# Patient Record
Sex: MALE | Race: WHITE | Employment: UNEMPLOYED | URBAN - METROPOLITAN AREA
[De-identification: names, ages, dates, MRNs, and addresses within clinical notes are randomized per-mention and may not be internally consistent; named-entity substitution may affect disease eponyms.]

---

## 2017-01-01 ENCOUNTER — HOSPICE ADMISSION (OUTPATIENT)
Dept: HOSPICE | Facility: HOSPICE | Age: 72
End: 2017-01-01
Payer: MEDICARE

## 2017-01-01 ENCOUNTER — APPOINTMENT (OUTPATIENT)
Dept: GENERAL RADIOLOGY | Age: 72
DRG: 329 | End: 2017-01-01
Attending: EMERGENCY MEDICINE
Payer: MEDICARE

## 2017-01-01 ENCOUNTER — ANESTHESIA EVENT (OUTPATIENT)
Dept: SURGERY | Age: 72
DRG: 329 | End: 2017-01-01
Payer: MEDICARE

## 2017-01-01 ENCOUNTER — TELEPHONE (OUTPATIENT)
Dept: NEUROLOGY | Age: 72
End: 2017-01-01

## 2017-01-01 ENCOUNTER — APPOINTMENT (OUTPATIENT)
Dept: GENERAL RADIOLOGY | Age: 72
DRG: 329 | End: 2017-01-01
Attending: INTERNAL MEDICINE
Payer: MEDICARE

## 2017-01-01 ENCOUNTER — APPOINTMENT (OUTPATIENT)
Dept: CT IMAGING | Age: 72
DRG: 329 | End: 2017-01-01
Attending: INTERNAL MEDICINE
Payer: MEDICARE

## 2017-01-01 ENCOUNTER — APPOINTMENT (OUTPATIENT)
Dept: GENERAL RADIOLOGY | Age: 72
DRG: 329 | End: 2017-01-01
Attending: SURGERY
Payer: MEDICARE

## 2017-01-01 ENCOUNTER — HOSPITAL ENCOUNTER (INPATIENT)
Age: 72
LOS: 14 days | Discharge: HOSPICE | DRG: 329 | End: 2017-04-24
Attending: EMERGENCY MEDICINE | Admitting: INTERNAL MEDICINE
Payer: MEDICARE

## 2017-01-01 ENCOUNTER — APPOINTMENT (OUTPATIENT)
Dept: INTERVENTIONAL RADIOLOGY/VASCULAR | Age: 72
DRG: 329 | End: 2017-01-01
Attending: INTERNAL MEDICINE
Payer: MEDICARE

## 2017-01-01 ENCOUNTER — HOME CARE VISIT (OUTPATIENT)
Dept: HOSPICE | Facility: HOSPICE | Age: 72
End: 2017-01-01
Payer: MEDICARE

## 2017-01-01 ENCOUNTER — OFFICE VISIT (OUTPATIENT)
Dept: NEUROLOGY | Age: 72
End: 2017-01-01

## 2017-01-01 ENCOUNTER — APPOINTMENT (OUTPATIENT)
Dept: CT IMAGING | Age: 72
DRG: 329 | End: 2017-01-01
Attending: PSYCHIATRY & NEUROLOGY
Payer: MEDICARE

## 2017-01-01 ENCOUNTER — APPOINTMENT (OUTPATIENT)
Dept: GENERAL RADIOLOGY | Age: 72
DRG: 329 | End: 2017-01-01
Attending: RADIOLOGY
Payer: MEDICARE

## 2017-01-01 ENCOUNTER — ANESTHESIA (OUTPATIENT)
Dept: ENDOSCOPY | Age: 72
DRG: 329 | End: 2017-01-01
Payer: MEDICARE

## 2017-01-01 ENCOUNTER — HOSPITAL ENCOUNTER (INPATIENT)
Age: 72
LOS: 4 days | Discharge: HOME HEALTH | DRG: 175 | End: 2017-04-09
Attending: EMERGENCY MEDICINE | Admitting: INTERNAL MEDICINE
Payer: MEDICARE

## 2017-01-01 ENCOUNTER — ANESTHESIA EVENT (OUTPATIENT)
Dept: ENDOSCOPY | Age: 72
DRG: 329 | End: 2017-01-01
Payer: MEDICARE

## 2017-01-01 ENCOUNTER — APPOINTMENT (OUTPATIENT)
Dept: CT IMAGING | Age: 72
DRG: 175 | End: 2017-01-01
Attending: EMERGENCY MEDICINE
Payer: MEDICARE

## 2017-01-01 ENCOUNTER — APPOINTMENT (OUTPATIENT)
Dept: GENERAL RADIOLOGY | Age: 72
DRG: 329 | End: 2017-01-01
Attending: PHYSICIAN ASSISTANT
Payer: MEDICARE

## 2017-01-01 ENCOUNTER — ANESTHESIA (OUTPATIENT)
Dept: SURGERY | Age: 72
DRG: 329 | End: 2017-01-01
Payer: MEDICARE

## 2017-01-01 ENCOUNTER — HOSPITAL ENCOUNTER (INPATIENT)
Age: 72
LOS: 1 days | DRG: 189 | End: 2017-04-24
Attending: FAMILY MEDICINE | Admitting: FAMILY MEDICINE
Payer: OTHER MISCELLANEOUS

## 2017-01-01 ENCOUNTER — TELEPHONE (OUTPATIENT)
Dept: CARDIOLOGY CLINIC | Age: 72
End: 2017-01-01

## 2017-01-01 ENCOUNTER — APPOINTMENT (OUTPATIENT)
Dept: GENERAL RADIOLOGY | Age: 72
DRG: 175 | End: 2017-01-01
Attending: EMERGENCY MEDICINE
Payer: MEDICARE

## 2017-01-01 ENCOUNTER — PATIENT MESSAGE (OUTPATIENT)
Dept: NEUROLOGY | Age: 72
End: 2017-01-01

## 2017-01-01 VITALS
HEART RATE: 81 BPM | WEIGHT: 315 LBS | BODY MASS INDEX: 44.13 KG/M2 | OXYGEN SATURATION: 96 % | DIASTOLIC BLOOD PRESSURE: 70 MMHG | SYSTOLIC BLOOD PRESSURE: 114 MMHG

## 2017-01-01 VITALS
BODY MASS INDEX: 43.2 KG/M2 | DIASTOLIC BLOOD PRESSURE: 92 MMHG | RESPIRATION RATE: 20 BRPM | SYSTOLIC BLOOD PRESSURE: 145 MMHG | HEIGHT: 71 IN | OXYGEN SATURATION: 94 % | TEMPERATURE: 98.4 F | HEART RATE: 90 BPM | WEIGHT: 308.6 LBS

## 2017-01-01 VITALS
SYSTOLIC BLOOD PRESSURE: 152 MMHG | HEIGHT: 71 IN | OXYGEN SATURATION: 92 % | BODY MASS INDEX: 40.68 KG/M2 | HEART RATE: 104 BPM | DIASTOLIC BLOOD PRESSURE: 74 MMHG | WEIGHT: 290.57 LBS | TEMPERATURE: 99 F | RESPIRATION RATE: 22 BRPM

## 2017-01-01 VITALS — RESPIRATION RATE: 20 BRPM | WEIGHT: 315 LBS | HEIGHT: 71 IN | BODY MASS INDEX: 44.1 KG/M2

## 2017-01-01 VITALS
OXYGEN SATURATION: 97 % | DIASTOLIC BLOOD PRESSURE: 82 MMHG | TEMPERATURE: 98.9 F | HEART RATE: 100 BPM | SYSTOLIC BLOOD PRESSURE: 153 MMHG | RESPIRATION RATE: 22 BRPM

## 2017-01-01 VITALS
DIASTOLIC BLOOD PRESSURE: 66 MMHG | OXYGEN SATURATION: 96 % | BODY MASS INDEX: 42.34 KG/M2 | HEART RATE: 102 BPM | WEIGHT: 303.6 LBS | SYSTOLIC BLOOD PRESSURE: 126 MMHG

## 2017-01-01 DIAGNOSIS — C18.7 MALIGNANT NEOPLASM OF SIGMOID COLON (HCC): ICD-10-CM

## 2017-01-01 DIAGNOSIS — G70.01 MYASTHENIA GRAVIS IN CRISIS (HCC): Primary | ICD-10-CM

## 2017-01-01 DIAGNOSIS — K92.2 GASTROINTESTINAL HEMORRHAGE, UNSPECIFIED GASTROINTESTINAL HEMORRHAGE TYPE: Primary | ICD-10-CM

## 2017-01-01 DIAGNOSIS — G70.01 MYASTHENIA GRAVIS WITH EXACERBATION, ADULT FORM (HCC): Chronic | ICD-10-CM

## 2017-01-01 DIAGNOSIS — K65.1 ABSCESS OF ABDOMINAL CAVITY (HCC): ICD-10-CM

## 2017-01-01 DIAGNOSIS — I26.99 OTHER ACUTE PULMONARY EMBOLISM WITHOUT ACUTE COR PULMONALE (HCC): ICD-10-CM

## 2017-01-01 DIAGNOSIS — R06.02 SOB (SHORTNESS OF BREATH): ICD-10-CM

## 2017-01-01 DIAGNOSIS — I82.419 ACUTE DEEP VEIN THROMBOSIS (DVT) OF FEMORAL VEIN, UNSPECIFIED LATERALITY (HCC): ICD-10-CM

## 2017-01-01 DIAGNOSIS — I48.0 PAF (PAROXYSMAL ATRIAL FIBRILLATION) (HCC): Chronic | ICD-10-CM

## 2017-01-01 DIAGNOSIS — J96.00 ACUTE RESPIRATORY FAILURE, UNSPECIFIED WHETHER WITH HYPOXIA OR HYPERCAPNIA (HCC): ICD-10-CM

## 2017-01-01 DIAGNOSIS — G70.01 MYASTHENIA GRAVIS IN CRISIS (HCC): ICD-10-CM

## 2017-01-01 DIAGNOSIS — R00.0 TACHYCARDIA: ICD-10-CM

## 2017-01-01 DIAGNOSIS — G70.01 MYASTHENIA GRAVIS WITH EXACERBATION, ADULT FORM (HCC): Primary | ICD-10-CM

## 2017-01-01 DIAGNOSIS — G70.01 MYASTHENIA GRAVIS WITH EXACERBATION (HCC): Primary | ICD-10-CM

## 2017-01-01 DIAGNOSIS — R91.8 LUNG NODULES: ICD-10-CM

## 2017-01-01 DIAGNOSIS — D62 ACUTE BLOOD LOSS ANEMIA: ICD-10-CM

## 2017-01-01 DIAGNOSIS — E66.01 MORBID OBESITY WITH BMI OF 40.0-44.9, ADULT (HCC): Chronic | ICD-10-CM

## 2017-01-01 DIAGNOSIS — I26.99 PULMONARY INFARCT (HCC): ICD-10-CM

## 2017-01-01 DIAGNOSIS — I26.99 OTHER ACUTE PULMONARY EMBOLISM WITHOUT ACUTE COR PULMONALE (HCC): Primary | ICD-10-CM

## 2017-01-01 LAB
ABO + RH BLD: NORMAL
ABO + RH BLD: NORMAL
ALBUMIN SERPL BCP-MCNC: 1.8 G/DL (ref 3.5–5)
ALBUMIN SERPL BCP-MCNC: 2.1 G/DL (ref 3.5–5)
ALBUMIN SERPL BCP-MCNC: 2.2 G/DL (ref 3.5–5)
ALBUMIN SERPL BCP-MCNC: 2.3 G/DL (ref 3.5–5)
ALBUMIN SERPL BCP-MCNC: 2.5 G/DL (ref 3.5–5)
ALBUMIN SERPL BCP-MCNC: 2.7 G/DL (ref 3.5–5)
ALBUMIN SERPL BCP-MCNC: 3.1 G/DL (ref 3.5–5)
ALBUMIN SERPL-MCNC: 3.5 G/DL (ref 3.6–4.8)
ALBUMIN/GLOB SERPL: 0.7 {RATIO} (ref 1.1–2.2)
ALBUMIN/GLOB SERPL: 0.8 {RATIO} (ref 1.1–2.2)
ALBUMIN/GLOB SERPL: 0.9 {RATIO} (ref 1.1–2.2)
ALBUMIN/GLOB SERPL: 1 {RATIO} (ref 1.1–2.2)
ALBUMIN/GLOB SERPL: 1 {RATIO} (ref 1.1–2.2)
ALP SERPL-CCNC: 40 U/L (ref 45–117)
ALP SERPL-CCNC: 44 U/L (ref 45–117)
ALP SERPL-CCNC: 47 U/L (ref 45–117)
ALP SERPL-CCNC: 48 U/L (ref 45–117)
ALP SERPL-CCNC: 48 U/L (ref 45–117)
ALP SERPL-CCNC: 50 U/L (ref 45–117)
ALP SERPL-CCNC: 54 U/L (ref 45–117)
ALP SERPL-CCNC: 60 IU/L (ref 39–117)
ALP SERPL-CCNC: 68 U/L (ref 45–117)
ALP SERPL-CCNC: 77 U/L (ref 45–117)
ALT SERPL-CCNC: 106 U/L (ref 12–78)
ALT SERPL-CCNC: 16 U/L (ref 12–78)
ALT SERPL-CCNC: 28 IU/L (ref 0–44)
ALT SERPL-CCNC: 29 U/L (ref 12–78)
ALT SERPL-CCNC: 31 U/L (ref 12–78)
ALT SERPL-CCNC: 32 U/L (ref 12–78)
ALT SERPL-CCNC: 32 U/L (ref 12–78)
ALT SERPL-CCNC: 33 U/L (ref 12–78)
ALT SERPL-CCNC: 41 U/L (ref 12–78)
ALT SERPL-CCNC: 73 U/L (ref 12–78)
ANION GAP BLD CALC-SCNC: 10 MMOL/L (ref 5–15)
ANION GAP BLD CALC-SCNC: 11 MMOL/L (ref 5–15)
ANION GAP BLD CALC-SCNC: 11 MMOL/L (ref 5–15)
ANION GAP BLD CALC-SCNC: 13 MMOL/L (ref 5–15)
ANION GAP BLD CALC-SCNC: 5 MMOL/L (ref 5–15)
ANION GAP BLD CALC-SCNC: 5 MMOL/L (ref 5–15)
ANION GAP BLD CALC-SCNC: 6 MMOL/L (ref 5–15)
ANION GAP BLD CALC-SCNC: 7 MMOL/L (ref 5–15)
ANION GAP BLD CALC-SCNC: 8 MMOL/L (ref 5–15)
ANION GAP BLD CALC-SCNC: 9 MMOL/L (ref 5–15)
APPEARANCE UR: CLEAR
APPEARANCE UR: CLEAR
APTT PPP: 24.5 SEC (ref 22.1–32.5)
ARTERIAL PATENCY WRIST A: YES
AST SERPL W P-5'-P-CCNC: 16 U/L (ref 15–37)
AST SERPL W P-5'-P-CCNC: 16 U/L (ref 15–37)
AST SERPL W P-5'-P-CCNC: 18 U/L (ref 15–37)
AST SERPL W P-5'-P-CCNC: 19 U/L (ref 15–37)
AST SERPL W P-5'-P-CCNC: 21 U/L (ref 15–37)
AST SERPL W P-5'-P-CCNC: 22 U/L (ref 15–37)
AST SERPL W P-5'-P-CCNC: 27 U/L (ref 15–37)
AST SERPL W P-5'-P-CCNC: 29 U/L (ref 15–37)
AST SERPL W P-5'-P-CCNC: 44 U/L (ref 15–37)
AST SERPL-CCNC: 22 IU/L (ref 0–40)
AT III PPP CHRO-ACNC: 144 % (ref 75–135)
ATRIAL RATE: 127 BPM
ATRIAL RATE: 166 BPM
ATRIAL RATE: 67 BPM
BACTERIA SPEC CULT: NORMAL
BACTERIA SPEC CULT: NORMAL
BACTERIA URNS QL MICRO: NEGATIVE /HPF
BACTERIA URNS QL MICRO: NEGATIVE /HPF
BASE EXCESS BLDA CALC-SCNC: 4.7 MMOL/L
BASE EXCESS BLDA CALC-SCNC: 6.6 MMOL/L
BASE EXCESS BLDA CALC-SCNC: 6.7 MMOL/L
BASOPHILS # BLD AUTO: 0 K/UL (ref 0–0.1)
BASOPHILS # BLD AUTO: 0 X10E3/UL (ref 0–0.2)
BASOPHILS # BLD AUTO: 0 X10E3/UL (ref 0–0.2)
BASOPHILS # BLD AUTO: 0.1 K/UL (ref 0–0.1)
BASOPHILS # BLD: 0 % (ref 0–1)
BASOPHILS # BLD: 1 % (ref 0–1)
BASOPHILS NFR BLD AUTO: 0 %
BASOPHILS NFR BLD AUTO: 0 %
BDY SITE: ABNORMAL
BILIRUB DIRECT SERPL-MCNC: 0.1 MG/DL (ref 0–0.2)
BILIRUB DIRECT SERPL-MCNC: 0.12 MG/DL (ref 0–0.4)
BILIRUB SERPL-MCNC: 0.3 MG/DL (ref 0–1.2)
BILIRUB SERPL-MCNC: 0.4 MG/DL (ref 0.2–1)
BILIRUB SERPL-MCNC: 0.4 MG/DL (ref 0.2–1)
BILIRUB SERPL-MCNC: 0.5 MG/DL (ref 0.2–1)
BILIRUB SERPL-MCNC: 0.6 MG/DL (ref 0.2–1)
BILIRUB SERPL-MCNC: 0.6 MG/DL (ref 0.2–1)
BILIRUB UR QL: NEGATIVE
BILIRUB UR QL: NEGATIVE
BLD PROD TYP BPU: NORMAL
BLOOD GROUP ANTIBODIES SERPL: NORMAL
BLOOD GROUP ANTIBODIES SERPL: NORMAL
BNP SERPL-MCNC: 1201 PG/ML (ref 0–125)
BNP SERPL-MCNC: 759 PG/ML (ref 0–125)
BNP SERPL-MCNC: 794 PG/ML (ref 0–125)
BPU ID: NORMAL
BUN SERPL-MCNC: 10 MG/DL (ref 6–20)
BUN SERPL-MCNC: 12 MG/DL (ref 6–20)
BUN SERPL-MCNC: 14 MG/DL (ref 6–20)
BUN SERPL-MCNC: 16 MG/DL (ref 6–20)
BUN SERPL-MCNC: 18 MG/DL (ref 6–20)
BUN SERPL-MCNC: 19 MG/DL (ref 6–20)
BUN SERPL-MCNC: 21 MG/DL (ref 6–20)
BUN SERPL-MCNC: 25 MG/DL (ref 6–20)
BUN SERPL-MCNC: 5 MG/DL (ref 6–20)
BUN SERPL-MCNC: 6 MG/DL (ref 6–20)
BUN SERPL-MCNC: 6 MG/DL (ref 6–20)
BUN SERPL-MCNC: 7 MG/DL (ref 6–20)
BUN SERPL-MCNC: 8 MG/DL (ref 6–20)
BUN SERPL-MCNC: 9 MG/DL (ref 6–20)
BUN/CREAT SERPL: 10 (ref 12–20)
BUN/CREAT SERPL: 11 (ref 12–20)
BUN/CREAT SERPL: 11 (ref 12–20)
BUN/CREAT SERPL: 12 (ref 12–20)
BUN/CREAT SERPL: 13 (ref 12–20)
BUN/CREAT SERPL: 13 (ref 12–20)
BUN/CREAT SERPL: 15 (ref 12–20)
BUN/CREAT SERPL: 16 (ref 12–20)
BUN/CREAT SERPL: 19 (ref 12–20)
BUN/CREAT SERPL: 20 (ref 12–20)
BUN/CREAT SERPL: 7 (ref 12–20)
BUN/CREAT SERPL: 8 (ref 12–20)
BUN/CREAT SERPL: 8 (ref 12–20)
CALCIUM SERPL-MCNC: 7.4 MG/DL (ref 8.5–10.1)
CALCIUM SERPL-MCNC: 7.5 MG/DL (ref 8.5–10.1)
CALCIUM SERPL-MCNC: 7.6 MG/DL (ref 8.5–10.1)
CALCIUM SERPL-MCNC: 7.7 MG/DL (ref 8.5–10.1)
CALCIUM SERPL-MCNC: 7.9 MG/DL (ref 8.5–10.1)
CALCIUM SERPL-MCNC: 8 MG/DL (ref 8.5–10.1)
CALCIUM SERPL-MCNC: 8.1 MG/DL (ref 8.5–10.1)
CALCIUM SERPL-MCNC: 8.1 MG/DL (ref 8.5–10.1)
CALCIUM SERPL-MCNC: 8.2 MG/DL (ref 8.5–10.1)
CALCIUM SERPL-MCNC: 8.3 MG/DL (ref 8.5–10.1)
CALCIUM SERPL-MCNC: 8.3 MG/DL (ref 8.5–10.1)
CALCIUM SERPL-MCNC: 8.4 MG/DL (ref 8.5–10.1)
CALCIUM SERPL-MCNC: 8.4 MG/DL (ref 8.5–10.1)
CALCIUM SERPL-MCNC: 8.5 MG/DL (ref 8.5–10.1)
CALCIUM SERPL-MCNC: 8.6 MG/DL (ref 8.5–10.1)
CALCIUM SERPL-MCNC: 8.7 MG/DL (ref 8.5–10.1)
CALCIUM SERPL-MCNC: 8.8 MG/DL (ref 8.5–10.1)
CALCIUM SERPL-MCNC: 9.3 MG/DL (ref 8.5–10.1)
CALCULATED R AXIS, ECG10: 21 DEGREES
CALCULATED R AXIS, ECG10: 35 DEGREES
CALCULATED R AXIS, ECG10: 45 DEGREES
CALCULATED T AXIS, ECG11: -116 DEGREES
CALCULATED T AXIS, ECG11: -163 DEGREES
CALCULATED T AXIS, ECG11: 39 DEGREES
CARDIOLIPIN IGA SER IA-ACNC: <9 APL U/ML (ref 0–11)
CARDIOLIPIN IGG SER IA-ACNC: <9 GPL U/ML (ref 0–14)
CARDIOLIPIN IGM SER IA-ACNC: <9 MPL U/ML (ref 0–12)
CHLORIDE SERPL-SCNC: 100 MMOL/L (ref 97–108)
CHLORIDE SERPL-SCNC: 101 MMOL/L (ref 97–108)
CHLORIDE SERPL-SCNC: 102 MMOL/L (ref 97–108)
CHLORIDE SERPL-SCNC: 103 MMOL/L (ref 97–108)
CHLORIDE SERPL-SCNC: 104 MMOL/L (ref 97–108)
CHLORIDE SERPL-SCNC: 105 MMOL/L (ref 97–108)
CHLORIDE SERPL-SCNC: 106 MMOL/L (ref 97–108)
CHLORIDE SERPL-SCNC: 109 MMOL/L (ref 97–108)
CHLORIDE SERPL-SCNC: 111 MMOL/L (ref 97–108)
CHOLEST SERPL-MCNC: 151 MG/DL
CK MB CFR SERPL CALC: ABNORMAL % (ref 0–2.5)
CK MB SERPL-MCNC: <1 NG/ML (ref 5–25)
CK SERPL-CCNC: 34 U/L (ref 39–308)
CO2 SERPL-SCNC: 22 MMOL/L (ref 21–32)
CO2 SERPL-SCNC: 24 MMOL/L (ref 21–32)
CO2 SERPL-SCNC: 24 MMOL/L (ref 21–32)
CO2 SERPL-SCNC: 25 MMOL/L (ref 21–32)
CO2 SERPL-SCNC: 25 MMOL/L (ref 21–32)
CO2 SERPL-SCNC: 28 MMOL/L (ref 21–32)
CO2 SERPL-SCNC: 29 MMOL/L (ref 21–32)
CO2 SERPL-SCNC: 29 MMOL/L (ref 21–32)
CO2 SERPL-SCNC: 30 MMOL/L (ref 21–32)
CO2 SERPL-SCNC: 31 MMOL/L (ref 21–32)
CO2 SERPL-SCNC: 32 MMOL/L (ref 21–32)
CO2 SERPL-SCNC: 33 MMOL/L (ref 21–32)
CO2 SERPL-SCNC: 33 MMOL/L (ref 21–32)
CO2 SERPL-SCNC: 35 MMOL/L (ref 21–32)
COLOR UR: ABNORMAL
COLOR UR: ABNORMAL
COMMENT, 511217: NORMAL
CREAT SERPL-MCNC: 0.61 MG/DL (ref 0.7–1.3)
CREAT SERPL-MCNC: 0.62 MG/DL (ref 0.7–1.3)
CREAT SERPL-MCNC: 0.66 MG/DL (ref 0.7–1.3)
CREAT SERPL-MCNC: 0.69 MG/DL (ref 0.7–1.3)
CREAT SERPL-MCNC: 0.7 MG/DL (ref 0.7–1.3)
CREAT SERPL-MCNC: 0.71 MG/DL (ref 0.7–1.3)
CREAT SERPL-MCNC: 0.71 MG/DL (ref 0.7–1.3)
CREAT SERPL-MCNC: 0.72 MG/DL (ref 0.7–1.3)
CREAT SERPL-MCNC: 0.74 MG/DL (ref 0.7–1.3)
CREAT SERPL-MCNC: 0.75 MG/DL (ref 0.7–1.3)
CREAT SERPL-MCNC: 0.77 MG/DL (ref 0.7–1.3)
CREAT SERPL-MCNC: 0.79 MG/DL (ref 0.7–1.3)
CREAT SERPL-MCNC: 0.81 MG/DL (ref 0.7–1.3)
CREAT SERPL-MCNC: 0.85 MG/DL (ref 0.7–1.3)
CREAT SERPL-MCNC: 0.89 MG/DL (ref 0.7–1.3)
CREAT SERPL-MCNC: 0.9 MG/DL (ref 0.7–1.3)
CREAT SERPL-MCNC: 0.97 MG/DL (ref 0.7–1.3)
CREAT SERPL-MCNC: 1.09 MG/DL (ref 0.7–1.3)
CREAT SERPL-MCNC: 1.1 MG/DL (ref 0.7–1.3)
CREAT SERPL-MCNC: 1.24 MG/DL (ref 0.7–1.3)
CREAT SERPL-MCNC: 1.3 MG/DL (ref 0.7–1.3)
CREATININE, RANDOM U, 070016: 1.45 G/L (ref 0.3–3)
CROSSMATCH RESULT,%XM: NORMAL
DAILY QC (YES/NO)?: YES
DATE LAST DOSE: ABNORMAL
DIAGNOSIS, 93000: NORMAL
DIFFERENTIAL METHOD BLD: ABNORMAL
DRVVT MIX, 117894: 41.8 SEC (ref 0–44)
EOSINOPHIL # BLD AUTO: 0 X10E3/UL (ref 0–0.4)
EOSINOPHIL # BLD AUTO: 0 X10E3/UL (ref 0–0.4)
EOSINOPHIL # BLD: 0 K/UL (ref 0–0.4)
EOSINOPHIL # BLD: 0.1 K/UL (ref 0–0.4)
EOSINOPHIL # BLD: 0.3 K/UL (ref 0–0.4)
EOSINOPHIL NFR BLD AUTO: 0 %
EOSINOPHIL NFR BLD AUTO: 0 %
EOSINOPHIL NFR BLD: 0 % (ref 0–7)
EOSINOPHIL NFR BLD: 1 % (ref 0–7)
EOSINOPHIL NFR BLD: 3 % (ref 0–7)
EPITH CASTS URNS QL MICRO: ABNORMAL /LPF
EPITH CASTS URNS QL MICRO: ABNORMAL /LPF
ERYTHROCYTE [DISTWIDTH] IN BLOOD BY AUTOMATED COUNT: 14.9 % (ref 11.5–14.5)
ERYTHROCYTE [DISTWIDTH] IN BLOOD BY AUTOMATED COUNT: 15 % (ref 11.5–14.5)
ERYTHROCYTE [DISTWIDTH] IN BLOOD BY AUTOMATED COUNT: 15.1 % (ref 12.3–15.4)
ERYTHROCYTE [DISTWIDTH] IN BLOOD BY AUTOMATED COUNT: 15.2 % (ref 11.5–14.5)
ERYTHROCYTE [DISTWIDTH] IN BLOOD BY AUTOMATED COUNT: 15.3 % (ref 11.5–14.5)
ERYTHROCYTE [DISTWIDTH] IN BLOOD BY AUTOMATED COUNT: 15.3 % (ref 11.5–14.5)
ERYTHROCYTE [DISTWIDTH] IN BLOOD BY AUTOMATED COUNT: 15.5 % (ref 12.3–15.4)
ERYTHROCYTE [DISTWIDTH] IN BLOOD BY AUTOMATED COUNT: 15.6 % (ref 11.5–14.5)
ERYTHROCYTE [DISTWIDTH] IN BLOOD BY AUTOMATED COUNT: 15.6 % (ref 11.5–14.5)
ERYTHROCYTE [DISTWIDTH] IN BLOOD BY AUTOMATED COUNT: 16.3 % (ref 11.5–14.5)
ERYTHROCYTE [DISTWIDTH] IN BLOOD BY AUTOMATED COUNT: 17.2 % (ref 11.5–14.5)
ERYTHROCYTE [DISTWIDTH] IN BLOOD BY AUTOMATED COUNT: 17.4 % (ref 11.5–14.5)
ERYTHROCYTE [DISTWIDTH] IN BLOOD BY AUTOMATED COUNT: 17.6 % (ref 11.5–14.5)
ERYTHROCYTE [DISTWIDTH] IN BLOOD BY AUTOMATED COUNT: 17.8 % (ref 11.5–14.5)
ERYTHROCYTE [DISTWIDTH] IN BLOOD BY AUTOMATED COUNT: 17.9 % (ref 11.5–14.5)
ERYTHROCYTE [DISTWIDTH] IN BLOOD BY AUTOMATED COUNT: 17.9 % (ref 11.5–14.5)
ERYTHROCYTE [DISTWIDTH] IN BLOOD BY AUTOMATED COUNT: 18 % (ref 11.5–14.5)
ERYTHROCYTE [DISTWIDTH] IN BLOOD BY AUTOMATED COUNT: 18.3 % (ref 11.5–14.5)
ERYTHROCYTE [DISTWIDTH] IN BLOOD BY AUTOMATED COUNT: 18.4 % (ref 11.5–14.5)
ERYTHROCYTE [DISTWIDTH] IN BLOOD BY AUTOMATED COUNT: 18.4 % (ref 11.5–14.5)
EST. AVERAGE GLUCOSE BLD GHB EST-MCNC: 160 MG/DL
F5 GENE MUT ANL BLD/T: NORMAL
FERRITIN SERPL-MCNC: 235 NG/ML (ref 26–388)
GAS FLOW.O2 O2 DELIVERY SYS: 4 L/MIN
GAS FLOW.O2 O2 DELIVERY SYS: 6 L/MIN
GAS FLOW.O2 O2 DELIVERY SYS: 6 L/MIN
GLOBULIN SER CALC-MCNC: 2.4 G/DL (ref 2–4)
GLOBULIN SER CALC-MCNC: 2.4 G/DL (ref 2–4)
GLOBULIN SER CALC-MCNC: 2.5 G/DL (ref 2–4)
GLOBULIN SER CALC-MCNC: 2.6 G/DL (ref 2–4)
GLOBULIN SER CALC-MCNC: 2.7 G/DL (ref 2–4)
GLOBULIN SER CALC-MCNC: 2.8 G/DL (ref 2–4)
GLOBULIN SER CALC-MCNC: 3.7 G/DL (ref 2–4)
GLUCOSE BLD STRIP.AUTO-MCNC: 101 MG/DL (ref 65–100)
GLUCOSE BLD STRIP.AUTO-MCNC: 106 MG/DL (ref 65–100)
GLUCOSE BLD STRIP.AUTO-MCNC: 106 MG/DL (ref 65–100)
GLUCOSE BLD STRIP.AUTO-MCNC: 107 MG/DL (ref 65–100)
GLUCOSE BLD STRIP.AUTO-MCNC: 107 MG/DL (ref 65–100)
GLUCOSE BLD STRIP.AUTO-MCNC: 108 MG/DL (ref 65–100)
GLUCOSE BLD STRIP.AUTO-MCNC: 115 MG/DL (ref 65–100)
GLUCOSE BLD STRIP.AUTO-MCNC: 123 MG/DL (ref 65–100)
GLUCOSE BLD STRIP.AUTO-MCNC: 123 MG/DL (ref 65–100)
GLUCOSE BLD STRIP.AUTO-MCNC: 124 MG/DL (ref 65–100)
GLUCOSE BLD STRIP.AUTO-MCNC: 126 MG/DL (ref 65–100)
GLUCOSE BLD STRIP.AUTO-MCNC: 128 MG/DL (ref 65–100)
GLUCOSE BLD STRIP.AUTO-MCNC: 129 MG/DL (ref 65–100)
GLUCOSE BLD STRIP.AUTO-MCNC: 131 MG/DL (ref 65–100)
GLUCOSE BLD STRIP.AUTO-MCNC: 132 MG/DL (ref 65–100)
GLUCOSE BLD STRIP.AUTO-MCNC: 134 MG/DL (ref 65–100)
GLUCOSE BLD STRIP.AUTO-MCNC: 134 MG/DL (ref 65–100)
GLUCOSE BLD STRIP.AUTO-MCNC: 135 MG/DL (ref 65–100)
GLUCOSE BLD STRIP.AUTO-MCNC: 136 MG/DL (ref 65–100)
GLUCOSE BLD STRIP.AUTO-MCNC: 140 MG/DL (ref 65–100)
GLUCOSE BLD STRIP.AUTO-MCNC: 140 MG/DL (ref 65–100)
GLUCOSE BLD STRIP.AUTO-MCNC: 141 MG/DL (ref 65–100)
GLUCOSE BLD STRIP.AUTO-MCNC: 141 MG/DL (ref 65–100)
GLUCOSE BLD STRIP.AUTO-MCNC: 142 MG/DL (ref 65–100)
GLUCOSE BLD STRIP.AUTO-MCNC: 143 MG/DL (ref 65–100)
GLUCOSE BLD STRIP.AUTO-MCNC: 143 MG/DL (ref 65–100)
GLUCOSE BLD STRIP.AUTO-MCNC: 145 MG/DL (ref 65–100)
GLUCOSE BLD STRIP.AUTO-MCNC: 148 MG/DL (ref 65–100)
GLUCOSE BLD STRIP.AUTO-MCNC: 149 MG/DL (ref 65–100)
GLUCOSE BLD STRIP.AUTO-MCNC: 151 MG/DL (ref 65–100)
GLUCOSE BLD STRIP.AUTO-MCNC: 152 MG/DL (ref 65–100)
GLUCOSE BLD STRIP.AUTO-MCNC: 154 MG/DL (ref 65–100)
GLUCOSE BLD STRIP.AUTO-MCNC: 154 MG/DL (ref 65–100)
GLUCOSE BLD STRIP.AUTO-MCNC: 155 MG/DL (ref 65–100)
GLUCOSE BLD STRIP.AUTO-MCNC: 159 MG/DL (ref 65–100)
GLUCOSE BLD STRIP.AUTO-MCNC: 161 MG/DL (ref 65–100)
GLUCOSE BLD STRIP.AUTO-MCNC: 162 MG/DL (ref 65–100)
GLUCOSE BLD STRIP.AUTO-MCNC: 163 MG/DL (ref 65–100)
GLUCOSE BLD STRIP.AUTO-MCNC: 163 MG/DL (ref 65–100)
GLUCOSE BLD STRIP.AUTO-MCNC: 164 MG/DL (ref 65–100)
GLUCOSE BLD STRIP.AUTO-MCNC: 165 MG/DL (ref 65–100)
GLUCOSE BLD STRIP.AUTO-MCNC: 167 MG/DL (ref 65–100)
GLUCOSE BLD STRIP.AUTO-MCNC: 168 MG/DL (ref 65–100)
GLUCOSE BLD STRIP.AUTO-MCNC: 169 MG/DL (ref 65–100)
GLUCOSE BLD STRIP.AUTO-MCNC: 169 MG/DL (ref 65–100)
GLUCOSE BLD STRIP.AUTO-MCNC: 170 MG/DL (ref 65–100)
GLUCOSE BLD STRIP.AUTO-MCNC: 170 MG/DL (ref 65–100)
GLUCOSE BLD STRIP.AUTO-MCNC: 172 MG/DL (ref 65–100)
GLUCOSE BLD STRIP.AUTO-MCNC: 174 MG/DL (ref 65–100)
GLUCOSE BLD STRIP.AUTO-MCNC: 176 MG/DL (ref 65–100)
GLUCOSE BLD STRIP.AUTO-MCNC: 177 MG/DL (ref 65–100)
GLUCOSE BLD STRIP.AUTO-MCNC: 179 MG/DL (ref 65–100)
GLUCOSE BLD STRIP.AUTO-MCNC: 179 MG/DL (ref 65–100)
GLUCOSE BLD STRIP.AUTO-MCNC: 181 MG/DL (ref 65–100)
GLUCOSE BLD STRIP.AUTO-MCNC: 184 MG/DL (ref 65–100)
GLUCOSE BLD STRIP.AUTO-MCNC: 194 MG/DL (ref 65–100)
GLUCOSE BLD STRIP.AUTO-MCNC: 195 MG/DL (ref 65–100)
GLUCOSE BLD STRIP.AUTO-MCNC: 196 MG/DL (ref 65–100)
GLUCOSE BLD STRIP.AUTO-MCNC: 198 MG/DL (ref 65–100)
GLUCOSE BLD STRIP.AUTO-MCNC: 202 MG/DL (ref 65–100)
GLUCOSE BLD STRIP.AUTO-MCNC: 204 MG/DL (ref 65–100)
GLUCOSE BLD STRIP.AUTO-MCNC: 212 MG/DL (ref 65–100)
GLUCOSE BLD STRIP.AUTO-MCNC: 213 MG/DL (ref 65–100)
GLUCOSE BLD STRIP.AUTO-MCNC: 74 MG/DL (ref 65–100)
GLUCOSE BLD STRIP.AUTO-MCNC: 91 MG/DL (ref 65–100)
GLUCOSE BLD STRIP.AUTO-MCNC: 92 MG/DL (ref 65–100)
GLUCOSE BLD STRIP.AUTO-MCNC: 93 MG/DL (ref 65–100)
GLUCOSE BLD STRIP.AUTO-MCNC: 96 MG/DL (ref 65–100)
GLUCOSE SERPL-MCNC: 101 MG/DL (ref 65–100)
GLUCOSE SERPL-MCNC: 101 MG/DL (ref 65–100)
GLUCOSE SERPL-MCNC: 104 MG/DL (ref 65–100)
GLUCOSE SERPL-MCNC: 104 MG/DL (ref 65–100)
GLUCOSE SERPL-MCNC: 107 MG/DL (ref 65–100)
GLUCOSE SERPL-MCNC: 110 MG/DL (ref 65–100)
GLUCOSE SERPL-MCNC: 111 MG/DL (ref 65–100)
GLUCOSE SERPL-MCNC: 120 MG/DL (ref 65–100)
GLUCOSE SERPL-MCNC: 123 MG/DL (ref 65–100)
GLUCOSE SERPL-MCNC: 128 MG/DL (ref 65–100)
GLUCOSE SERPL-MCNC: 129 MG/DL (ref 65–100)
GLUCOSE SERPL-MCNC: 131 MG/DL (ref 65–100)
GLUCOSE SERPL-MCNC: 137 MG/DL (ref 65–100)
GLUCOSE SERPL-MCNC: 137 MG/DL (ref 65–100)
GLUCOSE SERPL-MCNC: 144 MG/DL (ref 65–100)
GLUCOSE SERPL-MCNC: 147 MG/DL (ref 65–100)
GLUCOSE SERPL-MCNC: 158 MG/DL (ref 65–100)
GLUCOSE SERPL-MCNC: 91 MG/DL (ref 65–100)
GLUCOSE SERPL-MCNC: 93 MG/DL (ref 65–100)
GLUCOSE SERPL-MCNC: 93 MG/DL (ref 65–100)
GLUCOSE UR STRIP.AUTO-MCNC: NEGATIVE MG/DL
GLUCOSE UR STRIP.AUTO-MCNC: NEGATIVE MG/DL
HBA1C MFR BLD: 7.2 % (ref 4.2–6.3)
HCO3 BLDA-SCNC: 29 MMOL/L (ref 22–26)
HCO3 BLDA-SCNC: 31 MMOL/L (ref 22–26)
HCO3 BLDA-SCNC: 31 MMOL/L (ref 22–26)
HCT VFR BLD AUTO: 22.4 % (ref 36.6–50.3)
HCT VFR BLD AUTO: 24.6 % (ref 36.6–50.3)
HCT VFR BLD AUTO: 24.9 % (ref 36.6–50.3)
HCT VFR BLD AUTO: 25.2 % (ref 36.6–50.3)
HCT VFR BLD AUTO: 25.4 % (ref 36.6–50.3)
HCT VFR BLD AUTO: 25.8 % (ref 36.6–50.3)
HCT VFR BLD AUTO: 26.1 % (ref 36.6–50.3)
HCT VFR BLD AUTO: 26.5 % (ref 36.6–50.3)
HCT VFR BLD AUTO: 26.7 % (ref 36.6–50.3)
HCT VFR BLD AUTO: 27.1 % (ref 36.6–50.3)
HCT VFR BLD AUTO: 27.6 % (ref 36.6–50.3)
HCT VFR BLD AUTO: 28 % (ref 36.6–50.3)
HCT VFR BLD AUTO: 28.7 % (ref 36.6–50.3)
HCT VFR BLD AUTO: 29.6 % (ref 36.6–50.3)
HCT VFR BLD AUTO: 34.6 % (ref 36.6–50.3)
HCT VFR BLD AUTO: 34.6 % (ref 36.6–50.3)
HCT VFR BLD AUTO: 35.3 % (ref 36.6–50.3)
HCT VFR BLD AUTO: 36.1 % (ref 36.6–50.3)
HCT VFR BLD AUTO: 37.9 % (ref 36.6–50.3)
HCT VFR BLD AUTO: 42.8 % (ref 36.6–50.3)
HCT VFR BLD AUTO: 43.3 % (ref 36.6–50.3)
HCT VFR BLD AUTO: 44.3 % (ref 36.6–50.3)
HCT VFR BLD AUTO: 44.6 % (ref 36.6–50.3)
HCT VFR BLD AUTO: 45.3 % (ref 36.6–50.3)
HCT VFR BLD AUTO: 45.9 % (ref 37.5–51)
HCT VFR BLD AUTO: 46.7 % (ref 37.5–51)
HCT VFR BLD AUTO: 51.3 % (ref 36.6–50.3)
HCYS UR-MCNC: 11.1 UMOL/L (ref 0.4–13)
HDLC SERPL-MCNC: 45 MG/DL
HDLC SERPL: 3.4 {RATIO} (ref 0–5)
HGB BLD-MCNC: 10.5 G/DL (ref 12.1–17)
HGB BLD-MCNC: 11.3 G/DL (ref 12.1–17)
HGB BLD-MCNC: 11.4 G/DL (ref 12.1–17)
HGB BLD-MCNC: 12.2 G/DL (ref 12.1–17)
HGB BLD-MCNC: 12.4 G/DL (ref 12.1–17)
HGB BLD-MCNC: 12.6 G/DL (ref 12.1–17)
HGB BLD-MCNC: 13.3 G/DL (ref 12.1–17)
HGB BLD-MCNC: 13.9 G/DL (ref 12.1–17)
HGB BLD-MCNC: 14.1 G/DL (ref 12.1–17)
HGB BLD-MCNC: 14.4 G/DL (ref 12.1–17)
HGB BLD-MCNC: 14.4 G/DL (ref 12.1–17)
HGB BLD-MCNC: 15 G/DL (ref 12.6–17.7)
HGB BLD-MCNC: 15.6 G/DL (ref 12.6–17.7)
HGB BLD-MCNC: 15.9 G/DL (ref 12.1–17)
HGB BLD-MCNC: 7.2 G/DL (ref 12.1–17)
HGB BLD-MCNC: 7.7 G/DL (ref 12.1–17)
HGB BLD-MCNC: 7.8 G/DL (ref 12.1–17)
HGB BLD-MCNC: 7.8 G/DL (ref 12.1–17)
HGB BLD-MCNC: 8 G/DL (ref 12.1–17)
HGB BLD-MCNC: 8 G/DL (ref 12.1–17)
HGB BLD-MCNC: 8.3 G/DL (ref 12.1–17)
HGB BLD-MCNC: 8.4 G/DL (ref 12.1–17)
HGB BLD-MCNC: 8.5 G/DL (ref 12.1–17)
HGB BLD-MCNC: 8.7 G/DL (ref 12.1–17)
HGB BLD-MCNC: 8.9 G/DL (ref 12.1–17)
HGB BLD-MCNC: 9 G/DL (ref 12.1–17)
HGB BLD-MCNC: 9.1 G/DL (ref 12.1–17)
HGB BLD-MCNC: 9.5 G/DL (ref 12.1–17)
HGB BLD-MCNC: 9.7 G/DL (ref 12.1–17)
HGB BLD-MCNC: 9.8 G/DL (ref 12.1–17)
HGB UR QL STRIP: ABNORMAL
HGB UR QL STRIP: NEGATIVE
HOMOCYSTEINE-NORM, 716443: 0.9 UMOL/MMOL CR (ref 0.1–0.8)
HYALINE CASTS URNS QL MICRO: ABNORMAL /LPF (ref 0–5)
HYALINE CASTS URNS QL MICRO: ABNORMAL /LPF (ref 0–5)
IMM GRANULOCYTES # BLD: 0.1 X10E3/UL (ref 0–0.1)
IMM GRANULOCYTES # BLD: 0.3 X10E3/UL (ref 0–0.1)
IMM GRANULOCYTES NFR BLD: 1 %
IMM GRANULOCYTES NFR BLD: 2 %
INR PPP: 1 (ref 0.9–1.1)
INR PPP: 1.1 (ref 0.9–1.1)
IRON SATN MFR SERPL: 17 % (ref 20–50)
IRON SERPL-MCNC: 34 UG/DL (ref 35–150)
KETONES UR QL STRIP.AUTO: 40 MG/DL
KETONES UR QL STRIP.AUTO: NEGATIVE MG/DL
LA PPP-IMP: ABNORMAL
LACTATE SERPL-SCNC: 1.1 MMOL/L (ref 0.4–2)
LACTATE SERPL-SCNC: 2.4 MMOL/L (ref 0.4–2)
LACTATE SERPL-SCNC: 2.4 MMOL/L (ref 0.4–2)
LDLC SERPL CALC-MCNC: 85 MG/DL (ref 0–100)
LEUKOCYTE ESTERASE UR QL STRIP.AUTO: NEGATIVE
LEUKOCYTE ESTERASE UR QL STRIP.AUTO: NEGATIVE
LIPASE SERPL-CCNC: 121 U/L (ref 73–393)
LIPID PROFILE,FLP: NORMAL
LYMPHOCYTES # BLD AUTO: 0.3 X10E3/UL (ref 0.7–3.1)
LYMPHOCYTES # BLD AUTO: 0.4 X10E3/UL (ref 0.7–3.1)
LYMPHOCYTES # BLD AUTO: 12 % (ref 12–49)
LYMPHOCYTES # BLD AUTO: 3 % (ref 12–49)
LYMPHOCYTES # BLD AUTO: 4 % (ref 12–49)
LYMPHOCYTES # BLD AUTO: 5 % (ref 12–49)
LYMPHOCYTES # BLD AUTO: 5 % (ref 12–49)
LYMPHOCYTES # BLD AUTO: 6 % (ref 12–49)
LYMPHOCYTES # BLD AUTO: 7 % (ref 12–49)
LYMPHOCYTES # BLD AUTO: 7 % (ref 12–49)
LYMPHOCYTES # BLD AUTO: 9 % (ref 12–49)
LYMPHOCYTES # BLD AUTO: 9 % (ref 12–49)
LYMPHOCYTES # BLD: 0.3 K/UL (ref 0.8–3.5)
LYMPHOCYTES # BLD: 0.4 K/UL (ref 0.8–3.5)
LYMPHOCYTES # BLD: 0.6 K/UL (ref 0.8–3.5)
LYMPHOCYTES # BLD: 0.8 K/UL (ref 0.8–3.5)
LYMPHOCYTES # BLD: 0.9 K/UL (ref 0.8–3.5)
LYMPHOCYTES # BLD: 1 K/UL (ref 0.8–3.5)
LYMPHOCYTES # BLD: 1.1 K/UL (ref 0.8–3.5)
LYMPHOCYTES # BLD: 1.2 K/UL (ref 0.8–3.5)
LYMPHOCYTES # BLD: 1.4 K/UL (ref 0.8–3.5)
LYMPHOCYTES NFR BLD AUTO: 3 %
LYMPHOCYTES NFR BLD AUTO: 3 %
MAGNESIUM SERPL-MCNC: 1.7 MG/DL (ref 1.6–2.4)
MAGNESIUM SERPL-MCNC: 1.7 MG/DL (ref 1.6–2.4)
MAGNESIUM SERPL-MCNC: 1.8 MG/DL (ref 1.6–2.4)
MAGNESIUM SERPL-MCNC: 1.9 MG/DL (ref 1.6–2.4)
MAGNESIUM SERPL-MCNC: 2 MG/DL (ref 1.6–2.4)
MAGNESIUM SERPL-MCNC: 2.1 MG/DL (ref 1.6–2.4)
MAGNESIUM SERPL-MCNC: 2.1 MG/DL (ref 1.6–2.4)
MAGNESIUM SERPL-MCNC: 2.2 MG/DL (ref 1.6–2.4)
MCH RBC QN AUTO: 30.1 PG (ref 26–34)
MCH RBC QN AUTO: 30.2 PG (ref 26–34)
MCH RBC QN AUTO: 30.4 PG (ref 26–34)
MCH RBC QN AUTO: 30.5 PG (ref 26–34)
MCH RBC QN AUTO: 30.6 PG (ref 26–34)
MCH RBC QN AUTO: 30.6 PG (ref 26–34)
MCH RBC QN AUTO: 30.8 PG (ref 26–34)
MCH RBC QN AUTO: 30.8 PG (ref 26–34)
MCH RBC QN AUTO: 30.9 PG (ref 26–34)
MCH RBC QN AUTO: 30.9 PG (ref 26–34)
MCH RBC QN AUTO: 31 PG (ref 26–34)
MCH RBC QN AUTO: 31.1 PG (ref 26–34)
MCH RBC QN AUTO: 31.1 PG (ref 26–34)
MCH RBC QN AUTO: 31.2 PG (ref 26–34)
MCH RBC QN AUTO: 31.3 PG (ref 26–34)
MCH RBC QN AUTO: 31.6 PG (ref 26–34)
MCH RBC QN AUTO: 31.8 PG (ref 26.6–33)
MCH RBC QN AUTO: 32 PG (ref 26–34)
MCH RBC QN AUTO: 32 PG (ref 26–34)
MCH RBC QN AUTO: 32.1 PG (ref 26–34)
MCH RBC QN AUTO: 32.1 PG (ref 26–34)
MCH RBC QN AUTO: 32.5 PG (ref 26.6–33)
MCHC RBC AUTO-ENTMCNC: 30.4 G/DL (ref 30–36.5)
MCHC RBC AUTO-ENTMCNC: 30.7 G/DL (ref 30–36.5)
MCHC RBC AUTO-ENTMCNC: 31 G/DL (ref 30–36.5)
MCHC RBC AUTO-ENTMCNC: 31 G/DL (ref 30–36.5)
MCHC RBC AUTO-ENTMCNC: 31.1 G/DL (ref 30–36.5)
MCHC RBC AUTO-ENTMCNC: 31.3 G/DL (ref 30–36.5)
MCHC RBC AUTO-ENTMCNC: 31.4 G/DL (ref 30–36.5)
MCHC RBC AUTO-ENTMCNC: 31.8 G/DL (ref 30–36.5)
MCHC RBC AUTO-ENTMCNC: 32.1 G/DL (ref 30–36.5)
MCHC RBC AUTO-ENTMCNC: 32.3 G/DL (ref 30–36.5)
MCHC RBC AUTO-ENTMCNC: 32.5 G/DL (ref 30–36.5)
MCHC RBC AUTO-ENTMCNC: 32.6 G/DL (ref 30–36.5)
MCHC RBC AUTO-ENTMCNC: 32.7 G/DL (ref 30–36.5)
MCHC RBC AUTO-ENTMCNC: 32.7 G/DL (ref 31.5–35.7)
MCHC RBC AUTO-ENTMCNC: 32.8 G/DL (ref 30–36.5)
MCHC RBC AUTO-ENTMCNC: 32.9 G/DL (ref 30–36.5)
MCHC RBC AUTO-ENTMCNC: 32.9 G/DL (ref 30–36.5)
MCHC RBC AUTO-ENTMCNC: 33.1 G/DL (ref 30–36.5)
MCHC RBC AUTO-ENTMCNC: 33.2 G/DL (ref 30–36.5)
MCHC RBC AUTO-ENTMCNC: 33.3 G/DL (ref 30–36.5)
MCHC RBC AUTO-ENTMCNC: 33.4 G/DL (ref 31.5–35.7)
MCHC RBC AUTO-ENTMCNC: 33.7 G/DL (ref 30–36.5)
MCV RBC AUTO: 100 FL (ref 80–99)
MCV RBC AUTO: 100.9 FL (ref 80–99)
MCV RBC AUTO: 101 FL (ref 80–99)
MCV RBC AUTO: 91.4 FL (ref 80–99)
MCV RBC AUTO: 92.3 FL (ref 80–99)
MCV RBC AUTO: 93.7 FL (ref 80–99)
MCV RBC AUTO: 93.8 FL (ref 80–99)
MCV RBC AUTO: 94 FL (ref 80–99)
MCV RBC AUTO: 95 FL (ref 79–97)
MCV RBC AUTO: 95.3 FL (ref 80–99)
MCV RBC AUTO: 95.5 FL (ref 80–99)
MCV RBC AUTO: 95.7 FL (ref 80–99)
MCV RBC AUTO: 96.3 FL (ref 80–99)
MCV RBC AUTO: 96.4 FL (ref 80–99)
MCV RBC AUTO: 97 FL (ref 80–99)
MCV RBC AUTO: 97.1 FL (ref 80–99)
MCV RBC AUTO: 97.6 FL (ref 80–99)
MCV RBC AUTO: 98.4 FL (ref 80–99)
MCV RBC AUTO: 98.7 FL (ref 80–99)
MCV RBC AUTO: 99 FL (ref 79–97)
MCV RBC AUTO: 99.1 FL (ref 80–99)
MCV RBC AUTO: 99.6 FL (ref 80–99)
METAMYELOCYTES NFR BLD MANUAL: 1 %
METAMYELOCYTES NFR BLD MANUAL: 2 %
MONOCYTES # BLD AUTO: 0.2 X10E3/UL (ref 0.1–0.9)
MONOCYTES # BLD AUTO: 0.2 X10E3/UL (ref 0.1–0.9)
MONOCYTES # BLD: 0.2 K/UL (ref 0–1)
MONOCYTES # BLD: 0.3 K/UL (ref 0–1)
MONOCYTES # BLD: 0.4 K/UL (ref 0–1)
MONOCYTES # BLD: 0.5 K/UL (ref 0–1)
MONOCYTES # BLD: 0.6 K/UL (ref 0–1)
MONOCYTES # BLD: 0.7 K/UL (ref 0–1)
MONOCYTES # BLD: 0.8 K/UL (ref 0–1)
MONOCYTES # BLD: 0.8 K/UL (ref 0–1)
MONOCYTES # BLD: 0.9 K/UL (ref 0–1)
MONOCYTES # BLD: 1 K/UL (ref 0–1)
MONOCYTES # BLD: 1.2 K/UL (ref 0–1)
MONOCYTES # BLD: 1.9 K/UL (ref 0–1)
MONOCYTES NFR BLD AUTO: 1 %
MONOCYTES NFR BLD AUTO: 10 % (ref 5–13)
MONOCYTES NFR BLD AUTO: 2 %
MONOCYTES NFR BLD AUTO: 2 % (ref 5–13)
MONOCYTES NFR BLD AUTO: 2 % (ref 5–13)
MONOCYTES NFR BLD AUTO: 4 % (ref 5–13)
MONOCYTES NFR BLD AUTO: 5 % (ref 5–13)
MONOCYTES NFR BLD AUTO: 5 % (ref 5–13)
MONOCYTES NFR BLD AUTO: 6 % (ref 5–13)
MONOCYTES NFR BLD AUTO: 7 % (ref 5–13)
MONOCYTES NFR BLD AUTO: 9 % (ref 5–13)
MYELOCYTES NFR BLD MANUAL: 1 %
MYELOCYTES NFR BLD MANUAL: 1 %
MYELOCYTES NFR BLD MANUAL: 5 %
MYELOCYTES NFR BLD MANUAL: 6 %
NEUTROPHILS # BLD AUTO: 12.3 X10E3/UL (ref 1.4–7)
NEUTROPHILS # BLD AUTO: 13 X10E3/UL (ref 1.4–7)
NEUTROPHILS NFR BLD AUTO: 93 %
NEUTROPHILS NFR BLD AUTO: 95 %
NEUTS BAND NFR BLD MANUAL: 1 % (ref 0–6)
NEUTS BAND NFR BLD MANUAL: 1 % (ref 0–6)
NEUTS BAND NFR BLD MANUAL: 2 % (ref 0–6)
NEUTS BAND NFR BLD MANUAL: 5 % (ref 0–6)
NEUTS SEG # BLD: 11.3 K/UL (ref 1.8–8)
NEUTS SEG # BLD: 11.5 K/UL (ref 1.8–8)
NEUTS SEG # BLD: 12.4 K/UL (ref 1.8–8)
NEUTS SEG # BLD: 15.3 K/UL (ref 1.8–8)
NEUTS SEG # BLD: 15.5 K/UL (ref 1.8–8)
NEUTS SEG # BLD: 17.4 K/UL (ref 1.8–8)
NEUTS SEG # BLD: 24.1 K/UL (ref 1.8–8)
NEUTS SEG # BLD: 7.2 K/UL (ref 1.8–8)
NEUTS SEG # BLD: 7.5 K/UL (ref 1.8–8)
NEUTS SEG # BLD: 7.7 K/UL (ref 1.8–8)
NEUTS SEG # BLD: 9 K/UL (ref 1.8–8)
NEUTS SEG # BLD: 9.1 K/UL (ref 1.8–8)
NEUTS SEG # BLD: 9.5 K/UL (ref 1.8–8)
NEUTS SEG # BLD: 9.6 K/UL (ref 1.8–8)
NEUTS SEG # BLD: 9.7 K/UL (ref 1.8–8)
NEUTS SEG # BLD: 9.7 K/UL (ref 1.8–8)
NEUTS SEG NFR BLD AUTO: 80 % (ref 32–75)
NEUTS SEG NFR BLD AUTO: 82 % (ref 32–75)
NEUTS SEG NFR BLD AUTO: 82 % (ref 32–75)
NEUTS SEG NFR BLD AUTO: 83 % (ref 32–75)
NEUTS SEG NFR BLD AUTO: 84 % (ref 32–75)
NEUTS SEG NFR BLD AUTO: 85 % (ref 32–75)
NEUTS SEG NFR BLD AUTO: 86 % (ref 32–75)
NEUTS SEG NFR BLD AUTO: 86 % (ref 32–75)
NEUTS SEG NFR BLD AUTO: 88 % (ref 32–75)
NEUTS SEG NFR BLD AUTO: 89 % (ref 32–75)
NEUTS SEG NFR BLD AUTO: 90 % (ref 32–75)
NEUTS SEG NFR BLD AUTO: 90 % (ref 32–75)
NEUTS SEG NFR BLD AUTO: 91 % (ref 32–75)
NITRITE UR QL STRIP.AUTO: NEGATIVE
NITRITE UR QL STRIP.AUTO: NEGATIVE
NRBC BLD-RTO: 1 PER 100 WBC
PCO2 BLDA: 42 MMHG (ref 35–45)
PCO2 BLDA: 44 MMHG (ref 35–45)
PCO2 BLDA: 44 MMHG (ref 35–45)
PH BLDA: 7.45 [PH] (ref 7.35–7.45)
PH BLDA: 7.47 [PH] (ref 7.35–7.45)
PH BLDA: 7.48 [PH] (ref 7.35–7.45)
PH UR STRIP: 5 [PH] (ref 5–8)
PH UR STRIP: 7.5 [PH] (ref 5–8)
PHOSPHATE SERPL-MCNC: 2.2 MG/DL (ref 2.6–4.7)
PHOSPHATE SERPL-MCNC: 2.3 MG/DL (ref 2.6–4.7)
PHOSPHATE SERPL-MCNC: 2.6 MG/DL (ref 2.6–4.7)
PHOSPHATE SERPL-MCNC: 2.9 MG/DL (ref 2.6–4.7)
PHOSPHATE SERPL-MCNC: 2.9 MG/DL (ref 2.6–4.7)
PHOSPHATE SERPL-MCNC: 3 MG/DL (ref 2.6–4.7)
PHOSPHATE SERPL-MCNC: 3.1 MG/DL (ref 2.6–4.7)
PHOSPHATE SERPL-MCNC: 3.3 MG/DL (ref 2.6–4.7)
PHOSPHATE SERPL-MCNC: 3.4 MG/DL (ref 2.6–4.7)
PHOSPHATE SERPL-MCNC: 3.8 MG/DL (ref 2.6–4.7)
PHOSPHATE SERPL-MCNC: 5 MG/DL (ref 2.6–4.7)
PLATELET # BLD AUTO: 161 K/UL (ref 150–400)
PLATELET # BLD AUTO: 173 K/UL (ref 150–400)
PLATELET # BLD AUTO: 200 K/UL (ref 150–400)
PLATELET # BLD AUTO: 201 K/UL (ref 150–400)
PLATELET # BLD AUTO: 207 K/UL (ref 150–400)
PLATELET # BLD AUTO: 210 K/UL (ref 150–400)
PLATELET # BLD AUTO: 215 K/UL (ref 150–400)
PLATELET # BLD AUTO: 218 K/UL (ref 150–400)
PLATELET # BLD AUTO: 218 K/UL (ref 150–400)
PLATELET # BLD AUTO: 228 K/UL (ref 150–400)
PLATELET # BLD AUTO: 229 K/UL (ref 150–400)
PLATELET # BLD AUTO: 245 K/UL (ref 150–400)
PLATELET # BLD AUTO: 248 K/UL (ref 150–400)
PLATELET # BLD AUTO: 251 K/UL (ref 150–400)
PLATELET # BLD AUTO: 285 K/UL (ref 150–400)
PLATELET # BLD AUTO: 306 K/UL (ref 150–400)
PLATELET # BLD AUTO: 310 X10E3/UL (ref 150–379)
PLATELET # BLD AUTO: 323 K/UL (ref 150–400)
PLATELET # BLD AUTO: 323 K/UL (ref 150–400)
PLATELET # BLD AUTO: 327 K/UL (ref 150–400)
PLATELET # BLD AUTO: 421 K/UL (ref 150–400)
PLATELET # BLD AUTO: 439 X10E3/UL (ref 150–379)
PO2 BLDA: 71 MMHG (ref 80–100)
PO2 BLDA: 82 MMHG (ref 80–100)
PO2 BLDA: 94 MMHG (ref 80–100)
POTASSIUM SERPL-SCNC: 2.6 MMOL/L (ref 3.5–5.1)
POTASSIUM SERPL-SCNC: 2.8 MMOL/L (ref 3.5–5.1)
POTASSIUM SERPL-SCNC: 3 MMOL/L (ref 3.5–5.1)
POTASSIUM SERPL-SCNC: 3.2 MMOL/L (ref 3.5–5.1)
POTASSIUM SERPL-SCNC: 3.3 MMOL/L (ref 3.5–5.1)
POTASSIUM SERPL-SCNC: 3.5 MMOL/L (ref 3.5–5.1)
POTASSIUM SERPL-SCNC: 3.6 MMOL/L (ref 3.5–5.1)
POTASSIUM SERPL-SCNC: 3.8 MMOL/L (ref 3.5–5.1)
POTASSIUM SERPL-SCNC: 3.9 MMOL/L (ref 3.5–5.1)
POTASSIUM SERPL-SCNC: 4 MMOL/L (ref 3.5–5.1)
POTASSIUM SERPL-SCNC: 4 MMOL/L (ref 3.5–5.1)
POTASSIUM SERPL-SCNC: 4.4 MMOL/L (ref 3.5–5.1)
POTASSIUM SERPL-SCNC: 4.5 MMOL/L (ref 3.5–5.1)
POTASSIUM SERPL-SCNC: 4.6 MMOL/L (ref 3.5–5.1)
POTASSIUM SERPL-SCNC: 4.9 MMOL/L (ref 3.5–5.1)
PROT C PPP-ACNC: 190 % (ref 73–180)
PROT S ACT/NOR PPP: 147 % (ref 57–157)
PROT S PPP-ACNC: 117 % (ref 60–150)
PROT SERPL-MCNC: 4.4 G/DL (ref 6.4–8.2)
PROT SERPL-MCNC: 4.6 G/DL (ref 6.4–8.2)
PROT SERPL-MCNC: 4.7 G/DL (ref 6.4–8.2)
PROT SERPL-MCNC: 4.7 G/DL (ref 6.4–8.2)
PROT SERPL-MCNC: 4.9 G/DL (ref 6.4–8.2)
PROT SERPL-MCNC: 4.9 G/DL (ref 6.4–8.2)
PROT SERPL-MCNC: 5.3 G/DL (ref 6.4–8.2)
PROT SERPL-MCNC: 5.5 G/DL (ref 6.4–8.2)
PROT SERPL-MCNC: 5.9 G/DL (ref 6–8.5)
PROT SERPL-MCNC: 6.8 G/DL (ref 6.4–8.2)
PROT UR STRIP-MCNC: NEGATIVE MG/DL
PROT UR STRIP-MCNC: NEGATIVE MG/DL
PROTHROMBIN TIME: 10.5 SEC (ref 9–11.1)
PROTHROMBIN TIME: 11.2 SEC (ref 9–11.1)
PS IGA SER-ACNC: 1 APS IGA (ref 0–20)
PS IGG SER-ACNC: 1 GPS IGG (ref 0–11)
PS IGM SER-ACNC: 7 MPS IGM (ref 0–25)
Q-T INTERVAL, ECG07: 278 MS
Q-T INTERVAL, ECG07: 308 MS
Q-T INTERVAL, ECG07: 362 MS
QRS DURATION, ECG06: 56 MS
QRS DURATION, ECG06: 72 MS
QRS DURATION, ECG06: 80 MS
QTC CALCULATION (BEZET), ECG08: 368 MS
QTC CALCULATION (BEZET), ECG08: 445 MS
QTC CALCULATION (BEZET), ECG08: 468 MS
RBC # BLD AUTO: 2.34 M/UL (ref 4.1–5.7)
RBC # BLD AUTO: 2.55 M/UL (ref 4.1–5.7)
RBC # BLD AUTO: 2.69 M/UL (ref 4.1–5.7)
RBC # BLD AUTO: 2.71 M/UL (ref 4.1–5.7)
RBC # BLD AUTO: 2.75 M/UL (ref 4.1–5.7)
RBC # BLD AUTO: 2.79 M/UL (ref 4.1–5.7)
RBC # BLD AUTO: 2.81 M/UL (ref 4.1–5.7)
RBC # BLD AUTO: 2.82 M/UL (ref 4.1–5.7)
RBC # BLD AUTO: 2.92 M/UL (ref 4.1–5.7)
RBC # BLD AUTO: 2.96 M/UL (ref 4.1–5.7)
RBC # BLD AUTO: 3.1 M/UL (ref 4.1–5.7)
RBC # BLD AUTO: 3.63 M/UL (ref 4.1–5.7)
RBC # BLD AUTO: 3.75 M/UL (ref 4.1–5.7)
RBC # BLD AUTO: 3.93 M/UL (ref 4.1–5.7)
RBC # BLD AUTO: 4.28 M/UL (ref 4.1–5.7)
RBC # BLD AUTO: 4.4 M/UL (ref 4.1–5.7)
RBC # BLD AUTO: 4.49 M/UL (ref 4.1–5.7)
RBC # BLD AUTO: 4.49 M/UL (ref 4.1–5.7)
RBC # BLD AUTO: 4.5 M/UL (ref 4.1–5.7)
RBC # BLD AUTO: 4.62 X10E6/UL (ref 4.14–5.8)
RBC # BLD AUTO: 4.9 X10E6/UL (ref 4.14–5.8)
RBC # BLD AUTO: 5.08 M/UL (ref 4.1–5.7)
RBC #/AREA URNS HPF: ABNORMAL /HPF (ref 0–5)
RBC #/AREA URNS HPF: ABNORMAL /HPF (ref 0–5)
RBC MORPH BLD: ABNORMAL
REPORTED DOSE,DOSE: ABNORMAL UNITS
REPORTED DOSE/TIME,TMG: ABNORMAL
SAO2 % BLD: 95 % (ref 92–97)
SAO2 % BLD: 96 % (ref 92–97)
SAO2 % BLD: 98 % (ref 92–97)
SAO2% DEVICE SAO2% SENSOR NAME: ABNORMAL
SCREEN APTT: 36.4 SEC (ref 0–43.6)
SCREEN DRVVT: 49.1 SEC (ref 0–44)
SERVICE CMNT-IMP: ABNORMAL
SERVICE CMNT-IMP: NORMAL
SODIUM SERPL-SCNC: 138 MMOL/L (ref 136–145)
SODIUM SERPL-SCNC: 139 MMOL/L (ref 136–145)
SODIUM SERPL-SCNC: 139 MMOL/L (ref 136–145)
SODIUM SERPL-SCNC: 140 MMOL/L (ref 136–145)
SODIUM SERPL-SCNC: 140 MMOL/L (ref 136–145)
SODIUM SERPL-SCNC: 141 MMOL/L (ref 136–145)
SODIUM SERPL-SCNC: 141 MMOL/L (ref 136–145)
SODIUM SERPL-SCNC: 142 MMOL/L (ref 136–145)
SODIUM SERPL-SCNC: 143 MMOL/L (ref 136–145)
SODIUM SERPL-SCNC: 145 MMOL/L (ref 136–145)
SODIUM SERPL-SCNC: 146 MMOL/L (ref 136–145)
SP GR UR REFRACTOMETRY: 1.01 (ref 1–1.03)
SP GR UR REFRACTOMETRY: 1.02 (ref 1–1.03)
SPECIMEN EXP DATE BLD: NORMAL
SPECIMEN EXP DATE BLD: NORMAL
SPECIMEN SITE: ABNORMAL
STATUS OF UNIT,%ST: NORMAL
T4 FREE SERPL-MCNC: 1.1 NG/DL (ref 0.8–1.5)
THERAPEUTIC RANGE,PTTT: NORMAL SECS (ref 58–77)
TIBC SERPL-MCNC: 202 UG/DL (ref 250–450)
TRIGL SERPL-MCNC: 105 MG/DL (ref ?–150)
TROPONIN I SERPL-MCNC: 0.06 NG/ML
TROPONIN I SERPL-MCNC: <0.04 NG/ML
TSH SERPL DL<=0.05 MIU/L-ACNC: 0.32 UIU/ML (ref 0.36–3.74)
UA: UC IF INDICATED,UAUC: ABNORMAL
UA: UC IF INDICATED,UAUC: ABNORMAL
UNIT DIVISION, %UDIV: 0
UROBILINOGEN UR QL STRIP.AUTO: 0.2 EU/DL (ref 0.2–1)
UROBILINOGEN UR QL STRIP.AUTO: 0.2 EU/DL (ref 0.2–1)
VANCOMYCIN TROUGH SERPL-MCNC: 17.1 UG/ML (ref 5–10)
VENTRICULAR RATE, ECG03: 171 BPM
VENTRICULAR RATE, ECG03: 86 BPM
VENTRICULAR RATE, ECG03: 91 BPM
VLDLC SERPL CALC-MCNC: 21 MG/DL
WBC # BLD AUTO: 10 K/UL (ref 4.1–11.1)
WBC # BLD AUTO: 10.5 K/UL (ref 4.1–11.1)
WBC # BLD AUTO: 10.8 K/UL (ref 4.1–11.1)
WBC # BLD AUTO: 10.8 K/UL (ref 4.1–11.1)
WBC # BLD AUTO: 10.9 K/UL (ref 4.1–11.1)
WBC # BLD AUTO: 11.1 K/UL (ref 4.1–11.1)
WBC # BLD AUTO: 11.4 K/UL (ref 4.1–11.1)
WBC # BLD AUTO: 11.4 K/UL (ref 4.1–11.1)
WBC # BLD AUTO: 11.6 K/UL (ref 4.1–11.1)
WBC # BLD AUTO: 12.4 K/UL (ref 4.1–11.1)
WBC # BLD AUTO: 12.6 K/UL (ref 4.1–11.1)
WBC # BLD AUTO: 13.2 X10E3/UL (ref 3.4–10.8)
WBC # BLD AUTO: 13.8 K/UL (ref 4.1–11.1)
WBC # BLD AUTO: 13.8 X10E3/UL (ref 3.4–10.8)
WBC # BLD AUTO: 17.2 K/UL (ref 4.1–11.1)
WBC # BLD AUTO: 17.4 K/UL (ref 4.1–11.1)
WBC # BLD AUTO: 19.2 K/UL (ref 4.1–11.1)
WBC # BLD AUTO: 27.7 K/UL (ref 4.1–11.1)
WBC # BLD AUTO: 8.4 K/UL (ref 4.1–11.1)
WBC # BLD AUTO: 9.1 K/UL (ref 4.1–11.1)
WBC # BLD AUTO: 9.1 K/UL (ref 4.1–11.1)
WBC # BLD AUTO: 9.3 K/UL (ref 4.1–11.1)
WBC URNS QL MICRO: ABNORMAL /HPF (ref 0–4)
WBC URNS QL MICRO: ABNORMAL /HPF (ref 0–4)

## 2017-01-01 PROCEDURE — 74011636637 HC RX REV CODE- 636/637: Performed by: INTERNAL MEDICINE

## 2017-01-01 PROCEDURE — 71010 XR CHEST PORT: CPT

## 2017-01-01 PROCEDURE — 36415 COLL VENOUS BLD VENIPUNCTURE: CPT | Performed by: SURGERY

## 2017-01-01 PROCEDURE — 74011250637 HC RX REV CODE- 250/637: Performed by: INTERNAL MEDICINE

## 2017-01-01 PROCEDURE — 65660000000 HC RM CCU STEPDOWN

## 2017-01-01 PROCEDURE — C9113 INJ PANTOPRAZOLE SODIUM, VIA: HCPCS | Performed by: INTERNAL MEDICINE

## 2017-01-01 PROCEDURE — 74011250636 HC RX REV CODE- 250/636: Performed by: FAMILY MEDICINE

## 2017-01-01 PROCEDURE — C1769 GUIDE WIRE: HCPCS

## 2017-01-01 PROCEDURE — 99152 MOD SED SAME PHYS/QHP 5/>YRS: CPT | Performed by: INTERNAL MEDICINE

## 2017-01-01 PROCEDURE — 77030009965 HC RELD STPLR ENDOS COVD -D: Performed by: SURGERY

## 2017-01-01 PROCEDURE — 85025 COMPLETE CBC W/AUTO DIFF WBC: CPT | Performed by: EMERGENCY MEDICINE

## 2017-01-01 PROCEDURE — 74011250636 HC RX REV CODE- 250/636: Performed by: INTERNAL MEDICINE

## 2017-01-01 PROCEDURE — 83735 ASSAY OF MAGNESIUM: CPT | Performed by: INTERNAL MEDICINE

## 2017-01-01 PROCEDURE — 86900 BLOOD TYPING SEROLOGIC ABO: CPT | Performed by: EMERGENCY MEDICINE

## 2017-01-01 PROCEDURE — 74011250637 HC RX REV CODE- 250/637: Performed by: PHYSICIAN ASSISTANT

## 2017-01-01 PROCEDURE — 82962 GLUCOSE BLOOD TEST: CPT

## 2017-01-01 PROCEDURE — 77030013079 HC BLNKT BAIR HGGR 3M -A: Performed by: SURGERY

## 2017-01-01 PROCEDURE — 74011000250 HC RX REV CODE- 250: Performed by: INTERNAL MEDICINE

## 2017-01-01 PROCEDURE — 80053 COMPREHEN METABOLIC PANEL: CPT | Performed by: INTERNAL MEDICINE

## 2017-01-01 PROCEDURE — 74011000250 HC RX REV CODE- 250

## 2017-01-01 PROCEDURE — 85018 HEMOGLOBIN: CPT | Performed by: ANESTHESIOLOGY

## 2017-01-01 PROCEDURE — 80048 BASIC METABOLIC PNL TOTAL CA: CPT | Performed by: PHYSICIAN ASSISTANT

## 2017-01-01 PROCEDURE — 74011250636 HC RX REV CODE- 250/636: Performed by: NURSE PRACTITIONER

## 2017-01-01 PROCEDURE — 83880 ASSAY OF NATRIURETIC PEPTIDE: CPT | Performed by: INTERNAL MEDICINE

## 2017-01-01 PROCEDURE — 97116 GAIT TRAINING THERAPY: CPT

## 2017-01-01 PROCEDURE — 80048 BASIC METABOLIC PNL TOTAL CA: CPT | Performed by: INTERNAL MEDICINE

## 2017-01-01 PROCEDURE — 74011000258 HC RX REV CODE- 258: Performed by: EMERGENCY MEDICINE

## 2017-01-01 PROCEDURE — 77030003028 HC SUT VCRL J&J -A: Performed by: SURGERY

## 2017-01-01 PROCEDURE — 74011250636 HC RX REV CODE- 250/636: Performed by: SURGERY

## 2017-01-01 PROCEDURE — 77010033678 HC OXYGEN DAILY

## 2017-01-01 PROCEDURE — 74011000250 HC RX REV CODE- 250: Performed by: EMERGENCY MEDICINE

## 2017-01-01 PROCEDURE — P9016 RBC LEUKOCYTES REDUCED: HCPCS | Performed by: EMERGENCY MEDICINE

## 2017-01-01 PROCEDURE — 85025 COMPLETE CBC W/AUTO DIFF WBC: CPT | Performed by: INTERNAL MEDICINE

## 2017-01-01 PROCEDURE — 84484 ASSAY OF TROPONIN QUANT: CPT | Performed by: EMERGENCY MEDICINE

## 2017-01-01 PROCEDURE — L3710 EO ELAS W/METAL JNTS PRE OTS: HCPCS

## 2017-01-01 PROCEDURE — C1758 CATHETER, URETERAL: HCPCS

## 2017-01-01 PROCEDURE — 02HV33Z INSERTION OF INFUSION DEVICE INTO SUPERIOR VENA CAVA, PERCUTANEOUS APPROACH: ICD-10-PCS | Performed by: RADIOLOGY

## 2017-01-01 PROCEDURE — 96360 HYDRATION IV INFUSION INIT: CPT

## 2017-01-01 PROCEDURE — B4151ZZ FLUOROSCOPY OF INFERIOR MESENTERIC ARTERY USING LOW OSMOLAR CONTRAST: ICD-10-PCS | Performed by: RADIOLOGY

## 2017-01-01 PROCEDURE — 85610 PROTHROMBIN TIME: CPT | Performed by: EMERGENCY MEDICINE

## 2017-01-01 PROCEDURE — 04LB3DZ OCCLUSION OF INFERIOR MESENTERIC ARTERY WITH INTRALUMINAL DEVICE, PERCUTANEOUS APPROACH: ICD-10-PCS | Performed by: RADIOLOGY

## 2017-01-01 PROCEDURE — 74011250637 HC RX REV CODE- 250/637: Performed by: SPECIALIST

## 2017-01-01 PROCEDURE — 83880 ASSAY OF NATRIURETIC PEPTIDE: CPT | Performed by: EMERGENCY MEDICINE

## 2017-01-01 PROCEDURE — 65610000006 HC RM INTENSIVE CARE

## 2017-01-01 PROCEDURE — 77030013131 HC IV BLD ST ICUM -A

## 2017-01-01 PROCEDURE — 36415 COLL VENOUS BLD VENIPUNCTURE: CPT | Performed by: INTERNAL MEDICINE

## 2017-01-01 PROCEDURE — 74011000258 HC RX REV CODE- 258: Performed by: INTERNAL MEDICINE

## 2017-01-01 PROCEDURE — 77030010541: Performed by: SURGERY

## 2017-01-01 PROCEDURE — 77030034849: Performed by: SURGERY

## 2017-01-01 PROCEDURE — 85018 HEMOGLOBIN: CPT | Performed by: INTERNAL MEDICINE

## 2017-01-01 PROCEDURE — 87106 FUNGI IDENTIFICATION YEAST: CPT | Performed by: INTERNAL MEDICINE

## 2017-01-01 PROCEDURE — 81241 F5 GENE: CPT | Performed by: NURSE PRACTITIONER

## 2017-01-01 PROCEDURE — 93005 ELECTROCARDIOGRAM TRACING: CPT

## 2017-01-01 PROCEDURE — 74000 XR ABD PORT  1 V: CPT

## 2017-01-01 PROCEDURE — 74011000250 HC RX REV CODE- 250: Performed by: NURSE PRACTITIONER

## 2017-01-01 PROCEDURE — 71275 CT ANGIOGRAPHY CHEST: CPT

## 2017-01-01 PROCEDURE — 84484 ASSAY OF TROPONIN QUANT: CPT | Performed by: INTERNAL MEDICINE

## 2017-01-01 PROCEDURE — 80076 HEPATIC FUNCTION PANEL: CPT | Performed by: INTERNAL MEDICINE

## 2017-01-01 PROCEDURE — 77030032490 HC SLV COMPR SCD KNE COVD -B

## 2017-01-01 PROCEDURE — 65270000029 HC RM PRIVATE

## 2017-01-01 PROCEDURE — 77030034848

## 2017-01-01 PROCEDURE — 97165 OT EVAL LOW COMPLEX 30 MIN: CPT

## 2017-01-01 PROCEDURE — 77030031139 HC SUT VCRL2 J&J -A: Performed by: SURGERY

## 2017-01-01 PROCEDURE — 0656 HSPC GENERAL INPATIENT

## 2017-01-01 PROCEDURE — 85018 HEMOGLOBIN: CPT | Performed by: SURGERY

## 2017-01-01 PROCEDURE — 77030008463 HC STPLR SKN PROX J&J -B: Performed by: SURGERY

## 2017-01-01 PROCEDURE — 80053 COMPREHEN METABOLIC PANEL: CPT | Performed by: EMERGENCY MEDICINE

## 2017-01-01 PROCEDURE — 77030018809 HC RETRCTR ALXSO DISP AMR -B: Performed by: SURGERY

## 2017-01-01 PROCEDURE — 82803 BLOOD GASES ANY COMBINATION: CPT | Performed by: INTERNAL MEDICINE

## 2017-01-01 PROCEDURE — 70450 CT HEAD/BRAIN W/O DYE: CPT

## 2017-01-01 PROCEDURE — 83605 ASSAY OF LACTIC ACID: CPT | Performed by: INTERNAL MEDICINE

## 2017-01-01 PROCEDURE — 74011250636 HC RX REV CODE- 250/636

## 2017-01-01 PROCEDURE — 86900 BLOOD TYPING SEROLOGIC ABO: CPT | Performed by: INTERNAL MEDICINE

## 2017-01-01 PROCEDURE — 74011000250 HC RX REV CODE- 250: Performed by: PHYSICIAN ASSISTANT

## 2017-01-01 PROCEDURE — 74011250637 HC RX REV CODE- 250/637: Performed by: NURSE PRACTITIONER

## 2017-01-01 PROCEDURE — 77030010541

## 2017-01-01 PROCEDURE — 85613 RUSSELL VIPER VENOM DILUTED: CPT | Performed by: NURSE PRACTITIONER

## 2017-01-01 PROCEDURE — C1887 CATHETER, GUIDING: HCPCS

## 2017-01-01 PROCEDURE — 87040 BLOOD CULTURE FOR BACTERIA: CPT | Performed by: INTERNAL MEDICINE

## 2017-01-01 PROCEDURE — 77030033269 HC SLV COMPR SCD KNE2 CARD -B

## 2017-01-01 PROCEDURE — 5A2204Z RESTORATION OF CARDIAC RHYTHM, SINGLE: ICD-10-PCS | Performed by: INTERNAL MEDICINE

## 2017-01-01 PROCEDURE — 77030018798 HC PMP KT ENTRL FED COVD -A

## 2017-01-01 PROCEDURE — 77030008771 HC TU NG SALEM SUMP -A

## 2017-01-01 PROCEDURE — 77030012856

## 2017-01-01 PROCEDURE — 93970 EXTREMITY STUDY: CPT

## 2017-01-01 PROCEDURE — 99151 MOD SED SAME PHYS/QHP <5 YRS: CPT

## 2017-01-01 PROCEDURE — 74011000258 HC RX REV CODE- 258: Performed by: NURSE PRACTITIONER

## 2017-01-01 PROCEDURE — 97530 THERAPEUTIC ACTIVITIES: CPT

## 2017-01-01 PROCEDURE — 74011250637 HC RX REV CODE- 250/637: Performed by: PSYCHIATRY & NEUROLOGY

## 2017-01-01 PROCEDURE — 77030019563 HC DEV ATTCH FEED HOLL -A

## 2017-01-01 PROCEDURE — 74011250636 HC RX REV CODE- 250/636: Performed by: PHYSICIAN ASSISTANT

## 2017-01-01 PROCEDURE — 77030020391 HC TU TRACH GLDRT VERT -A: Performed by: ANESTHESIOLOGY

## 2017-01-01 PROCEDURE — 77030002933 HC SUT MCRYL J&J -A: Performed by: SURGERY

## 2017-01-01 PROCEDURE — 0DJD8ZZ INSPECTION OF LOWER INTESTINAL TRACT, VIA NATURAL OR ARTIFICIAL OPENING ENDOSCOPIC: ICD-10-PCS | Performed by: SPECIALIST

## 2017-01-01 PROCEDURE — 84100 ASSAY OF PHOSPHORUS: CPT | Performed by: INTERNAL MEDICINE

## 2017-01-01 PROCEDURE — 77030028837 HC SYR ANGI PWR INJ COEU -A

## 2017-01-01 PROCEDURE — 84484 ASSAY OF TROPONIN QUANT: CPT | Performed by: NURSE PRACTITIONER

## 2017-01-01 PROCEDURE — 85303 CLOT INHIBIT PROT C ACTIVITY: CPT | Performed by: NURSE PRACTITIONER

## 2017-01-01 PROCEDURE — 97161 PT EVAL LOW COMPLEX 20 MIN: CPT

## 2017-01-01 PROCEDURE — 94762 N-INVAS EAR/PLS OXIMTRY CONT: CPT

## 2017-01-01 PROCEDURE — 77030004530 HC CATH ANGI DX IMGR BSC -A

## 2017-01-01 PROCEDURE — 86147 CARDIOLIPIN ANTIBODY EA IG: CPT | Performed by: NURSE PRACTITIONER

## 2017-01-01 PROCEDURE — 74011250636 HC RX REV CODE- 250/636: Performed by: EMERGENCY MEDICINE

## 2017-01-01 PROCEDURE — 77030013079 HC BLNKT BAIR HGGR 3M -A: Performed by: ANESTHESIOLOGY

## 2017-01-01 PROCEDURE — 85300 ANTITHROMBIN III ACTIVITY: CPT | Performed by: NURSE PRACTITIONER

## 2017-01-01 PROCEDURE — P9059 PLASMA, FRZ BETWEEN 8-24HOUR: HCPCS | Performed by: INTERNAL MEDICINE

## 2017-01-01 PROCEDURE — 85027 COMPLETE CBC AUTOMATED: CPT | Performed by: INTERNAL MEDICINE

## 2017-01-01 PROCEDURE — 97110 THERAPEUTIC EXERCISES: CPT

## 2017-01-01 PROCEDURE — 36430 TRANSFUSION BLD/BLD COMPNT: CPT

## 2017-01-01 PROCEDURE — 77030008771 HC TU NG SALEM SUMP -A: Performed by: ANESTHESIOLOGY

## 2017-01-01 PROCEDURE — 97162 PT EVAL MOD COMPLEX 30 MIN: CPT

## 2017-01-01 PROCEDURE — 77030011641 HC PASTE OST ADH BMS -A

## 2017-01-01 PROCEDURE — 3336500001 HSPC ELECTION

## 2017-01-01 PROCEDURE — 81001 URINALYSIS AUTO W/SCOPE: CPT | Performed by: INTERNAL MEDICINE

## 2017-01-01 PROCEDURE — 74011250636 HC RX REV CODE- 250/636: Performed by: SPECIALIST

## 2017-01-01 PROCEDURE — 82728 ASSAY OF FERRITIN: CPT | Performed by: INTERNAL MEDICINE

## 2017-01-01 PROCEDURE — 93312 ECHO TRANSESOPHAGEAL: CPT

## 2017-01-01 PROCEDURE — 77030011264 HC ELECTRD BLD EXT COVD -A: Performed by: SURGERY

## 2017-01-01 PROCEDURE — C1894 INTRO/SHEATH, NON-LASER: HCPCS

## 2017-01-01 PROCEDURE — 77030029065 HC DRSG HEMO QCLOT ZMED -B

## 2017-01-01 PROCEDURE — 77030015696

## 2017-01-01 PROCEDURE — 83090 ASSAY OF HOMOCYSTEINE: CPT | Performed by: NURSE PRACTITIONER

## 2017-01-01 PROCEDURE — 76210000017 HC OR PH I REC 1.5 TO 2 HR: Performed by: SURGERY

## 2017-01-01 PROCEDURE — 36415 COLL VENOUS BLD VENIPUNCTURE: CPT | Performed by: PHYSICIAN ASSISTANT

## 2017-01-01 PROCEDURE — 96361 HYDRATE IV INFUSION ADD-ON: CPT

## 2017-01-01 PROCEDURE — 84443 ASSAY THYROID STIM HORMONE: CPT | Performed by: EMERGENCY MEDICINE

## 2017-01-01 PROCEDURE — 74011636637 HC RX REV CODE- 636/637: Performed by: PSYCHIATRY & NEUROLOGY

## 2017-01-01 PROCEDURE — 93306 TTE W/DOPPLER COMPLETE: CPT

## 2017-01-01 PROCEDURE — 77030013032 HC MSK BPAP/CPAP FISP -B

## 2017-01-01 PROCEDURE — 85730 THROMBOPLASTIN TIME PARTIAL: CPT | Performed by: EMERGENCY MEDICINE

## 2017-01-01 PROCEDURE — C1880 VENA CAVA FILTER: HCPCS

## 2017-01-01 PROCEDURE — 36600 WITHDRAWAL OF ARTERIAL BLOOD: CPT | Performed by: INTERNAL MEDICINE

## 2017-01-01 PROCEDURE — 77030018729 HC ELECTRD DEFIB PAD CARD -B

## 2017-01-01 PROCEDURE — 76937 US GUIDE VASCULAR ACCESS: CPT

## 2017-01-01 PROCEDURE — 83540 ASSAY OF IRON: CPT | Performed by: INTERNAL MEDICINE

## 2017-01-01 PROCEDURE — 85025 COMPLETE CBC W/AUTO DIFF WBC: CPT | Performed by: SURGERY

## 2017-01-01 PROCEDURE — 77030021668 HC NEB PREFIL KT VYRM -A

## 2017-01-01 PROCEDURE — C1751 CATH, INF, PER/CENT/MIDLINE: HCPCS

## 2017-01-01 PROCEDURE — 77030002966 HC SUT PDS J&J -A: Performed by: SURGERY

## 2017-01-01 PROCEDURE — 96376 TX/PRO/DX INJ SAME DRUG ADON: CPT

## 2017-01-01 PROCEDURE — 0DTN0ZZ RESECTION OF SIGMOID COLON, OPEN APPROACH: ICD-10-PCS | Performed by: SURGERY

## 2017-01-01 PROCEDURE — 99285 EMERGENCY DEPT VISIT HI MDM: CPT

## 2017-01-01 PROCEDURE — 77030011640 HC PAD GRND REM COVD -A: Performed by: SURGERY

## 2017-01-01 PROCEDURE — 74011000250 HC RX REV CODE- 250: Performed by: RADIOLOGY

## 2017-01-01 PROCEDURE — 94660 CPAP INITIATION&MGMT: CPT

## 2017-01-01 PROCEDURE — 77030004822 HC CATH COIL PUSH BSC -C

## 2017-01-01 PROCEDURE — 76450000000

## 2017-01-01 PROCEDURE — P9016 RBC LEUKOCYTES REDUCED: HCPCS | Performed by: INTERNAL MEDICINE

## 2017-01-01 PROCEDURE — 74011250636 HC RX REV CODE- 250/636: Performed by: RADIOLOGY

## 2017-01-01 PROCEDURE — 74174 CTA ABD&PLVS W/CONTRAST: CPT

## 2017-01-01 PROCEDURE — 83690 ASSAY OF LIPASE: CPT | Performed by: EMERGENCY MEDICINE

## 2017-01-01 PROCEDURE — 06H03DZ INSERTION OF INTRALUMINAL DEVICE INTO INFERIOR VENA CAVA, PERCUTANEOUS APPROACH: ICD-10-PCS | Performed by: RADIOLOGY

## 2017-01-01 PROCEDURE — 85305 CLOT INHIBIT PROT S TOTAL: CPT | Performed by: NURSE PRACTITIONER

## 2017-01-01 PROCEDURE — 74011636320 HC RX REV CODE- 636/320: Performed by: RADIOLOGY

## 2017-01-01 PROCEDURE — 77030010522

## 2017-01-01 PROCEDURE — 80202 ASSAY OF VANCOMYCIN: CPT | Performed by: INTERNAL MEDICINE

## 2017-01-01 PROCEDURE — 80053 COMPREHEN METABOLIC PANEL: CPT | Performed by: SURGERY

## 2017-01-01 PROCEDURE — 82565 ASSAY OF CREATININE: CPT | Performed by: SURGERY

## 2017-01-01 PROCEDURE — 85025 COMPLETE CBC W/AUTO DIFF WBC: CPT | Performed by: PHYSICIAN ASSISTANT

## 2017-01-01 PROCEDURE — 76010000153 HC OR TIME 1.5 TO 2 HR: Performed by: SURGERY

## 2017-01-01 PROCEDURE — 77030012406 HC DRN WND PENRS BARD -A: Performed by: SURGERY

## 2017-01-01 PROCEDURE — 82550 ASSAY OF CK (CPK): CPT | Performed by: EMERGENCY MEDICINE

## 2017-01-01 PROCEDURE — 76060000031 HC ANESTHESIA FIRST 0.5 HR: Performed by: SPECIALIST

## 2017-01-01 PROCEDURE — 83036 HEMOGLOBIN GLYCOSYLATED A1C: CPT | Performed by: INTERNAL MEDICINE

## 2017-01-01 PROCEDURE — 77030018836 HC SOL IRR NACL ICUM -A: Performed by: SURGERY

## 2017-01-01 PROCEDURE — 84100 ASSAY OF PHOSPHORUS: CPT | Performed by: SURGERY

## 2017-01-01 PROCEDURE — 77030002996 HC SUT SLK J&J -A: Performed by: SURGERY

## 2017-01-01 PROCEDURE — 77030002986 HC SUT PROL J&J -A: Performed by: SURGERY

## 2017-01-01 PROCEDURE — 36415 COLL VENOUS BLD VENIPUNCTURE: CPT | Performed by: EMERGENCY MEDICINE

## 2017-01-01 PROCEDURE — 86920 COMPATIBILITY TEST SPIN: CPT | Performed by: EMERGENCY MEDICINE

## 2017-01-01 PROCEDURE — 96366 THER/PROPH/DIAG IV INF ADDON: CPT

## 2017-01-01 PROCEDURE — 77030029131 HC ADMN ST IV BLD N DEHP ICUM -B

## 2017-01-01 PROCEDURE — 81001 URINALYSIS AUTO W/SCOPE: CPT | Performed by: SURGERY

## 2017-01-01 PROCEDURE — 77030019698 HC SYR ANGI MDLON MRTM -A

## 2017-01-01 PROCEDURE — 0D1N0Z4 BYPASS SIGMOID COLON TO CUTANEOUS, OPEN APPROACH: ICD-10-PCS | Performed by: SURGERY

## 2017-01-01 PROCEDURE — 77030007172 HC COIL EMB HLAL COOK -B

## 2017-01-01 PROCEDURE — 88309 TISSUE EXAM BY PATHOLOGIST: CPT | Performed by: SURGERY

## 2017-01-01 PROCEDURE — C9290 INJ, BUPIVACAINE LIPOSOME: HCPCS | Performed by: SURGERY

## 2017-01-01 PROCEDURE — 77030020061 HC IV BLD WRMR ADMIN SET 3M -B: Performed by: ANESTHESIOLOGY

## 2017-01-01 PROCEDURE — 30233N1 TRANSFUSION OF NONAUTOLOGOUS RED BLOOD CELLS INTO PERIPHERAL VEIN, PERCUTANEOUS APPROACH: ICD-10-PCS | Performed by: EMERGENCY MEDICINE

## 2017-01-01 PROCEDURE — C1760 CLOSURE DEV, VASC: HCPCS

## 2017-01-01 PROCEDURE — G0299 HHS/HOSPICE OF RN EA 15 MIN: HCPCS

## 2017-01-01 PROCEDURE — 77030019908 HC STETH ESOPH SIMS -A: Performed by: ANESTHESIOLOGY

## 2017-01-01 PROCEDURE — 83605 ASSAY OF LACTIC ACID: CPT | Performed by: SURGERY

## 2017-01-01 PROCEDURE — 83735 ASSAY OF MAGNESIUM: CPT | Performed by: SURGERY

## 2017-01-01 PROCEDURE — 74011000258 HC RX REV CODE- 258

## 2017-01-01 PROCEDURE — 76040000019: Performed by: SPECIALIST

## 2017-01-01 PROCEDURE — 97535 SELF CARE MNGMENT TRAINING: CPT

## 2017-01-01 PROCEDURE — 77030010545

## 2017-01-01 PROCEDURE — 77030011229 HC DIL VESL COON COOK -A

## 2017-01-01 PROCEDURE — 74011000250 HC RX REV CODE- 250: Performed by: SURGERY

## 2017-01-01 PROCEDURE — 76060000034 HC ANESTHESIA 1.5 TO 2 HR: Performed by: SURGERY

## 2017-01-01 PROCEDURE — 92960 CARDIOVERSION ELECTRIC EXT: CPT

## 2017-01-01 PROCEDURE — 77030034850

## 2017-01-01 PROCEDURE — 85018 HEMOGLOBIN: CPT | Performed by: PHYSICIAN ASSISTANT

## 2017-01-01 PROCEDURE — 84439 ASSAY OF FREE THYROXINE: CPT | Performed by: EMERGENCY MEDICINE

## 2017-01-01 PROCEDURE — 30233K1 TRANSFUSION OF NONAUTOLOGOUS FROZEN PLASMA INTO PERIPHERAL VEIN, PERCUTANEOUS APPROACH: ICD-10-PCS | Performed by: INTERNAL MEDICINE

## 2017-01-01 PROCEDURE — 86148 ANTI-PHOSPHOLIPID ANTIBODY: CPT | Performed by: NURSE PRACTITIONER

## 2017-01-01 PROCEDURE — 86920 COMPATIBILITY TEST SPIN: CPT | Performed by: INTERNAL MEDICINE

## 2017-01-01 PROCEDURE — 96365 THER/PROPH/DIAG IV INF INIT: CPT

## 2017-01-01 PROCEDURE — 80061 LIPID PANEL: CPT | Performed by: SURGERY

## 2017-01-01 RX ORDER — CARVEDILOL 6.25 MG/1
6.25 TABLET ORAL 2 TIMES DAILY WITH MEALS
Status: DISCONTINUED | OUTPATIENT
Start: 2017-01-01 | End: 2017-01-01

## 2017-01-01 RX ORDER — HYDROMORPHONE HYDROCHLORIDE 1 MG/ML
0.5 INJECTION, SOLUTION INTRAMUSCULAR; INTRAVENOUS; SUBCUTANEOUS
Status: DISCONTINUED | OUTPATIENT
Start: 2017-01-01 | End: 2017-01-01 | Stop reason: HOSPADM

## 2017-01-01 RX ORDER — INSULIN LISPRO 100 [IU]/ML
INJECTION, SOLUTION INTRAVENOUS; SUBCUTANEOUS
Status: DISCONTINUED | OUTPATIENT
Start: 2017-01-01 | End: 2017-01-01 | Stop reason: HOSPADM

## 2017-01-01 RX ORDER — ADHESIVE BANDAGE
30 BANDAGE TOPICAL DAILY
Status: DISCONTINUED | OUTPATIENT
Start: 2017-01-01 | End: 2017-01-01 | Stop reason: HOSPADM

## 2017-01-01 RX ORDER — ENOXAPARIN SODIUM 100 MG/ML
100 INJECTION SUBCUTANEOUS
Status: COMPLETED | OUTPATIENT
Start: 2017-01-01 | End: 2017-01-01

## 2017-01-01 RX ORDER — NALOXONE HYDROCHLORIDE 0.4 MG/ML
0.4 INJECTION, SOLUTION INTRAMUSCULAR; INTRAVENOUS; SUBCUTANEOUS
Status: DISCONTINUED | OUTPATIENT
Start: 2017-01-01 | End: 2017-01-01 | Stop reason: HOSPADM

## 2017-01-01 RX ORDER — DIGOXIN 0.25 MG/ML
250 INJECTION INTRAMUSCULAR; INTRAVENOUS ONCE
Status: COMPLETED | OUTPATIENT
Start: 2017-01-01 | End: 2017-01-01

## 2017-01-01 RX ORDER — ENOXAPARIN SODIUM 100 MG/ML
100 INJECTION SUBCUTANEOUS EVERY 12 HOURS
Status: DISCONTINUED | OUTPATIENT
Start: 2017-01-01 | End: 2017-01-01

## 2017-01-01 RX ORDER — METFORMIN HYDROCHLORIDE 500 MG/1
500 TABLET ORAL
Status: DISCONTINUED | OUTPATIENT
Start: 2017-01-01 | End: 2017-01-01

## 2017-01-01 RX ORDER — MAGNESIUM SULFATE HEPTAHYDRATE 40 MG/ML
2 INJECTION, SOLUTION INTRAVENOUS ONCE
Status: COMPLETED | OUTPATIENT
Start: 2017-01-01 | End: 2017-01-01

## 2017-01-01 RX ORDER — SODIUM,POTASSIUM PHOSPHATES 280-250MG
2 POWDER IN PACKET (EA) ORAL EVERY 4 HOURS
Status: COMPLETED | OUTPATIENT
Start: 2017-01-01 | End: 2017-01-01

## 2017-01-01 RX ORDER — PYRIDOSTIGMINE BROMIDE 60 MG/1
60 TABLET ORAL 2 TIMES DAILY
Status: DISCONTINUED | OUTPATIENT
Start: 2017-01-01 | End: 2017-01-01

## 2017-01-01 RX ORDER — SODIUM CHLORIDE 9 MG/ML
INJECTION, SOLUTION INTRAVENOUS
Status: DISCONTINUED | OUTPATIENT
Start: 2017-01-01 | End: 2017-01-01 | Stop reason: HOSPADM

## 2017-01-01 RX ORDER — LIDOCAINE HYDROCHLORIDE 10 MG/ML
10-30 INJECTION INFILTRATION; PERINEURAL
Status: DISCONTINUED | OUTPATIENT
Start: 2017-01-01 | End: 2017-01-01 | Stop reason: HOSPADM

## 2017-01-01 RX ORDER — MIDAZOLAM HYDROCHLORIDE 1 MG/ML
INJECTION, SOLUTION INTRAMUSCULAR; INTRAVENOUS
Status: DISPENSED
Start: 2017-01-01 | End: 2017-01-01

## 2017-01-01 RX ORDER — CARVEDILOL 12.5 MG/1
12.5 TABLET ORAL 2 TIMES DAILY WITH MEALS
Status: DISCONTINUED | OUTPATIENT
Start: 2017-01-01 | End: 2017-01-01 | Stop reason: HOSPADM

## 2017-01-01 RX ORDER — MAGNESIUM SULFATE 100 %
4 CRYSTALS MISCELLANEOUS AS NEEDED
Status: DISCONTINUED | OUTPATIENT
Start: 2017-01-01 | End: 2017-01-01

## 2017-01-01 RX ORDER — OXYCODONE AND ACETAMINOPHEN 5; 325 MG/1; MG/1
1 TABLET ORAL
Status: CANCELLED | OUTPATIENT
Start: 2017-01-01

## 2017-01-01 RX ORDER — MIDAZOLAM HYDROCHLORIDE 1 MG/ML
.25-5 INJECTION, SOLUTION INTRAMUSCULAR; INTRAVENOUS
Status: DISCONTINUED | OUTPATIENT
Start: 2017-01-01 | End: 2017-01-01 | Stop reason: HOSPADM

## 2017-01-01 RX ORDER — MYCOPHENOLATE MOFETIL 200 MG/ML
1000 POWDER, FOR SUSPENSION ORAL
Status: DISCONTINUED | OUTPATIENT
Start: 2017-01-01 | End: 2017-01-01

## 2017-01-01 RX ORDER — FENTANYL CITRATE 50 UG/ML
25-200 INJECTION, SOLUTION INTRAMUSCULAR; INTRAVENOUS
Status: DISCONTINUED | OUTPATIENT
Start: 2017-01-01 | End: 2017-01-01 | Stop reason: HOSPADM

## 2017-01-01 RX ORDER — NALOXONE HYDROCHLORIDE 0.4 MG/ML
0.4 INJECTION, SOLUTION INTRAMUSCULAR; INTRAVENOUS; SUBCUTANEOUS ONCE
Status: COMPLETED | OUTPATIENT
Start: 2017-01-01 | End: 2017-01-01

## 2017-01-01 RX ORDER — SODIUM CHLORIDE 450 MG/100ML
75 INJECTION, SOLUTION INTRAVENOUS CONTINUOUS
Status: DISCONTINUED | OUTPATIENT
Start: 2017-01-01 | End: 2017-01-01

## 2017-01-01 RX ORDER — PYRIDOSTIGMINE BROMIDE 60 MG/1
60 TABLET ORAL 2 TIMES DAILY
COMMUNITY

## 2017-01-01 RX ORDER — SODIUM CHLORIDE 0.9 % (FLUSH) 0.9 %
5-10 SYRINGE (ML) INJECTION AS NEEDED
Status: DISCONTINUED | OUTPATIENT
Start: 2017-01-01 | End: 2017-01-01 | Stop reason: HOSPADM

## 2017-01-01 RX ORDER — ASCORBIC ACID 500 MG
1000 TABLET ORAL DAILY
Status: DISCONTINUED | OUTPATIENT
Start: 2017-01-01 | End: 2017-01-01

## 2017-01-01 RX ORDER — DILTIAZEM HYDROCHLORIDE 30 MG/1
30 TABLET, FILM COATED ORAL
Status: DISCONTINUED | OUTPATIENT
Start: 2017-01-01 | End: 2017-01-01

## 2017-01-01 RX ORDER — DILTIAZEM HYDROCHLORIDE 5 MG/ML
INJECTION INTRAVENOUS
Status: COMPLETED
Start: 2017-01-01 | End: 2017-01-01

## 2017-01-01 RX ORDER — AZATHIOPRINE 50 MG/1
50 TABLET ORAL 2 TIMES DAILY
Qty: 60 TAB | Refills: 5 | Status: SHIPPED | OUTPATIENT
Start: 2017-01-01 | End: 2017-01-01 | Stop reason: SDUPTHER

## 2017-01-01 RX ORDER — SODIUM CHLORIDE 9 MG/ML
250 INJECTION, SOLUTION INTRAVENOUS AS NEEDED
Status: DISCONTINUED | OUTPATIENT
Start: 2017-01-01 | End: 2017-01-01

## 2017-01-01 RX ORDER — MORPHINE SULFATE 2 MG/ML
2 INJECTION, SOLUTION INTRAMUSCULAR; INTRAVENOUS
Status: DISCONTINUED | OUTPATIENT
Start: 2017-01-01 | End: 2017-01-01 | Stop reason: HOSPADM

## 2017-01-01 RX ORDER — FENTANYL CITRATE 50 UG/ML
100 INJECTION, SOLUTION INTRAMUSCULAR; INTRAVENOUS ONCE
Status: DISCONTINUED | OUTPATIENT
Start: 2017-01-01 | End: 2017-01-01 | Stop reason: HOSPADM

## 2017-01-01 RX ORDER — ACETAMINOPHEN 500 MG
1000 TABLET ORAL
COMMUNITY

## 2017-01-01 RX ORDER — PROPOFOL 10 MG/ML
INJECTION, EMULSION INTRAVENOUS AS NEEDED
Status: DISCONTINUED | OUTPATIENT
Start: 2017-01-01 | End: 2017-01-01 | Stop reason: HOSPADM

## 2017-01-01 RX ORDER — DILTIAZEM HYDROCHLORIDE 240 MG/1
240 CAPSULE, COATED, EXTENDED RELEASE ORAL DAILY
Status: DISCONTINUED | OUTPATIENT
Start: 2017-01-01 | End: 2017-01-01

## 2017-01-01 RX ORDER — CHOLECALCIFEROL TAB 125 MCG (5000 UNIT) 125 MCG
5000 TAB ORAL DAILY
COMMUNITY

## 2017-01-01 RX ORDER — FENTANYL CITRATE 50 UG/ML
INJECTION, SOLUTION INTRAMUSCULAR; INTRAVENOUS AS NEEDED
Status: DISCONTINUED | OUTPATIENT
Start: 2017-01-01 | End: 2017-01-01 | Stop reason: HOSPADM

## 2017-01-01 RX ORDER — DEXTROSE 50 % IN WATER (D50W) INTRAVENOUS SYRINGE
12.5-25 AS NEEDED
Status: DISCONTINUED | OUTPATIENT
Start: 2017-01-01 | End: 2017-01-01 | Stop reason: HOSPADM

## 2017-01-01 RX ORDER — DILTIAZEM HYDROCHLORIDE 5 MG/ML
20 INJECTION INTRAVENOUS
Status: COMPLETED | OUTPATIENT
Start: 2017-01-01 | End: 2017-01-01

## 2017-01-01 RX ORDER — ACETAMINOPHEN 650 MG/1
650 SUPPOSITORY RECTAL
Status: DISCONTINUED | OUTPATIENT
Start: 2017-01-01 | End: 2017-01-01 | Stop reason: HOSPADM

## 2017-01-01 RX ORDER — CLONIDINE 0.2 MG/24H
1 PATCH, EXTENDED RELEASE TRANSDERMAL
Status: DISCONTINUED | OUTPATIENT
Start: 2017-01-01 | End: 2017-01-01 | Stop reason: HOSPADM

## 2017-01-01 RX ORDER — MIDAZOLAM HYDROCHLORIDE 1 MG/ML
INJECTION, SOLUTION INTRAMUSCULAR; INTRAVENOUS AS NEEDED
Status: DISCONTINUED | OUTPATIENT
Start: 2017-01-01 | End: 2017-01-01 | Stop reason: HOSPADM

## 2017-01-01 RX ORDER — FACIAL-BODY WIPES
10 EACH TOPICAL DAILY PRN
Status: DISCONTINUED | OUTPATIENT
Start: 2017-01-01 | End: 2017-01-01 | Stop reason: HOSPADM

## 2017-01-01 RX ORDER — MIDAZOLAM HYDROCHLORIDE 1 MG/ML
.5-1 INJECTION, SOLUTION INTRAMUSCULAR; INTRAVENOUS
Status: DISCONTINUED | OUTPATIENT
Start: 2017-01-01 | End: 2017-01-01 | Stop reason: HOSPADM

## 2017-01-01 RX ORDER — ZOLPIDEM TARTRATE 5 MG/1
5 TABLET ORAL
Status: DISCONTINUED | OUTPATIENT
Start: 2017-01-01 | End: 2017-01-01 | Stop reason: HOSPADM

## 2017-01-01 RX ORDER — GLYCOPYRROLATE 0.2 MG/ML
0.2 INJECTION INTRAMUSCULAR; INTRAVENOUS
Status: DISCONTINUED | OUTPATIENT
Start: 2017-01-01 | End: 2017-01-01 | Stop reason: HOSPADM

## 2017-01-01 RX ORDER — MYCOPHENOLATE MOFETIL 500 MG/1
1000 TABLET ORAL 2 TIMES DAILY
Qty: 120 TAB | Refills: 5 | Status: SHIPPED | OUTPATIENT
Start: 2017-01-01 | End: 2017-01-01

## 2017-01-01 RX ORDER — SODIUM CHLORIDE 0.9 % (FLUSH) 0.9 %
5-10 SYRINGE (ML) INJECTION AS NEEDED
Status: DISCONTINUED | OUTPATIENT
Start: 2017-01-01 | End: 2017-01-01

## 2017-01-01 RX ORDER — HYDROMORPHONE HCL IN 0.9% NACL 15 MG/30ML
PATIENT CONTROLLED ANALGESIA VIAL INTRAVENOUS CONTINUOUS
Status: DISCONTINUED | OUTPATIENT
Start: 2017-01-01 | End: 2017-01-01 | Stop reason: HOSPADM

## 2017-01-01 RX ORDER — ALBUTEROL SULFATE 0.83 MG/ML
2.5 SOLUTION RESPIRATORY (INHALATION)
Status: DISCONTINUED | OUTPATIENT
Start: 2017-01-01 | End: 2017-01-01 | Stop reason: HOSPADM

## 2017-01-01 RX ORDER — ONDANSETRON 2 MG/ML
INJECTION INTRAMUSCULAR; INTRAVENOUS AS NEEDED
Status: DISCONTINUED | OUTPATIENT
Start: 2017-01-01 | End: 2017-01-01 | Stop reason: HOSPADM

## 2017-01-01 RX ORDER — PROTAMINE SULFATE 10 MG/ML
50 INJECTION, SOLUTION INTRAVENOUS
Status: COMPLETED | OUTPATIENT
Start: 2017-01-01 | End: 2017-01-01

## 2017-01-01 RX ORDER — ENOXAPARIN SODIUM 100 MG/ML
40 INJECTION SUBCUTANEOUS EVERY 12 HOURS
Status: DISCONTINUED | OUTPATIENT
Start: 2017-01-01 | End: 2017-01-01

## 2017-01-01 RX ORDER — DEXTROMETHORPHAN/PSEUDOEPHED 2.5-7.5/.8
1.2 DROPS ORAL
Status: DISCONTINUED | OUTPATIENT
Start: 2017-01-01 | End: 2017-01-01 | Stop reason: HOSPADM

## 2017-01-01 RX ORDER — CARVEDILOL 12.5 MG/1
12.5 TABLET ORAL 2 TIMES DAILY WITH MEALS
Status: DISCONTINUED | OUTPATIENT
Start: 2017-01-01 | End: 2017-01-01

## 2017-01-01 RX ORDER — MULTIVIT WITH MINERALS/HERBS
3 TABLET ORAL DAILY
COMMUNITY

## 2017-01-01 RX ORDER — HYDROMORPHONE HYDROCHLORIDE 1 MG/ML
0.5 INJECTION, SOLUTION INTRAMUSCULAR; INTRAVENOUS; SUBCUTANEOUS
Status: DISCONTINUED | OUTPATIENT
Start: 2017-01-01 | End: 2017-01-01

## 2017-01-01 RX ORDER — DIGOXIN 125 MCG
0.25 TABLET ORAL DAILY
Status: COMPLETED | OUTPATIENT
Start: 2017-01-01 | End: 2017-01-01

## 2017-01-01 RX ORDER — POTASSIUM CHLORIDE 1.5 G/1.77G
40 POWDER, FOR SOLUTION ORAL
Status: COMPLETED | OUTPATIENT
Start: 2017-01-01 | End: 2017-01-01

## 2017-01-01 RX ORDER — FLUMAZENIL 0.1 MG/ML
0.2 INJECTION INTRAVENOUS
Status: DISCONTINUED | OUTPATIENT
Start: 2017-01-01 | End: 2017-01-01 | Stop reason: HOSPADM

## 2017-01-01 RX ORDER — HEPARIN SODIUM 10000 [USP'U]/100ML
10-36 INJECTION, SOLUTION INTRAVENOUS
Status: DISCONTINUED | OUTPATIENT
Start: 2017-01-01 | End: 2017-01-01

## 2017-01-01 RX ORDER — LORAZEPAM 2 MG/ML
2 INJECTION INTRAMUSCULAR
Status: DISCONTINUED | OUTPATIENT
Start: 2017-01-01 | End: 2017-01-01 | Stop reason: HOSPADM

## 2017-01-01 RX ORDER — MORPHINE SULFATE 5 MG/ML
INJECTION, SOLUTION INTRAVENOUS CONTINUOUS
Status: DISCONTINUED | OUTPATIENT
Start: 2017-01-01 | End: 2017-01-01

## 2017-01-01 RX ORDER — ALPRAZOLAM 0.25 MG/1
0.25 TABLET ORAL
Status: DISCONTINUED | OUTPATIENT
Start: 2017-01-01 | End: 2017-01-01

## 2017-01-01 RX ORDER — LORAZEPAM 2 MG/ML
1 INJECTION INTRAMUSCULAR
Status: DISCONTINUED | OUTPATIENT
Start: 2017-01-01 | End: 2017-01-01

## 2017-01-01 RX ORDER — LORAZEPAM 2 MG/ML
1 INJECTION INTRAMUSCULAR
Status: DISCONTINUED | OUTPATIENT
Start: 2017-01-01 | End: 2017-01-01 | Stop reason: HOSPADM

## 2017-01-01 RX ORDER — SODIUM CHLORIDE 0.9 % (FLUSH) 0.9 %
10 SYRINGE (ML) INJECTION EVERY 8 HOURS
Status: DISCONTINUED | OUTPATIENT
Start: 2017-01-01 | End: 2017-01-01

## 2017-01-01 RX ORDER — MIDAZOLAM HYDROCHLORIDE 5 MG/ML
4 INJECTION INTRAMUSCULAR; INTRAVENOUS ONCE
Status: DISPENSED | OUTPATIENT
Start: 2017-01-01 | End: 2017-01-01

## 2017-01-01 RX ORDER — LORAZEPAM 2 MG/ML
1 INJECTION INTRAMUSCULAR EVERY 4 HOURS
Status: DISCONTINUED | OUTPATIENT
Start: 2017-01-01 | End: 2017-01-01

## 2017-01-01 RX ORDER — CARVEDILOL 12.5 MG/1
12.5 TABLET ORAL 2 TIMES DAILY WITH MEALS
Qty: 60 TAB | Refills: 0 | Status: SHIPPED | OUTPATIENT
Start: 2017-01-01

## 2017-01-01 RX ORDER — ENOXAPARIN SODIUM 100 MG/ML
40 INJECTION SUBCUTANEOUS
Status: COMPLETED | OUTPATIENT
Start: 2017-01-01 | End: 2017-01-01

## 2017-01-01 RX ORDER — HEPARIN SODIUM 200 [USP'U]/100ML
500 INJECTION, SOLUTION INTRAVENOUS ONCE
Status: DISCONTINUED | OUTPATIENT
Start: 2017-01-01 | End: 2017-01-01 | Stop reason: HOSPADM

## 2017-01-01 RX ORDER — ENOXAPARIN SODIUM 100 MG/ML
1 INJECTION SUBCUTANEOUS EVERY 12 HOURS
Status: DISCONTINUED | OUTPATIENT
Start: 2017-01-01 | End: 2017-01-01 | Stop reason: SDUPTHER

## 2017-01-01 RX ORDER — MAGNESIUM CITRATE
296 SOLUTION, ORAL ORAL
Status: COMPLETED | OUTPATIENT
Start: 2017-01-01 | End: 2017-01-01

## 2017-01-01 RX ORDER — ASCORBIC ACID 500 MG
TABLET ORAL DAILY
COMMUNITY

## 2017-01-01 RX ORDER — OXYCODONE AND ACETAMINOPHEN 5; 325 MG/1; MG/1
1 TABLET ORAL
Status: DISCONTINUED | OUTPATIENT
Start: 2017-01-01 | End: 2017-01-01

## 2017-01-01 RX ORDER — CARVEDILOL 6.25 MG/1
12.5 TABLET ORAL 2 TIMES DAILY WITH MEALS
Status: DISCONTINUED | OUTPATIENT
Start: 2017-01-01 | End: 2017-01-01

## 2017-01-01 RX ORDER — DILTIAZEM HYDROCHLORIDE 5 MG/ML
15 INJECTION INTRAVENOUS ONCE
Status: COMPLETED | OUTPATIENT
Start: 2017-01-01 | End: 2017-01-01

## 2017-01-01 RX ORDER — METFORMIN HYDROCHLORIDE 500 MG/1
500 TABLET ORAL
Qty: 30 TAB | Refills: 0 | Status: SHIPPED | OUTPATIENT
Start: 2017-01-01

## 2017-01-01 RX ORDER — SODIUM CHLORIDE 9 MG/ML
100 INJECTION, SOLUTION INTRAVENOUS CONTINUOUS
Status: DISCONTINUED | OUTPATIENT
Start: 2017-01-01 | End: 2017-01-01

## 2017-01-01 RX ORDER — SODIUM CHLORIDE 9 MG/ML
50 INJECTION, SOLUTION INTRAVENOUS CONTINUOUS
Status: DISPENSED | OUTPATIENT
Start: 2017-01-01 | End: 2017-01-01

## 2017-01-01 RX ORDER — KETOROLAC TROMETHAMINE 30 MG/ML
30 INJECTION, SOLUTION INTRAMUSCULAR; INTRAVENOUS
Status: DISCONTINUED | OUTPATIENT
Start: 2017-01-01 | End: 2017-01-01 | Stop reason: HOSPADM

## 2017-01-01 RX ORDER — EPINEPHRINE 0.1 MG/ML
1 INJECTION INTRACARDIAC; INTRAVENOUS
Status: DISCONTINUED | OUTPATIENT
Start: 2017-01-01 | End: 2017-01-01 | Stop reason: HOSPADM

## 2017-01-01 RX ORDER — DILTIAZEM HYDROCHLORIDE 300 MG/1
300 CAPSULE, COATED, EXTENDED RELEASE ORAL DAILY
Status: DISCONTINUED | OUTPATIENT
Start: 2017-01-01 | End: 2017-01-01

## 2017-01-01 RX ORDER — ALPRAZOLAM 0.25 MG/1
0.25 TABLET ORAL
Status: DISCONTINUED | OUTPATIENT
Start: 2017-01-01 | End: 2017-01-01 | Stop reason: HOSPADM

## 2017-01-01 RX ORDER — LABETALOL HYDROCHLORIDE 5 MG/ML
10 INJECTION, SOLUTION INTRAVENOUS
Status: DISCONTINUED | OUTPATIENT
Start: 2017-01-01 | End: 2017-01-01

## 2017-01-01 RX ORDER — ONDANSETRON 2 MG/ML
4 INJECTION INTRAMUSCULAR; INTRAVENOUS
Status: DISCONTINUED | OUTPATIENT
Start: 2017-01-01 | End: 2017-01-01

## 2017-01-01 RX ORDER — FLUMAZENIL 0.1 MG/ML
0.5 INJECTION INTRAVENOUS ONCE
Status: ACTIVE | OUTPATIENT
Start: 2017-01-01 | End: 2017-01-01

## 2017-01-01 RX ORDER — CARVEDILOL 12.5 MG/1
25 TABLET ORAL 2 TIMES DAILY WITH MEALS
Status: DISCONTINUED | OUTPATIENT
Start: 2017-01-01 | End: 2017-01-01

## 2017-01-01 RX ORDER — ACETAMINOPHEN 650 MG/1
650 SUPPOSITORY RECTAL
Status: DISCONTINUED | OUTPATIENT
Start: 2017-01-01 | End: 2017-01-01

## 2017-01-01 RX ORDER — GUAIFENESIN 100 MG/5ML
324 LIQUID (ML) ORAL
Status: COMPLETED | OUTPATIENT
Start: 2017-01-01 | End: 2017-01-01

## 2017-01-01 RX ORDER — ONDANSETRON 4 MG/1
4 TABLET, ORALLY DISINTEGRATING ORAL
Status: DISCONTINUED | OUTPATIENT
Start: 2017-01-01 | End: 2017-01-01 | Stop reason: HOSPADM

## 2017-01-01 RX ORDER — SODIUM CHLORIDE 0.9 % (FLUSH) 0.9 %
5-10 SYRINGE (ML) INJECTION EVERY 8 HOURS
Status: DISCONTINUED | OUTPATIENT
Start: 2017-01-01 | End: 2017-01-01

## 2017-01-01 RX ORDER — AZATHIOPRINE 50 MG/1
TABLET ORAL
Qty: 90 TAB | Refills: 5 | Status: SHIPPED | OUTPATIENT
Start: 2017-01-01 | End: 2017-01-01 | Stop reason: ALTCHOICE

## 2017-01-01 RX ORDER — GUAIFENESIN 100 MG/5ML
81 LIQUID (ML) ORAL DAILY
Status: DISCONTINUED | OUTPATIENT
Start: 2017-01-01 | End: 2017-01-01

## 2017-01-01 RX ORDER — DILTIAZEM HYDROCHLORIDE 60 MG/1
120 CAPSULE, EXTENDED RELEASE ORAL DAILY
Status: DISCONTINUED | OUTPATIENT
Start: 2017-01-01 | End: 2017-01-01

## 2017-01-01 RX ORDER — SODIUM CHLORIDE 0.9 % (FLUSH) 0.9 %
5-10 SYRINGE (ML) INJECTION EVERY 8 HOURS
Status: DISCONTINUED | OUTPATIENT
Start: 2017-01-01 | End: 2017-01-01 | Stop reason: HOSPADM

## 2017-01-01 RX ORDER — SODIUM,POTASSIUM PHOSPHATES 280-250MG
2 POWDER IN PACKET (EA) ORAL ONCE
Status: COMPLETED | OUTPATIENT
Start: 2017-01-01 | End: 2017-01-01

## 2017-01-01 RX ORDER — TOPIRAMATE 25 MG/1
25 TABLET ORAL
Qty: 30 TAB | Refills: 5 | Status: SHIPPED | OUTPATIENT
Start: 2017-01-01 | End: 2017-01-01

## 2017-01-01 RX ORDER — LIDOCAINE HYDROCHLORIDE 10 MG/ML
INJECTION INFILTRATION; PERINEURAL
Status: DISPENSED
Start: 2017-01-01 | End: 2017-01-01

## 2017-01-01 RX ORDER — HYDROMORPHONE HCL IN 0.9% NACL 15 MG/30ML
PATIENT CONTROLLED ANALGESIA VIAL INTRAVENOUS
Status: DISCONTINUED | OUTPATIENT
Start: 2017-01-01 | End: 2017-01-01

## 2017-01-01 RX ORDER — ENOXAPARIN SODIUM 100 MG/ML
1 INJECTION SUBCUTANEOUS EVERY 12 HOURS
Status: DISCONTINUED | OUTPATIENT
Start: 2017-01-01 | End: 2017-01-01 | Stop reason: CLARIF

## 2017-01-01 RX ORDER — CLONIDINE 0.2 MG/24H
1 PATCH, EXTENDED RELEASE TRANSDERMAL
Status: DISCONTINUED | OUTPATIENT
Start: 2017-01-01 | End: 2017-01-01

## 2017-01-01 RX ORDER — FENTANYL CITRATE 50 UG/ML
100 INJECTION, SOLUTION INTRAMUSCULAR; INTRAVENOUS ONCE
Status: ACTIVE | OUTPATIENT
Start: 2017-01-01 | End: 2017-01-01

## 2017-01-01 RX ORDER — DIGOXIN 250 MCG
0.25 TABLET ORAL DAILY
Status: COMPLETED | OUTPATIENT
Start: 2017-01-01 | End: 2017-01-01

## 2017-01-01 RX ORDER — PROCHLORPERAZINE EDISYLATE 5 MG/ML
5 INJECTION INTRAMUSCULAR; INTRAVENOUS
Status: DISCONTINUED | OUTPATIENT
Start: 2017-01-01 | End: 2017-01-01 | Stop reason: HOSPADM

## 2017-01-01 RX ORDER — POTASSIUM CHLORIDE 750 MG/1
10 TABLET, FILM COATED, EXTENDED RELEASE ORAL
Status: COMPLETED | OUTPATIENT
Start: 2017-01-01 | End: 2017-01-01

## 2017-01-01 RX ORDER — DOCUSATE SODIUM 100 MG/1
100 CAPSULE, LIQUID FILLED ORAL DAILY
Status: DISCONTINUED | OUTPATIENT
Start: 2017-01-01 | End: 2017-01-01 | Stop reason: HOSPADM

## 2017-01-01 RX ORDER — DILTIAZEM HYDROCHLORIDE 300 MG/1
300 CAPSULE, COATED, EXTENDED RELEASE ORAL DAILY
Qty: 30 CAP | Refills: 0 | Status: SHIPPED | OUTPATIENT
Start: 2017-01-01

## 2017-01-01 RX ORDER — INSULIN LISPRO 100 [IU]/ML
INJECTION, SOLUTION INTRAVENOUS; SUBCUTANEOUS EVERY 6 HOURS
Status: DISCONTINUED | OUTPATIENT
Start: 2017-01-01 | End: 2017-01-01

## 2017-01-01 RX ORDER — ACETAMINOPHEN 325 MG/1
650 TABLET ORAL
Status: DISCONTINUED | OUTPATIENT
Start: 2017-01-01 | End: 2017-01-01 | Stop reason: HOSPADM

## 2017-01-01 RX ORDER — ACETAMINOPHEN 325 MG/1
650 TABLET ORAL
Status: DISCONTINUED | OUTPATIENT
Start: 2017-01-01 | End: 2017-01-01

## 2017-01-01 RX ORDER — TRAMADOL HYDROCHLORIDE 50 MG/1
50 TABLET ORAL
Status: DISCONTINUED | OUTPATIENT
Start: 2017-01-01 | End: 2017-01-01 | Stop reason: HOSPADM

## 2017-01-01 RX ORDER — PYRIDOSTIGMINE BROMIDE 60 MG/1
TABLET ORAL
Qty: 270 TAB | Refills: 5 | Status: SHIPPED | OUTPATIENT
Start: 2017-01-01 | End: 2017-01-01 | Stop reason: CLARIF

## 2017-01-01 RX ORDER — IBUPROFEN 200 MG
400-600 TABLET ORAL
COMMUNITY
End: 2017-01-01

## 2017-01-01 RX ORDER — TRAMADOL HYDROCHLORIDE 50 MG/1
50 TABLET ORAL
COMMUNITY

## 2017-01-01 RX ORDER — LEVOFLOXACIN 5 MG/ML
750 INJECTION, SOLUTION INTRAVENOUS
Status: DISCONTINUED | OUTPATIENT
Start: 2017-01-01 | End: 2017-01-01

## 2017-01-01 RX ORDER — POTASSIUM CHLORIDE 750 MG/1
20 TABLET, FILM COATED, EXTENDED RELEASE ORAL
Status: CANCELLED | OUTPATIENT
Start: 2017-01-01 | End: 2017-01-01

## 2017-01-01 RX ORDER — MORPHINE SULFATE 2 MG/ML
2 INJECTION, SOLUTION INTRAMUSCULAR; INTRAVENOUS
Status: DISCONTINUED | OUTPATIENT
Start: 2017-01-01 | End: 2017-01-01

## 2017-01-01 RX ORDER — SODIUM CHLORIDE 900 MG/100ML
INJECTION INTRAVENOUS
Status: COMPLETED
Start: 2017-01-01 | End: 2017-01-01

## 2017-01-01 RX ORDER — LIDOCAINE 50 MG/G
OINTMENT TOPICAL AS NEEDED
Status: DISCONTINUED | OUTPATIENT
Start: 2017-01-01 | End: 2017-01-01 | Stop reason: HOSPADM

## 2017-01-01 RX ORDER — DILTIAZEM HYDROCHLORIDE 180 MG/1
360 CAPSULE, COATED, EXTENDED RELEASE ORAL DAILY
Status: DISCONTINUED | OUTPATIENT
Start: 2017-01-01 | End: 2017-01-01

## 2017-01-01 RX ORDER — SODIUM CHLORIDE 0.9 % (FLUSH) 0.9 %
10 SYRINGE (ML) INJECTION AS NEEDED
Status: DISCONTINUED | OUTPATIENT
Start: 2017-01-01 | End: 2017-01-01

## 2017-01-01 RX ORDER — PYRIDOSTIGMINE BROMIDE 60 MG/1
60 TABLET ORAL 2 TIMES DAILY
Status: DISCONTINUED | OUTPATIENT
Start: 2017-01-01 | End: 2017-01-01 | Stop reason: HOSPADM

## 2017-01-01 RX ORDER — SUCCINYLCHOLINE CHLORIDE 20 MG/ML
INJECTION INTRAMUSCULAR; INTRAVENOUS AS NEEDED
Status: DISCONTINUED | OUTPATIENT
Start: 2017-01-01 | End: 2017-01-01 | Stop reason: HOSPADM

## 2017-01-01 RX ORDER — MELATONIN
5000 DAILY
Status: DISCONTINUED | OUTPATIENT
Start: 2017-01-01 | End: 2017-01-01

## 2017-01-01 RX ORDER — FENTANYL CITRATE 50 UG/ML
INJECTION, SOLUTION INTRAMUSCULAR; INTRAVENOUS
Status: DISPENSED
Start: 2017-01-01 | End: 2017-01-01

## 2017-01-01 RX ORDER — FUROSEMIDE 10 MG/ML
20 INJECTION INTRAMUSCULAR; INTRAVENOUS ONCE
Status: COMPLETED | OUTPATIENT
Start: 2017-01-01 | End: 2017-01-01

## 2017-01-01 RX ORDER — TRAMADOL HYDROCHLORIDE 50 MG/1
50 TABLET ORAL
Status: DISCONTINUED | OUTPATIENT
Start: 2017-01-01 | End: 2017-01-01

## 2017-01-01 RX ORDER — SCOLOPAMINE TRANSDERMAL SYSTEM 1 MG/1
1.5 PATCH, EXTENDED RELEASE TRANSDERMAL
Status: DISCONTINUED | OUTPATIENT
Start: 2017-01-01 | End: 2017-01-01 | Stop reason: HOSPADM

## 2017-01-01 RX ORDER — SODIUM CHLORIDE, SODIUM LACTATE, POTASSIUM CHLORIDE, CALCIUM CHLORIDE 600; 310; 30; 20 MG/100ML; MG/100ML; MG/100ML; MG/100ML
INJECTION, SOLUTION INTRAVENOUS
Status: DISCONTINUED | OUTPATIENT
Start: 2017-01-01 | End: 2017-01-01 | Stop reason: HOSPADM

## 2017-01-01 RX ORDER — MYCOPHENOLATE MOFETIL 500 MG/1
500 TABLET ORAL 2 TIMES DAILY
Qty: 120 TAB | Refills: 5 | Status: SHIPPED | OUTPATIENT
Start: 2017-01-01 | End: 2017-01-01 | Stop reason: SDUPTHER

## 2017-01-01 RX ORDER — LIDOCAINE HYDROCHLORIDE 20 MG/ML
15 SOLUTION OROPHARYNGEAL AS NEEDED
Status: DISCONTINUED | OUTPATIENT
Start: 2017-01-01 | End: 2017-01-01 | Stop reason: HOSPADM

## 2017-01-01 RX ORDER — BUMETANIDE 0.25 MG/ML
1 INJECTION INTRAMUSCULAR; INTRAVENOUS ONCE
Status: DISCONTINUED | OUTPATIENT
Start: 2017-01-01 | End: 2017-01-01 | Stop reason: HOSPADM

## 2017-01-01 RX ORDER — OXYCODONE AND ACETAMINOPHEN 5; 325 MG/1; MG/1
1-2 TABLET ORAL
Status: DISCONTINUED | OUTPATIENT
Start: 2017-01-01 | End: 2017-01-01

## 2017-01-01 RX ORDER — SODIUM CHLORIDE 9 MG/ML
75 INJECTION, SOLUTION INTRAVENOUS CONTINUOUS
Status: DISCONTINUED | OUTPATIENT
Start: 2017-01-01 | End: 2017-01-01

## 2017-01-01 RX ORDER — POTASSIUM CHLORIDE 7.45 MG/ML
10 INJECTION INTRAVENOUS
Status: COMPLETED | OUTPATIENT
Start: 2017-01-01 | End: 2017-01-01

## 2017-01-01 RX ORDER — INSULIN LISPRO 100 [IU]/ML
INJECTION, SOLUTION INTRAVENOUS; SUBCUTANEOUS
Status: DISCONTINUED | OUTPATIENT
Start: 2017-01-01 | End: 2017-01-01

## 2017-01-01 RX ORDER — ATROPINE SULFATE 0.1 MG/ML
0.5 INJECTION INTRAVENOUS
Status: DISCONTINUED | OUTPATIENT
Start: 2017-01-01 | End: 2017-01-01 | Stop reason: HOSPADM

## 2017-01-01 RX ORDER — ROCURONIUM BROMIDE 10 MG/ML
INJECTION, SOLUTION INTRAVENOUS AS NEEDED
Status: DISCONTINUED | OUTPATIENT
Start: 2017-01-01 | End: 2017-01-01 | Stop reason: HOSPADM

## 2017-01-01 RX ORDER — POTASSIUM CHLORIDE 7.45 MG/ML
10 INJECTION INTRAVENOUS
Status: DISCONTINUED | OUTPATIENT
Start: 2017-01-01 | End: 2017-01-01

## 2017-01-01 RX ORDER — LIDOCAINE HYDROCHLORIDE 20 MG/ML
JELLY TOPICAL ONCE
Status: DISCONTINUED | OUTPATIENT
Start: 2017-01-01 | End: 2017-01-01 | Stop reason: HOSPADM

## 2017-01-01 RX ORDER — POTASSIUM CHLORIDE 750 MG/1
40 TABLET, FILM COATED, EXTENDED RELEASE ORAL
Status: COMPLETED | OUTPATIENT
Start: 2017-01-01 | End: 2017-01-01

## 2017-01-01 RX ORDER — DIPHENHYDRAMINE HYDROCHLORIDE 50 MG/ML
25 INJECTION, SOLUTION INTRAMUSCULAR; INTRAVENOUS
Status: DISCONTINUED | OUTPATIENT
Start: 2017-01-01 | End: 2017-01-01

## 2017-01-01 RX ORDER — ENOXAPARIN SODIUM 100 MG/ML
1 INJECTION SUBCUTANEOUS
Status: DISCONTINUED | OUTPATIENT
Start: 2017-01-01 | End: 2017-01-01

## 2017-01-01 RX ORDER — PRAVASTATIN SODIUM 20 MG/1
40 TABLET ORAL
Status: DISCONTINUED | OUTPATIENT
Start: 2017-01-01 | End: 2017-01-01

## 2017-01-01 RX ORDER — ACETAMINOPHEN 325 MG/1
650 TABLET ORAL ONCE
Status: COMPLETED | OUTPATIENT
Start: 2017-01-01 | End: 2017-01-01

## 2017-01-01 RX ORDER — PREDNISONE 20 MG/1
40 TABLET ORAL
Status: DISCONTINUED | OUTPATIENT
Start: 2017-01-01 | End: 2017-01-01

## 2017-01-01 RX ORDER — AMOXICILLIN 500 MG/1
500 CAPSULE ORAL 2 TIMES DAILY
COMMUNITY
Start: 2017-01-01 | End: 2017-01-01

## 2017-01-01 RX ORDER — MAGNESIUM SULFATE 100 %
4 CRYSTALS MISCELLANEOUS AS NEEDED
Status: DISCONTINUED | OUTPATIENT
Start: 2017-01-01 | End: 2017-01-01 | Stop reason: HOSPADM

## 2017-01-01 RX ORDER — PYRIDOSTIGMINE BROMIDE 60 MG/1
90 TABLET ORAL 3 TIMES DAILY
Status: DISCONTINUED | OUTPATIENT
Start: 2017-01-01 | End: 2017-01-01

## 2017-01-01 RX ORDER — DEXTROSE 50 % IN WATER (D50W) INTRAVENOUS SYRINGE
12.5-25 AS NEEDED
Status: DISCONTINUED | OUTPATIENT
Start: 2017-01-01 | End: 2017-01-01

## 2017-01-01 RX ORDER — NALOXONE HYDROCHLORIDE 0.4 MG/ML
0.4 INJECTION, SOLUTION INTRAMUSCULAR; INTRAVENOUS; SUBCUTANEOUS AS NEEDED
Status: COMPLETED | OUTPATIENT
Start: 2017-01-01 | End: 2017-01-01

## 2017-01-01 RX ADMIN — INSULIN LISPRO 2 UNITS: 100 INJECTION, SOLUTION INTRAVENOUS; SUBCUTANEOUS at 12:59

## 2017-01-01 RX ADMIN — PREDNISONE 50 MG: 20 TABLET ORAL at 09:12

## 2017-01-01 RX ADMIN — CARVEDILOL 6.25 MG: 6.25 TABLET, FILM COATED ORAL at 08:15

## 2017-01-01 RX ADMIN — DIGOXIN 0.25 MG: 0.25 TABLET ORAL at 09:11

## 2017-01-01 RX ADMIN — NALOXONE HYDROCHLORIDE 0.4 MG: 0.4 INJECTION, SOLUTION INTRAMUSCULAR; INTRAVENOUS; SUBCUTANEOUS at 14:14

## 2017-01-01 RX ADMIN — CARVEDILOL 25 MG: 12.5 TABLET, FILM COATED ORAL at 09:11

## 2017-01-01 RX ADMIN — ALPRAZOLAM 0.25 MG: 0.25 TABLET ORAL at 05:41

## 2017-01-01 RX ADMIN — ONDANSETRON 4 MG: 2 INJECTION INTRAMUSCULAR; INTRAVENOUS at 08:38

## 2017-01-01 RX ADMIN — PREDNISONE 50 MG: 20 TABLET ORAL at 09:45

## 2017-01-01 RX ADMIN — Medication 10 ML: at 05:47

## 2017-01-01 RX ADMIN — SODIUM CHLORIDE 1000 ML: 900 INJECTION, SOLUTION INTRAVENOUS at 08:40

## 2017-01-01 RX ADMIN — ASPIRIN 81 MG CHEWABLE TABLET 81 MG: 81 TABLET CHEWABLE at 08:50

## 2017-01-01 RX ADMIN — PYRIDOSTIGMINE BROMIDE 60 MG: 60 TABLET ORAL at 16:56

## 2017-01-01 RX ADMIN — SODIUM CHLORIDE: 9 INJECTION, SOLUTION INTRAVENOUS at 07:15

## 2017-01-01 RX ADMIN — ENOXAPARIN SODIUM 100 MG: 40 INJECTION SUBCUTANEOUS at 12:04

## 2017-01-01 RX ADMIN — PIPERACILLIN SODIUM,TAZOBACTAM SODIUM 3.38 G: 3; .375 INJECTION, POWDER, FOR SOLUTION INTRAVENOUS at 02:59

## 2017-01-01 RX ADMIN — PIPERACILLIN SODIUM,TAZOBACTAM SODIUM 3.38 G: 3; .375 INJECTION, POWDER, FOR SOLUTION INTRAVENOUS at 18:04

## 2017-01-01 RX ADMIN — PIPERACILLIN SODIUM,TAZOBACTAM SODIUM 3.38 G: 3; .375 INJECTION, POWDER, FOR SOLUTION INTRAVENOUS at 03:53

## 2017-01-01 RX ADMIN — Medication 10 ML: at 14:00

## 2017-01-01 RX ADMIN — Medication 2 MG: at 11:06

## 2017-01-01 RX ADMIN — SODIUM CHLORIDE 75 ML/HR: 900 INJECTION, SOLUTION INTRAVENOUS at 09:05

## 2017-01-01 RX ADMIN — NITROGLYCERIN 0.5 INCH: 20 OINTMENT TOPICAL at 12:00

## 2017-01-01 RX ADMIN — MIDAZOLAM HYDROCHLORIDE 1 MG: 1 INJECTION, SOLUTION INTRAMUSCULAR; INTRAVENOUS at 01:27

## 2017-01-01 RX ADMIN — Medication: at 22:09

## 2017-01-01 RX ADMIN — MAGESIUM CITRATE 296 ML: 1.75 LIQUID ORAL at 11:53

## 2017-01-01 RX ADMIN — MAGNESIUM SULFATE HEPTAHYDRATE 2 G: 40 INJECTION, SOLUTION INTRAVENOUS at 14:32

## 2017-01-01 RX ADMIN — SODIUM CHLORIDE 40 MG: 9 INJECTION INTRAMUSCULAR; INTRAVENOUS; SUBCUTANEOUS at 22:13

## 2017-01-01 RX ADMIN — INSULIN LISPRO 3 UNITS: 100 INJECTION, SOLUTION INTRAVENOUS; SUBCUTANEOUS at 16:44

## 2017-01-01 RX ADMIN — Medication 10 ML: at 13:54

## 2017-01-01 RX ADMIN — VANCOMYCIN HYDROCHLORIDE 2000 MG: 10 INJECTION, POWDER, LYOPHILIZED, FOR SOLUTION INTRAVENOUS at 03:18

## 2017-01-01 RX ADMIN — SODIUM CHLORIDE 40 MG: 9 INJECTION INTRAMUSCULAR; INTRAVENOUS; SUBCUTANEOUS at 09:13

## 2017-01-01 RX ADMIN — Medication 10 ML: at 06:53

## 2017-01-01 RX ADMIN — VANCOMYCIN HYDROCHLORIDE 2000 MG: 10 INJECTION, POWDER, LYOPHILIZED, FOR SOLUTION INTRAVENOUS at 14:45

## 2017-01-01 RX ADMIN — OXYCODONE HYDROCHLORIDE AND ACETAMINOPHEN 1000 MG: 500 TABLET ORAL at 09:12

## 2017-01-01 RX ADMIN — Medication 10 ML: at 05:16

## 2017-01-01 RX ADMIN — HYDROMORPHONE HYDROCHLORIDE 0.5 MG: 1 INJECTION, SOLUTION INTRAMUSCULAR; INTRAVENOUS; SUBCUTANEOUS at 20:28

## 2017-01-01 RX ADMIN — Medication 10 ML: at 16:31

## 2017-01-01 RX ADMIN — PIPERACILLIN SODIUM,TAZOBACTAM SODIUM 3.38 G: 3; .375 INJECTION, POWDER, FOR SOLUTION INTRAVENOUS at 11:07

## 2017-01-01 RX ADMIN — PIPERACILLIN SODIUM,TAZOBACTAM SODIUM 3.38 G: 3; .375 INJECTION, POWDER, FOR SOLUTION INTRAVENOUS at 12:02

## 2017-01-01 RX ADMIN — SODIUM CHLORIDE 40 MG: 9 INJECTION INTRAMUSCULAR; INTRAVENOUS; SUBCUTANEOUS at 08:52

## 2017-01-01 RX ADMIN — DILTIAZEM HYDROCHLORIDE 30 MG: 30 TABLET, FILM COATED ORAL at 08:36

## 2017-01-01 RX ADMIN — SODIUM CHLORIDE: 9 INJECTION, SOLUTION INTRAVENOUS at 10:30

## 2017-01-01 RX ADMIN — ALPRAZOLAM 0.25 MG: 0.25 TABLET ORAL at 00:01

## 2017-01-01 RX ADMIN — PROPOFOL 10 MG: 10 INJECTION, EMULSION INTRAVENOUS at 07:23

## 2017-01-01 RX ADMIN — OXYCODONE HYDROCHLORIDE AND ACETAMINOPHEN 2 TABLET: 5; 325 TABLET ORAL at 17:18

## 2017-01-01 RX ADMIN — PREDNISONE 50 MG: 20 TABLET ORAL at 08:14

## 2017-01-01 RX ADMIN — SODIUM CHLORIDE 100 ML/HR: 900 INJECTION, SOLUTION INTRAVENOUS at 00:21

## 2017-01-01 RX ADMIN — POTASSIUM CHLORIDE 10 MEQ: 750 TABLET, FILM COATED, EXTENDED RELEASE ORAL at 12:59

## 2017-01-01 RX ADMIN — CARVEDILOL 25 MG: 12.5 TABLET, FILM COATED ORAL at 16:20

## 2017-01-01 RX ADMIN — INSULIN LISPRO 2 UNITS: 100 INJECTION, SOLUTION INTRAVENOUS; SUBCUTANEOUS at 17:57

## 2017-01-01 RX ADMIN — DILTIAZEM HYDROCHLORIDE 5 MG/HR: 5 INJECTION INTRAVENOUS at 13:38

## 2017-01-01 RX ADMIN — PYRIDOSTIGMINE BROMIDE 60 MG: 60 TABLET ORAL at 09:28

## 2017-01-01 RX ADMIN — MIDAZOLAM HYDROCHLORIDE 1 MG: 1 INJECTION, SOLUTION INTRAMUSCULAR; INTRAVENOUS at 11:26

## 2017-01-01 RX ADMIN — POTASSIUM & SODIUM PHOSPHATES POWDER PACK 280-160-250 MG 2 PACKET: 280-160-250 PACK at 09:04

## 2017-01-01 RX ADMIN — SODIUM CHLORIDE 40 MG: 9 INJECTION INTRAMUSCULAR; INTRAVENOUS; SUBCUTANEOUS at 09:00

## 2017-01-01 RX ADMIN — PREDNISONE 50 MG: 20 TABLET ORAL at 08:21

## 2017-01-01 RX ADMIN — DILTIAZEM HYDROCHLORIDE 15 MG/HR: 5 INJECTION INTRAVENOUS at 01:48

## 2017-01-01 RX ADMIN — SODIUM CHLORIDE 125 ML/HR: 900 INJECTION, SOLUTION INTRAVENOUS at 07:42

## 2017-01-01 RX ADMIN — PYRIDOSTIGMINE BROMIDE 60 MG: 60 TABLET ORAL at 08:15

## 2017-01-01 RX ADMIN — ONDANSETRON 4 MG: 2 INJECTION INTRAMUSCULAR; INTRAVENOUS at 12:00

## 2017-01-01 RX ADMIN — Medication 10 ML: at 09:17

## 2017-01-01 RX ADMIN — DILTIAZEM HYDROCHLORIDE 10 MG/HR: 5 INJECTION INTRAVENOUS at 19:59

## 2017-01-01 RX ADMIN — CARVEDILOL 6.25 MG: 6.25 TABLET, FILM COATED ORAL at 11:07

## 2017-01-01 RX ADMIN — FENTANYL CITRATE 25 MCG: 50 INJECTION, SOLUTION INTRAMUSCULAR; INTRAVENOUS at 01:27

## 2017-01-01 RX ADMIN — PIPERACILLIN SODIUM,TAZOBACTAM SODIUM 3.38 G: 3; .375 INJECTION, POWDER, FOR SOLUTION INTRAVENOUS at 20:02

## 2017-01-01 RX ADMIN — ALPRAZOLAM 0.25 MG: 0.25 TABLET ORAL at 00:09

## 2017-01-01 RX ADMIN — INSULIN LISPRO 2 UNITS: 100 INJECTION, SOLUTION INTRAVENOUS; SUBCUTANEOUS at 23:54

## 2017-01-01 RX ADMIN — SODIUM CHLORIDE 40 MG: 9 INJECTION INTRAMUSCULAR; INTRAVENOUS; SUBCUTANEOUS at 20:23

## 2017-01-01 RX ADMIN — OXYCODONE HYDROCHLORIDE AND ACETAMINOPHEN 1000 MG: 500 TABLET ORAL at 08:50

## 2017-01-01 RX ADMIN — SODIUM CHLORIDE 100 ML: 900 INJECTION, SOLUTION INTRAVENOUS at 20:45

## 2017-01-01 RX ADMIN — PYRIDOSTIGMINE BROMIDE 90 MG: 60 TABLET ORAL at 08:14

## 2017-01-01 RX ADMIN — CARVEDILOL 25 MG: 12.5 TABLET, FILM COATED ORAL at 19:55

## 2017-01-01 RX ADMIN — Medication 10 ML: at 22:22

## 2017-01-01 RX ADMIN — ENOXAPARIN SODIUM 100 MG: 100 INJECTION SUBCUTANEOUS at 13:18

## 2017-01-01 RX ADMIN — POTASSIUM CHLORIDE 10 MEQ: 10 INJECTION, SOLUTION INTRAVENOUS at 09:55

## 2017-01-01 RX ADMIN — ENOXAPARIN SODIUM 40 MG: 40 INJECTION SUBCUTANEOUS at 00:25

## 2017-01-01 RX ADMIN — Medication 10 ML: at 14:35

## 2017-01-01 RX ADMIN — ENOXAPARIN SODIUM 40 MG: 40 INJECTION SUBCUTANEOUS at 12:04

## 2017-01-01 RX ADMIN — SODIUM CHLORIDE, SODIUM LACTATE, POTASSIUM CHLORIDE, CALCIUM CHLORIDE: 600; 310; 30; 20 INJECTION, SOLUTION INTRAVENOUS at 10:30

## 2017-01-01 RX ADMIN — CARVEDILOL 12.5 MG: 12.5 TABLET, FILM COATED ORAL at 11:01

## 2017-01-01 RX ADMIN — PIPERACILLIN SODIUM,TAZOBACTAM SODIUM 3.38 G: 3; .375 INJECTION, POWDER, FOR SOLUTION INTRAVENOUS at 03:38

## 2017-01-01 RX ADMIN — INSULIN LISPRO 2 UNITS: 100 INJECTION, SOLUTION INTRAVENOUS; SUBCUTANEOUS at 11:56

## 2017-01-01 RX ADMIN — ROCURONIUM BROMIDE 5 MG: 10 INJECTION, SOLUTION INTRAVENOUS at 10:40

## 2017-01-01 RX ADMIN — DILTIAZEM HYDROCHLORIDE 15 MG/HR: 5 INJECTION INTRAVENOUS at 23:24

## 2017-01-01 RX ADMIN — ENOXAPARIN SODIUM 100 MG: 40 INJECTION SUBCUTANEOUS at 12:00

## 2017-01-01 RX ADMIN — Medication 10 ML: at 21:32

## 2017-01-01 RX ADMIN — Medication 10 ML: at 13:17

## 2017-01-01 RX ADMIN — CARVEDILOL 12.5 MG: 12.5 TABLET, FILM COATED ORAL at 17:04

## 2017-01-01 RX ADMIN — IOPAMIDOL 80 ML: 755 INJECTION, SOLUTION INTRAVENOUS at 18:22

## 2017-01-01 RX ADMIN — HYDROMORPHONE HYDROCHLORIDE 0.5 MG: 1 INJECTION, SOLUTION INTRAMUSCULAR; INTRAVENOUS; SUBCUTANEOUS at 22:36

## 2017-01-01 RX ADMIN — ASPIRIN 81 MG CHEWABLE TABLET 324 MG: 81 TABLET CHEWABLE at 12:00

## 2017-01-01 RX ADMIN — NITROGLYCERIN 0.5 INCH: 20 OINTMENT TOPICAL at 08:47

## 2017-01-01 RX ADMIN — TRAMADOL HYDROCHLORIDE 50 MG: 50 TABLET, FILM COATED ORAL at 19:57

## 2017-01-01 RX ADMIN — LABETALOL HYDROCHLORIDE 10 MG: 5 INJECTION, SOLUTION INTRAVENOUS at 04:35

## 2017-01-01 RX ADMIN — Medication 10 ML: at 05:33

## 2017-01-01 RX ADMIN — ENOXAPARIN SODIUM 40 MG: 40 INJECTION SUBCUTANEOUS at 08:21

## 2017-01-01 RX ADMIN — VANCOMYCIN HYDROCHLORIDE 2000 MG: 10 INJECTION, POWDER, LYOPHILIZED, FOR SOLUTION INTRAVENOUS at 02:48

## 2017-01-01 RX ADMIN — MIDAZOLAM HYDROCHLORIDE 1 MG: 1 INJECTION, SOLUTION INTRAMUSCULAR; INTRAVENOUS at 10:50

## 2017-01-01 RX ADMIN — SODIUM CHLORIDE 40 MG: 9 INJECTION INTRAMUSCULAR; INTRAVENOUS; SUBCUTANEOUS at 20:40

## 2017-01-01 RX ADMIN — SUCCINYLCHOLINE CHLORIDE 100 MG: 20 INJECTION INTRAMUSCULAR; INTRAVENOUS at 10:40

## 2017-01-01 RX ADMIN — MIDAZOLAM HYDROCHLORIDE 1 MG: 1 INJECTION, SOLUTION INTRAMUSCULAR; INTRAVENOUS at 10:45

## 2017-01-01 RX ADMIN — APIXABAN 10 MG: 5 TABLET, FILM COATED ORAL at 08:38

## 2017-01-01 RX ADMIN — LIDOCAINE: 50 OINTMENT TOPICAL at 10:27

## 2017-01-01 RX ADMIN — Medication 10 ML: at 21:57

## 2017-01-01 RX ADMIN — INSULIN LISPRO 2 UNITS: 100 INJECTION, SOLUTION INTRAVENOUS; SUBCUTANEOUS at 16:57

## 2017-01-01 RX ADMIN — PIPERACILLIN SODIUM,TAZOBACTAM SODIUM 3.38 G: 3; .375 INJECTION, POWDER, FOR SOLUTION INTRAVENOUS at 11:44

## 2017-01-01 RX ADMIN — SODIUM CHLORIDE, SODIUM LACTATE, POTASSIUM CHLORIDE, CALCIUM CHLORIDE: 600; 310; 30; 20 INJECTION, SOLUTION INTRAVENOUS at 11:30

## 2017-01-01 RX ADMIN — TRAMADOL HYDROCHLORIDE 50 MG: 50 TABLET, FILM COATED ORAL at 15:00

## 2017-01-01 RX ADMIN — LORAZEPAM 2 MG: 2 INJECTION, SOLUTION INTRAMUSCULAR; INTRAVENOUS at 15:33

## 2017-01-01 RX ADMIN — INSULIN LISPRO 2 UNITS: 100 INJECTION, SOLUTION INTRAVENOUS; SUBCUTANEOUS at 12:43

## 2017-01-01 RX ADMIN — PIPERACILLIN SODIUM,TAZOBACTAM SODIUM 3.38 G: 3; .375 INJECTION, POWDER, FOR SOLUTION INTRAVENOUS at 20:41

## 2017-01-01 RX ADMIN — VITAMIN D, TAB 1000IU (100/BT) 5000 UNITS: 25 TAB at 11:51

## 2017-01-01 RX ADMIN — PIPERACILLIN SODIUM,TAZOBACTAM SODIUM 3.38 G: 3; .375 INJECTION, POWDER, FOR SOLUTION INTRAVENOUS at 18:52

## 2017-01-01 RX ADMIN — SODIUM CHLORIDE 125 ML/HR: 900 INJECTION, SOLUTION INTRAVENOUS at 18:09

## 2017-01-01 RX ADMIN — PREDNISONE 50 MG: 20 TABLET ORAL at 08:57

## 2017-01-01 RX ADMIN — CARVEDILOL 25 MG: 12.5 TABLET, FILM COATED ORAL at 16:43

## 2017-01-01 RX ADMIN — Medication 10 ML: at 13:18

## 2017-01-01 RX ADMIN — Medication 10 ML: at 19:58

## 2017-01-01 RX ADMIN — VANCOMYCIN HYDROCHLORIDE 2000 MG: 10 INJECTION, POWDER, LYOPHILIZED, FOR SOLUTION INTRAVENOUS at 14:04

## 2017-01-01 RX ADMIN — DILTIAZEM HYDROCHLORIDE 15 MG/HR: 5 INJECTION INTRAVENOUS at 08:17

## 2017-01-01 RX ADMIN — DILTIAZEM HYDROCHLORIDE 360 MG: 180 CAPSULE, COATED, EXTENDED RELEASE ORAL at 08:14

## 2017-01-01 RX ADMIN — SODIUM CHLORIDE 75 ML/HR: 450 INJECTION, SOLUTION INTRAVENOUS at 08:39

## 2017-01-01 RX ADMIN — Medication 10 ML: at 13:47

## 2017-01-01 RX ADMIN — Medication: at 12:05

## 2017-01-01 RX ADMIN — INSULIN LISPRO 2 UNITS: 100 INJECTION, SOLUTION INTRAVENOUS; SUBCUTANEOUS at 12:03

## 2017-01-01 RX ADMIN — SODIUM CHLORIDE 125 ML/HR: 900 INJECTION, SOLUTION INTRAVENOUS at 22:00

## 2017-01-01 RX ADMIN — PIPERACILLIN SODIUM,TAZOBACTAM SODIUM 3.38 G: 3; .375 INJECTION, POWDER, FOR SOLUTION INTRAVENOUS at 03:05

## 2017-01-01 RX ADMIN — OXYCODONE HYDROCHLORIDE AND ACETAMINOPHEN 1000 MG: 500 TABLET ORAL at 11:52

## 2017-01-01 RX ADMIN — PYRIDOSTIGMINE BROMIDE 90 MG: 60 TABLET ORAL at 20:40

## 2017-01-01 RX ADMIN — DILTIAZEM HYDROCHLORIDE 300 MG: 180 CAPSULE, COATED, EXTENDED RELEASE ORAL at 08:01

## 2017-01-01 RX ADMIN — LABETALOL HYDROCHLORIDE 10 MG: 5 INJECTION, SOLUTION INTRAVENOUS at 02:31

## 2017-01-01 RX ADMIN — MIDAZOLAM HYDROCHLORIDE 1 MG: 1 INJECTION, SOLUTION INTRAMUSCULAR; INTRAVENOUS at 11:01

## 2017-01-01 RX ADMIN — SODIUM CHLORIDE 40 MG: 9 INJECTION INTRAMUSCULAR; INTRAVENOUS; SUBCUTANEOUS at 21:50

## 2017-01-01 RX ADMIN — PROTAMINE SULFATE 50 MG: 10 INJECTION, SOLUTION INTRAVENOUS at 23:02

## 2017-01-01 RX ADMIN — OXYCODONE HYDROCHLORIDE AND ACETAMINOPHEN 1000 MG: 500 TABLET ORAL at 08:13

## 2017-01-01 RX ADMIN — LABETALOL HYDROCHLORIDE 10 MG: 5 INJECTION, SOLUTION INTRAVENOUS at 02:50

## 2017-01-01 RX ADMIN — NITROGLYCERIN 0.5 INCH: 20 OINTMENT TOPICAL at 02:21

## 2017-01-01 RX ADMIN — PYRIDOSTIGMINE BROMIDE 60 MG: 60 TABLET ORAL at 17:44

## 2017-01-01 RX ADMIN — SODIUM CHLORIDE 40 MG: 9 INJECTION INTRAMUSCULAR; INTRAVENOUS; SUBCUTANEOUS at 08:42

## 2017-01-01 RX ADMIN — APIXABAN 10 MG: 5 TABLET, FILM COATED ORAL at 17:04

## 2017-01-01 RX ADMIN — POTASSIUM CHLORIDE 40 MEQ: 750 TABLET, FILM COATED, EXTENDED RELEASE ORAL at 08:59

## 2017-01-01 RX ADMIN — LABETALOL HYDROCHLORIDE 10 MG: 5 INJECTION, SOLUTION INTRAVENOUS at 11:45

## 2017-01-01 RX ADMIN — OXYCODONE HYDROCHLORIDE AND ACETAMINOPHEN 1000 MG: 500 TABLET ORAL at 09:16

## 2017-01-01 RX ADMIN — SODIUM CHLORIDE 40 MG: 9 INJECTION INTRAMUSCULAR; INTRAVENOUS; SUBCUTANEOUS at 08:37

## 2017-01-01 RX ADMIN — Medication 10 ML: at 05:26

## 2017-01-01 RX ADMIN — DILTIAZEM HYDROCHLORIDE 15 MG/HR: 5 INJECTION INTRAVENOUS at 10:21

## 2017-01-01 RX ADMIN — DIGOXIN 0.25 MG: 0.12 TABLET ORAL at 08:22

## 2017-01-01 RX ADMIN — CARVEDILOL 12.5 MG: 6.25 TABLET, FILM COATED ORAL at 18:03

## 2017-01-01 RX ADMIN — LIDOCAINE HYDROCHLORIDE 20 ML: 10 INJECTION, SOLUTION INFILTRATION; PERINEURAL at 01:28

## 2017-01-01 RX ADMIN — SODIUM CHLORIDE 40 MG: 9 INJECTION INTRAMUSCULAR; INTRAVENOUS; SUBCUTANEOUS at 22:15

## 2017-01-01 RX ADMIN — SODIUM CHLORIDE: 9 INJECTION, SOLUTION INTRAVENOUS at 11:45

## 2017-01-01 RX ADMIN — PYRIDOSTIGMINE BROMIDE 60 MG: 60 TABLET ORAL at 08:40

## 2017-01-01 RX ADMIN — POTASSIUM CHLORIDE 10 MEQ: 10 INJECTION, SOLUTION INTRAVENOUS at 06:26

## 2017-01-01 RX ADMIN — DILTIAZEM HYDROCHLORIDE 20 MG: 5 INJECTION INTRAVENOUS at 16:03

## 2017-01-01 RX ADMIN — Medication 10 ML: at 22:15

## 2017-01-01 RX ADMIN — PYRIDOSTIGMINE BROMIDE 60 MG: 60 TABLET ORAL at 18:03

## 2017-01-01 RX ADMIN — Medication 10 ML: at 13:57

## 2017-01-01 RX ADMIN — VITAMIN D, TAB 1000IU (100/BT) 5000 UNITS: 25 TAB at 08:22

## 2017-01-01 RX ADMIN — VITAMIN D, TAB 1000IU (100/BT) 5000 UNITS: 25 TAB at 09:16

## 2017-01-01 RX ADMIN — ENOXAPARIN SODIUM 100 MG: 100 INJECTION SUBCUTANEOUS at 12:04

## 2017-01-01 RX ADMIN — PYRIDOSTIGMINE BROMIDE 90 MG: 60 TABLET ORAL at 08:50

## 2017-01-01 RX ADMIN — SODIUM CHLORIDE 40 MG: 9 INJECTION INTRAMUSCULAR; INTRAVENOUS; SUBCUTANEOUS at 09:17

## 2017-01-01 RX ADMIN — PIPERACILLIN SODIUM,TAZOBACTAM SODIUM 3.38 G: 3; .375 INJECTION, POWDER, FOR SOLUTION INTRAVENOUS at 11:42

## 2017-01-01 RX ADMIN — FENTANYL CITRATE 50 MCG: 50 INJECTION, SOLUTION INTRAMUSCULAR; INTRAVENOUS at 11:45

## 2017-01-01 RX ADMIN — DILTIAZEM HYDROCHLORIDE 30 MG: 30 TABLET, FILM COATED ORAL at 06:51

## 2017-01-01 RX ADMIN — PYRIDOSTIGMINE BROMIDE 60 MG: 60 TABLET ORAL at 17:18

## 2017-01-01 RX ADMIN — DIGOXIN 0.25 MG: 0.12 TABLET ORAL at 08:56

## 2017-01-01 RX ADMIN — DILTIAZEM HYDROCHLORIDE 360 MG: 180 CAPSULE, COATED, EXTENDED RELEASE ORAL at 09:15

## 2017-01-01 RX ADMIN — Medication 10 ML: at 22:36

## 2017-01-01 RX ADMIN — VITAMIN D, TAB 1000IU (100/BT) 5000 UNITS: 25 TAB at 09:11

## 2017-01-01 RX ADMIN — SODIUM CHLORIDE 40 MG: 9 INJECTION INTRAMUSCULAR; INTRAVENOUS; SUBCUTANEOUS at 23:06

## 2017-01-01 RX ADMIN — SODIUM CHLORIDE 40 MG: 9 INJECTION INTRAMUSCULAR; INTRAVENOUS; SUBCUTANEOUS at 08:21

## 2017-01-01 RX ADMIN — PIPERACILLIN SODIUM,TAZOBACTAM SODIUM 3.38 G: 3; .375 INJECTION, POWDER, FOR SOLUTION INTRAVENOUS at 19:34

## 2017-01-01 RX ADMIN — Medication 10 ML: at 17:58

## 2017-01-01 RX ADMIN — DIPHENHYDRAMINE HYDROCHLORIDE 25 MG: 50 INJECTION, SOLUTION INTRAMUSCULAR; INTRAVENOUS at 01:43

## 2017-01-01 RX ADMIN — INSULIN LISPRO 2 UNITS: 100 INJECTION, SOLUTION INTRAVENOUS; SUBCUTANEOUS at 16:30

## 2017-01-01 RX ADMIN — PIPERACILLIN SODIUM,TAZOBACTAM SODIUM 3.38 G: 3; .375 INJECTION, POWDER, FOR SOLUTION INTRAVENOUS at 21:33

## 2017-01-01 RX ADMIN — BENZOCAINE: 200 SPRAY DENTAL; ORAL; PERIODONTAL at 10:34

## 2017-01-01 RX ADMIN — POTASSIUM CHLORIDE 10 MEQ: 10 INJECTION, SOLUTION INTRAVENOUS at 13:02

## 2017-01-01 RX ADMIN — PIPERACILLIN SODIUM,TAZOBACTAM SODIUM 3.38 G: 3; .375 INJECTION, POWDER, FOR SOLUTION INTRAVENOUS at 16:00

## 2017-01-01 RX ADMIN — ACETAMINOPHEN 650 MG: 325 TABLET ORAL at 16:43

## 2017-01-01 RX ADMIN — ACETAMINOPHEN 650 MG: 325 TABLET ORAL at 19:56

## 2017-01-01 RX ADMIN — DILTIAZEM HYDROCHLORIDE 300 MG: 180 CAPSULE, COATED, EXTENDED RELEASE ORAL at 09:30

## 2017-01-01 RX ADMIN — DILTIAZEM HYDROCHLORIDE 30 MG: 30 TABLET, FILM COATED ORAL at 16:27

## 2017-01-01 RX ADMIN — POTASSIUM CHLORIDE 40 MEQ: 1.5 POWDER, FOR SOLUTION ORAL at 11:07

## 2017-01-01 RX ADMIN — VANCOMYCIN HYDROCHLORIDE 2000 MG: 10 INJECTION, POWDER, LYOPHILIZED, FOR SOLUTION INTRAVENOUS at 01:00

## 2017-01-01 RX ADMIN — Medication 10 ML: at 05:10

## 2017-01-01 RX ADMIN — SODIUM CHLORIDE 40 MG: 9 INJECTION INTRAMUSCULAR; INTRAVENOUS; SUBCUTANEOUS at 19:36

## 2017-01-01 RX ADMIN — VANCOMYCIN HYDROCHLORIDE 2000 MG: 10 INJECTION, POWDER, LYOPHILIZED, FOR SOLUTION INTRAVENOUS at 16:45

## 2017-01-01 RX ADMIN — NALOXONE HYDROCHLORIDE 0.4 MG: 0.4 INJECTION, SOLUTION INTRAMUSCULAR; INTRAVENOUS; SUBCUTANEOUS at 14:12

## 2017-01-01 RX ADMIN — ALPRAZOLAM 0.25 MG: 0.25 TABLET ORAL at 20:40

## 2017-01-01 RX ADMIN — FENTANYL CITRATE 50 MCG: 50 INJECTION, SOLUTION INTRAMUSCULAR; INTRAVENOUS at 10:45

## 2017-01-01 RX ADMIN — CARVEDILOL 25 MG: 12.5 TABLET, FILM COATED ORAL at 17:18

## 2017-01-01 RX ADMIN — ACETAMINOPHEN 650 MG: 325 TABLET ORAL at 09:11

## 2017-01-01 RX ADMIN — ALPRAZOLAM 0.25 MG: 0.25 TABLET ORAL at 12:41

## 2017-01-01 RX ADMIN — ACETAMINOPHEN 650 MG: 325 TABLET ORAL at 13:10

## 2017-01-01 RX ADMIN — ENOXAPARIN SODIUM 100 MG: 100 INJECTION SUBCUTANEOUS at 01:02

## 2017-01-01 RX ADMIN — VANCOMYCIN HYDROCHLORIDE 2000 MG: 10 INJECTION, POWDER, LYOPHILIZED, FOR SOLUTION INTRAVENOUS at 14:20

## 2017-01-01 RX ADMIN — SODIUM CHLORIDE 40 MG: 9 INJECTION INTRAMUSCULAR; INTRAVENOUS; SUBCUTANEOUS at 23:44

## 2017-01-01 RX ADMIN — OXYCODONE HYDROCHLORIDE AND ACETAMINOPHEN 1000 MG: 500 TABLET ORAL at 08:59

## 2017-01-01 RX ADMIN — INSULIN LISPRO 2 UNITS: 100 INJECTION, SOLUTION INTRAVENOUS; SUBCUTANEOUS at 13:14

## 2017-01-01 RX ADMIN — CARVEDILOL 25 MG: 12.5 TABLET, FILM COATED ORAL at 08:56

## 2017-01-01 RX ADMIN — OXYCODONE HYDROCHLORIDE AND ACETAMINOPHEN 1000 MG: 500 TABLET ORAL at 08:15

## 2017-01-01 RX ADMIN — LABETALOL HYDROCHLORIDE 10 MG: 5 INJECTION, SOLUTION INTRAVENOUS at 20:28

## 2017-01-01 RX ADMIN — DILTIAZEM HYDROCHLORIDE 30 MG: 30 TABLET, FILM COATED ORAL at 13:09

## 2017-01-01 RX ADMIN — DIPHENHYDRAMINE HYDROCHLORIDE 25 MG: 50 INJECTION, SOLUTION INTRAMUSCULAR; INTRAVENOUS at 17:23

## 2017-01-01 RX ADMIN — MIDAZOLAM HYDROCHLORIDE 2 MG: 1 INJECTION, SOLUTION INTRAMUSCULAR; INTRAVENOUS at 10:47

## 2017-01-01 RX ADMIN — PYRIDOSTIGMINE BROMIDE 60 MG: 60 TABLET ORAL at 08:36

## 2017-01-01 RX ADMIN — Medication 10 ML: at 21:13

## 2017-01-01 RX ADMIN — PIPERACILLIN SODIUM,TAZOBACTAM SODIUM 3.38 G: 3; .375 INJECTION, POWDER, FOR SOLUTION INTRAVENOUS at 10:37

## 2017-01-01 RX ADMIN — Medication 10 ML: at 14:34

## 2017-01-01 RX ADMIN — DILTIAZEM HYDROCHLORIDE 120 MG: 60 CAPSULE, EXTENDED RELEASE ORAL at 09:52

## 2017-01-01 RX ADMIN — Medication 10 ML: at 05:20

## 2017-01-01 RX ADMIN — DILTIAZEM HYDROCHLORIDE 2.5 MG/HR: 5 INJECTION INTRAVENOUS at 16:37

## 2017-01-01 RX ADMIN — ACETAMINOPHEN 650 MG: 325 TABLET ORAL at 21:12

## 2017-01-01 RX ADMIN — SODIUM CHLORIDE 40 MG: 9 INJECTION INTRAMUSCULAR; INTRAVENOUS; SUBCUTANEOUS at 21:56

## 2017-01-01 RX ADMIN — TRAMADOL HYDROCHLORIDE 50 MG: 50 TABLET, FILM COATED ORAL at 03:02

## 2017-01-01 RX ADMIN — PYRIDOSTIGMINE BROMIDE 90 MG: 60 TABLET ORAL at 09:13

## 2017-01-01 RX ADMIN — Medication 10 ML: at 18:08

## 2017-01-01 RX ADMIN — DILTIAZEM HYDROCHLORIDE 300 MG: 180 CAPSULE, COATED, EXTENDED RELEASE ORAL at 08:38

## 2017-01-01 RX ADMIN — PREDNISONE 50 MG: 20 TABLET ORAL at 08:50

## 2017-01-01 RX ADMIN — NITROGLYCERIN 0.5 INCH: 20 OINTMENT TOPICAL at 19:00

## 2017-01-01 RX ADMIN — PYRIDOSTIGMINE BROMIDE 60 MG: 60 TABLET ORAL at 09:11

## 2017-01-01 RX ADMIN — CARVEDILOL 6.25 MG: 6.25 TABLET, FILM COATED ORAL at 16:37

## 2017-01-01 RX ADMIN — DILTIAZEM HYDROCHLORIDE 30 MG: 30 TABLET, FILM COATED ORAL at 21:32

## 2017-01-01 RX ADMIN — INSULIN LISPRO 2 UNITS: 100 INJECTION, SOLUTION INTRAVENOUS; SUBCUTANEOUS at 12:25

## 2017-01-01 RX ADMIN — Medication: at 15:42

## 2017-01-01 RX ADMIN — POTASSIUM CHLORIDE 10 MEQ: 10 INJECTION, SOLUTION INTRAVENOUS at 07:08

## 2017-01-01 RX ADMIN — SODIUM CHLORIDE 1000 ML: 900 INJECTION, SOLUTION INTRAVENOUS at 15:54

## 2017-01-01 RX ADMIN — PYRIDOSTIGMINE BROMIDE 60 MG: 60 TABLET ORAL at 17:21

## 2017-01-01 RX ADMIN — PIPERACILLIN SODIUM,TAZOBACTAM SODIUM 3.38 G: 3; .375 INJECTION, POWDER, FOR SOLUTION INTRAVENOUS at 03:16

## 2017-01-01 RX ADMIN — PIPERACILLIN SODIUM,TAZOBACTAM SODIUM 3.38 G: 3; .375 INJECTION, POWDER, FOR SOLUTION INTRAVENOUS at 18:32

## 2017-01-01 RX ADMIN — FENTANYL CITRATE 25 MCG: 50 INJECTION, SOLUTION INTRAMUSCULAR; INTRAVENOUS at 11:01

## 2017-01-01 RX ADMIN — SODIUM CHLORIDE 75 ML/HR: 450 INJECTION, SOLUTION INTRAVENOUS at 22:47

## 2017-01-01 RX ADMIN — FENTANYL CITRATE 50 MCG: 50 INJECTION, SOLUTION INTRAMUSCULAR; INTRAVENOUS at 12:00

## 2017-01-01 RX ADMIN — POTASSIUM & SODIUM PHOSPHATES POWDER PACK 280-160-250 MG 2 PACKET: 280-160-250 PACK at 09:46

## 2017-01-01 RX ADMIN — PYRIDOSTIGMINE BROMIDE 90 MG: 60 TABLET ORAL at 22:14

## 2017-01-01 RX ADMIN — SODIUM CHLORIDE 40 MG: 9 INJECTION INTRAMUSCULAR; INTRAVENOUS; SUBCUTANEOUS at 21:40

## 2017-01-01 RX ADMIN — VITAMIN D, TAB 1000IU (100/BT) 5000 UNITS: 25 TAB at 08:58

## 2017-01-01 RX ADMIN — IOPAMIDOL 95 ML: 755 INJECTION, SOLUTION INTRAVENOUS at 22:49

## 2017-01-01 RX ADMIN — DIGOXIN 0.25 MG: 0.25 TABLET ORAL at 09:13

## 2017-01-01 RX ADMIN — DILTIAZEM HYDROCHLORIDE 30 MG: 30 TABLET, FILM COATED ORAL at 17:43

## 2017-01-01 RX ADMIN — POTASSIUM CHLORIDE 10 MEQ: 10 INJECTION, SOLUTION INTRAVENOUS at 08:55

## 2017-01-01 RX ADMIN — MIDAZOLAM HYDROCHLORIDE 1 MG: 1 INJECTION, SOLUTION INTRAMUSCULAR; INTRAVENOUS at 11:04

## 2017-01-01 RX ADMIN — CARVEDILOL 12.5 MG: 12.5 TABLET, FILM COATED ORAL at 08:39

## 2017-01-01 RX ADMIN — DILTIAZEM HYDROCHLORIDE 360 MG: 180 CAPSULE, COATED, EXTENDED RELEASE ORAL at 09:45

## 2017-01-01 RX ADMIN — APIXABAN 10 MG: 5 TABLET, FILM COATED ORAL at 08:01

## 2017-01-01 RX ADMIN — PIPERACILLIN SODIUM,TAZOBACTAM SODIUM 3.38 G: 3; .375 INJECTION, POWDER, FOR SOLUTION INTRAVENOUS at 03:17

## 2017-01-01 RX ADMIN — PRAVASTATIN SODIUM 40 MG: 20 TABLET ORAL at 22:14

## 2017-01-01 RX ADMIN — DILTIAZEM HYDROCHLORIDE 30 MG: 30 TABLET, FILM COATED ORAL at 21:54

## 2017-01-01 RX ADMIN — SODIUM CHLORIDE 40 MG: 9 INJECTION INTRAMUSCULAR; INTRAVENOUS; SUBCUTANEOUS at 08:18

## 2017-01-01 RX ADMIN — OXYCODONE HYDROCHLORIDE AND ACETAMINOPHEN 1000 MG: 500 TABLET ORAL at 09:13

## 2017-01-01 RX ADMIN — PYRIDOSTIGMINE BROMIDE 60 MG: 60 TABLET ORAL at 08:23

## 2017-01-01 RX ADMIN — INSULIN LISPRO 2 UNITS: 100 INJECTION, SOLUTION INTRAVENOUS; SUBCUTANEOUS at 18:03

## 2017-01-01 RX ADMIN — INSULIN LISPRO 2 UNITS: 100 INJECTION, SOLUTION INTRAVENOUS; SUBCUTANEOUS at 18:49

## 2017-01-01 RX ADMIN — DILTIAZEM HYDROCHLORIDE 30 MG: 30 TABLET, FILM COATED ORAL at 17:11

## 2017-01-01 RX ADMIN — HYDROMORPHONE HYDROCHLORIDE 0.5 MG: 1 INJECTION, SOLUTION INTRAMUSCULAR; INTRAVENOUS; SUBCUTANEOUS at 11:12

## 2017-01-01 RX ADMIN — PIPERACILLIN SODIUM,TAZOBACTAM SODIUM 3.38 G: 3; .375 INJECTION, POWDER, FOR SOLUTION INTRAVENOUS at 10:01

## 2017-01-01 RX ADMIN — PIPERACILLIN SODIUM,TAZOBACTAM SODIUM 3.38 G: 3; .375 INJECTION, POWDER, FOR SOLUTION INTRAVENOUS at 04:50

## 2017-01-01 RX ADMIN — FENTANYL CITRATE 100 MCG: 50 INJECTION, SOLUTION INTRAMUSCULAR; INTRAVENOUS at 11:31

## 2017-01-01 RX ADMIN — DIPHENHYDRAMINE HYDROCHLORIDE 25 MG: 50 INJECTION, SOLUTION INTRAMUSCULAR; INTRAVENOUS at 22:45

## 2017-01-01 RX ADMIN — INSULIN LISPRO 2 UNITS: 100 INJECTION, SOLUTION INTRAVENOUS; SUBCUTANEOUS at 17:08

## 2017-01-01 RX ADMIN — PREDNISONE 50 MG: 20 TABLET ORAL at 08:22

## 2017-01-01 RX ADMIN — POLYETHYLENE GLYCOL-3350 AND ELECTROLYTES 2000 ML: 236; 6.74; 5.86; 2.97; 22.74 POWDER, FOR SOLUTION ORAL at 20:47

## 2017-01-01 RX ADMIN — FOLIC ACID: 5 INJECTION, SOLUTION INTRAMUSCULAR; INTRAVENOUS; SUBCUTANEOUS at 14:00

## 2017-01-01 RX ADMIN — FUROSEMIDE 20 MG: 10 INJECTION, SOLUTION INTRAMUSCULAR; INTRAVENOUS at 14:26

## 2017-01-01 RX ADMIN — PYRIDOSTIGMINE BROMIDE 90 MG: 60 TABLET ORAL at 09:12

## 2017-01-01 RX ADMIN — DILTIAZEM HYDROCHLORIDE 30 MG: 30 TABLET, FILM COATED ORAL at 12:25

## 2017-01-01 RX ADMIN — PYRIDOSTIGMINE BROMIDE 60 MG: 60 TABLET ORAL at 18:24

## 2017-01-01 RX ADMIN — TRAMADOL HYDROCHLORIDE 50 MG: 50 TABLET, FILM COATED ORAL at 00:01

## 2017-01-01 RX ADMIN — TRAMADOL HYDROCHLORIDE 50 MG: 50 TABLET, FILM COATED ORAL at 20:40

## 2017-01-01 RX ADMIN — IOPAMIDOL 90 ML: 755 INJECTION, SOLUTION INTRAVENOUS at 02:10

## 2017-01-01 RX ADMIN — FENTANYL CITRATE 50 MCG: 50 INJECTION, SOLUTION INTRAMUSCULAR; INTRAVENOUS at 10:04

## 2017-01-01 RX ADMIN — ENOXAPARIN SODIUM 40 MG: 40 INJECTION SUBCUTANEOUS at 19:44

## 2017-01-01 RX ADMIN — ENOXAPARIN SODIUM 100 MG: 100 INJECTION SUBCUTANEOUS at 08:21

## 2017-01-01 RX ADMIN — PYRIDOSTIGMINE BROMIDE 60 MG: 60 TABLET ORAL at 08:02

## 2017-01-01 RX ADMIN — Medication 10 ML: at 17:32

## 2017-01-01 RX ADMIN — DILTIAZEM HYDROCHLORIDE 10 MG/HR: 5 INJECTION INTRAVENOUS at 20:07

## 2017-01-01 RX ADMIN — SODIUM CHLORIDE 40 MG: 9 INJECTION INTRAMUSCULAR; INTRAVENOUS; SUBCUTANEOUS at 08:15

## 2017-01-01 RX ADMIN — CARVEDILOL 25 MG: 12.5 TABLET, FILM COATED ORAL at 16:29

## 2017-01-01 RX ADMIN — NITROGLYCERIN 1 INCH: 20 OINTMENT TOPICAL at 12:41

## 2017-01-01 RX ADMIN — CARVEDILOL 6.25 MG: 6.25 TABLET, FILM COATED ORAL at 09:28

## 2017-01-01 RX ADMIN — PIPERACILLIN SODIUM,TAZOBACTAM SODIUM 3.38 G: 3; .375 INJECTION, POWDER, FOR SOLUTION INTRAVENOUS at 18:13

## 2017-01-01 RX ADMIN — VANCOMYCIN HYDROCHLORIDE 2000 MG: 10 INJECTION, POWDER, LYOPHILIZED, FOR SOLUTION INTRAVENOUS at 02:37

## 2017-01-01 RX ADMIN — PREDNISONE 50 MG: 20 TABLET ORAL at 08:35

## 2017-01-01 RX ADMIN — VITAMIN D, TAB 1000IU (100/BT) 5000 UNITS: 25 TAB at 09:28

## 2017-01-01 RX ADMIN — INSULIN LISPRO 2 UNITS: 100 INJECTION, SOLUTION INTRAVENOUS; SUBCUTANEOUS at 17:10

## 2017-01-01 RX ADMIN — CARVEDILOL 6.25 MG: 6.25 TABLET, FILM COATED ORAL at 16:27

## 2017-01-01 RX ADMIN — PYRIDOSTIGMINE BROMIDE 60 MG: 60 TABLET ORAL at 17:11

## 2017-01-01 RX ADMIN — Medication 10 ML: at 20:51

## 2017-01-01 RX ADMIN — PREDNISONE 50 MG: 20 TABLET ORAL at 07:59

## 2017-01-01 RX ADMIN — LIDOCAINE HYDROCHLORIDE 15 ML: 20 SOLUTION ORAL; TOPICAL at 10:35

## 2017-01-01 RX ADMIN — PYRIDOSTIGMINE BROMIDE 60 MG: 60 TABLET ORAL at 18:00

## 2017-01-01 RX ADMIN — DIGOXIN 250 MCG: 0.25 INJECTION INTRAMUSCULAR; INTRAVENOUS at 22:40

## 2017-01-01 RX ADMIN — Medication 10 ML: at 09:00

## 2017-01-01 RX ADMIN — SODIUM CHLORIDE 40 MG: 9 INJECTION INTRAMUSCULAR; INTRAVENOUS; SUBCUTANEOUS at 09:30

## 2017-01-01 RX ADMIN — INSULIN LISPRO 2 UNITS: 100 INJECTION, SOLUTION INTRAVENOUS; SUBCUTANEOUS at 16:43

## 2017-01-01 RX ADMIN — APIXABAN 10 MG: 5 TABLET, FILM COATED ORAL at 20:42

## 2017-01-01 RX ADMIN — CARVEDILOL 12.5 MG: 6.25 TABLET, FILM COATED ORAL at 17:11

## 2017-01-01 RX ADMIN — ENOXAPARIN SODIUM 100 MG: 100 INJECTION SUBCUTANEOUS at 19:43

## 2017-01-01 RX ADMIN — PIPERACILLIN SODIUM,TAZOBACTAM SODIUM 3.38 G: 3; .375 INJECTION, POWDER, FOR SOLUTION INTRAVENOUS at 05:32

## 2017-01-01 RX ADMIN — INSULIN LISPRO 2 UNITS: 100 INJECTION, SOLUTION INTRAVENOUS; SUBCUTANEOUS at 16:37

## 2017-01-01 RX ADMIN — MAGNESIUM SULFATE HEPTAHYDRATE 2 G: 40 INJECTION, SOLUTION INTRAVENOUS at 09:58

## 2017-01-01 RX ADMIN — PIPERACILLIN SODIUM,TAZOBACTAM SODIUM 3.38 G: 3; .375 INJECTION, POWDER, FOR SOLUTION INTRAVENOUS at 12:11

## 2017-01-01 RX ADMIN — PIPERACILLIN SODIUM,TAZOBACTAM SODIUM 3.38 G: 3; .375 INJECTION, POWDER, FOR SOLUTION INTRAVENOUS at 11:29

## 2017-01-01 RX ADMIN — ACETAMINOPHEN 650 MG: 325 TABLET ORAL at 09:41

## 2017-01-01 RX ADMIN — POTASSIUM CHLORIDE 10 MEQ: 10 INJECTION, SOLUTION INTRAVENOUS at 11:42

## 2017-01-01 RX ADMIN — PREDNISONE 50 MG: 20 TABLET ORAL at 09:15

## 2017-01-01 RX ADMIN — ENOXAPARIN SODIUM 40 MG: 40 INJECTION SUBCUTANEOUS at 13:15

## 2017-01-01 RX ADMIN — ENOXAPARIN SODIUM 40 MG: 40 INJECTION SUBCUTANEOUS at 12:00

## 2017-01-01 RX ADMIN — DILTIAZEM HYDROCHLORIDE 20 MG/HR: 5 INJECTION INTRAVENOUS at 19:30

## 2017-01-01 RX ADMIN — DILTIAZEM HYDROCHLORIDE 300 MG: 180 CAPSULE, COATED, EXTENDED RELEASE ORAL at 08:50

## 2017-01-01 RX ADMIN — SODIUM CHLORIDE 1000 ML: 900 INJECTION, SOLUTION INTRAVENOUS at 13:45

## 2017-01-01 RX ADMIN — TRAMADOL HYDROCHLORIDE 50 MG: 50 TABLET, FILM COATED ORAL at 11:50

## 2017-01-01 RX ADMIN — PROPOFOL 40 MG: 10 INJECTION, EMULSION INTRAVENOUS at 07:21

## 2017-01-01 RX ADMIN — PRAVASTATIN SODIUM 40 MG: 20 TABLET ORAL at 21:24

## 2017-01-01 RX ADMIN — Medication 10 ML: at 22:09

## 2017-01-01 RX ADMIN — PRAVASTATIN SODIUM 40 MG: 20 TABLET ORAL at 20:40

## 2017-01-01 RX ADMIN — ENOXAPARIN SODIUM 100 MG: 100 INJECTION SUBCUTANEOUS at 20:06

## 2017-01-01 RX ADMIN — PIPERACILLIN SODIUM,TAZOBACTAM SODIUM 3.38 G: 3; .375 INJECTION, POWDER, FOR SOLUTION INTRAVENOUS at 21:38

## 2017-01-01 RX ADMIN — PYRIDOSTIGMINE BROMIDE 60 MG: 60 TABLET ORAL at 17:42

## 2017-01-01 RX ADMIN — DILTIAZEM HYDROCHLORIDE 5 MG/HR: 5 INJECTION INTRAVENOUS at 12:15

## 2017-01-01 RX ADMIN — PYRIDOSTIGMINE BROMIDE 90 MG: 60 TABLET ORAL at 09:45

## 2017-01-01 RX ADMIN — Medication 10 ML: at 23:07

## 2017-01-01 RX ADMIN — PYRIDOSTIGMINE BROMIDE 60 MG: 60 TABLET ORAL at 18:04

## 2017-01-01 RX ADMIN — PYRIDOSTIGMINE BROMIDE 60 MG: 60 TABLET ORAL at 08:21

## 2017-01-01 RX ADMIN — VANCOMYCIN HYDROCHLORIDE 2500 MG: 10 INJECTION, POWDER, LYOPHILIZED, FOR SOLUTION INTRAVENOUS at 00:48

## 2017-01-01 RX ADMIN — PIPERACILLIN SODIUM,TAZOBACTAM SODIUM 3.38 G: 3; .375 INJECTION, POWDER, FOR SOLUTION INTRAVENOUS at 11:08

## 2017-01-01 RX ADMIN — SODIUM CHLORIDE 125 ML/HR: 900 INJECTION, SOLUTION INTRAVENOUS at 12:47

## 2017-01-01 RX ADMIN — ENOXAPARIN SODIUM 100 MG: 100 INJECTION SUBCUTANEOUS at 00:24

## 2017-01-01 RX ADMIN — POLYETHYLENE GLYCOL 3350, SODIUM SULFATE ANHYDROUS, SODIUM BICARBONATE, SODIUM CHLORIDE, POTASSIUM CHLORIDE 2000 ML: 236; 22.74; 6.74; 5.86; 2.97 POWDER, FOR SOLUTION ORAL at 13:47

## 2017-01-01 RX ADMIN — Medication 10 ML: at 23:50

## 2017-01-01 RX ADMIN — SODIUM CHLORIDE 40 MG: 9 INJECTION INTRAMUSCULAR; INTRAVENOUS; SUBCUTANEOUS at 16:31

## 2017-01-01 RX ADMIN — Medication 2 MG: at 08:50

## 2017-01-01 RX ADMIN — DIGOXIN 250 MCG: 0.25 INJECTION INTRAMUSCULAR; INTRAVENOUS at 03:21

## 2017-01-01 RX ADMIN — LABETALOL HYDROCHLORIDE 10 MG: 5 INJECTION, SOLUTION INTRAVENOUS at 11:54

## 2017-01-01 RX ADMIN — FENTANYL CITRATE 50 MCG: 50 INJECTION, SOLUTION INTRAMUSCULAR; INTRAVENOUS at 10:48

## 2017-01-01 RX ADMIN — PYRIDOSTIGMINE BROMIDE 90 MG: 60 TABLET ORAL at 21:56

## 2017-01-01 RX ADMIN — DOCUSATE SODIUM 100 MG: 100 CAPSULE ORAL at 13:30

## 2017-01-01 RX ADMIN — PYRIDOSTIGMINE BROMIDE 60 MG: 60 TABLET ORAL at 17:06

## 2017-01-01 RX ADMIN — PYRIDOSTIGMINE BROMIDE 90 MG: 60 TABLET ORAL at 20:23

## 2017-01-01 RX ADMIN — LORAZEPAM 1 MG: 2 INJECTION, SOLUTION INTRAMUSCULAR; INTRAVENOUS at 11:06

## 2017-01-01 RX ADMIN — CEFTRIAXONE SODIUM 1 G: 1 INJECTION, POWDER, FOR SOLUTION INTRAMUSCULAR; INTRAVENOUS at 20:46

## 2017-01-01 RX ADMIN — PYRIDOSTIGMINE BROMIDE 90 MG: 60 TABLET ORAL at 14:31

## 2017-01-01 RX ADMIN — LIDOCAINE HYDROCHLORIDE 10 ML: 10 INJECTION, SOLUTION INFILTRATION; PERINEURAL at 10:07

## 2017-01-01 RX ADMIN — IOPAMIDOL 100 ML: 755 INJECTION, SOLUTION INTRAVENOUS at 10:42

## 2017-01-01 RX ADMIN — OXYCODONE HYDROCHLORIDE AND ACETAMINOPHEN 1000 MG: 500 TABLET ORAL at 09:28

## 2017-01-01 RX ADMIN — ENOXAPARIN SODIUM 100 MG: 100 INJECTION SUBCUTANEOUS at 08:26

## 2017-01-01 RX ADMIN — VITAMIN D, TAB 1000IU (100/BT) 5000 UNITS: 25 TAB at 09:12

## 2017-01-01 RX ADMIN — DILTIAZEM HYDROCHLORIDE 10 MG/HR: 5 INJECTION INTRAVENOUS at 05:32

## 2017-01-01 RX ADMIN — DIGOXIN 0.25 MG: 0.25 TABLET ORAL at 11:50

## 2017-01-01 RX ADMIN — SODIUM CHLORIDE 40 MG: 9 INJECTION INTRAMUSCULAR; INTRAVENOUS; SUBCUTANEOUS at 09:46

## 2017-01-01 RX ADMIN — INSULIN LISPRO 2 UNITS: 100 INJECTION, SOLUTION INTRAVENOUS; SUBCUTANEOUS at 17:17

## 2017-01-01 RX ADMIN — SODIUM CHLORIDE 40 MG: 9 INJECTION INTRAMUSCULAR; INTRAVENOUS; SUBCUTANEOUS at 21:24

## 2017-01-01 RX ADMIN — Medication 10 ML: at 23:44

## 2017-01-01 RX ADMIN — OXYCODONE HYDROCHLORIDE AND ACETAMINOPHEN 1000 MG: 500 TABLET ORAL at 08:22

## 2017-01-01 RX ADMIN — AZITHROMYCIN MONOHYDRATE 500 MG: 500 INJECTION, POWDER, LYOPHILIZED, FOR SOLUTION INTRAVENOUS at 20:46

## 2017-01-01 RX ADMIN — PIPERACILLIN SODIUM,TAZOBACTAM SODIUM 3.38 G: 3; .375 INJECTION, POWDER, FOR SOLUTION INTRAVENOUS at 05:10

## 2017-01-01 RX ADMIN — PYRIDOSTIGMINE BROMIDE 90 MG: 60 TABLET ORAL at 16:21

## 2017-01-01 RX ADMIN — ENOXAPARIN SODIUM 40 MG: 40 INJECTION SUBCUTANEOUS at 20:05

## 2017-01-01 RX ADMIN — MIDAZOLAM HYDROCHLORIDE 1 MG: 1 INJECTION, SOLUTION INTRAMUSCULAR; INTRAVENOUS at 11:15

## 2017-01-01 RX ADMIN — PYRIDOSTIGMINE BROMIDE 90 MG: 60 TABLET ORAL at 13:58

## 2017-01-01 RX ADMIN — ALPRAZOLAM 0.25 MG: 0.25 TABLET ORAL at 21:12

## 2017-01-01 RX ADMIN — TRAMADOL HYDROCHLORIDE 50 MG: 50 TABLET, FILM COATED ORAL at 21:24

## 2017-01-01 RX ADMIN — LORAZEPAM 1 MG: 2 INJECTION, SOLUTION INTRAMUSCULAR; INTRAVENOUS at 13:52

## 2017-01-01 RX ADMIN — VANCOMYCIN HYDROCHLORIDE 2000 MG: 10 INJECTION, POWDER, LYOPHILIZED, FOR SOLUTION INTRAVENOUS at 15:18

## 2017-01-01 RX ADMIN — ENOXAPARIN SODIUM 100 MG: 40 INJECTION SUBCUTANEOUS at 05:09

## 2017-01-01 RX ADMIN — Medication 10 ML: at 02:59

## 2017-01-01 RX ADMIN — FENTANYL CITRATE 50 MCG: 50 INJECTION, SOLUTION INTRAMUSCULAR; INTRAVENOUS at 10:19

## 2017-01-01 RX ADMIN — LORAZEPAM 1 MG: 2 INJECTION, SOLUTION INTRAMUSCULAR; INTRAVENOUS at 08:56

## 2017-01-01 RX ADMIN — HYDROMORPHONE HYDROCHLORIDE 0.5 MG: 1 INJECTION, SOLUTION INTRAMUSCULAR; INTRAVENOUS; SUBCUTANEOUS at 12:41

## 2017-01-01 RX ADMIN — PYRIDOSTIGMINE BROMIDE 60 MG: 60 TABLET ORAL at 08:39

## 2017-01-01 RX ADMIN — VITAMIN D, TAB 1000IU (100/BT) 5000 UNITS: 25 TAB at 08:49

## 2017-01-01 RX ADMIN — INSULIN LISPRO 3 UNITS: 100 INJECTION, SOLUTION INTRAVENOUS; SUBCUTANEOUS at 17:20

## 2017-01-01 RX ADMIN — ENOXAPARIN SODIUM 40 MG: 40 INJECTION SUBCUTANEOUS at 02:21

## 2017-01-01 RX ADMIN — TRAMADOL HYDROCHLORIDE 50 MG: 50 TABLET, FILM COATED ORAL at 08:00

## 2017-01-01 RX ADMIN — Medication 10 ML: at 06:54

## 2017-01-01 RX ADMIN — DILTIAZEM HYDROCHLORIDE 15 MG: 5 INJECTION INTRAVENOUS at 13:24

## 2017-01-01 RX ADMIN — PREDNISONE 50 MG: 20 TABLET ORAL at 08:38

## 2017-01-01 RX ADMIN — DILTIAZEM HYDROCHLORIDE 360 MG: 180 CAPSULE, COATED, EXTENDED RELEASE ORAL at 09:11

## 2017-01-01 RX ADMIN — CARVEDILOL 25 MG: 12.5 TABLET, FILM COATED ORAL at 17:57

## 2017-01-01 RX ADMIN — DILTIAZEM HYDROCHLORIDE 30 MG: 30 TABLET, FILM COATED ORAL at 11:29

## 2017-01-01 RX ADMIN — Medication: at 15:58

## 2017-01-01 RX ADMIN — IOPAMIDOL 30 ML: 755 INJECTION, SOLUTION INTRAVENOUS at 10:19

## 2017-01-01 RX ADMIN — CARVEDILOL 25 MG: 12.5 TABLET, FILM COATED ORAL at 09:43

## 2017-01-01 RX ADMIN — DOCUSATE SODIUM 100 MG: 100 CAPSULE ORAL at 08:39

## 2017-01-01 RX ADMIN — DILTIAZEM HYDROCHLORIDE 30 MG: 30 TABLET, FILM COATED ORAL at 08:22

## 2017-01-01 RX ADMIN — Medication 10 ML: at 22:00

## 2017-01-01 RX ADMIN — TRAMADOL HYDROCHLORIDE 50 MG: 50 TABLET, FILM COATED ORAL at 18:07

## 2017-01-01 RX ADMIN — INSULIN LISPRO 2 UNITS: 100 INJECTION, SOLUTION INTRAVENOUS; SUBCUTANEOUS at 17:05

## 2017-01-01 RX ADMIN — CARVEDILOL 25 MG: 12.5 TABLET, FILM COATED ORAL at 17:21

## 2017-01-01 RX ADMIN — SODIUM CHLORIDE: 9 INJECTION, SOLUTION INTRAVENOUS at 10:45

## 2017-01-01 RX ADMIN — SODIUM CHLORIDE 50 ML/HR: 900 INJECTION, SOLUTION INTRAVENOUS at 07:04

## 2017-01-01 RX ADMIN — INSULIN LISPRO 2 UNITS: 100 INJECTION, SOLUTION INTRAVENOUS; SUBCUTANEOUS at 21:50

## 2017-01-01 RX ADMIN — VANCOMYCIN HYDROCHLORIDE 2000 MG: 10 INJECTION, POWDER, LYOPHILIZED, FOR SOLUTION INTRAVENOUS at 02:51

## 2017-01-01 RX ADMIN — INSULIN LISPRO 2 UNITS: 100 INJECTION, SOLUTION INTRAVENOUS; SUBCUTANEOUS at 11:41

## 2017-01-01 RX ADMIN — PIPERACILLIN SODIUM,TAZOBACTAM SODIUM 3.38 G: 3; .375 INJECTION, POWDER, FOR SOLUTION INTRAVENOUS at 11:30

## 2017-01-01 RX ADMIN — PYRIDOSTIGMINE BROMIDE 60 MG: 60 TABLET ORAL at 08:57

## 2017-01-01 RX ADMIN — DIGOXIN 0.25 MG: 0.12 TABLET ORAL at 17:11

## 2017-01-01 RX ADMIN — PREDNISONE 50 MG: 20 TABLET ORAL at 08:39

## 2017-01-01 RX ADMIN — PIPERACILLIN SODIUM,TAZOBACTAM SODIUM 3.38 G: 3; .375 INJECTION, POWDER, FOR SOLUTION INTRAVENOUS at 19:57

## 2017-01-01 RX ADMIN — DILTIAZEM HYDROCHLORIDE 240 MG: 240 CAPSULE, EXTENDED RELEASE ORAL at 12:59

## 2017-01-01 RX ADMIN — PIPERACILLIN SODIUM,TAZOBACTAM SODIUM 3.38 G: 3; .375 INJECTION, POWDER, FOR SOLUTION INTRAVENOUS at 04:42

## 2017-01-01 RX ADMIN — VITAMIN D, TAB 1000IU (100/BT) 5000 UNITS: 25 TAB at 08:14

## 2017-01-01 RX ADMIN — SODIUM CHLORIDE 40 MG: 9 INJECTION INTRAMUSCULAR; INTRAVENOUS; SUBCUTANEOUS at 21:35

## 2017-01-01 RX ADMIN — PROPOFOL 150 MG: 10 INJECTION, EMULSION INTRAVENOUS at 10:40

## 2017-01-01 RX ADMIN — MAGNESIUM HYDROXIDE 30 ML: 400 SUSPENSION ORAL at 13:30

## 2017-01-01 RX ADMIN — SODIUM CHLORIDE 40 MG: 9 INJECTION INTRAMUSCULAR; INTRAVENOUS; SUBCUTANEOUS at 21:54

## 2017-01-01 RX ADMIN — ENOXAPARIN SODIUM 40 MG: 40 INJECTION SUBCUTANEOUS at 05:10

## 2017-01-01 RX ADMIN — ENOXAPARIN SODIUM 40 MG: 40 INJECTION SUBCUTANEOUS at 08:30

## 2017-01-01 RX ADMIN — INSULIN LISPRO 2 UNITS: 100 INJECTION, SOLUTION INTRAVENOUS; SUBCUTANEOUS at 11:30

## 2017-01-01 RX ADMIN — VITAMIN D, TAB 1000IU (100/BT) 5000 UNITS: 25 TAB at 08:41

## 2017-01-01 RX ADMIN — CARVEDILOL 25 MG: 12.5 TABLET, FILM COATED ORAL at 08:14

## 2017-01-01 RX ADMIN — SODIUM CHLORIDE 125 ML/HR: 900 INJECTION, SOLUTION INTRAVENOUS at 02:00

## 2017-01-01 RX ADMIN — ENOXAPARIN SODIUM 100 MG: 40 INJECTION SUBCUTANEOUS at 02:20

## 2017-01-01 RX ADMIN — INSULIN LISPRO 2 UNITS: 100 INJECTION, SOLUTION INTRAVENOUS; SUBCUTANEOUS at 16:39

## 2017-01-01 RX ADMIN — DILTIAZEM HYDROCHLORIDE 240 MG: 240 CAPSULE, EXTENDED RELEASE ORAL at 08:57

## 2017-01-01 RX ADMIN — HYDROMORPHONE HYDROCHLORIDE 0.5 MG: 1 INJECTION, SOLUTION INTRAMUSCULAR; INTRAVENOUS; SUBCUTANEOUS at 05:41

## 2017-01-01 RX ADMIN — Medication 10 ML: at 21:50

## 2017-01-01 RX ADMIN — Medication 5 ML: at 22:46

## 2017-01-01 RX ADMIN — PRAVASTATIN SODIUM 40 MG: 20 TABLET ORAL at 22:08

## 2017-01-01 RX ADMIN — SODIUM CHLORIDE 40 MG: 9 INJECTION INTRAMUSCULAR; INTRAVENOUS; SUBCUTANEOUS at 21:31

## 2017-01-01 RX ADMIN — LABETALOL HYDROCHLORIDE 10 MG: 5 INJECTION, SOLUTION INTRAVENOUS at 16:32

## 2017-01-01 RX ADMIN — ALPRAZOLAM 0.25 MG: 0.25 TABLET ORAL at 21:24

## 2017-01-01 RX ADMIN — OXYCODONE HYDROCHLORIDE AND ACETAMINOPHEN 1000 MG: 500 TABLET ORAL at 08:39

## 2017-01-01 RX ADMIN — PYRIDOSTIGMINE BROMIDE 90 MG: 60 TABLET ORAL at 21:24

## 2017-01-01 RX ADMIN — PREDNISONE 50 MG: 20 TABLET ORAL at 09:28

## 2017-01-01 RX ADMIN — PIPERACILLIN SODIUM,TAZOBACTAM SODIUM 3.38 G: 3; .375 INJECTION, POWDER, FOR SOLUTION INTRAVENOUS at 03:03

## 2017-01-01 RX ADMIN — MIDAZOLAM HYDROCHLORIDE 2 MG: 1 INJECTION, SOLUTION INTRAMUSCULAR; INTRAVENOUS at 10:30

## 2017-01-01 RX ADMIN — Medication 10 ML: at 13:33

## 2017-01-01 RX ADMIN — MAGNESIUM HYDROXIDE 30 ML: 400 SUSPENSION ORAL at 09:46

## 2017-01-01 RX ADMIN — CARVEDILOL 25 MG: 12.5 TABLET, FILM COATED ORAL at 08:50

## 2017-01-01 RX ADMIN — ACETAMINOPHEN 650 MG: 325 TABLET ORAL at 09:45

## 2017-01-01 RX ADMIN — PREDNISONE 50 MG: 20 TABLET ORAL at 08:13

## 2017-01-01 RX ADMIN — PIPERACILLIN SODIUM,TAZOBACTAM SODIUM 3.38 G: 3; .375 INJECTION, POWDER, FOR SOLUTION INTRAVENOUS at 04:49

## 2017-01-01 RX ADMIN — SODIUM CHLORIDE 40 MG: 9 INJECTION INTRAMUSCULAR; INTRAVENOUS; SUBCUTANEOUS at 09:05

## 2017-01-01 RX ADMIN — ENOXAPARIN SODIUM 40 MG: 40 INJECTION SUBCUTANEOUS at 01:01

## 2017-01-01 RX ADMIN — CARVEDILOL 25 MG: 12.5 TABLET, FILM COATED ORAL at 16:56

## 2017-01-01 RX ADMIN — PREDNISONE 50 MG: 20 TABLET ORAL at 09:14

## 2017-01-01 RX ADMIN — POTASSIUM CHLORIDE 10 MEQ: 10 INJECTION, SOLUTION INTRAVENOUS at 08:32

## 2017-01-01 RX ADMIN — PIPERACILLIN SODIUM,TAZOBACTAM SODIUM 3.38 G: 3; .375 INJECTION, POWDER, FOR SOLUTION INTRAVENOUS at 02:02

## 2017-01-01 RX ADMIN — INSULIN LISPRO 2 UNITS: 100 INJECTION, SOLUTION INTRAVENOUS; SUBCUTANEOUS at 08:56

## 2017-01-01 RX ADMIN — PIPERACILLIN SODIUM,TAZOBACTAM SODIUM 3.38 G: 3; .375 INJECTION, POWDER, FOR SOLUTION INTRAVENOUS at 22:00

## 2017-01-01 RX ADMIN — ALPRAZOLAM 0.25 MG: 0.25 TABLET ORAL at 16:15

## 2017-01-01 RX ADMIN — DILTIAZEM HYDROCHLORIDE 20 MG/HR: 5 INJECTION INTRAVENOUS at 04:47

## 2017-01-01 RX ADMIN — POTASSIUM & SODIUM PHOSPHATES POWDER PACK 280-160-250 MG 2 PACKET: 280-160-250 PACK at 11:29

## 2017-01-01 RX ADMIN — PYRIDOSTIGMINE BROMIDE 60 MG: 60 TABLET ORAL at 17:04

## 2017-01-01 RX ADMIN — POTASSIUM CHLORIDE 40 MEQ: 1.5 POWDER, FOR SOLUTION ORAL at 09:11

## 2017-01-01 RX ADMIN — PIPERACILLIN SODIUM,TAZOBACTAM SODIUM 3.38 G: 3; .375 INJECTION, POWDER, FOR SOLUTION INTRAVENOUS at 18:24

## 2017-01-04 NOTE — TELEPHONE ENCOUNTER
Order placed for Topamax 25 mg, # 30 #5 refills, PO, per Verbal Order from Dr. Thor Gray on 1/4/2017 due to Headache.

## 2017-01-04 NOTE — TELEPHONE ENCOUNTER
Order placed for Imuran 50 mg , # 60 #5 refills, PO, per Verbal Order from Dr. Darylene Green on 1/4/2017 due to Myasthenia Gravis.

## 2017-01-23 NOTE — PROGRESS NOTES
Neurology Progress Note    Patient ID:  Anju Pearson  [id number]  73 y.o.  1945      Subjective:   History:  Mr. Alexander Nevarez is a 74 yo RH M history of has a past medical history of Anxiety; Arthritis; Asthma; and HTN (hypertension) , glucose intolerant, who last 9/2/16 was transferred to [facility] for diplopia, L facial weakness R ptosis and general weakness and respiratory distress requiring intubation, seen by Dr Elian Palomares and Dr Dayami Craig in consultation and found to have (+) Myasthenia Gravis Abs. CT head negative for stroke. Unable to do MRI brain due to size. CT chest negative for thymoma. Patient eventually was placed on IVIG, Prednisone 60 mg, Mestinon 60 mg TID and Cellcept 500 mg BID with benefit.      Patient was doing fine with Cellcept, but his insurance will not approve it despite several appeal with note of worsening. Patient was started on Imuran 50 mg BID 3 weeks ago. Currently feels slightly generally weak. (+) jaw pain and swelling (+) numbness of R hand. Still on Prednisone 50 mg QD        Objective:   ROS:  Per HPI-  Otherwise 12 point ROS was negative    Meds:  Current Outpatient Prescriptions on File Prior to Visit   Medication Sig Dispense Refill    topiramate (TOPAMAX) 25 mg tablet Take 1 Tab by mouth nightly. 30 Tab 5    predniSONE (DELTASONE) 10 mg tablet Take 5 Tabs by mouth daily (with breakfast). 450 Tab 3    potassium chloride SA (MICRO-K) 10 mEq capsule Take 10 mEq by mouth two (2) times a day.  ALPRAZolam (XANAX) 0.25 mg tablet Take  by mouth.  pyridostigmine (MESTINON) 60 mg tablet Take 1 Tab by mouth three (3) times daily. 90 Tab 5    albuterol-ipratropium (DUO-NEB) 2.5 mg-0.5 mg/3 ml nebu 3 mL by Nebulization route every six (6) hours as needed. 30 Nebule 0    aspirin delayed-release (ASPIR-LOW) 81 mg tablet Take 1 Tab by mouth daily. 30 Tab 0    cloNIDine (CATAPRES) 0.2 mg/24 hr patch 1 Patch by TransDERmal route every seven (7) days.  4 Patch 0    doxepin (SINEQUAN) 25 mg capsule Take 1 Cap by mouth nightly. 90 Cap 3    loratadine (CLARITIN) 10 mg tablet Take 10 mg by mouth.  pantoprazole (PROTONIX) 20 mg tablet Take 20 mg by mouth daily. No current facility-administered medications on file prior to visit. Imaging:    CT Results (recent): MRI Results (recent):  No results found for this or any previous visit. IR Results (recent):  No results found for this or any previous visit. VAS/US Results (recent):  No results found for this or any previous visit. Reviewed records in [employer] tab today    Lab Review     Orders Only on 12/19/2016   Component Date Value Ref Range Status    WBC 01/12/2017 11.5* 3.4 - 10.8 x10E3/uL Final    RBC 01/12/2017 4.38  4.14 - 5.80 x10E6/uL Final    HGB 01/12/2017 14.1  12.6 - 17.7 g/dL Final    HCT 01/12/2017 43.6  37.5 - 51.0 % Final    MCV 01/12/2017 100* 79 - 97 fL Final    MCH 01/12/2017 32.2  26.6 - 33.0 pg Final    MCHC 01/12/2017 32.3  31.5 - 35.7 g/dL Final    RDW 01/12/2017 14.6  12.3 - 15.4 % Final    PLATELET 84/05/9966 009  150 - 379 x10E3/uL Final    NEUTROPHILS 01/12/2017 88  % Final    Lymphocytes 01/12/2017 5  % Final    MONOCYTES 01/12/2017 5  % Final    EOSINOPHILS 01/12/2017 1  % Final    BASOPHILS 01/12/2017 0  % Final    ABS. NEUTROPHILS 01/12/2017 10.2* 1.4 - 7.0 x10E3/uL Final    Abs Lymphocytes 01/12/2017 0.5* 0.7 - 3.1 x10E3/uL Final    ABS. MONOCYTES 01/12/2017 0.6  0.1 - 0.9 x10E3/uL Final    ABS. EOSINOPHILS 01/12/2017 0.1  0.0 - 0.4 x10E3/uL Final    ABS. BASOPHILS 01/12/2017 0.0  0.0 - 0.2 x10E3/uL Final    IMMATURE GRANULOCYTES 01/12/2017 1  % Final    ABS. IMM. GRANS. 01/12/2017 0.2* 0.0 - 0.1 x10E3/uL Final    Comment: (An elevated percentage of Immature Granulocytes has not been found  to be clinically significant as a sole clinical predictor of disease. Does NOT include bands or blast cells.   Pregnancy associated  physiological leukocytosis may also show increased immature  granulocytes without clinical significance.)      Protein, total 01/12/2017 5.4* 6.0 - 8.5 g/dL Final    Albumin 01/12/2017 3.4* 3.6 - 4.8 g/dL Final    Bilirubin, total 01/12/2017 0.2  0.0 - 1.2 mg/dL Final    Bilirubin, direct 01/12/2017 0.08  0.00 - 0.40 mg/dL Final    Alk. phosphatase 01/12/2017 75  39 - 117 IU/L Final    AST 01/12/2017 21  0 - 40 IU/L Final    ALT 01/12/2017 21  0 - 44 IU/L Final   Office Visit on 11/21/2016   Component Date Value Ref Range Status    WBC 11/22/2016 12.2* 3.4 - 10.8 x10E3/uL Final    RBC 11/22/2016 4.85  4.14 - 5.80 x10E6/uL Final    HGB 11/22/2016 15.2  12.6 - 17.7 g/dL Final    HCT 11/22/2016 46.4  37.5 - 51.0 % Final    MCV 11/22/2016 96  79 - 97 fL Final    MCH 11/22/2016 31.3  26.6 - 33.0 pg Final    MCHC 11/22/2016 32.8  31.5 - 35.7 g/dL Final    RDW 11/22/2016 15.1  12.3 - 15.4 % Final    PLATELET 36/22/5765 464  150 - 379 x10E3/uL Final    NEUTROPHILS 11/22/2016 93  % Final    Toxic granulation of polys.  Lymphocytes 11/22/2016 3  % Final    MONOCYTES 11/22/2016 1  % Final    EOSINOPHILS 11/22/2016 0  % Final    BASOPHILS 11/22/2016 0  % Final    IMMATURE CELLS 11/22/2016 Note   Final    ABS. NEUTROPHILS 11/22/2016 11.5* 1.4 - 7.0 x10E3/uL Final    Abs Lymphocytes 11/22/2016 0.4* 0.7 - 3.1 x10E3/uL Final    ABS. MONOCYTES 11/22/2016 0.1  0.1 - 0.9 x10E3/uL Final    ABS. EOSINOPHILS 11/22/2016 0.0  0.0 - 0.4 x10E3/uL Final    ABS. BASOPHILS 11/22/2016 0.0  0.0 - 0.2 x10E3/uL Final    Hematology comments: 11/22/2016 Note:   Final    Manual differential was performed.  BANDS 11/22/2016 1  Not Estab. % Final    Metamyelocytes, [id number] 11/22/2016 2* 0 - 0 % Final         Exam:  Visit Vitals    Resp 20    Ht 5' 11\" (1.803 m)    Wt 143.3 kg (316 lb)    BMI 44.07 kg/m2     Gen: Awake, alert, follows commands  Appropriate appearance, normal speech. Oriented to all spheres.   No visual field defect on confrontation exam.  Full eyes movement, with no nystagmus, no diplopia, no ptosis. Normal gag and swallow. All remaining cranial nerves were normal  Motor function: 5/5 in all extremities  Sensory: intact to LT, PP and JPS  Good FTN and HTS   Gait: Normal    Assessment:     1. Myasthenia gravis in crisis [facility])    Paresthesia , possible R CTS        Plan:   1. Increase Imuran 50 mg 2 tabs in AM and 1 tab QHS. .  2. We will check CBC and LFTS a few days before FU  3. Continue prednisone to 50 mg (10 mg 5 pills per day)  4. Continue GI prophylaxis, Ca and Vit D  5. If patient continues to get worse, will consider IVIG  6. R Wrist splint at night      Follow-up Disposition:  Return in about 1 month (around 2/23/2017).           Avery Duarte MD  Diplomate, American Board of Psychiatry and Neurology  Diplomate, Neuromuscular Medicine  Diplomate, American Board of Electrodiagnostic Medicine

## 2017-01-23 NOTE — PROGRESS NOTES
Reviewed record in preparation for visit and have necessary documentation  Pt did not bring medication to office visit for review  Medication list reviewed and reconciled with patient  Information was given to pt on Advanced Directives, Living Will  opportunity was given for questions

## 2017-01-23 NOTE — PATIENT INSTRUCTIONS
Learning About Living [place]  What is a living will? A living will is a legal form you use to write down the kind of care you want at the end of your life. It is used by the health professionals who will treat you if you aren't able to decide for yourself. If you put your wishes in writing, your loved ones and others will know what kind of care you want. They won't need to guess. This can ease your mind and be helpful to others. A living will is not the same as an estate or property will. An estate will explains what you want to happen with your money and property after you die. Is a living will a legal document? A living will is a legal document. Each state has its own laws about [place]. If you move to another state, make sure that your living will is legal in the state where you now live. Or you might use a universal form that has been approved by many states. This kind of form can sometimes be completed and stored online. Your electronic copy will then be available wherever you have a connection to the Internet. In most cases, doctors will respect your wishes even if you have a form from a different state. · You don't need an  to complete a living will. But legal advice can be helpful if your state's laws are unclear, your health history is complicated, or your family can't agree on what should be in your living will. · You can change your living will at any time. Some people find that their wishes about end-of-life care change as their health changes. · In addition to making a living will, think about completing a medical power of  form. This form lets you name the person you want to make end-of-life treatment decisions for you (your \"health care agent\") if you're not able to. Many hospitals and nursing homes will give you the forms you need to complete a living will and a medical power of .   · Your living will is used only if you can't make or communicate decisions for yourself anymore. If you become able to make decisions again, you can accept or refuse any treatment, no matter what you wrote in your living will. · Your state may offer an online registry. This is a place where you can store your living will online so the doctors and nurses who need to treat you can find it right away. What should you think about when creating a living will? Talk about your end-of-life wishes with your family members and your doctor. Let them know what you want. That way the people making decisions for you won't be surprised by your choices. Think about these questions as you make your living will:  · Do you know enough about life support methods that might be used? If not, talk to your doctor so you know what might be done if you can't breathe on your own, your heart stops, or you're unable to swallow. · What things would you still want to be able to do after you receive life-support methods? Would you want to be able to walk? To speak? To eat on your own? To live without the help of machines? · If you have a choice, where do you want to be cared for? In your home? At a hospital or nursing home? · Do you want certain Shinto practices performed if you become very ill? · If you have a choice at the end of your life, where would you prefer to die? At home? In a hospital or nursing home? Somewhere else? · Would you prefer to be buried or cremated? · Do you want your organs to be donated after you die? What should you do with your living will? · Make sure that your family members and your health care agent have copies of your living will. · Give your doctor a copy of your living will to keep in your medical record. If you have more than one doctor, make sure that each one has a copy. · You may want to put a copy of your living will where it can be easily found. Where can you learn more? Go to http://palmer-kendall.info/.   Enter N625 in the search box to learn more about \"Learning About Living [place]. \"  Current as of: February 24, 2016  Content Version: 11.1  © 2409-8524 [employer]. Care instructions adapted under license by [employer] (which disclaims liability or warranty for this information). If you have questions about a medical condition or this instruction, always ask your healthcare professional. [address] any warranty or liability for your use of this information. Advance Directives: Care Instructions  Your Care Instructions  An advance directive is a legal way to state your wishes at the end of your life. It tells your family and your doctor what to do if you can no longer say what you want. There are two main types of advance directives. You can change them any time that your wishes change. · A living will tells your family and your doctor your wishes about life support and other treatment. · A medical power of  lets you name a person to make treatment decisions for you when you can't speak for yourself. This person is called a health care agent. If you do not have an advance directive, decisions about your medical care may be made by a doctor or a  who doesn't know you. It may help to think of an advance directive as a gift to the people who care for you. If you have one, they won't have to make tough decisions by themselves. Follow-up care is a key part of your treatment and safety. Be sure to make and go to all appointments, and call your doctor if you are having problems. It's also a good idea to know your test results and keep a list of the medicines you take. How can you care for yourself at home? · Discuss your wishes with your loved ones and your doctor. This way, there are no surprises. · Many states have a unique form. Or you might use a universal form that has been approved by many states. This kind of form can sometimes be completed and stored online.  Your electronic copy will then be available wherever you have a connection to the Internet. In most cases, doctors will respect your wishes even if you have a form from a different state. · You don't need a  to do an advance directive. But you may want to get legal advice. · Think about these questions when you prepare an advance directive:  ¨ Who do you want to make decisions about your medical care if you are not able to? Many people choose a family member, close friend, or doctor. ¨ Do you know enough about life support methods that might be used? If not, talk to your doctor so you understand. ¨ What are you most afraid of that might happen? You might be afraid of having pain, losing your independence, or being kept alive by machines. ¨ Where would you prefer to die? Choices include your home, a hospital, or a nursing home. ¨ Would you like to have information about hospice care to support you and your family? ¨ Do you want to donate organs when you die? ¨ Do you want certain Evangelical practices performed before you die? If so, put your wishes in the advance directive. · Read your advance directive every year, and make changes as needed. When should you call for help? Be sure to contact your doctor if you have any questions. Where can you learn more? Go to http://palmer-kendall.info/. Enter R264 in the search box to learn more about \"Advance Directives: Care Instructions. \"  Current as of: February 24, 2016  Content Version: 11.1  © 9976-1083 [employer]. Care instructions adapted under license by [employer] (which disclaims liability or warranty for this information). If you have questions about a medical condition or this instruction, always ask your healthcare professional. [address] any warranty or liability for your use of this information.

## 2017-01-23 NOTE — MR AVS SNAPSHOT
Visit Information Date & Time Provider Department Dept. Phone Encounter #  
 1/23/2017  1:00 PM Luis Dobbs MD Neurology [facility] [phone] [id number] Follow-up Instructions Return in about 1 month (around 2/23/2017). Upcoming Health Maintenance Date Due DTaP/Tdap/Td series (1 - Tdap) 6/16/1968 FOBT Q 1 YEAR AGE 50-75 6/16/1997 ZOSTER VACCINE AGE 60> 6/16/2007 GLAUCOMA SCREENING Q2Y 6/16/2012 Pneumococcal 65+ Low/Medium Risk (1 of 2 - PCV13) 6/16/2012 MEDICARE YEARLY EXAM 6/16/2012 INFLUENZA AGE 9 TO ADULT 8/1/2016 Allergies as of 1/23/2017  Review Complete On: 1/23/2017 By: Acacia Rondon LPN Severity Noted Reaction Type Reactions Lactose Medium 09/14/2016   Intolerance Itching Fructose  09/14/2016   Intolerance Other (comments) States added sugar to foods causes sore throat/ear ache. Inderal [Propranolol]  09/03/2016    Unknown (comments) Lisinopril  09/07/2016    Angioedema Gluten Low 09/14/2016   Intolerance Nausea Only Current Immunizations  Never Reviewed No immunizations on file. Not reviewed this visit You Were Diagnosed With   
  
 Codes Comments Myasthenia gravis in crisis [facility])    -  Primary ICD-10-CM: G70.01 
ICD-9-CM: 358.01 Vitals Resp Height(growth percentile) Weight(growth percentile) BMI Smoking Status 20 5' 11\" (1.803 m) 316 lb (143.3 kg) 44.07 kg/m2 Never Smoker BMI and BSA Data Body Mass Index Body Surface Area 44.07 kg/m 2 2.68 m 2 Preferred Pharmacy Pharmacy Name Phone [facility] [address] [place] [address] [phone] Your Updated Medication List  
  
   
This list is accurate as of: 1/23/17  1:17 PM.  Always use your most recent med list.  
  
  
  
  
 albuterol-ipratropium 2.5 mg-0.5 mg/3 ml Nebu Commonly known as:  DUO-NEB  
3 mL by Nebulization route every six (6) hours as needed. aspirin delayed-release 81 mg tablet Commonly known as:  ASPIR-LOW Take 1 Tab by mouth daily. azaTHIOprine 50 mg tablet Commonly known as:  [employer] Take 2 tabs in AM and 1 tab at night CLARITIN 10 mg tablet Generic drug:  loratadine Take 10 mg by mouth.  
  
 cloNIDine 0.2 mg/24 hr patch Commonly known as:  CATAPRES  
1 Patch by TransDERmal route every seven (7) days. doxepin 25 mg capsule Commonly known as:  SINEquan Take 1 Cap by mouth nightly. potassium chloride SA 10 mEq capsule Commonly known as:  Ector [place] Take 10 mEq by mouth two (2) times a day. predniSONE 10 mg tablet Commonly known as:  Justin Razor Take 5 Tabs by mouth daily (with breakfast). PROTONIX 20 mg tablet Generic drug:  pantoprazole Take 20 mg by mouth daily. pyridostigmine 60 mg tablet Commonly known as:  MESTINON Take 1 Tab by mouth three (3) times daily. topiramate 25 mg tablet Commonly known as:  TOPAMAX Take 1 Tab by mouth nightly. XANAX 0.25 mg tablet Generic drug:  ALPRAZolam  
Take  by mouth. Prescriptions Sent to Pharmacy Refills  
 azaTHIOprine (IMURAN) 50 mg tablet 5 Sig: Take 2 tabs in AM and 1 tab at night Class: Normal  
 Pharmacy: [address] #: [phone] We Performed the Following CBC WITH AUTOMATED DIFF [53670 CPT(R)] HEPATIC FUNCTION PANEL [54566 CPT(R)] Follow-up Instructions Return in about 1 month (around 2/23/2017). Patient Instructions Yovani Lomas 1723 What is a living will? A living will is a legal form you use to write down the kind of care you want at the end of your life. It is used by the health professionals who will treat you if you aren't able to decide for yourself.  
If you put your wishes in writing, your loved ones and others will know what kind of care you want. They won't need to guess. This can ease your mind and be helpful to others. A living will is not the same as an estate or property will. An estate will explains what you want to happen with your money and property after you die. Is a living will a legal document? A living will is a legal document. Each state has its own laws about living lane. If you move to another state, make sure that your living will is legal in the state where you now live. Or you might use a universal form that has been approved by many states. This kind of form can sometimes be completed and stored online. Your electronic copy will then be available wherever you have a connection to the Internet. In most cases, doctors will respect your wishes even if you have a form from a different state. · You don't need an  to complete a living will. But legal advice can be helpful if your state's laws are unclear, your health history is complicated, or your family can't agree on what should be in your living will. · You can change your living will at any time. Some people find that their wishes about end-of-life care change as their health changes. · In addition to making a living will, think about completing a medical power of  form. This form lets you name the person you want to make end-of-life treatment decisions for you (your \"health care agent\") if you're not able to. Many hospitals and nursing homes will give you the forms you need to complete a living will and a medical power of . · Your living will is used only if you can't make or communicate decisions for yourself anymore. If you become able to make decisions again, you can accept or refuse any treatment, no matter what you wrote in your living will. · Your state may offer an online registry. This is a place where you can store your living will online so the doctors and nurses who need to treat you can find it right away. What should you think about when creating a living will? Talk about your end-of-life wishes with your family members and your doctor. Let them know what you want. That way the people making decisions for you won't be surprised by your choices. Think about these questions as you make your living will: · Do you know enough about life support methods that might be used? If not, talk to your doctor so you know what might be done if you can't breathe on your own, your heart stops, or you're unable to swallow. · What things would you still want to be able to do after you receive life-support methods? Would you want to be able to walk? To speak? To eat on your own? To live without the help of machines? · If you have a choice, where do you want to be cared for? In your home? At a hospital or nursing home? · Do you want certain Episcopalian practices performed if you become very ill? · If you have a choice at the end of your life, where would you prefer to die? At home? In a hospital or nursing home? Somewhere else? · Would you prefer to be buried or cremated? · Do you want your organs to be donated after you die? What should you do with your living will? · Make sure that your family members and your health care agent have copies of your living will. · Give your doctor a copy of your living will to keep in your medical record. If you have more than one doctor, make sure that each one has a copy. · You may want to put a copy of your living will where it can be easily found. Where can you learn more? Go to http://palmer-kendall.info/. Enter H155 in the search box to learn more about \"Learning About Living Perronelida. \" Current as of: February 24, 2016 Content Version: 11.1 © 7547-6292 [employer]. Care instructions adapted under license by eshtery (which disclaims liability or warranty for this information).  If you have questions about a medical condition or this instruction, always ask your healthcare professional. [address] any warranty or liability for your use of this information. Advance Directives: Care Instructions Your Care Instructions An advance directive is a legal way to state your wishes at the end of your life. It tells your family and your doctor what to do if you can no longer say what you want. There are two main types of advance directives. You can change them any time that your wishes change. · A living will tells your family and your doctor your wishes about life support and other treatment. · A medical power of  lets you name a person to make treatment decisions for you when you can't speak for yourself. This person is called a health care agent. If you do not have an advance directive, decisions about your medical care may be made by a doctor or a  who doesn't know you. It may help to think of an advance directive as a gift to the people who care for you. If you have one, they won't have to make tough decisions by themselves. Follow-up care is a key part of your treatment and safety. Be sure to make and go to all appointments, and call your doctor if you are having problems. It's also a good idea to know your test results and keep a list of the medicines you take. How can you care for yourself at home? · Discuss your wishes with your loved ones and your doctor. This way, there are no surprises. · Many states have a unique form. Or you might use a universal form that has been approved by many states. This kind of form can sometimes be completed and stored online. Your electronic copy will then be available wherever you have a connection to the Internet. In most cases, doctors will respect your wishes even if you have a form from a different state. · You don't need a  to do an advance directive. But you may want to get legal advice. · Think about these questions when you prepare an advance directive: ¨ Who do you want to make decisions about your medical care if you are not able to? Many people choose a family member, close friend, or doctor. ¨ Do you know enough about life support methods that might be used? If not, talk to your doctor so you understand. ¨ What are you most afraid of that might happen? You might be afraid of having pain, losing your independence, or being kept alive by machines. ¨ Where would you prefer to die? Choices include your home, a hospital, or a nursing home. ¨ Would you like to have information about hospice care to support you and your family? ¨ Do you want to donate organs when you die? ¨ Do you want certain Mormonism practices performed before you die? If so, put your wishes in the advance directive. · Read your advance directive every year, and make changes as needed. When should you call for help? Be sure to contact your doctor if you have any questions. Where can you learn more? Go to http://palmer-kendall.info/. Enter R264 in the search box to learn more about \"Advance Directives: Care Instructions. \" Current as of: February 24, 2016 Content Version: 11.1 © 6032-6319 Centro. Care instructions adapted under license by Bambisa (which disclaims liability or warranty for this information). If you have questions about a medical condition or this instruction, always ask your healthcare professional. [place] [address] any warranty or liability for your use of this information. Introducing [facility]! Dear Jorge Tucker: Thank you for requesting a Banksnob account. Our records indicate that you already have an active Banksnob account. You can access your account anytime at [email or web address] Did you know that you can access your hospital and ER discharge instructions at any time in Airside Mobile? You can also review all of your test results from your hospital stay or ER visit. Additional Information If you have questions, please visit the Frequently Asked Questions section of the Airside Mobile website at [email or web address] Remember, Airside Mobile is NOT to be used for urgent needs. For medical emergencies, dial 911. Now available from your iPhone and Android! Please provide this summary of care documentation to your next provider. Your primary care clinician is listed as [facility] and [facility]. If you have any questions after today's visit, please call [phone].

## 2017-02-08 NOTE — TELEPHONE ENCOUNTER
From: Vivian Palencia. To: Evon Smiley MD  Sent: 2/4/2017 10:14 AM EST  Subject: Prescription Question    I think I have great news. It looks like goodrx has a [facility] coupon for Mycophenolate 500 mg tablets Generic for Cellcept. They have it for $35.00 120 tablets. I just pray that is correct because they have coupons for all kinds of prices at other drug stores. And without the coupon it is over $700.00. I would really appreciate it if Dr. Leonila Morgan would send a prescription to Ferny Miller at [facility] [place] [phone]. I do not know why I did not think about this before. Thank you very much.    Sincerely,  Skyler Wright

## 2017-02-24 NOTE — PROGRESS NOTES
Neurology Progress Note    Patient ID:  Cali Maher  [id number]  73 y.o.  1945      Subjective:   History:  Mr. Analy Richardson is a 74 yo RH M history of has a past medical history of Anxiety; Arthritis; Asthma; and HTN (hypertension) , glucose intolerant, who last 9/2/16 was transferred to [facility] for diplopia, L facial weakness R ptosis and general weakness and respiratory distress requiring intubation, seen by Dr Celena Solomon and Dr Jeremie Gordon in consultation and found to have (+) Myasthenia Gravis Abs. CT chest negative for thymoma. Patient eventually was placed on IVIG, Prednisone 60 mg, Mestinon 60 mg TID and Cellcept 500 mg BID with benefit.      Patient was not responding as well with Imuran. Luckily, patient able to find a pharmacy c/[facility] which sells cheaper Cellcept. Patient placed back on Cellcept 500 mg BID. Patient had a recent gum infection with note of weakness but now that he is on Amoxicillin, he is felling much better. Objective:   ROS:  Per HPI-  Otherwise 12 point ROS was negative    Meds:  Current Outpatient Prescriptions on File Prior to Visit   Medication Sig Dispense Refill    pyridostigmine (MESTINON) 60 mg tablet TAKE 1 TABLET BY MOUTH THREE TIMES DAILY 270 Tab 5    predniSONE (DELTASONE) 10 mg tablet Take 5 Tabs by mouth daily (with breakfast). 450 Tab 3    loratadine (CLARITIN) 10 mg tablet Take 10 mg by mouth.  potassium chloride SA (MICRO-K) 10 mEq capsule Take 10 mEq by mouth two (2) times a day.  ALPRAZolam (XANAX) 0.25 mg tablet Take  by mouth.  aspirin delayed-release (ASPIR-LOW) 81 mg tablet Take 1 Tab by mouth daily. 30 Tab 0    cloNIDine (CATAPRES) 0.2 mg/24 hr patch 1 Patch by TransDERmal route every seven (7) days. 4 Patch 0    topiramate (TOPAMAX) 25 mg tablet Take 1 Tab by mouth nightly. 30 Tab 5    doxepin (SINEQUAN) 25 mg capsule Take 1 Cap by mouth nightly. 90 Cap 3    pantoprazole (PROTONIX) 20 mg tablet Take 20 mg by mouth daily.       albuterol-ipratropium (DUO-NEB) 2.5 mg-0.5 mg/3 ml nebu 3 mL by Nebulization route every six (6) hours as needed. 30 Nebule 0     No current facility-administered medications on file prior to visit. Imaging:    CT Results (recent): MRI Results (recent):  No results found for this or any previous visit. IR Results (recent):  No results found for this or any previous visit. VAS/US Results (recent):  No results found for this or any previous visit. Reviewed records in [facility] and media tab today    Lab Review     Office Visit on 01/23/2017   Component Date Value Ref Range Status    WBC 02/22/2017 13.8* 3.4 - 10.8 x10E3/uL Final    RBC 02/22/2017 4.62  4.14 - 5.80 x10E6/uL Final    HGB 02/22/2017 15.0  12.6 - 17.7 g/dL Final    HCT 02/22/2017 45.9  37.5 - 51.0 % Final    MCV 02/22/2017 99* 79 - 97 fL Final    MCH 02/22/2017 32.5  26.6 - 33.0 pg Final    MCHC 02/22/2017 32.7  31.5 - 35.7 g/dL Final    RDW 02/22/2017 15.5* 12.3 - 15.4 % Final    PLATELET 48/55/4858 432* 150 - 379 x10E3/uL Final    NEUTROPHILS 02/22/2017 95  % Final    Lymphocytes 02/22/2017 3  % Final    MONOCYTES 02/22/2017 1  % Final    EOSINOPHILS 02/22/2017 0  % Final    BASOPHILS 02/22/2017 0  % Final    ABS. NEUTROPHILS 02/22/2017 13.0* 1.4 - 7.0 x10E3/uL Final    Abs Lymphocytes 02/22/2017 0.4* 0.7 - 3.1 x10E3/uL Final    ABS. MONOCYTES 02/22/2017 0.2  0.1 - 0.9 x10E3/uL Final    ABS. EOSINOPHILS 02/22/2017 0.0  0.0 - 0.4 x10E3/uL Final    ABS. BASOPHILS 02/22/2017 0.0  0.0 - 0.2 x10E3/uL Final    IMMATURE GRANULOCYTES 02/22/2017 1  % Final    ABS. IMM. GRANS. 02/22/2017 0.1  0.0 - 0.1 x10E3/uL Final    Protein, total 02/22/2017 5.9* 6.0 - 8.5 g/dL Final    Albumin 02/22/2017 3.5* 3.6 - 4.8 g/dL Final    Bilirubin, total 02/22/2017 0.3  0.0 - 1.2 mg/dL Final    Bilirubin, direct 02/22/2017 0.12  0.00 - 0.40 mg/dL Final                  **Please note reference interval change**    Alk.  phosphatase 02/22/2017 60  39 - 117 IU/L Final    AST (SGOT) 02/22/2017 22  0 - 40 IU/L Final    ALT (SGPT) 02/22/2017 28  0 - 44 IU/L Final   Orders Only on 12/19/2016   Component Date Value Ref Range Status    WBC 01/12/2017 11.5* 3.4 - 10.8 x10E3/uL Final    RBC 01/12/2017 4.38  4.14 - 5.80 x10E6/uL Final    HGB 01/12/2017 14.1  12.6 - 17.7 g/dL Final    HCT 01/12/2017 43.6  37.5 - 51.0 % Final    MCV 01/12/2017 100* 79 - 97 fL Final    MCH 01/12/2017 32.2  26.6 - 33.0 pg Final    MCHC 01/12/2017 32.3  31.5 - 35.7 g/dL Final    RDW 01/12/2017 14.6  12.3 - 15.4 % Final    PLATELET 29/26/6056 443  150 - 379 x10E3/uL Final    NEUTROPHILS 01/12/2017 88  % Final    Lymphocytes 01/12/2017 5  % Final    MONOCYTES 01/12/2017 5  % Final    EOSINOPHILS 01/12/2017 1  % Final    BASOPHILS 01/12/2017 0  % Final    ABS. NEUTROPHILS 01/12/2017 10.2* 1.4 - 7.0 x10E3/uL Final    Abs Lymphocytes 01/12/2017 0.5* 0.7 - 3.1 x10E3/uL Final    ABS. MONOCYTES 01/12/2017 0.6  0.1 - 0.9 x10E3/uL Final    ABS. EOSINOPHILS 01/12/2017 0.1  0.0 - 0.4 x10E3/uL Final    ABS. BASOPHILS 01/12/2017 0.0  0.0 - 0.2 x10E3/uL Final    IMMATURE GRANULOCYTES 01/12/2017 1  % Final    ABS. IMM. GRANS. 01/12/2017 0.2* 0.0 - 0.1 x10E3/uL Final    Comment: (An elevated percentage of Immature Granulocytes has not been found  to be clinically significant as a sole clinical predictor of disease. Does NOT include bands or blast cells. Pregnancy associated  physiological leukocytosis may also show increased immature  granulocytes without clinical significance.)      Protein, total 01/12/2017 5.4* 6.0 - 8.5 g/dL Final    Albumin 01/12/2017 3.4* 3.6 - 4.8 g/dL Final    Bilirubin, total 01/12/2017 0.2  0.0 - 1.2 mg/dL Final    Bilirubin, direct 01/12/2017 0.08  0.00 - 0.40 mg/dL Final    Alk.  phosphatase 01/12/2017 75  39 - 117 IU/L Final    AST (SGOT) 01/12/2017 21  0 - 40 IU/L Final    ALT (SGPT) 01/12/2017 21  0 - 44 IU/L Final Exam:  Visit Vitals    /70    Pulse 81    Wt 143.5 kg (316 lb 6.4 oz)    SpO2 96%    BMI 44.13 kg/m2     Gen: Awake, alert, follows commands  Appropriate appearance, normal speech. Oriented to all spheres. No visual field defect on confrontation exam.  Full eyes movement, with no nystagmus, no diplopia, no ptosis. Normal gag and swallow. All remaining cranial nerves were normal  Motor function: 5/5 in all extremities  Sensory: intact to LT, PP and JPS  Good FTN and HTS   Gait: Normal    Assessment:     1. Myasthenia gravis in crisis [facility])            Plan:   1. Increase Cellcept 500 mg 2 tabs BID  2. Continue prednisone to 50 mg (10 mg 5 pills per day) for now  4. Refer to cardiothoracic surgery for possible thymectomy (and also to evalaute his hiatal hernia)  5. Continue GI prophylaxis, Ca and Vit D  6. If patient continues to get worse, will consider IVIG  7. R Wrist splint at night      Follow-up Disposition:  Return in about 1 month (around 3/24/2017).           Cira Liriano MD  Diplomate, American Board of Psychiatry and Neurology  Diplomate, Neuromuscular Medicine  Diplomate, American Board of Electrodiagnostic Medicine

## 2017-02-24 NOTE — LETTER
2/24/2017 2:40 PM 
 
Patient:  Marjan Mariee. YOB: 1945 Date of Visit: 2/24/2017 Dear Ronaldo Koch MD 
[address] [place] [id number] VIA Facsimile: [phone] 
 : Thank you for referring Mr. Solo Kerr to me for evaluation/treatment. Below are the relevant portions of my assessment and plan of care. If you have questions, please do not hesitate to call me. I look forward to following Mr. Jonathan Moon along with you. Sincerely, Avery Duarte MD

## 2017-04-03 NOTE — TELEPHONE ENCOUNTER
Got a referral for the pt to get IVIG in November. Pt was hesitant but now they are ready to start but auth . They will try to obtain a new one.

## 2017-04-04 PROBLEM — G70.01 MYASTHENIA GRAVIS WITH EXACERBATION, ADULT FORM (HCC): Status: ACTIVE | Noted: 2017-01-01

## 2017-04-04 NOTE — MR AVS SNAPSHOT
Visit Information Date & Time Provider Department Dept. Phone Encounter #  
 4/4/2017 11:40 AM Shantell Blunt MD Neurology [facility] [phone] [id number] Follow-up Instructions Return in about 2 weeks (around 4/18/2017). Upcoming Health Maintenance Date Due DTaP/Tdap/Td series (1 - Tdap) 6/16/1968 FOBT Q 1 YEAR AGE 50-75 6/16/1997 ZOSTER VACCINE AGE 60> 6/16/2007 GLAUCOMA SCREENING Q2Y 6/16/2012 Pneumococcal 65+ Low/Medium Risk (1 of 2 - PCV13) 6/16/2012 MEDICARE YEARLY EXAM 6/16/2012 INFLUENZA AGE 9 TO ADULT 8/1/2016 Allergies as of 4/4/2017  Review Complete On: 4/4/2017 By: Jai Pete LPN Severity Noted Reaction Type Reactions Lactose Medium 09/14/2016   Intolerance Itching Fructose  09/14/2016   Intolerance Other (comments) States added sugar to foods causes sore throat/ear ache. Inderal [Propranolol]  09/03/2016    Unknown (comments) Lisinopril  09/07/2016    Angioedema Gluten Low 09/14/2016   Intolerance Nausea Only Current Immunizations  Never Reviewed No immunizations on file. Not reviewed this visit You Were Diagnosed With   
  
 Codes Comments Myasthenia gravis with exacerbation, adult form ([facility].)    -  Primary ICD-10-CM: G70.01 
ICD-9-CM: 358.01 Vitals BP Pulse Weight(growth percentile) SpO2 BMI Smoking Status 126/66 (!) 102 303 lb 9.6 oz (137.7 kg) 96% 42.34 kg/m2 Never Smoker BMI and BSA Data Body Mass Index Body Surface Area  
 42.34 kg/m 2 2.63 m 2 Preferred Pharmacy Pharmacy Name Phone [facility] [place], [address] [place] AT [facility] [phone] Your Updated Medication List  
  
   
This list is accurate as of: 4/4/17 12:13 PM.  Always use your most recent med list.  
  
  
  
  
 albuterol-ipratropium 2.5 mg-0.5 mg/3 ml Nebu Commonly known as:  Jesus Cullen  
 3 mL by Nebulization route every six (6) hours as needed. amoxicillin 500 mg capsule Commonly known as:  AMOXIL Take 500 mg by mouth two (2) times a day. aspirin delayed-release 81 mg tablet Commonly known as:  ASPIR-LOW Take 1 Tab by mouth daily. CLARITIN 10 mg tablet Generic drug:  loratadine Take 10 mg by mouth.  
  
 cloNIDine 0.2 mg/24 hr patch Commonly known as:  CATAPRES  
1 Patch by TransDERmal route every seven (7) days. doxepin 25 mg capsule Commonly known as:  SINEquan Take 1 Cap by mouth nightly. mycophenolate 500 mg tablet Commonly known as:  CELLCEPT Take 2 Tabs by mouth two (2) times a day. potassium chloride SA 10 mEq capsule Commonly known as:  Merril Linker Take 10 mEq by mouth two (2) times a day. predniSONE 10 mg tablet Commonly known as:  Ronita Benoit Take 5 Tabs by mouth daily (with breakfast). PROTONIX 20 mg tablet Generic drug:  pantoprazole Take 20 mg by mouth daily. pyridostigmine 60 mg tablet Commonly known as:  MESTINON  
TAKE 1 TABLET BY MOUTH THREE TIMES DAILY topiramate 25 mg tablet Commonly known as:  TOPAMAX Take 1 Tab by mouth nightly. XANAX 0.25 mg tablet Generic drug:  ALPRAZolam  
Take  by mouth. Follow-up Instructions Return in about 2 weeks (around 4/18/2017). Introducing [facility]! Dear Audra Parekh: Thank you for requesting a [employer] account. Our records indicate that you already have an active [employer] account. You can access your account anytime at [email or web address]. [email or web address] Did you know that you can access your hospital and ER discharge instructions at any time in [employer]? You can also review all of your test results from your hospital stay or ER visit. Additional Information If you have questions, please visit the Frequently Asked Questions section of the [employer] website at [email or web address] Remember, [employer] is NOT to be used for urgent needs. For medical emergencies, dial 911. Now available from your iPhone and Android! Please provide this summary of care documentation to your next provider. Your primary care clinician is listed as Amanda Brumley Sergey. If you have any questions after today's visit, please call [phone].

## 2017-04-04 NOTE — LETTER
4/4/2017 12:18 PM 
 
Patient:  Gennaro Tristan. YOB: 1945 Date of Visit: 4/4/2017 Dear Chela Conley MD 
[address] [place] [address] VIA Facsimile: [phone] 
 : Thank you for referring Mr. Yvon De Dios to me for evaluation/treatment. Below are the relevant portions of my assessment and plan of care. If you have questions, please do not hesitate to call me. I look forward to following Mr. Zay Rock along with you. Sincerely, Ilda Putnam MD

## 2017-04-04 NOTE — PROGRESS NOTES
Neurology Progress Note    Patient ID:  Abbie Roberto  [id number]  73 y.o.  1945      Subjective:   Mr. Melissa Bourne is a 72 yo RH M history of has a past medical history of Anxiety; Arthritis; Asthma; and HTN, glucose intolerant, who last 9/2/16 was transferred to [facility] for diplopia, L facial weakness R ptosis and general weakness and respiratory distress requiring intubation, seen by Dr Flory Cutler and Dr Dary Cartagena in consultation and found to have (+) Myasthenia Gravis Abs. CT chest negative for thymoma. Patient tried IVIG, Prednisone, Mestinon, Imuran and Cellcept.      Patient having sore throat with increase shortness of breath. Patient saw Urologist for dysuria and was told to stop Cellcept. Tried cutting back Prednisone with note of worsening of dyspnea. Has not seen a thoracic surgeon yet (the one we referred him left the practice) since patient has gotten sick        Objective:   ROS:  Per HPI-  Otherwise 12 point ROS was negative    Meds:  Current Outpatient Prescriptions on File Prior to Visit   Medication Sig Dispense Refill    amoxicillin (AMOXIL) 500 mg capsule Take 500 mg by mouth two (2) times a day.  pyridostigmine (MESTINON) 60 mg tablet TAKE 1 TABLET BY MOUTH THREE TIMES DAILY 270 Tab 5    topiramate (TOPAMAX) 25 mg tablet Take 1 Tab by mouth nightly. 30 Tab 5    doxepin (SINEQUAN) 25 mg capsule Take 1 Cap by mouth nightly. 90 Cap 3    predniSONE (DELTASONE) 10 mg tablet Take 5 Tabs by mouth daily (with breakfast). 450 Tab 3    loratadine (CLARITIN) 10 mg tablet Take 10 mg by mouth.  pantoprazole (PROTONIX) 20 mg tablet Take 20 mg by mouth daily.  potassium chloride SA (MICRO-K) 10 mEq capsule Take 10 mEq by mouth two (2) times a day.  ALPRAZolam (XANAX) 0.25 mg tablet Take  by mouth.  albuterol-ipratropium (DUO-NEB) 2.5 mg-0.5 mg/3 ml nebu 3 mL by Nebulization route every six (6) hours as needed.  30 Nebule 0    aspirin delayed-release (ASPIR-LOW) 81 mg tablet Take 1 Tab by mouth daily. 30 Tab 0    cloNIDine (CATAPRES) 0.2 mg/24 hr patch 1 Patch by TransDERmal route every seven (7) days. 4 Patch 0    mycophenolate (CELLCEPT) 500 mg tablet Take 2 Tabs by mouth two (2) times a day. 120 Tab 5     No current facility-administered medications on file prior to visit. Imaging:    CT Results (recent): MRI Results (recent):  No results found for this or any previous visit. IR Results (recent):  No results found for this or any previous visit. VAS/US Results (recent):  No results found for this or any previous visit. Reviewed records in [facility] tab today    Lab Review     Refill on 03/02/2017   Component Date Value Ref Range Status    WBC 03/30/2017 13.2* 3.4 - 10.8 x10E3/uL Final    RBC 03/30/2017 4.90  4.14 - 5.80 x10E6/uL Final    HGB 03/30/2017 15.6  12.6 - 17.7 g/dL Final    HCT 03/30/2017 46.7  37.5 - 51.0 % Final    MCV 03/30/2017 95  79 - 97 fL Final    MCH 03/30/2017 31.8  26.6 - 33.0 pg Final    MCHC 03/30/2017 33.4  31.5 - 35.7 g/dL Final    RDW 03/30/2017 15.1  12.3 - 15.4 % Final    PLATELET 52/68/8416 292  150 - 379 x10E3/uL Final    NEUTROPHILS 03/30/2017 93  % Final    Lymphocytes 03/30/2017 3  % Final    MONOCYTES 03/30/2017 2  % Final    EOSINOPHILS 03/30/2017 0  % Final    BASOPHILS 03/30/2017 0  % Final    ABS. NEUTROPHILS 03/30/2017 12.3* 1.4 - 7.0 x10E3/uL Final    Abs Lymphocytes 03/30/2017 0.3* 0.7 - 3.1 x10E3/uL Final    ABS. MONOCYTES 03/30/2017 0.2  0.1 - 0.9 x10E3/uL Final    ABS. EOSINOPHILS 03/30/2017 0.0  0.0 - 0.4 x10E3/uL Final    ABS. BASOPHILS 03/30/2017 0.0  0.0 - 0.2 x10E3/uL Final    IMMATURE GRANULOCYTES 03/30/2017 2  % Final    ABS. IMM. GRANS. 03/30/2017 0.3* 0.0 - 0.1 x10E3/uL Final    Comment: (An elevated percentage of Immature Granulocytes has not been found  to be clinically significant as a sole clinical predictor of disease. Does NOT include bands or blast cells. Pregnancy associated  physiological leukocytosis may also show increased immature  granulocytes without clinical significance.)     Office Visit on 01/23/2017   Component Date Value Ref Range Status    WBC 02/22/2017 13.8* 3.4 - 10.8 x10E3/uL Final    RBC 02/22/2017 4.62  4.14 - 5.80 x10E6/uL Final    HGB 02/22/2017 15.0  12.6 - 17.7 g/dL Final    HCT 02/22/2017 45.9  37.5 - 51.0 % Final    MCV 02/22/2017 99* 79 - 97 fL Final    MCH 02/22/2017 32.5  26.6 - 33.0 pg Final    MCHC 02/22/2017 32.7  31.5 - 35.7 g/dL Final    RDW 02/22/2017 15.5* 12.3 - 15.4 % Final    PLATELET 90/50/6344 769* 150 - 379 x10E3/uL Final    NEUTROPHILS 02/22/2017 95  % Final    Lymphocytes 02/22/2017 3  % Final    MONOCYTES 02/22/2017 1  % Final    EOSINOPHILS 02/22/2017 0  % Final    BASOPHILS 02/22/2017 0  % Final    ABS. NEUTROPHILS 02/22/2017 13.0* 1.4 - 7.0 x10E3/uL Final    Abs Lymphocytes 02/22/2017 0.4* 0.7 - 3.1 x10E3/uL Final    ABS. MONOCYTES 02/22/2017 0.2  0.1 - 0.9 x10E3/uL Final    ABS. EOSINOPHILS 02/22/2017 0.0  0.0 - 0.4 x10E3/uL Final    ABS. BASOPHILS 02/22/2017 0.0  0.0 - 0.2 x10E3/uL Final    IMMATURE GRANULOCYTES 02/22/2017 1  % Final    ABS. IMM. GRANS. 02/22/2017 0.1  0.0 - 0.1 x10E3/uL Final    Protein, total 02/22/2017 5.9* 6.0 - 8.5 g/dL Final    Albumin 02/22/2017 3.5* 3.6 - 4.8 g/dL Final    Bilirubin, total 02/22/2017 0.3  0.0 - 1.2 mg/dL Final    Bilirubin, direct 02/22/2017 0.12  0.00 - 0.40 mg/dL Final                  **Please note reference interval change**    Alk. phosphatase 02/22/2017 60  39 - 117 IU/L Final    AST (SGOT) 02/22/2017 22  0 - 40 IU/L Final    ALT (SGPT) 02/22/2017 28  0 - 44 IU/L Final         Exam:  Visit Vitals    /66    Pulse (!) 102    Wt 137.7 kg (303 lb 9.6 oz)    SpO2 96%    BMI 42.34 kg/m2     Gen: Awake, alert, follows commands  Appropriate appearance, normal speech. Oriented to all spheres.   No visual field defect on confrontation exam.  Full eyes movement, with no nystagmus, no diplopia, no ptosis. Normal gag and swallow. All remaining cranial nerves were normal  Motor function: 5/5 in all extremities  Sensory: intact to LT, PP and JPS  Good FTN and HTS   Gait: Normal    Assessment:     1. Myasthenia gravis with exacerbation, adult form ([facility]            Plan: 1. Will schedule for IVIG 60 grams x 2 days. May need monthly treatment until symptoms are stable  2. Continue prednisone to 50 mg (10 mg 5 pills per day) for now  3. FU with his PCP regarding throat infection  4. Will consider starting him back on Imuran once infection has resolved. Follow-up Disposition:  Return in about 2 weeks (around 4/18/2017).           Jose A Wiggins MD  Diplomate, American Board of Psychiatry and Neurology  Diplomate, Neuromuscular Medicine  Diplomate, American Board of Electrodiagnostic Medicine

## 2017-04-05 PROBLEM — N17.9 ARF (ACUTE RENAL FAILURE) (HCC): Status: ACTIVE | Noted: 2017-01-01

## 2017-04-05 PROBLEM — G70.01 MYASTHENIA GRAVIS WITH EXACERBATION, ADULT FORM (HCC): Chronic | Status: ACTIVE | Noted: 2017-01-01

## 2017-04-05 PROBLEM — E66.01 MORBID OBESITY WITH BMI OF 40.0-44.9, ADULT (HCC): Chronic | Status: ACTIVE | Noted: 2017-01-01

## 2017-04-05 PROBLEM — D72.829 LEUKOCYTOSIS: Chronic | Status: ACTIVE | Noted: 2017-01-01

## 2017-04-05 PROBLEM — R73.9 HYPERGLYCEMIA: Status: ACTIVE | Noted: 2017-01-01

## 2017-04-05 PROBLEM — I26.99 PULMONARY EMBOLI (HCC): Status: ACTIVE | Noted: 2017-01-01

## 2017-04-05 PROBLEM — Z86.73 HISTORY OF STROKE: Chronic | Status: ACTIVE | Noted: 2017-01-01

## 2017-04-05 NOTE — PROGRESS NOTES
[facility]: MED RECONCILIATION    Comments/Recommendations:    Mucinex D - per patient, he took Mucinex D (decongestant) yesterday. Pharmacist counseled patient to avoid decongestants which may exacerbate rapid heart rate.  Mycophenolate - stopped approximately two weeks ago at the advice of his urologist. Medication was causing side effects.  Over-the-counter ear drops (oil) - per patient's family member, patient has been having ear pain and has been using an over-the-counter ear drop. She cannot recall the name, but thinks it is similar to olive oil. Medications added:     · Acetaminophen 1,000 mg PO every 8 hours as needed  · Ibuprofen 200 mg tablet - takes 2-3 tablets PO every 6 hours as needed  · Over-the-counter B-complex (Shaklee brand) 3 tabs PO daily  · Vitamin D3 5,000 units PO daily  · Ascorbic acid 500 mg tablet - takes 2-3 tablets PO daily    Medications adjusted:    · Pyridostigmine 60 mg PO two times daily    Information obtained from: patient and patient's family member who assists him with his medications. Allergies: Lactose; Fructose; Inderal [propranolol]; Lisinopril; and Gluten    Prior to Admission Medications:   Prior to Admission Medications   Prescriptions Last Dose Informant Patient Reported? Taking? ALPRAZolam (XANAX) 0.25 mg tablet 4/3/2017 Self Yes Yes   Sig: Take 0.25 mg by mouth three (3) times daily as needed for Anxiety. acetaminophen (TYLENOL) 500 mg tablet 4/3/2017 Self Yes Yes   Sig: Take 1,000 mg by mouth every eight (8) hours as needed for Pain. ascorbic acid, vitamin C, (VITAMIN C) 500 mg tablet 4/4/2017 at Unknown time Self Yes Yes   Sig: Take 1,000-1,500 mg by mouth daily. b complex vitamins (B COMPLEX 1) tablet 4/5/2017 at Unknown time Self Yes Yes   Sig: Take 3 Tabs by mouth daily. cholecalciferol, VITAMIN D3, (VITAMIN D3) 5,000 unit tab tablet 4/5/2017 at Unknown time Self Yes Yes   Sig: Take 5,000 Units by mouth daily.    cloNIDine (CATAPRES) 0.2 mg/24 hr patch 2017 at AM Self No Yes   Si Patch by TransDERmal route every seven (7) days. ibuprofen (ADVIL) 200 mg tablet 2017 at Unknown time Self Yes Yes   Sig: Take 400-600 mg by mouth every six (6) hours as needed for Pain.   potassium chloride SA (MICRO-K) 10 mEq capsule 2017 at AM Self Yes Yes   Sig: Take 10 mEq by mouth two (2) times a day. predniSONE (DELTASONE) 10 mg tablet 2017 at Unknown time Self No Yes   Sig: Take 5 Tabs by mouth daily (with breakfast). pyridostigmine (MESTINON) 60 mg tablet 2017 at AM Self Yes Yes   Sig: Take 60 mg by mouth two (2) times a day. traMADol (ULTRAM) 50 mg tablet 3/29/2017 at Unknown time Self Yes Yes   Sig: Take 50 mg by mouth every six (6) hours as needed for Pain.         Eric Driscoll, Pharmacist

## 2017-04-05 NOTE — ED PROVIDER NOTES
HPI Comments: 73 y.o. male with past medical history significant for HTN, anxiety and myasthenia gravis who presents from PCP office for evaluation of rapid heart rate. Pt states that for the past few days, he has been experiencing chest \"tightness\" with associated SOB which he was evaluated for at his PCP office earlier today. Pt had an abnormal EKG performed at his PCP office, prompting him to be sent to the ED. Pt states that he has recently started taking Mucinex D due to a cough, which he thought may be due to allergies. Pt's spouse claims that the pt's WBC count was noted to be elevated as well. Pt notes that he has lost about 10-12 lbs over the past few months. Pt denies vomiting, diarrhea or fever. There are no other acute medical concerns at this time. PCP: Alvin Mckeon MD    Note written by Lisa Uriarte. Sohail Zaragoza, as dictated by Ruchi Chavez MD 3:45 PM      The history is provided by the patient. No  was used. Past Medical History:   Diagnosis Date    Anxiety     Arthritis     Asthma     HTN (hypertension) with goal to be determined        Past Surgical History:   Procedure Laterality Date    HX ORTHOPAEDIC      HX OTHER SURGICAL      L sided neck suregry \" infection\"         History reviewed. No pertinent family history. Social History     Social History    Marital status:      Spouse name: N/A    Number of children: N/A    Years of education: N/A     Occupational History    Not on file. Social History Main Topics    Smoking status: Never Smoker    Smokeless tobacco: Not on file    Alcohol use No    Drug use: Not on file    Sexual activity: Not on file     Other Topics Concern    Not on file     Social History Narrative         ALLERGIES: Lactose; Fructose; Inderal [propranolol]; Lisinopril; and Gluten    Review of Systems   Constitutional: Negative for fever. Respiratory: Positive for cough and shortness of breath.     Cardiovascular: Positive for chest pain. Gastrointestinal: Negative for diarrhea and vomiting. All other systems reviewed and are negative. Vitals:    04/05/17 1546   BP: (!) 169/114   Pulse: (!) 164   Resp: 16   Temp: 98 °F (36.7 °C)   SpO2: 97%   Weight: 137.4 kg (303 lb)   Height: 5' 11\" (1.803 m)            Physical Exam   Constitutional: He is oriented to person, place, and time. He appears well-developed and well-nourished. No distress. m obese, NAD, AxOx4, speaking in complete sentences, high HR/ htn noted;     Valsalva failed;      HENT:   Head: Normocephalic and atraumatic. Nose: Nose normal.   Mouth/Throat: Oropharynx is clear and moist.   Cn intact       Eyes: Conjunctivae and EOM are normal. Pupils are equal, round, and reactive to light. Right eye exhibits no discharge. Left eye exhibits no discharge. Neck: Normal range of motion. Neck supple. Cardiovascular: Regular rhythm, normal heart sounds and intact distal pulses. Exam reveals no gallop and no friction rub. No murmur heard. Pulmonary/Chest: Effort normal and breath sounds normal. No respiratory distress. He has no wheezes. He has no rales. He exhibits no tenderness. Abdominal: Soft. Bowel sounds are normal. He exhibits no distension and no mass. There is no tenderness. There is no rebound and no guarding. nttp     Genitourinary:   Genitourinary Comments: Pt denies urinary/ Testicular/ scrotal or penile  complaints   Musculoskeletal: Normal range of motion. He exhibits no edema, tenderness or deformity. Lymphadenopathy:     He has no cervical adenopathy. Neurological: He is alert and oriented to person, place, and time. He has normal reflexes. He displays normal reflexes. No cranial nerve deficit. He exhibits normal muscle tone. Coordination normal.   pt has motor/ CV/ Sensation grossly intact to all extremities, R = L in strength;   Skin: Skin is warm and dry. No rash noted. No erythema.    Psychiatric: He has a normal mood and affect. Nursing note and vitals reviewed. MDM  Number of Diagnoses or Management Options  Other acute pulmonary embolism without acute cor pulmonale ([facility]):   Pulmonary infarct [facility]):   Tachycardia:   Risk of Complications, Morbidity, and/or Mortality  Presenting problems: high  Diagnostic procedures: moderate  Management options: moderate    Critical Care  Total time providing critical care: 30-74 minutes (42 min)    Patient Progress  Patient progress: stable    ED Course       Procedures    Chief Complaint   Patient presents with    Rapid Heart Rate       3:58 PM  The patients presenting problems have been discussed, and they are in agreement with the care plan formulated and outlined with them. I have encouraged them to ask questions as they arise throughout their visit. MEDICATIONS GIVEN:  Medications   sodium chloride 0.9 % bolus infusion 1,000 mL (1,000 mL IntraVENous New Bag 4/5/17 1554)   dilTIAZem (CARDIZEM) injection 20 mg (not administered)   dilTIAZem (CARDIZEM) 125 mg in dextrose 5% 125 mL infusion (not administered)       LABS REVIEWED:  Labs Reviewed   SAMPLES BEING HELD   CBC WITH AUTOMATED DIFF   CK W/ CKMB & INDEX   TROPONIN I   PROTHROMBIN TIME + INR   PRO-BNP   METABOLIC PANEL, COMPREHENSIVE   LIPASE   TSH 3RD GENERATION   T4, FREE       RADIOLOGY RESULTS:  The following have been ordered and reviewed:  _____________________________________________________________________  _____________________________________________________________________    EKG interpretation: (Preliminary)  Rhythm: sinus tachycardia rhythm; and regular . Rate (approx.): 170; Axis: normal; P wave: normal; QRS interval: normal ; ST/T wave: normal; Negative acute significant segmental elevations    PROCEDURES:        CONSULTATIONS:   CONSULT NOTE:  5:50 PM Rachel Rabago MD spoke with Dr. Isela Sheldon for Cardiology. Discussed available diagnostic tests and clinical findings.   He is in agreement with care plans as outlined. Dr. Rocky Munroe will see the pt. CONSULT NOTE:  7:03 PM Armaan Diaz MD spoke with Dr. Karsten Galeana, Consult for Hospitalist.  Discussed available diagnostic tests and clinical findings. He is in agreement with care plans as outlined. Dr. Karsten Galeana will see the pt. PROGRESS NOTES:  7:02 PM  Patient is being admitted to the hospital.  The results of their tests and reasons for their admission have been discussed with them and/or available family. They convey agreement and understanding for the need to be admitted and for their admission diagnosis. Consultation will be made now with the inpatient physician for hospitalization. DIAGNOSIS:    No diagnosis found. PLAN:  1-PE's/ ? Pulmonary infarct - lovenox/ monitor  2 afib w/ RVR; continue dilt; monitor;     ED COURSE: The patients hospital course has been uncomplicated.     4:43 PM  'feeling better'; HR now 124; awaiting results; agrees w/ CT-PE     5:22 PM  Hr now 120's/ more like a fib/ BP normalized; 'feels better'; awaiting chest ct results/ agrees w/ admission;

## 2017-04-05 NOTE — IP AVS SNAPSHOT
Current Discharge Medication List  
  
START taking these medications Dose & Instructions Dispensing Information Comments Morning Noon Evening Bedtime  
 apixaban 5 mg tablet Commonly known as:  Gary Purchase Your last dose was: Your next dose is: Please take 10mg twice daily for 5 days then decrease to 5mg twice daily thereafter  Indications: pulmonary thromboembolism Quantity:  60 Tab Refills:  0  
     
   
   
   
  
 carvedilol 12.5 mg tablet Commonly known as:  Manuel Christina Your last dose was: Your next dose is:    
   
   
 Dose:  12.5 mg Take 1 Tab by mouth two (2) times daily (with meals). Quantity:  60 Tab Refills:  0  
     
   
   
   
  
 dilTIAZem  mg ER capsule Commonly known as:  CARDIZEM CD Your last dose was: Your next dose is:    
   
   
 Dose:  300 mg Take 1 Cap by mouth daily. Quantity:  30 Cap Refills:  0  
     
   
   
   
  
 metFORMIN 500 mg tablet Commonly known as:  GLUCOPHAGE Your last dose was: Your next dose is:    
   
   
 Dose:  500 mg Take 1 Tab by mouth daily (with breakfast). Quantity:  30 Tab Refills:  0  
     
   
   
   
  
 OTHER(NON-FORMULARY) Your last dose was: Your next dose is:    
   
   
 1) AccuCheck Coco Plus Test Strips #100 with 1 refill 2) AccuCheck FastClix Lancets (Drums) #100 with 1 refill Quantity:  1 Each Refills:  0 CONTINUE these medications which have NOT CHANGED Dose & Instructions Dispensing Information Comments Morning Noon Evening Bedtime  
 acetaminophen 500 mg tablet Commonly known as:  TYLENOL Your last dose was: Your next dose is:    
   
   
 Dose:  1000 mg Take 1,000 mg by mouth every eight (8) hours as needed for Pain. Refills:  0  
     
   
   
   
  
 ascorbic acid (vitamin C) 500 mg tablet Commonly known as:  VITAMIN C Your last dose was: Your next dose is:    
   
   
 Dose:  5586-0077 mg Take 1,000-1,500 mg by mouth daily. Refills:  0  
     
   
   
   
  
 B COMPLEX 1 tablet Generic drug:  b complex vitamins Your last dose was: Your next dose is:    
   
   
 Dose:  3 Tab Take 3 Tabs by mouth daily. Refills:  0  
     
   
   
   
  
 cholecalciferol (VITAMIN D3) 5,000 unit Tab tablet Commonly known as:  VITAMIN D3 Your last dose was: Your next dose is:    
   
   
 Dose:  5000 Units Take 5,000 Units by mouth daily. Refills:  0  
     
   
   
   
  
 cloNIDine 0.2 mg/24 hr patch Commonly known as:  CATAPRES Your last dose was: Your next dose is:    
   
   
 Dose:  1 Patch 1 Patch by TransDERmal route every seven (7) days. Quantity:  4 Patch Refills:  0 MESTINON 60 mg tablet Generic drug:  pyridostigmine Your last dose was: Your next dose is:    
   
   
 Dose:  60 mg Take 60 mg by mouth two (2) times a day. Refills:  0  
     
   
   
   
  
 potassium chloride SA 10 mEq capsule Commonly known as:  Naheed Parson Your last dose was: Your next dose is:    
   
   
 Dose:  10 mEq Take 10 mEq by mouth two (2) times a day. Refills:  0  
     
   
   
   
  
 predniSONE 10 mg tablet Commonly known as:  Adela Sake Your last dose was: Your next dose is:    
   
   
 Dose:  50 mg Take 5 Tabs by mouth daily (with breakfast). Quantity:  450 Tab Refills:  3  
     
   
   
   
  
 traMADol 50 mg tablet Commonly known as:  ULTRAM  
   
Your last dose was: Your next dose is:    
   
   
 Dose:  50 mg Take 50 mg by mouth every six (6) hours as needed for Pain. Refills:  0  
     
   
   
   
  
 XANAX 0.25 mg tablet Generic drug:  ALPRAZolam  
   
Your last dose was:     
   
Your next dose is:    
   
   
 Dose:  0.25 mg  
 Take 0.25 mg by mouth three (3) times daily as needed for Anxiety. Refills:  0 STOP taking these medications ADVIL 200 mg tablet Generic drug:  ibuprofen Where to Get Your Medications Information on where to get these meds will be given to you by the nurse or doctor. ! Ask your nurse or doctor about these medications  
  apixaban 5 mg tablet  
 carvedilol 12.5 mg tablet  
 dilTIAZem  mg ER capsule  
 metFORMIN 500 mg tablet OTHER(NON-FORMULARY)

## 2017-04-05 NOTE — ED NOTES
Verbal shift change report given to Terell Thurman RN (oncoming nurse) by Shane Miles RN (offgoing nurse). Report included the following information SBAR, Kardex, ED Summary, [facility] and Recent Results.

## 2017-04-05 NOTE — ED TRIAGE NOTES
Patient arrives POV from PCP's office. Cuca Soriano that he went today because he was SOB and having some ear pain. States a hx of Myasthenia, denies any abnormal heart rate before.  EKG from PCPs office shows STV

## 2017-04-05 NOTE — H&P
[address]  [phone]    Admission History and Physical      NAME:  Isaac Champagne. :   1945   MRN:  [id number]     PCP:  Pawel Castro MD     Date/Time:  2017         Subjective:     CHIEF COMPLAINT: palpitations     HISTORY OF PRESENT ILLNESS:     Mr. Vanda Padron is a 73 y.o.  male who is admitted with PE. Mr. Vanda Padron presented to the Emergency Department this PM complaining of palpitations: for the last few days, intermittent, moderate, associated with chest tightness and dyspnea, seen by PCP earlier and sent to ED for evaluation of rapid heart rate    History obtained from sibling, chart review and the patient. Previous records reviewed    Past Medical History:   Diagnosis Date    Anxiety     Arthritis     Asthma     HTN (hypertension) with goal to be determined     Morbid obesity with BMI of 40.0-44.9, adult ([facility] [address] 2017    Myasthenia gravis ([facility] [address]         Past Surgical History:   Procedure Laterality Date    HX ORTHOPAEDIC      HX OTHER SURGICAL      L sided neck suregry \" infection\"       Social History   Substance Use Topics    Smoking status: Never Smoker    Smokeless tobacco: Not on file    Alcohol use No        Family History   Problem Relation Age of Onset    Diabetes Mother     Hypertension Mother     Deep Vein Thrombosis Mother     Deep Vein Thrombosis Sister         Allergies   Allergen Reactions    Lactose Itching    Fructose Other (comments)     States added sugar to foods causes sore throat/ear ache.  Inderal [Propranolol] Unknown (comments)    Lisinopril Angioedema    Gluten Nausea Only        Prior to Admission medications    Medication Sig Start Date End Date Taking? Authorizing Provider   traMADol (ULTRAM) 50 mg tablet Take 50 mg by mouth every six (6) hours as needed for Pain.    Yes Phys Other, MD   acetaminophen (TYLENOL) 500 mg tablet Take 1,000 mg by mouth every eight (8) hours as needed for Pain. Yes Historical Provider   ibuprofen (ADVIL) 200 mg tablet Take 400-600 mg by mouth every six (6) hours as needed for Pain. Yes Historical Provider   pyridostigmine (MESTINON) 60 mg tablet Take 60 mg by mouth two (2) times a day. Yes Historical Provider   b complex vitamins (B COMPLEX 1) tablet Take 3 Tabs by mouth daily. Yes Historical Provider   cholecalciferol, VITAMIN D3, (VITAMIN D3) 5,000 unit tab tablet Take 5,000 Units by mouth daily. Yes Historical Provider   ascorbic acid, vitamin C, (VITAMIN C) 500 mg tablet Take 1,000-1,500 mg by mouth daily. Yes Historical Provider   predniSONE (DELTASONE) 10 mg tablet Take 5 Tabs by mouth daily (with breakfast). 11/21/16  Yes Avelina Porras MD   potassium chloride SA (MICRO-K) 10 mEq capsule Take 10 mEq by mouth two (2) times a day. Yes Historical Provider   ALPRAZolam (XANAX) 0.25 mg tablet Take 0.25 mg by mouth three (3) times daily as needed for Anxiety. Yes Historical Provider   cloNIDine (CATAPRES) 0.2 mg/24 hr patch 1 Patch by TransDERmal route every seven (7) days.  9/16/16  Yes Kenisha Earl MD         Review of Systems:  (bold if positive, if negative)    Gen:  Eyes:  ENT:  CVS:  Palpitations, chest pain,Pulm:  Cough, dyspneaGI:    :    MS:  Skin:  Psych:  Endo:    Hem:  Renal:    Neuro:            Objective:      VITALS:    Vital signs reviewed; most recent are:    Visit Vitals    /68    Pulse (!) 132    Temp 98 °F (36.7 °C)    Resp 23    Ht 5' 11\" (1.803 m)    Wt 137.4 kg (303 lb)    SpO2 (!) 86%    BMI 42.26 kg/m2     SpO2 Readings from Last 6 Encounters:   04/05/17 (!) 86%   04/04/17 96%   02/24/17 96%   11/21/16 97%   10/21/16 98%   09/16/16 94%        No intake or output data in the 24 hours ending 04/05/17 1937         Exam:     Physical Exam:    Gen: Well-developed, obese, in no acute distress  HEENT:  Pink conjunctivae, PERRL, hearing intact to voice, moist mucous membranes  Neck: Supple, without masses, thyroid non-tender  Resp: No accessory muscle use, clear breath sounds without wheezes rales or rhonchi  Card: No murmurs, normal S1, S2 without thrills, bruits; 1-2+ pitting, peripheral edema  Abd:  Soft, non-tender, non-distended, normoactive bowel sounds are present, no palpable organomegaly and no detectable hernias  Lymph:  No cervical or inguinal adenopathy  Musc: No cyanosis or clubbing  Skin: No rashes or ulcers, skin turgor is good  Neuro:  Cranial nerves are grossly intact, no focal motor weakness, follows commands appropriately  Psych:  Good insight, oriented to person, place and time, alert             Labs:    Recent Labs      04/05/17   1549   WBC  17.4*   HGB  15.9   HCT  51.3*   PLT  248     Recent Labs      04/05/17   1549   NA  140   K  4.9   CL  105   CO2  22   GLU  158*   BUN  21*   CREA  1.30   CA  9.3   ALB  3.1*   TBILI  0.5   SGOT  44*   ALT  106*     Lab Results   Component Value Date/Time    Glucose (POC) 189 09/16/2016 11:18 AM    Glucose (POC) 111 09/16/2016 06:40 AM     No results for input(s): PH, PCO2, PO2, HCO3, FIO2 in the last 72 hours. Recent Labs      04/05/17   1625   INR  1.0       Additional testing:  Chest X-ray: no acute process.   Results not reviewed with Radiologist.       Assessment/Plan:       Principal Problem:    Pulmonary emboli ([facility] [address] (4/5/2017)   - full anticoagulation started in ED with enoxaparin, will continue   - will get LE dopplers to look for residual clot   - given levofloxacin in the ED for infiltrate seen on CT which is likely due to pulmonary infarct   - will consult Pulmonary, will not continue antibiotics for now    Active Problems:    PAF (paroxysmal atrial fibrillation) ([facility] [address] (9/3/2016)   - in RVR here   - improved on IV diltiazem   - being driven by PE as above   - has a history of PAF not on chronic anticoagulation   - Cardiology evaluating      Myasthenia gravis with exacerbation, adult form ([facility] [address] (4/4/2017)   - at baseline   - start PT/OT once LE dopplers are resulted if appropriate      History of stroke (4/5/2017)   - unclear where this history came from, patient denies and is not on antiplatelet agent, I can find no imagining in the system that confirms prior CVA   - no need for antiplatelet at this time      ARF (acute renal failure) ([facility] [address] (4/5/2017)   - creatinine elevated >0.5 from baseline, unclear what duration   - does take NSAIDs, will stop   - not on diuretic   - follow with hydration      Leukocytosis (4/5/2017)    - chronically elevated, likely due to chronic steroid use   - may be slightly higher today due to acute events   - follow without antibiotics for now      Morbid obesity with BMI of 40.0-44.9, adult ([facility] (4/5/2017)   - counseled on weight loss       Hyperglycemia   - check A1c, follow FBS       Code status:   - patient is FULL CODE      Total time spent with patient: [address] discussed with: Patient, Family and Dr. Juvenal Urias    Discussed:  Code Status, Care Plan and D/C Planning    Prophylaxis:  full dose enoxaparin    Probable Disposition:  Home w/Family           ___________________________________________________    Attending Physician: Mary Govea MD

## 2017-04-05 NOTE — IP AVS SNAPSHOT
Tuh Hodgkin 
 
 
 [address] [facility] 
[phone] Patient: Bernabe Barron. MRN: [id number] DSD:9/17/9580 You are allergic to the following Allergen Reactions Lactose Itching Fructose Other (comments) States added sugar to foods causes sore throat/ear ache. Inderal (Propranolol) Unknown (comments) Lisinopril Angioedema Gluten Nausea Only Recent Documentation Height Weight BMI Smoking Status 1.803 m 140 kg 43.04 kg/m2 Never Smoker Unresulted Labs Order Current Status CARDIOLIPIN AB PANEL In process FACTOR V LEIDEN In process HOMOCYSTEINE, UR In process PHOSPHATIDYLSERINE ABS In process LUPUS ANTICOAGULANT PANEL W/ REFLEX Preliminary result Emergency Contacts Name Discharge Info Relation Home Work Mobile [facility] DISCHARGE CAREGIVER [3] Sister [23] [phone] About your hospitalization You were admitted on:  April 5, 2017 You last received care in the:  [facility] 3 PRO CARE TELE 1 You were discharged on:  April 9, 2017 Unit phone number:  [phone] Why you were hospitalized Your primary diagnosis was:  Pulmonary Emboli (Hcc) Your diagnoses also included:  History Of Stroke, Paf (Paroxysmal Atrial Fibrillation) (Hcc), Myasthenia Gravis With Exacerbation, Adult Form (Hcc), Arf (Acute Renal Failure) (Hcc), Leukocytosis, Morbid Obesity With Bmi Of 40.0-44.9, Adult (Hcc), Hyperglycemia Providers Seen During Your Hospitalizations Provider Role Specialty Primary office phone Marianne Allen MD Attending Provider Emergency Medicine [phone] Gabbi Holt MD Attending Provider Internal Medicine [phone] Your Primary Care Physician (PCP) Primary Care Physician Office Phone Office Fax Fox Aguirre [phone] Follow-up Information Follow up With Details Comments Contact Info AT [facility] [address] 
[phone] Justus Lee MD On 4/21/2017 2:20 PM  [address] 1007 [facility] 
[phone] Chela oCnley MD   [address] 
[phone] Your Appointments Friday April 21, 2017  2:20 PM EDT HOSPITAL DISCHARGE with Justus Lee MD  
[facility] ([facility]) 354 [facility] [address] 8665 [facility]  
[phone] Monday May 01, 2017  2:40 PM EDT Follow Up with Ilda Putnam MD  
[facility]) [facility] [address] [facility] [address] [phone] Current Discharge Medication List  
  
START taking these medications Dose & Instructions Dispensing Information Comments Morning Noon Evening Bedtime  
 apixaban 5 mg tablet Commonly known as:  Nellene American Your last dose was: Your next dose is: Please take 10mg twice daily for 5 days then decrease to 5mg twice daily thereafter  Indications: pulmonary thromboembolism Quantity:  60 Tab Refills:  0  
     
   
   
   
  
 carvedilol 12.5 mg tablet Commonly known as:  Jaquelinpiyush Mckeon Your last dose was: Your next dose is:    
   
   
 Dose:  12.5 mg Take 1 Tab by mouth two (2) times daily (with meals). Quantity:  60 Tab Refills:  0  
     
   
   
   
  
 dilTIAZem  mg ER capsule Commonly known as:  CARDIZEM CD Your last dose was: Your next dose is:    
   
   
 Dose:  300 mg Take 1 Cap by mouth daily. Quantity:  30 Cap Refills:  0  
     
   
   
   
  
 metFORMIN 500 mg tablet Commonly known as:  GLUCOPHAGE Your last dose was: Your next dose is:    
   
   
 Dose:  500 mg Take 1 Tab by mouth daily (with breakfast). Quantity:  30 Tab Refills:  0  
     
   
   
   
  
 OTHER(NON-FORMULARY) Your last dose was: Your next dose is:    
   
   
 1) AccuCheck Coco Plus Test Strips #100 with 1 refill 2) AccuCheck FastClix Lancets (Drums) #100 with 1 refill Quantity:  1 Each Refills:  0 CONTINUE these medications which have NOT CHANGED Dose & Instructions Dispensing Information Comments Morning Noon Evening Bedtime  
 acetaminophen 500 mg tablet Commonly known as:  TYLENOL Your last dose was: Your next dose is:    
   
   
 Dose:  1000 mg Take 1,000 mg by mouth every eight (8) hours as needed for Pain. Refills:  0  
     
   
   
   
  
 ascorbic acid (vitamin C) 500 mg tablet Commonly known as:  VITAMIN C Your last dose was: Your next dose is:    
   
   
 Dose:  0006-4331 mg Take 1,000-1,500 mg by mouth daily. Refills:  0  
     
   
   
   
  
 B COMPLEX 1 tablet Generic drug:  b complex vitamins Your last dose was: Your next dose is:    
   
   
 Dose:  3 Tab Take 3 Tabs by mouth daily. Refills:  0  
     
   
   
   
  
 cholecalciferol (VITAMIN D3) 5,000 unit Tab tablet Commonly known as:  VITAMIN D3 Your last dose was: Your next dose is:    
   
   
 Dose:  5000 Units Take 5,000 Units by mouth daily. Refills:  0  
     
   
   
   
  
 cloNIDine 0.2 mg/24 hr patch Commonly known as:  CATAPRES Your last dose was: Your next dose is:    
   
   
 Dose:  1 Patch 1 Patch by TransDERmal route every seven (7) days. Quantity:  4 Patch Refills:  0 MESTINON 60 mg tablet Generic drug:  pyridostigmine Your last dose was: Your next dose is:    
   
   
 Dose:  60 mg Take 60 mg by mouth two (2) times a day. Refills:  0  
     
   
   
   
  
 potassium chloride SA 10 mEq capsule Commonly known as:  Tabitha Reid Your last dose was: Your next dose is:    
   
   
 Dose:  10 mEq Take 10 mEq by mouth two (2) times a day. Refills:  0  
     
   
   
   
  
 predniSONE 10 mg tablet Commonly known as:  Monty Harry Your last dose was: Your next dose is:    
   
   
 Dose:  50 mg Take 5 Tabs by mouth daily (with breakfast). Quantity:  450 Tab Refills:  3  
     
   
   
   
  
 traMADol 50 mg tablet Commonly known as:  ULTRAM  
   
Your last dose was: Your next dose is:    
   
   
 Dose:  50 mg Take 50 mg by mouth every six (6) hours as needed for Pain. Refills:  0  
     
   
   
   
  
 XANAX 0.25 mg tablet Generic drug:  ALPRAZolam  
   
Your last dose was: Your next dose is:    
   
   
 Dose:  0.25 mg Take 0.25 mg by mouth three (3) times daily as needed for Anxiety. Refills:  0 STOP taking these medications ADVIL 200 mg tablet Generic drug:  ibuprofen Where to Get Your Medications Information on where to get these meds will be given to you by the nurse or doctor. ! Ask your nurse or doctor about these medications  
  apixaban 5 mg tablet  
 carvedilol 12.5 mg tablet  
 dilTIAZem  mg ER capsule  
 metFORMIN 500 mg tablet OTHER(NON-FORMULARY) Discharge Instructions HOSPITALIST DISCHARGE INSTRUCTIONS 
NAME: Bernabe Barron. :  1945 MRN:  [id number] Date/Time:  2017 11:26 AM 
 
ADMIT DATE: 2017 DISCHARGE DATE: 2017 ADMITTING DIAGNOSIS: 
Atrial fibrillation with RVR DISCHARGE DIAGNOSIS: 
As above MEDICATIONS: 
PLEASE NOTE THE CHANGE IN YOUR BLOOD THINNER. YOU WILL BE ON A HIGHER DOSE FOR THE FIRST FEW DAYS BUT THEN WILL NEED TO DROP DOWN TO A LOWER DOSE AS PER THE PRESCRIPTION  
· It is important that you take the medication exactly as they are prescribed.   
· Keep your medication in the bottles provided by the pharmacist and keep a list of the medication names, dosages, and times to be taken in your wallet. · Do not take other medications without consulting your doctor. Pain Management: per above medications What to do at [facility] Recommended diet:  Regular Diet Recommended activity: Activity as tolerated If you experience any of the following symptoms then please call your primary care physician or return to the emergency room if you cannot get hold of your doctor: 
Fever, chills, nausea, vomiting, diarrhea, change in mentation, falling, bleeding, shortness of breath, chest pain Follow Up: 
Dr. Pawel Castro MD  you are to call and set up an appointment to see them in 2 weeks. Follow-up Information Follow up With Details Comments Contact Info AT [place] [id number] [phone] Tejas Nayak MD On 4/21/2017 2:20 PM  [address] 
[phone] Erica Jaramillo MD   [address] [place] [address] 
[phone] Information obtained by : 
I understand that if any problems occur once I am at home I am to contact my physician. I understand and acknowledge receipt of the instructions indicated above. Physician's or R.N.'s Signature                                                                  Date/Time Patient or Representative Signature                                                          Date/Time Atrial Fibrillation: Care Instructions Your Care Instructions Atrial fibrillation is an irregular and often fast heartbeat. Treating this condition is important for several reasons.  It can cause blood clots, which can travel from your heart to your brain and cause a stroke. If you have a fast heartbeat, you may feel lightheaded, dizzy, and weak. An irregular heartbeat can also increase your risk for heart failure. Atrial fibrillation is often the result of another heart condition, such as high blood pressure or coronary artery disease. Making changes to improve your heart condition will help you stay healthy and active. Follow-up care is a key part of your treatment and safety. Be sure to make and go to all appointments, and call your doctor if you are having problems. It's also a good idea to know your test results and keep a list of the medicines you take. How can you care for yourself at home? Medicines · Take your medicines exactly as prescribed. Call your doctor if you think you are having a problem with your medicine. You will get more details on the specific medicines your doctor prescribes. · If your doctor has given you a blood thinner to prevent a stroke, be sure you get instructions about how to take your medicine safely. Blood thinners can cause serious bleeding problems. · Do not take any vitamins, over-the-counter drugs, or herbal products without talking to your doctor first. 
Lifestyle changes · Do not smoke. Smoking can increase your chance of a stroke and heart attack. If you need help quitting, talk to your doctor about stop-smoking programs and medicines. These can increase your chances of quitting for good. · Eat a heart-healthy diet. · Stay at a healthy weight. Lose weight if you need to. · Limit alcohol to 2 drinks a day for men and 1 drink a day for women. Too much alcohol can cause health problems. · Avoid colds and flu. Get a pneumococcal vaccine shot. If you have had one before, ask your doctor whether you need another dose. Get a flu shot every year. If you must be around people with colds or flu, wash your hands often. Activity · If your doctor recommends it, get more exercise. Walking is a good choice. Bit by bit, increase the amount you walk every day. Try for at least 30 minutes on most days of the week. You also may want to swim, bike, or do other activities. Your doctor may suggest that you join a cardiac rehabilitation program so that you can have help increasing your physical activity safely. · Start light exercise if your doctor says it is okay. Even a small amount will help you get stronger, have more energy, and manage stress. Walking is an easy way to get exercise. Start out by walking a little more than you did in the hospital. Gradually increase the amount you walk. · When you exercise, watch for signs that your heart is working too hard. You are pushing too hard if you cannot talk while you are exercising. If you become short of breath or dizzy or have chest pain, sit down and rest immediately. · Check your pulse regularly. Place two fingers on the artery at the palm side of your wrist, in line with your thumb. If your heartbeat seems uneven or fast, talk to your doctor. When should you call for help? Call 911 anytime you think you may need emergency care. For example, call if: 
· You have symptoms of a heart attack. These may include: ¨ Chest pain or pressure, or a strange feeling in the chest. 
¨ Sweating. ¨ Shortness of breath. ¨ Nausea or vomiting. ¨ Pain, pressure, or a strange feeling in the back, neck, jaw, or upper belly or in one or both shoulders or arms. ¨ Lightheadedness or sudden weakness. ¨ A fast or irregular heartbeat. After you call 911, the  may tell you to chew 1 adult-strength or 2 to 4 low-dose aspirin. Wait for an ambulance. Do not try to drive yourself. · You have symptoms of a stroke. These may include: 
¨ Sudden numbness, tingling, weakness, or loss of movement in your face, arm, or leg, especially on only one side of your body. ¨ Sudden vision changes. ¨ Sudden trouble speaking. ¨ Sudden confusion or trouble understanding simple statements. ¨ Sudden problems with walking or balance. ¨ A sudden, severe headache that is different from past headaches. · You passed out (lost consciousness). Call your doctor now or seek immediate medical care if: 
· You have new or increased shortness of breath. · You feel dizzy or lightheaded, or you feel like you may faint. · Your heart rate becomes irregular. · You can feel your heart flutter in your chest or skip heartbeats. Tell your doctor if these symptoms are new or worse. Watch closely for changes in your health, and be sure to contact your doctor if you have any problems. Where can you learn more? Go to http://palmer-kendall.info/. Enter U020 in the search box to learn more about \"Atrial Fibrillation: Care Instructions. \" Current as of: January 27, 2016 Content Version: 11.2 © 9158-3608 [employer]. Care instructions adapted under license by [employer] (which disclaims liability or warranty for this information). If you have questions about a medical condition or this instruction, always ask your healthcare professional. [place] [address] any warranty or liability for your use of this information. Metformin (By mouth) Metformin Hydrochloride (met-FOR-min lenka-droe-KLOR-ann) Treats type 2 diabetes. Brand Name(s):Fortamet, Glucophage, Glucophage XR, Glumetza, Riomet There may be other brand names for this medicine. When This Medicine Should Not Be Used: This medicine is not right for everyone. Do not use if you had an allergic reaction to metformin, or if you have severe kidney problems or metabolic acidosis. How to Use This Medicine:  
Liquid, Tablet, Long Acting Tablet · Take your medicine as directed. Your dose may need to be changed several times to find what works best for you. · It is best to take this medicine with food or milk. · Swallow the extended-release tablet whole. Do not crush, break, or chew it. Tell your doctor if you have trouble swallowing the tablets whole. · Measure the oral liquid medicine with a marked measuring spoon, oral syringe, or medicine cup. · Read and follow the patient instructions that come with this medicine. Talk to your doctor or pharmacist if you have any questions. · Missed dose: Take a dose as soon as you remember. If it is almost time for your next dose, wait until then and take a regular dose. Do not take extra medicine to make up for a missed dose. · Store the medicine in a closed container at room temperature, away from heat, moisture, and direct light. Drugs and Foods to Avoid: Ask your doctor or pharmacist before using any other medicine, including over-the-counter medicines, vitamins, and herbal products. · Some medicines can affect how metformin works. Tell your doctor if you are using any of the following: ¨ Acetazolamide ¨ Dichlorphenamide ¨ Diuretics (water pills) ¨ Estrogen or birth control pills ¨ Heart or blood pressure medicine ¨ Isoniazid ¨ Nicotinic acid ¨ Phenothiazine medicine ¨ Phenytoin Marcellina.Elders Steroid medicine ¨ Thyroid medicine ¨ Topiramate ¨ Zonisamide Warnings While Using This Medicine: · Tell your doctor if you are pregnant or breastfeeding, or if you have heart or blood vessel disease, heart failure, blood circulation problems, kidney disease, liver disease, anemia, an adrenal gland or pituitary gland disorder, or a vitamin B12 deficiency. Tell your doctor if you had a heart attack. Tell your doctor if you drink alcohol. · Too much of this medicine can cause a rare, but serious condition called lactic acidosis. · Part of the extended-release tablet may pass in your stool. This is normal. 
· Make sure any doctor or dentist who treats you knows that you are using this medicine.  You may need to stop using this medicine before you have surgery, an x-ray, CT scan, or other medical test. 
· Your doctor will do lab tests at regular visits to check on the effects of this medicine. Keep all appointments. · Keep all medicine out of the reach of children. Never share your medicine with anyone. Possible Side Effects While Using This Medicine:  
Call your doctor right away if you notice any of these side effects: · Allergic reaction: Itching or hives, swelling in your face or hands, swelling or tingling in your mouth or throat, chest tightness, trouble breathing · Confusion, fast heartbeat, increased hunger, shakiness · Fever or chills · Stomach pain, nausea, vomiting, muscle pain or cramping · Trouble breathing, slow heartbeat, lightheadedness, dizziness · Unusual tiredness or weakness If you notice these less serious side effects, talk with your doctor: · Diarrhea, gas, nausea If you notice other side effects that you think are caused by this medicine, tell your doctor. Call your doctor for medical advice about side effects. You may report side effects to FDA at 2-003-FDA-7074 © 2016 [address] is for End User's use only and may not be sold, redistributed or otherwise used for commercial purposes. The above information is an  only. It is not intended as medical advice for individual conditions or treatments. Talk to your doctor, nurse or pharmacist before following any medical regimen to see if it is safe and effective for you. Apixaban (By mouth) Apixaban (a-PIX-a-ban) Treats and prevents blood clots. This medicine is a blood thinner. Brand Name(s):Eliquis There may be other brand names for this medicine. When This Medicine Should Not Be Used: This medicine is not right for everyone. Do not use it if you had an allergic reaction to apixaban or you have active bleeding. How to Use This Medicine:  
Tablet · Your doctor will tell you how much medicine to use.  Do not use more than directed. · This medicine should come with a Medication Guide. Ask your pharmacist for a copy if you do not have one. · Missed dose: Take a dose as soon as you remember. If it is almost time for your next dose, wait until then and take a regular dose. Do not take extra medicine to make up for a missed dose. · Store the medicine in a closed container at room temperature, away from heat, moisture, and direct light. Drugs and Foods to Avoid: Ask your doctor or pharmacist before using any other medicine, including over-the-counter medicines, vitamins, and herbal products. · Some medicines can affect how apixaban works. Tell your doctor if you are using any of the following: ¨ Another blood thinner, such as clopidogrel, heparin, prasugrel, warfarin ¨ An NSAID pain or arthritis medicine, such as aspirin, diclofenac, ibuprofen, naproxen, celecoxib ¨ Depression medicine ¨ Infection medicine, such as clarithromycin, itraconazole, ketoconazole, rifampin, ritonavir ¨ Seizure medicine, such as carbamazepine or phenytoin ¨ Mekhi's wort Warnings While Using This Medicine: · Tell your doctor if you are pregnant or breastfeeding, or if you have kidney disease, liver disease, bleeding problems, or an artificial heart valve. · Do not stop using this medicine suddenly without asking your doctor. You might have a higher risk of stroke for a short time after you stop using this medicine. · This medicine increases your risk for bleeding that can become serious if not controlled. You may also bruise easily, and it may take longer than usual for bleeding to stop. · This medicine may increase your risk for blood clots in your spine or back if you undergo an epidural or spinal puncture. This could lead to paralysis. Tell your doctor if you ever had spine problems or back surgery. · Tell any doctor or dentist who treats you that you are using this medicine.  With your doctor's supervision, you may need to stop using this medicine several days before you have surgery or medical tests. · Your doctor will do lab tests at regular visits to check on the effects of this medicine. Keep all appointments. · Keep all medicine out of the reach of children. Never share your medicine with anyone. Possible Side Effects While Using This Medicine:  
Call your doctor right away if you notice any of these side effects: · Allergic reaction: Itching or hives, swelling in your face or hands, swelling or tingling in your mouth or throat, chest tightness, trouble breathing · Change in how much or how often you urinate, red or pink urine · Chest pain, trouble breathing · Coughing up blood, vomiting blood or material that looks like coffee grounds · Numbness, tingling, or muscle weakness in your legs or feet · Red or black, tarry stools · Unusual bleeding, bruising, or weakness If you notice other side effects that you think are caused by this medicine, tell your doctor. Call your doctor for medical advice about side effects. You may report side effects to FDA at 3-855-GVK-8917 © 2016 [address] is for End User's use only and may not be sold, redistributed or otherwise used for commercial purposes. The above information is an  only. It is not intended as medical advice for individual conditions or treatments. Talk to your doctor, nurse or pharmacist before following any medical regimen to see if it is safe and effective for you. 
  
 
  
 
 
Discharge Instructions Attachments/References DILTIAZEM (BY MOUTH) (ENGLISH) APIXABAN (BY MOUTH) (ENGLISH) Discharge Orders None [facility] Announcement We are excited to announce that we are making your provider's discharge notes available to you in [employer]. You will see these notes when they are completed and signed by the physician that discharged you from your recent hospital stay.   If you have any questions or concerns about any information you see in Shopetti, please call the Health Information Department where you were seen or reach out to your Primary Care Provider for more information about your plan of care. Introducing [facility]! Dear Jacinta Hanley: Thank you for requesting a Shopetti account. Our records indicate that you already have an active Shopetti account. You can access your account anytime at [email or web address]. [email or web address] Did you know that you can access your hospital and ER discharge instructions at any time in Shopetti? You can also review all of your test results from your hospital stay or ER visit. Additional Information If you have questions, please visit the Frequently Asked Questions section of the Shopetti website at [email or web address] Remember, Shopetti is NOT to be used for urgent needs. For medical emergencies, dial 911. Now available from your iPhone and Android! General Information Please provide this summary of care documentation to your next provider. Patient Signature:  ____________________________________________________________ Date:  ____________________________________________________________  
  
Karissa Higgins Provider Signature:  ____________________________________________________________ Date:  ____________________________________________________________ More Information Diltiazem (By mouth) Diltiazem ([id number]-PVU-w-zem) Treats high blood pressure and angina (chest pain). This medicine is a calcium channel blocker. Brand Name(s):Cardizem, Cardizem CD, Cardizem LA, Cardizem SR, Cartia XT, Dilacor XR, Dilt-XR, Matzim LA, [place], [address],[facility] B There may be other brand names for this medicine. When This Medicine Should Not Be Used: This medicine is not right for everyone. Do not use it if you had an allergic reaction to diltiazem or similar medicines. How to Use This Medicine: Long Acting Capsule, 12 Hour Capsule, 24 Hour Capsule, Tablet, Long Acting Tablet · Take your medicine as directed. Your dose may need to be changed several times to find what works best for you. · It is best to take this medicine on an empty stomach. · Swallow this medicine whole. Do not crush, break, chew, or open the capsule or tablet. · Missed dose: Take a dose as soon as you remember. If it is almost time for your next dose, wait until then and take a regular dose. Do not take extra medicine to make up for a missed dose. · Store the medicine in a closed container at room temperature, away from heat, moisture, and direct light. Drugs and Foods to Avoid: Ask your doctor or pharmacist before using any other medicine, including over-the-counter medicines, vitamins, and herbal products. · Some foods and medicines can affect how diltiazem works. Tell your doctor if you are using the following: 
¨ Buspirone, carbamazepine, cimetidine, clonidine, cyclosporine, digoxin, lovastatin, midazolam, quinidine, rifampin, simvastatin, triazolam 
¨ Other blood pressure medicine, including a beta-blocker Shane Langston Warnings While Using This Medicine: · Tell your doctor if you are pregnant or breastfeeding, or if you have kidney disease, liver disease, or digestion problems. Tell your doctor about all heart problems that you have, including heart failure or rhythm problems. · This medicine could lower your blood pressure too much, especially when you first use it or if you are dehydrated. Stand or sit up slowly if you feel lightheaded or dizzy. Do not drive or do anything else that could be dangerous until you know how this medicine affects you. · Do not stop using this medicine without asking your doctor, even if you feel well. This medicine will not cure high blood pressure, but it will help keep it in normal range. You may have to take blood pressure medicine for the rest of your life. · Your doctor will do lab tests at regular visits to check on the effects of this medicine. Keep all appointments. · Keep all medicine out of the reach of children. Never share your medicine with anyone. Possible Side Effects While Using This Medicine:  
Call your doctor right away if you notice any of these side effects: · Allergic reaction: Itching or hives, swelling in your face or hands, swelling or tingling in your mouth or throat, chest tightness, trouble breathing · Blistering, peeling, or red skin rash · Fast, slow, uneven, or pounding heartbeat · Rapid weight gain, swelling in your hands, ankles, or feet · Dark urine or pale stools, nausea, vomiting, loss of appetite, stomach pain, yellow skin or eyes If you notice other side effects that you think are caused by this medicine, tell your doctor. Call your doctor for medical advice about side effects. You may report side effects to FDA at 7-360-FDA-4477 © 2016 [address] is for End User's use only and may not be sold, redistributed or otherwise used for commercial purposes. The above information is an  only. It is not intended as medical advice for individual conditions or treatments. Talk to your doctor, nurse or pharmacist before following any medical regimen to see if it is safe and effective for you. Apixaban (By mouth) Apixaban (a-PIX-a-ban) Treats and prevents blood clots. This medicine is a blood thinner. Brand Name(s):Eliquis There may be other brand names for this medicine. When This Medicine Should Not Be Used: This medicine is not right for everyone. Do not use it if you had an allergic reaction to apixaban or you have active bleeding. How to Use This Medicine:  
Tablet · Your doctor will tell you how much medicine to use. Do not use more than directed. · This medicine should come with a Medication Guide. Ask your pharmacist for a copy if you do not have one. · Missed dose: Take a dose as soon as you remember. If it is almost time for your next dose, wait until then and take a regular dose. Do not take extra medicine to make up for a missed dose. · Store the medicine in a closed container at room temperature, away from heat, moisture, and direct light. Drugs and Foods to Avoid: Ask your doctor or pharmacist before using any other medicine, including over-the-counter medicines, vitamins, and herbal products. · Some medicines can affect how apixaban works. Tell your doctor if you are using any of the following: ¨ Another blood thinner, such as clopidogrel, heparin, prasugrel, warfarin ¨ An NSAID pain or arthritis medicine, such as aspirin, diclofenac, ibuprofen, naproxen, celecoxib ¨ Depression medicine ¨ Infection medicine, such as clarithromycin, itraconazole, ketoconazole, rifampin, ritonavir ¨ Seizure medicine, such as carbamazepine or phenytoin ¨ Mekhi's wort Warnings While Using This Medicine: · Tell your doctor if you are pregnant or breastfeeding, or if you have kidney disease, liver disease, bleeding problems, or an artificial heart valve. · Do not stop using this medicine suddenly without asking your doctor. You might have a higher risk of stroke for a short time after you stop using this medicine. · This medicine increases your risk for bleeding that can become serious if not controlled. You may also bruise easily, and it may take longer than usual for bleeding to stop. · This medicine may increase your risk for blood clots in your spine or back if you undergo an epidural or spinal puncture. This could lead to paralysis. Tell your doctor if you ever had spine problems or back surgery. · Tell any doctor or dentist who treats you that you are using this medicine. With your doctor's supervision, you may need to stop using this medicine several days before you have surgery or medical tests. · Your doctor will do lab tests at regular visits to check on the effects of this medicine. Keep all appointments. · Keep all medicine out of the reach of children. Never share your medicine with anyone. Possible Side Effects While Using This Medicine:  
Call your doctor right away if you notice any of these side effects: · Allergic reaction: Itching or hives, swelling in your face or hands, swelling or tingling in your mouth or throat, chest tightness, trouble breathing · Change in how much or how often you urinate, red or pink urine · Chest pain, trouble breathing · Coughing up blood, vomiting blood or material that looks like coffee grounds · Numbness, tingling, or muscle weakness in your legs or feet · Red or black, tarry stools · Unusual bleeding, bruising, or weakness If you notice other side effects that you think are caused by this medicine, tell your doctor. Call your doctor for medical advice about side effects. You may report side effects to FDA at 5-510-FDA-2950 © 2016 [address] is for End User's use only and may not be sold, redistributed or otherwise used for commercial purposes. The above information is an  only. It is not intended as medical advice for individual conditions or treatments. Talk to your doctor, nurse or pharmacist before following any medical regimen to see if it is safe and effective for you.

## 2017-04-06 NOTE — PROGRESS NOTES
[facility]  [address] [facility], [address] [place] [address]  [phone]      Medical Progress Note      NAME: Osmany Alcaraz. :  1945  MRM:  [id number]    Date/Time: 2017  1:25 PM         Subjective:     Chief Complaint:  \"I'm okay\"     Pt reports feeling \"ok\". No CP or SOB. On dilt gtt. Plan is for cardioversion in the AM     ROS:  (bold if positive, if negative)           Objective:       Vitals:          Last 24hrs VS reviewed since prior progress note. Most recent are:    Visit Vitals    BP (!) 151/99    Pulse (!) 124    Temp 97.3 °F (36.3 °C)    Resp 30    Ht 5' 11\" (1.803 m)    Wt 137.4 kg (302 lb 14.6 oz)    SpO2 90%    BMI 42.25 kg/m2     SpO2 Readings from Last 6 Encounters:   17 90%   17 96%   17 96%   16 97%   10/21/16 98%   16 94%    O2 Flow Rate (L/min): 2 l/min     Intake/Output Summary (Last 24 hours) at 17 1325  Last data filed at 17 0600   Gross per 24 hour   Intake           1281.5 ml   Output                0 ml   Net           1281.5 ml          Exam:     Physical Exam:    Gen:  Well-developed, well-nourished, in no acute distress  HEENT:  Pink conjunctivae, PERRL, hearing intact to voice, moist mucous membranes  Neck:  Supple, without masses, thyroid non-tender  Resp:  No accessory muscle use, clear breath sounds without wheezes rales or rhonchi  Card: Tachycardic. Irregularly irregular. No MRG   Abd: Obese ab. Distended.  Non-tender   Musc:  No cyanosis or clubbing  Skin:  No rashes or ulcers, skin turgor is good  Neuro:  Cranial nerves 3-12 are grossly intact,  strength is 5/5 bilaterally and dorsi / plantarflexion is 5/5 bilaterally, follows commands appropriately  Psych:  Good insight, oriented to person, place and time, alert    Medications Reviewed: (see below)    Lab Data Reviewed: (see below)    ______________________________________________________________________    Medications: Current Facility-Administered Medications   Medication Dose Route Frequency    sodium chloride (NS) flush 5-10 mL  5-10 mL IntraVENous Q8H    sodium chloride (NS) flush 5-10 mL  5-10 mL IntraVENous PRN    acetaminophen (TYLENOL) tablet 650 mg  650 mg Oral Q4H PRN    HYDROmorphone (PF) (DILAUDID) injection 0.5 mg  0.5 mg IntraVENous Q4H PRN    zolpidem (AMBIEN) tablet 5 mg  5 mg Oral QHS PRN    ALPRAZolam (XANAX) tablet 0.25 mg  0.25 mg Oral TID PRN    [START ON 4/9/2017] cloNIDine (CATAPRES) 0.2 mg/24 hr patch 1 Patch  1 Patch TransDERmal Q7D    predniSONE (DELTASONE) tablet 50 mg  50 mg Oral DAILY WITH BREAKFAST    pyridostigmine (MESTINON) tablet 60 mg  60 mg Oral BID    traMADol (ULTRAM) tablet 50 mg  50 mg Oral Q6H PRN    prochlorperazine (COMPAZINE) injection 5 mg  5 mg IntraVENous Q6H PRN    enoxaparin (LOVENOX) injection 100 mg  100 mg SubCUTAneous Q12H    And    enoxaparin (LOVENOX) injection 40 mg  40 mg SubCUTAneous Q12H    dilTIAZem (CARDIZEM) IR tablet 30 mg  30 mg Oral TIDAC    0.45% sodium chloride infusion  75 mL/hr IntraVENous CONTINUOUS    [START ON 4/7/2017] midazolam (VERSED) injection 4 mg  4 mg IntraVENous ONCE    [START ON 4/7/2017] fentaNYL citrate (PF) injection 100 mcg  100 mcg IntraVENous ONCE    [START ON 4/7/2017] flumazenil (ROMAZICON) 0.1 mg/mL injection 0.5 mg  0.5 mg IntraVENous ONCE    dilTIAZem (CARDIZEM) 125 mg in dextrose 5% 125 mL infusion  0-15 mg/hr IntraVENous TITRATE            Lab Review:     Recent Labs      04/06/17   0436  04/05/17   1549   WBC  10.8  17.4*   HGB  13.9  15.9   HCT  45.3  51.3*   PLT  210  248     Recent Labs      04/06/17   0436  04/05/17   1625  04/05/17   1549   NA  146*   --   140   K  4.0   --   4.9   CL  111*   --   105   CO2  28   --   22   GLU  104*   --   158*   BUN  19   --   21*   CREA  0.97   --   1.30   CA  7.7*   --   9.3   MG  2.0   --    --    PHOS  3.8   --    --    ALB   --    --   3.1*   SGOT   --    --   44*   ALT --    --   106*   INR   --   1.0   --      No components found for: [place]         Assessment / Plan:   Pulmonary emboli ([facility] (4/5/2017) - likely 2/2 relative immobility though may have a hypercoagulable disorder considering family history   -f/u hypercoagulability studies   -continue therapeutic anticoagulation; NOAC at discharge       PAF (paroxysmal atrial fibrillation) ([facility] (9/3/2016) - likely triggered by pulmonary emboli  -plan is for cardioversion in the AM as per cardiology   -continue dilt   -continue anticoagulation as above       Myasthenia gravis with exacerbation, adult form ([facility] (4/4/2017)  -continue home Mestinon  -PT/OT eval while in house once more stable       History of stroke (4/5/2017) - in the setting of PAF argues in favor of anticoagulation especially in combination with PE   -continue anticoagulation as above       ARF (acute renal failure) ([facility] (4/5/2017) - resolved       Leukocytosis (4/5/2017) - ?reactive. ?CAP.  Resolved   -complete course of azithromycin       Morbid obesity with BMI of 40.0-44.9, adult ([facility] (4/5/2017)  -counseled on weight loss       Hyperglycemia (4/5/2017) - no h/o DM   -A1c elevated at 7.2  -ISS   -BG checks AC TID and qHS   -diabetes educator   -suspect will need initiation of metformin once acute issues resolve    Total time spent with patient: 35 Minutes critical care time due to PE, a-fib with RVR requiring dilt gtt and cardioversion               Care Plan discussed with: Patient and Nursing Staff    Discussed:  Code Status, Care Plan and D/C Planning    Prophylaxis:  Lovenox    Disposition:  Home w/Family           ___________________________________________________    Attending Physician: Reji Solis MD

## 2017-04-06 NOTE — PROGRESS NOTES
TRANSFER - OUT REPORT:    Verbal report given to SARAH Jaime (name) on [employer].  being received from ED (unit) for routine progression of care      Report consisted of patients Situation, Background, Assessment and   Recommendations(SBAR). Information from the following report(s) SBAR, Kardex, Intake/Output, MAR, Recent Results and Cardiac Rhythm A-Fib was reviewed with the receiving nurse. Opportunity for questions and clarification was provided. Assessment completed upon patients arrival to unit and care assumed.

## 2017-04-06 NOTE — PROGRESS NOTES
0002 TRANSFER - IN REPORT:    Verbal report received from [address] [place] [address] Dr on [employer].  being received from ED for routine progression of care      Report consisted of patients Situation, Background, Assessment and   Recommendations(SBAR). Information from the following report(s) SBAR, Kardex, ED Summary, Intake/Output, MAR, Accordion, Recent Results, Med Rec Status, Cardiac Rhythm at fib and Alarm Parameters  was reviewed with the receiving nurse. Opportunity for questions and clarification was provided. Assessment completed upon patients arrival to unit and care assumed. Primary Nurse Georgia Schwartz RN and SARAH Lr performed a dual skin assessment on this patient No impairment noted , rashes present on arms and legs. Srinivas score is 23.  0712 Bedside shift change report given to Connor Cam. Report included the following information SBAR, Kardex, ED Summary, Intake/Output, MAR, Accordion, Recent Results, Med Rec Status, Cardiac Rhythm at fib / flutter. and Alarm Parameters .

## 2017-04-06 NOTE — PROGRESS NOTES
Physical Therapy Note:  Orders acknowledged, chart reviewed, and spoke with nursing. Patient is currently having testing done at bedside. RN reports patient has been tachy with little movement in bed. Patient with B PEs, started on Lovenox yesterday evening (~8pm). Spoke with Pulmonary NP, recommended holding OT/PT today. Will follow-up next tx day for PT evaluation as appropriate. Thank you.

## 2017-04-06 NOTE — PROGRESS NOTES
Occupational Therapy Note:  Orders acknowledged, chart reviewed, and spoke with nursing. Patient is currently having testing done at bedside. RN reports patient has been tachy with little movement in bed. Patient with B PEs, started on Lovenox yesterday evening (~8pm). Spoke with Pulmonary NP, recommended holding OT/PT today. Will follow-up next tx day for OT evaluation as appropriate.    Yandy Mcmullen, OTR/L

## 2017-04-06 NOTE — PROGRESS NOTES
I met with both pt and his sister. Pt lives with his sister @ the address on demographic sheet. Sister is Chris Jaramillo. Pt's home is actually in [place] but he is residing with his sister in [place] due to his myasthenia gravis. Pt sees Dr Vazquez Murillo for management of his myasthenia gravis. He is supposed to begin IG infusions next week @ home by a nurse through Dr Julio Gatica office per sister. Pt and sister are concerned that pt will become quickly debilitated due to his MG so they are looking forward to PT &OT working with pt. Pt and his sister would like home health for home PT and skilled nursing . At home,pt has a walker and shower chair. Sister states that when pt first moved in with her,he could barely ambulate and required the assistance of his walker. Most recently,pt has been ambulating independently without assistive devices. Sister is very hopeful that with early therapy intervention,he will be able to maintain at his prior to admission level of activity. Choices for home health agencies reviewed with pt & his sister. [facility] in the past and they have a PT who works with myasthenia gravis patients. Clinicals faxed to [facility] and placed on AVS.Pt is requesting a therapist named Saritha Willoughby so I put this request on my referral also. Sister states that practically everyone in her family history of PE.      PCP is Dr Monty Vásquez. Preferred pharmacy is [facility]. Thank you.   Sheldon Can

## 2017-04-06 NOTE — CARDIO/PULMONARY
Cardiac Rehab:  4/6/2017 @ 1330:  Received cardiac rehab consult for Afib, DCCV, ANTWAN education  Met with 72 y/o male patient admitted 4/5/17 with \"palpitation. \"  Dx; Pulm emboli and Afib/RVR per Dr. Sherlynn Duverney H&P. Sister and ZOILA are present in room and received permission to discuss health issues with famiy present. Sister apparently tends to pt's care. Explained educational role of Cardiac Rehab RN. Cardiac Meds:   Prior to admission cardiac meds: Catapres. Smoking history:  Never smoked. Discussed pt's understanding of current health issues, treatment and plan of care. Pt/sister reported he was not clear if he has had Afib in the past but told he may have. Pt/sister are are of procedure for tomorrow. Explained  AFib, increased risk of CVA/PE/MI, need for anticoagulation, and new meds. Gave/reviewed handouts on [employer]'s Atrial Fibrillation, ANTWAN, cardioversion, walking for exercise and [employer]'s Eating for Healthier Heart/low salt diet. 4/7/2017 @ 1925: Met with pt sitting up in bed on IVCU. Sister is present in room. Pt/sister are aware of results of ANTWAN with cardioversion to SR. Pt remains in SR. He reported he is \"feeling much better. \"  Briefly reviewed ANTWAN And cardioversion along with new and changes in meds. Medication education:  Provided copy of med rec. Reviewed meds. Sister reported she has all his meds and dispenses then to him. Pt does not have any problems obtaining meds. Discussed new meds this admission: Cardizem-purpose and side effects. Also discussed possibility of anticoagulant meds to be started after tomorrows procedure. Attached information on new meds to AVS.   4/7/2017:  Pt has been placed on Elquis for PE protocol. Sister reported she has a coupon for 30-days free. Reviewed activation of coupon. Discussed purpose of med side effects and injury prevention. Also discussed changed of Cardizem from IV to PO and will be additional med upon d/c.   Attached info Eliquis to AVS.   Pt/family verbalized understanding of info provided, and all questions were answered. 4/7/2017:  Pt/sister verbalized understanding of info provided, and all questions were answered.

## 2017-04-06 NOTE — PROGRESS NOTES
Cardiology Progress Note [facility]         NAME:  Lyndsey Albert. :   1945   MRN:   [id number]     Assessment/Plan:   AFib- new onset RVR- trigger PE's       - cont IV cardizem       - ANTWAN/ DCCV in am - NPO after midnight       - echo today       - cardiac rehab ed           - CAD eval w/  stress at some point         - [id number] Score 4 placing him at 6.5 % annual, risk of embolic stroke. Plan long term anticoagulation w/ NOAC. He has not had a colonoscopy-discussed risk/benefits w/ pt and sister  Bilateral PE's - per attending/Pulm   HTN:  BP OK- multi med     Care Coordination: 35 minutes spent reviewing data, dx, treatment w/ pt and family, coordinating care with team including care management, and  arranging testing. Cardiology Attending:    Patient personally seen and examined. All the elements of history and examination were personally performed. Assessment and plan was discussed and agree as written above. Allen Padilla MD, [facility] [place]         Subjective:   Kalen Vaughn is a 73y.o. year old male w/ hx:  HTN cx by  Myasthenia Gravis, CVA, morbid obesity who PW  symptoms of palpitations, dyspnea and chest tightness. Symptoms started today. No aggravating or relieving factors. No prior cardiac history. Had (-) stress test number of years ago.      CTA : bilateral PEs  A1C 7.2 proBNP 759  Trop (-)   Overnight activities reviewed:    - -140/60-90 range    - Tele afib 110-120 range    - cardizem gtt @ 15 mcg    - K+ 4    Mg++2,     Cr 0.97       Cardiac ROS: chronic class 3-4 dyspnea- no home O2, breathing better, no palpitations, no chest pain, Patient denies any exertional chest pain, dyspnea, palpitations, syncope, orthopnea, edema or paroxysmal nocturnal dyspnea. Review of Systems: chronic fatigue, weakness, No nausea, indigestion, vomiting, pain, cough, sputum. No bleeding. Taking po.  Appetite         CARDIAC EVALUATION   ECHO 2016:EF 65%, no WMA, mild TR, RVSP 41 mmHg        Objective:     Visit Vitals    /90    Pulse (!) 116    Temp 97.4 °F (36.3 °C)    Resp 27    Ht 5' 11\" (1.803 m)    Wt 302 lb 14.6 oz (137.4 kg)    SpO2 92%    BMI 42.25 kg/m2        O2 Device: Room air    Temp (24hrs), Av.9 °F (36.6 °C), Min:97.4 °F (36.3 °C), Max:98.5 °F (36.9 °C)        Intake/Output Summary (Last 24 hours) at 17 0921  Last data filed at 17 0600   Gross per 24 hour   Intake           1281.5 ml   Output                0 ml   Net           1281.5 ml       TELE: yasmin ratae 110-120     General: AAOx3 cooperative, no acute distress. HEENT: Atraumatic. Pink, nikki, and moist.  Anicteric sclerae. Neck: Supple,     Lungs: 2 lpm nc, decreased throughout No wheezing/rhonchi/crackles. Heart: PMI: unable to palpate, HS distant, Irregular rhythm, difficult to appreciate  murmur, no S3, no rubs, no gallops. Difficult to assess  JVD. No carotid bruits. Abdomen: Soft, non-distended, non-tender. + Bowel sounds. No bruits. : voiding   Extremities: general edema, no clubbing, no cyanosis. No calf tenderness  Neurologic: Grossly intact. Alert and oriented X 3. No acute neurological distress. Psych: Good insight. Not anxious nor agitated.       Care Plan discussed with:    Comments   Patient y    Family  y    RN y    Care Manager y sister                  Consultant:          Data Review:   CMP:   Lab Results   Component Value Date/Time     (H) 2017 04:36 AM    K 4.0 2017 04:36 AM     (H) 2017 04:36 AM    CO2 28 2017 04:36 AM    AGAP 7 2017 04:36 AM     (H) 2017 04:36 AM    BUN 19 2017 04:36 AM    CREA 0.97 2017 04:36 AM    GFRAA >60 2017 04:36 AM    GFRNA >60 2017 04:36 AM    CA 7.7 (L) 2017 04:36 AM    MG 2.0 2017 04:36 AM    PHOS 3.8 2017 04:36 AM    ALB 3.1 (L) 2017 03:49 PM    TBILI 0.5 2017 03:49 PM    TP 6.8 2017 03:49 PM    GLOB 3.7 04/05/2017 03:49 PM    AGRAT 0.8 (L) 04/05/2017 03:49 PM    SGOT 44 (H) 04/05/2017 03:49 PM     (H) 04/05/2017 03:49 PM     CBC:   Lab Results   Component Value Date/Time    WBC 10.8 04/06/2017 04:36 AM    HGB 13.9 04/06/2017 04:36 AM    HCT 45.3 04/06/2017 04:36 AM     04/06/2017 04:36 AM     All Cardiac Markers in the last 24 hours:   Lab Results   Component Value Date/Time    CPK 34 (L) 04/05/2017 03:49 PM    CKMB <1.0 04/05/2017 03:49 PM    CKNDX Cannot be calulated 04/05/2017 03:49 PM    TROIQ <0.04 04/05/2017 03:49 PM     Recent Glucose Results:   Lab Results   Component Value Date/Time     (H) 04/06/2017 04:36 AM     (H) 04/05/2017 03:49 PM     ABG: No results found for: PH, PHI, PCO2, PCO2I, PO2, PO2I, HCO3, HCO3I, FIO2, FIO2I  LIPIDS:  Cholesterol, total   Date Value Ref Range Status   09/03/2016 168 <200 MG/DL Final     Triglyceride   Date Value Ref Range Status   09/03/2016 198 (H) <150 MG/DL Final     Comment:     Based on NCEP-ATP III:  Triglycerides <150 mg/dL  is considered normal, 150-199 mg/dL  borderline high,  200-499 mg/dL high and  greater than or equal to 500 mg/dL very high. HDL Cholesterol   Date Value Ref Range Status   09/03/2016 41 MG/DL Final     Comment:     Based on NCEP ATP III, HDL Cholesterol <40 mg/dL is considered low and >60 mg/dL is elevated.      LDL, calculated   Date Value Ref Range Status   09/03/2016 87.4 0 - 100 MG/DL Final     Comment:     Based on the NCEP-ATP: LDL-C concentrations are considered  optimal <100 mg/dL,  near optimal/above Normal 100-129 mg/dL  Borderline High: 130-159, High: 160-189 mg/dL  Very High: Greater than or equal to 190 mg/dL       VLDL, calculated   Date Value Ref Range Status   09/03/2016 39.6 MG/DL Final     CHOL/HDL Ratio   Date Value Ref Range Status   09/03/2016 4.1 0 - 5.0   Final       Medications reviewed  Current Facility-Administered Medications   Medication Dose Route Frequency    sodium chloride (NS) flush 5-10 mL  5-10 mL IntraVENous Q8H    sodium chloride (NS) flush 5-10 mL  5-10 mL IntraVENous PRN    acetaminophen (TYLENOL) tablet 650 mg  650 mg Oral Q4H PRN    HYDROmorphone (PF) (DILAUDID) injection 0.5 mg  0.5 mg IntraVENous Q4H PRN    zolpidem (AMBIEN) tablet 5 mg  5 mg Oral QHS PRN    ALPRAZolam (XANAX) tablet 0.25 mg  0.25 mg Oral TID PRN    [START ON 4/9/2017] cloNIDine (CATAPRES) 0.2 mg/24 hr patch 1 Patch  1 Patch TransDERmal Q7D    predniSONE (DELTASONE) tablet 50 mg  50 mg Oral DAILY WITH BREAKFAST    pyridostigmine (MESTINON) tablet 60 mg  60 mg Oral BID    traMADol (ULTRAM) tablet 50 mg  50 mg Oral Q6H PRN    prochlorperazine (COMPAZINE) injection 5 mg  5 mg IntraVENous Q6H PRN    enoxaparin (LOVENOX) injection 100 mg  100 mg SubCUTAneous Q12H    And    enoxaparin (LOVENOX) injection 40 mg  40 mg SubCUTAneous Q12H    dilTIAZem (CARDIZEM) IR tablet 30 mg  30 mg Oral TIDAC    0.45% sodium chloride infusion  75 mL/hr IntraVENous CONTINUOUS    [START ON 4/7/2017] midazolam (VERSED) injection 4 mg  4 mg IntraVENous ONCE    [START ON 4/7/2017] fentaNYL citrate (PF) injection 100 mcg  100 mcg IntraVENous ONCE    [START ON 4/7/2017] flumazenil (ROMAZICON) 0.1 mg/mL injection 0.5 mg  0.5 mg IntraVENous ONCE    dilTIAZem (CARDIZEM) 125 mg in dextrose 5% 125 mL infusion  0-15 mg/hr IntraVENous TITRATE    azithromycin (ZITHROMAX) 500 mg in 0.9% sodium chloride (MBP/ADV) 250 mL  500 mg IntraVENous Q24H         Emily Perea, RN, ACNP-BC, [facility]

## 2017-04-06 NOTE — PROGRESS NOTES
Bedside and Verbal shift change report given to Jackie Gutierrez (oncoming nurse) by Mookie Lincoln RN (offgoing nurse). Report included the following information SBAR, Kardex, [facility], Med Rec Status and Cardiac Rhythm afib.   [id number]: received pt alert and oriented, correct pt identified,  Assessment completed and charted,  Sister at bedside by 0900 am,  Labs drawn per order and received in lab [place] confirmed all lab work [email or web address], asked pt if he could give a urine sample for ordered urine at that time pt states \"feeling like I can void,  Then states needing to stand up to void,  Bladder scan obtained pt had 24 mls of urine. 1315:Obtained urine sample and sent to the lab pt voided 200 mls of urine at this time will continue to monitor  Order for duplex lower extremity bilateral  Obtained, vascular lab did not come for test, called and left a message with vascular lab that test was ordered however not completed  @1930  Pt started on PO during shift, diltiazem able to titrate drip to 10 mls/hr see mar  Bedside and Verbal shift change report given to Yeni SMITH (oncoming nurse) by Kacey Bryant  (offgoing nurse). Report included the following information SBAR, Kardex, Procedure Summary, MAR, Recent Results and Cardiac Rhythm Afib.

## 2017-04-06 NOTE — CONSULTS
Name: Sveta Tirado. Hospital: [facility]   : 1945 Admit Date: 2017   Phone: [phone]  Room: [facility]/   PCP: Barb Reynolds MD  MRN: [id number]   Date: 2017  Code: Full Code        HPI: Mr. Jordan Rosales is a 72 yo male who came to the ED yesterday after being seen in his PCP's office for a sore throat and found to be tachycardic. He was sent directly to ED for HR in 150s as he reports. He has a PMH of HTN, Myasthenia Gravis and he reports Asthma. He has been seen by an allergist in the past for multiple allergies and was on immunotherapy at one point, but is no longer. He is not on any inhalers at this time. He was never a smoker. [place] and reports exposure to dust. CTA of chest showed he has bilateral PEs. He denies a cough at this time. No fevers, chills, hemoptysis, cp/n/v.       Chart and notes reviewed. Data reviewed. I review the patient's current medications in the medical record at each encounter.  I have evaluated and examined the patient. 8:36 AM       History was obtained from patient. I was asked by Carmelita Pantoja MD to see Sveta TiradoLalitha in consultation for a chief complaint of SOB Pulmonary Embolus. Images: CTA 17-Bilateral pulmonary emboli. RUL airspace disease may represent a pulmonary infarct .     WBC-10.8 (from 17.4)  Crt 0.97    SPO2 92% 3L    Past Medical History:   Diagnosis Date    Anxiety     Arthritis     Asthma     HTN (hypertension) with goal to be determined     Morbid obesity with BMI of 40.0-44.9, adult ([facility] [address] 2017    Myasthenia gravis ([facility] [address]        Past Surgical History:   Procedure Laterality Date    HX ORTHOPAEDIC      HX OTHER SURGICAL      L sided neck suregry \" infection\"       Family History   Problem Relation Age of Onset    Diabetes Mother     Hypertension Mother     Deep Vein Thrombosis Mother     Deep Vein Thrombosis Sister        Social History   Substance Use Topics    Smoking status: Never Smoker    Smokeless tobacco: Not on file    Alcohol use No       Allergies   Allergen Reactions    Lactose Itching    Fructose Other (comments)     States added sugar to foods causes sore throat/ear ache.  Inderal [Propranolol] Unknown (comments)    Lisinopril Angioedema    Gluten Nausea Only       Current Facility-Administered Medications   Medication Dose Route Frequency    sodium chloride (NS) flush 5-10 mL  5-10 mL IntraVENous Q8H    sodium chloride (NS) flush 5-10 mL  5-10 mL IntraVENous PRN    acetaminophen (TYLENOL) tablet 650 mg  650 mg Oral Q4H PRN    HYDROmorphone (PF) (DILAUDID) injection 0.5 mg  0.5 mg IntraVENous Q4H PRN    zolpidem (AMBIEN) tablet 5 mg  5 mg Oral QHS PRN    ALPRAZolam (XANAX) tablet 0.25 mg  0.25 mg Oral TID PRN    [START ON 4/9/2017] cloNIDine (CATAPRES) 0.2 mg/24 hr patch 1 Patch  1 Patch TransDERmal Q7D    predniSONE (DELTASONE) tablet 50 mg  50 mg Oral DAILY WITH BREAKFAST    pyridostigmine (MESTINON) tablet 60 mg  60 mg Oral BID    traMADol (ULTRAM) tablet 50 mg  50 mg Oral Q6H PRN    prochlorperazine (COMPAZINE) injection 5 mg  5 mg IntraVENous Q6H PRN    enoxaparin (LOVENOX) injection 100 mg  100 mg SubCUTAneous Q12H    And    enoxaparin (LOVENOX) injection 40 mg  40 mg SubCUTAneous Q12H    dilTIAZem (CARDIZEM) IR tablet 30 mg  30 mg Oral TIDAC    0.45% sodium chloride infusion  75 mL/hr IntraVENous CONTINUOUS    dilTIAZem (CARDIZEM) 125 mg in dextrose 5% 125 mL infusion  0-15 mg/hr IntraVENous TITRATE    azithromycin (ZITHROMAX) 500 mg in 0.9% sodium chloride (MBP/ADV) 250 mL  500 mg IntraVENous Q24H         REVIEW OF SYSTEMS   Negative except as stated in the HPI. Physical Exam:   Visit Vitals    BP (!) 134/97    Pulse (!) 124    Temp 97.4 °F (36.3 °C)    Resp 27    Ht 5' 11\" (1.803 m)    Wt 137.4 kg (302 lb 14.6 oz)    SpO2 92%    BMI 42.25 kg/m2       General:  Alert, cooperative, no distress, appears stated age. Head:  Normocephalic, without obvious abnormality, atraumatic. Eyes:  Conjunctivae/corneas clear. EOMs intact. Nose: Nares normal. Septum midline. Mucosa normal. No drainage or sinus tenderness. Throat: Lips, mucosa, and tongue normal. Teeth and gums normal.   Neck: Supple, symmetrical, trachea midline, no adenopathy,no JVD. Lungs:   Diminished right lowe lung field. Chest wall:  No tenderness or deformity. Heart:  Regular rate and rhythm, S1, S2 normal, no murmur, click, rub or gallop. Abdomen:   Obese, Soft, non-tender. Bowel sounds normal. No masses. Extremities: Extremities normal, atraumatic, no cyanosis, 1+ edema BLE. Pulses: 2+ and symmetric all extremities. Skin: Skin color, texture, turgor normal. No rashes or lesions   Lymph nodes: Cervical, supraclavicular, and axillary nodes normal.   Neurologic: Grossly nonfocal       Lab Results   Component Value Date/Time    Sodium 146 04/06/2017 04:36 AM    Potassium 4.0 04/06/2017 04:36 AM    Chloride 111 04/06/2017 04:36 AM    CO2 28 04/06/2017 04:36 AM    BUN 19 04/06/2017 04:36 AM    Creatinine 0.97 04/06/2017 04:36 AM    Glucose 104 04/06/2017 04:36 AM    Calcium 7.7 04/06/2017 04:36 AM    Magnesium 2.0 04/06/2017 04:36 AM    Phosphorus 3.8 04/06/2017 04:36 AM    Lactic acid 1.6 09/02/2016 07:58 PM       Lab Results   Component Value Date/Time    WBC 10.8 04/06/2017 04:36 AM    HGB 13.9 04/06/2017 04:36 AM    PLATELET 319 37/32/2892 04:36 AM    .9 04/06/2017 04:36 AM       Lab Results   Component Value Date/Time    INR 1.0 04/05/2017 04:25 PM    aPTT 22.3 09/02/2016 07:58 PM    AST (SGOT) 44 04/05/2017 03:49 PM    Alk.  phosphatase 77 04/05/2017 03:49 PM    Protein, total 6.8 04/05/2017 03:49 PM    Albumin 3.1 04/05/2017 03:49 PM    Globulin 3.7 04/05/2017 03:49 PM       Lab Results   Component Value Date/Time    Ferritin 369 09/08/2016 10:03 AM       Lab Results   Component Value Date/Time    TSH 0.32 04/05/2017 04:39 PM        No results found for: PH, PHI, PCO2, PCO2I, PO2, PO2I, HCO3, HCO3I, FIO2, FIO2I    Lab Results   Component Value Date/Time    CK 34 04/05/2017 03:49 PM    CK-MB Index Cannot be calulated 04/05/2017 03:49 PM    Troponin-I, Qt. <0.04 04/05/2017 03:49 PM        Lab Results   Component Value Date/Time    Culture result: ENTEROBACTER CLOACAE 09/16/2016 10:10 PM    Culture result: KLEBSIELLA OXYTOCA 09/16/2016 10:10 PM    Culture result: MODERATE  NORMAL RESPIRATORY KUNAL   09/03/2016 09:24 AM    Culture result: LIGHT  APPARENT  JESSA ALBICANS   09/03/2016 09:24 AM       No results found for: TOXA1, RPR, HBCM, HBSAG, HAAB, HCAB, HCAB1, HAAT, G6PD, CRYAC, HIVGT, HIVR, HIV1, HIV12, HIVPC, HIVRPI    Lab Results   Component Value Date/Time    CK 34 04/05/2017 03:49 PM    CK 30 09/28/2016 07:45 AM       Lab Results   Component Value Date/Time    Color DARK YELLOW 09/16/2016 10:10 PM    Appearance CLOUDY 09/16/2016 10:10 PM    pH (UA) 6.0 09/16/2016 10:10 PM    Protein NEGATIVE  09/16/2016 10:10 PM    Glucose NEGATIVE  09/16/2016 10:10 PM    Ketone NEGATIVE  09/16/2016 10:10 PM    Bilirubin NEGATIVE  09/16/2016 10:10 PM    Blood LARGE 09/16/2016 10:10 PM    Urobilinogen 2.0 09/16/2016 10:10 PM    Nitrites POSITIVE 09/16/2016 10:10 PM    Leukocyte Esterase MODERATE 09/16/2016 10:10 PM    WBC  09/16/2016 10:10 PM    RBC  09/16/2016 10:10 PM    Bacteria 4+ 09/16/2016 10:10 PM       IMPRESSION  · Bilateral Central Pulmonary Emboli  · Abnormal Chest CT: with pleural based RUL nodular density likely representing a pulmonary infarct  · Afib with RVR  · Snoring: with probable underlying KWADWO  · Myasthenia Gravis  · Leukocytosis: on chronic steroids   · Morbid Obesity  · Family H/O DVT: in Mother and 2 Sisters    PLAN  · Continue therapeutic Lovenox  · Keep on bedrest for today  · Check hypercoaguable panel  · F/U ECHO results   · Rate control per Cardiology recs  · ANTWAN/DC Cardioversion planned for tomorrow morning  · F/U BLE VD results  · Will need outpatient sleep eval/study      Manolo Cochran NP     Pulmonary/Critical Care Attending Note  Pt seen and examined. I agree with the assessment/plan as outlined in Ms. Barraza's note     Pt presented with chest pain and CTA chest demonstrates significant bilateral, central PE  --continue therapeutic Lovenox  --has family h/o DVT in Mother and both sisters  --check hypercoag panel  --f/u BLE VD  --f/u ECHO results  --continue bedrest for today  Rate control for new onset Afib per Cardiology recs  --ANTWAN and DCCV planned for tomorrow  Snores heavily at night and is obese  --needs outpatient sleep eval/study  Dispo: keep in [facility] today  Gwen Mesa MD, [facility]  [facility]

## 2017-04-06 NOTE — PROGRESS NOTES
1920 Received report. 1957 Ultram 50 mg po given for lower abdominal pain. 2236 Dilaudid 0.5 mg iv given for lower abdominal pain. 0200 Patient resting , no c/o pain. 0715 Bedside shift change report given to SARAH Mcdonald. Report included the following information SBAR, Kardex, Intake/Output, MAR, Accordion, Recent Results, Med Rec Status, Cardiac Rhythm at fib and Alarm Parameters .

## 2017-04-06 NOTE — CONSULTS
Reason for Consult: Afib RVR    HPI: Sailaja Infante is a 73 y.o. male with PMH of HTN, Myasthenia Gravis is here with symptoms of palpitations, dyspnea and chest tightness. Symptoms started today. No aggravating or relieving factors. No prior cardiac history. Had stress test number of years ago. ROS: No cough, fever, chills, abdominal pain, nausea, vomiting, diarrhea, lightheadedness, dizziness, presyncope or syncope. No dysuia or constipation. Past Medical History:   Diagnosis Date    Anxiety     Arthritis     Asthma     HTN (hypertension) with goal to be determined     Morbid obesity with BMI of 40.0-44.9, adult ([facility] [address] 4/5/2017    Myasthenia gravis ([facility] [address]             Past Surgical History:   Procedure Laterality Date    HX ORTHOPAEDIC      HX OTHER SURGICAL      L sided neck suregry \" infection\"             Family History   Problem Relation Age of Onset    Diabetes Mother     Hypertension Mother     Deep Vein Thrombosis Mother     Deep Vein Thrombosis Sister            Social History     Social History    Marital status:      Spouse name: N/A    Number of children: N/A    Years of education: N/A     Occupational History    Not on file. Social History Main Topics    Smoking status: Never Smoker    Smokeless tobacco: Not on file    Alcohol use No    Drug use: Not on file    Sexual activity: Not on file     Other Topics Concern    Not on file     Social History Narrative         Allergies   Allergen Reactions    Lactose Itching    Fructose Other (comments)     States added sugar to foods causes sore throat/ear ache.     Inderal [Propranolol] Unknown (comments)    Lisinopril Angioedema    Gluten Nausea Only            Current Facility-Administered Medications   Medication Dose Route Frequency Provider Last Rate Last Dose    dilTIAZem (CARDIZEM) 125 mg in dextrose 5% 125 mL infusion  0-15 mg/hr IntraVENous TITRATE Vianney Plaza MD 15 mL/hr at 04/05/17 2007 15 mg/hr at 04/05/17 2007    azithromycin (ZITHROMAX) 500 mg in 0.9% sodium chloride (MBP/ADV) 250 mL  500 mg IntraVENous Q24H Tonya Cook  mL/hr at 04/05/17 2046 500 mg at 04/05/17 2046     Current Outpatient Prescriptions   Medication Sig Dispense Refill    traMADol (ULTRAM) 50 mg tablet Take 50 mg by mouth every six (6) hours as needed for Pain.  acetaminophen (TYLENOL) 500 mg tablet Take 1,000 mg by mouth every eight (8) hours as needed for Pain.  ibuprofen (ADVIL) 200 mg tablet Take 400-600 mg by mouth every six (6) hours as needed for Pain.  pyridostigmine (MESTINON) 60 mg tablet Take 60 mg by mouth two (2) times a day.  b complex vitamins (B COMPLEX 1) tablet Take 3 Tabs by mouth daily.  cholecalciferol, VITAMIN D3, (VITAMIN D3) 5,000 unit tab tablet Take 5,000 Units by mouth daily.  ascorbic acid, vitamin C, (VITAMIN C) 500 mg tablet Take 1,000-1,500 mg by mouth daily.  predniSONE (DELTASONE) 10 mg tablet Take 5 Tabs by mouth daily (with breakfast). 450 Tab 3    potassium chloride SA (MICRO-K) 10 mEq capsule Take 10 mEq by mouth two (2) times a day.  ALPRAZolam (XANAX) 0.25 mg tablet Take 0.25 mg by mouth three (3) times daily as needed for Anxiety.  cloNIDine (CATAPRES) 0.2 mg/24 hr patch 1 Patch by TransDERmal route every seven (7) days. 4 Patch 0        ROS:  12 point review of systems was performed.  All negative except for HPI     Physical Exam:  Visit Vitals    /87 (BP Patient Position: At rest)    Pulse (!) 116    Temp 98 °F (36.7 °C)    Resp 18    Ht 5' 11\" (1.803 m)    Wt 303 lb (137.4 kg)    SpO2 92%    BMI 42.26 kg/m2       Gen:  Well-developed, well-nourished, in no acute distress  HEENT:  Pink conjunctivae, PERRL, hearing intact to voice, moist mucous membranes  Neck:  Supple, without masses, thyroid non-tender  Resp:  No accessory muscle use, clear breath sounds without wheezes rales or rhonchi  Card:  No murmurs, normal S1, S2 without thrills, bruits or peripheral edema  Abd:  Soft, non-tender, non-distended, normoactive bowel sounds are present, no palpable organomegaly and no detectable hernias  Lymph:  No cervical or inguinal adenopathy  Musc:  No cyanosis or clubbing  Skin:  No rashes or ulcers, skin turgor is good  Neuro:  Cranial nerves are grossly intact, no focal motor weakness, follows commands appropriately  Psych:  Good insight, oriented to person, place and time, alert     Labs:     Lab Results  Component Value Date/Time   WBC 17.4 04/05/2017 03:49 PM   HGB 15.9 04/05/2017 03:49 PM   HCT 51.3 04/05/2017 03:49 PM   PLATELET 335 43/13/3044 03:49 PM   .0 04/05/2017 03:49 PM       Lab Results  Component Value Date/Time   Hemoglobin A1c 5.6 09/02/2016 07:59 PM   Glucose 158 04/05/2017 03:49 PM   Glucose (POC) 189 09/16/2016 11:18 AM   LDL, calculated 87.4 09/03/2016 05:28 AM   Creatinine 1.30 04/05/2017 03:49 PM      Lab Results  Component Value Date/Time   Cholesterol, total 168 09/03/2016 05:28 AM   HDL Cholesterol 41 09/03/2016 05:28 AM   LDL, calculated 87.4 09/03/2016 05:28 AM   Triglyceride 198 09/03/2016 05:28 AM   CHOL/HDL Ratio 4.1 09/03/2016 05:28 AM       Lab Results  Component Value Date/Time   ALT (SGPT) 106 04/05/2017 03:49 PM   AST (SGOT) 44 04/05/2017 03:49 PM   Alk.  phosphatase 77 04/05/2017 03:49 PM   Bilirubin, direct 0.12 02/22/2017 02:20 PM   Bilirubin, total 0.5 04/05/2017 03:49 PM       Lab Results   Component Value Date/Time    INR 1.0 04/05/2017 04:25 PM    INR 1.0 09/03/2016 05:28 AM    INR 1.0 09/02/2016 07:58 PM    Prothrombin time 10.5 04/05/2017 04:25 PM    Prothrombin time 10.5 09/03/2016 05:28 AM    Prothrombin time 10.5 09/02/2016 07:58 PM      Lab Results  Component Value Date/Time   GFR est AA >60 04/05/2017 03:49 PM   GFR est non-AA 55 04/05/2017 03:49 PM   Creatinine 1.30 04/05/2017 03:49 PM   BUN 21 04/05/2017 03:49 PM   Sodium 140 04/05/2017 03:49 PM   Potassium 4.9 04/05/2017 03:49 PM Chloride 105 04/05/2017 03:49 PM   CO2 22 04/05/2017 03:49 PM      No results found for: PSA, William Mcgrath, [id number], [id number], PSALT  Lab Results  Component Value Date/Time   TSH 0.32 04/05/2017 04:39 PM   T4, Free 1.1 04/05/2017 04:39 PM      Lab Results   Component Value Date/Time    Glucose 158 04/05/2017 03:49 PM    Glucose (POC) 189 09/16/2016 11:18 AM      Lab Results   Component Value Date/Time    CK 34 04/05/2017 03:49 PM    CK - MB <1.0 04/05/2017 03:49 PM    CK-MB Index Cannot be calulated 04/05/2017 03:49 PM    Troponin-I, Qt. <0.04 04/05/2017 03:49 PM      Lab Results   Component Value Date/Time    NT pro- 04/05/2017 03:49 PM      Lab Results   Component Value Date/Time    Sodium 140 04/05/2017 03:49 PM    Potassium 4.9 04/05/2017 03:49 PM    Chloride 105 04/05/2017 03:49 PM    CO2 22 04/05/2017 03:49 PM    Anion gap 13 04/05/2017 03:49 PM    Glucose 158 04/05/2017 03:49 PM    BUN 21 04/05/2017 03:49 PM    Creatinine 1.30 04/05/2017 03:49 PM    BUN/Creatinine ratio 16 04/05/2017 03:49 PM    GFR est AA >60 04/05/2017 03:49 PM    GFR est non-AA 55 04/05/2017 03:49 PM    Calcium 9.3 04/05/2017 03:49 PM      Lab Results   Component Value Date/Time    Sodium 140 04/05/2017 03:49 PM    Potassium 4.9 04/05/2017 03:49 PM    Chloride 105 04/05/2017 03:49 PM    CO2 22 04/05/2017 03:49 PM    Anion gap 13 04/05/2017 03:49 PM    Glucose 158 04/05/2017 03:49 PM    BUN 21 04/05/2017 03:49 PM    Creatinine 1.30 04/05/2017 03:49 PM    BUN/Creatinine ratio 16 04/05/2017 03:49 PM    GFR est AA >60 04/05/2017 03:49 PM    GFR est non-AA 55 04/05/2017 03:49 PM    Calcium 9.3 04/05/2017 03:49 PM    Bilirubin, total 0.5 04/05/2017 03:49 PM    ALT (SGPT) 106 04/05/2017 03:49 PM    AST (SGOT) 44 04/05/2017 03:49 PM    Alk.  phosphatase 77 04/05/2017 03:49 PM    Protein, total 6.8 04/05/2017 03:49 PM    Albumin 3.1 04/05/2017 03:49 PM    Globulin 3.7 04/05/2017 03:49 PM    A-G Ratio 0.8 04/05/2017 03:49 PM      Lab Results   Component Value Date/Time    Hemoglobin A1c 5.6 09/02/2016 07:59 PM           Recent Labs      04/05/17   1549   CPK  34*   CKMB  <1.0   TROIQ  <0.04     Diagnostic Tests:   EKG: Afib with RVR, V rate 160, normal ST segment, normal axis, normal intervals. Problem List:     Problem List  Date Reviewed: 4/5/2017          Codes Class Noted    * (Principal)Pulmonary emboli ([place] [address] ICD-10-CM: I26.99  ICD-9-CM: 415.19  4/5/2017        History of stroke (Chronic) ICD-10-CM: S54.32  ICD-9-CM: V12.54  4/5/2017        ARF (acute renal failure) ([place] [address] ICD-10-CM: N17.9  ICD-9-CM: 584.9  4/5/2017        Leukocytosis (Chronic) ICD-10-CM: Y99.839  ICD-9-CM: 288.60  4/5/2017        Morbid obesity with BMI of 40.0-44.9, adult ([facility] (Chronic) ICD-10-CM: E66.01, Z68.41  ICD-9-CM: 278.01, V85.41  4/5/2017        Hyperglycemia ICD-10-CM: R73.9  ICD-9-CM: 790.29  4/5/2017        Myasthenia gravis with exacerbation, adult form ([place] [address] (Chronic) ICD-10-CM: G70.01  ICD-9-CM: 358.01  4/4/2017        Cervical radiculopathy due to degenerative joint disease of spine ICD-10-CM: M47.22  ICD-9-CM: 721.0  9/14/2016        PAF (paroxysmal atrial fibrillation) (HCC) (Chronic) ICD-10-CM: I48.0  ICD-9-CM: 427.31  9/3/2016              Plan:    1. Afib with RVR: Trigger appears to be acute PE. Check Echo for RV/ LV. Antigcoagulation for PE will cover Afib. Rate control with Cardizem gtt. Trop negative. 2. Acute PE: Hemodynamically stable. Check Echo in am.      Aleln Badillo MD, [facility]

## 2017-04-06 NOTE — TELEPHONE ENCOUNTER
Patient seen in clinic on 4/4/15 patient will schedule for IVIG 60 grams x 2 days. May need monthly treatment until symptoms are stable per Dr. Norah Conway.

## 2017-04-07 NOTE — PROGRESS NOTES
Attempted to work with the patient for Physical Therapy, chart reviewed and discussed with nurse patient having procedure at bedside cardioversion. We will continue to follow up with the patient for therapy. Thank you.

## 2017-04-07 NOTE — PROGRESS NOTES
Cardiology Progress Note [facility]         NAME:  Lyndsey Terrazas. :   1945   MRN:   [id number]     Assessment/Plan:   AFib- new onset RVR- trigger PE's       - cont IV cardizem-> po        - ANTWAN/ DCCV today       - cardiac rehab ed           - CAD eval w/ stress at some point         - [id number] Score 4 placing him at 6.5 % annual, risk of embolic stroke. Recommend Eliquis 5mg BID for long term NOAC       - Replete K+ goal >4.0  Bilateral PE's - per attending/Pulm   HTN:  BP OK- multi med     Care Coordination: 35 minutes spent reviewing data, dx, treatment w/ pt and family, coordinating care with team including care management, and  arranging testing. Subjective:   Danyelle Hutchinson is a 73y.o. year old male w/ hx:  HTN cx by  Myasthenia Gravis, CVA, morbid obesity who PW  symptoms of palpitations, dyspnea and chest tightness. Symptoms started today. No aggravating or relieving factors. No prior cardiac history. Had (-) stress test number of years ago.        CTA : bilateral PEs  A1C 7.2 proBNP 759  Trop (-)     Overnight activities reviewed:               - Diltiazem at 10mg/hr - po started yesterday               - 3L NC   - -168/ range    - Tele afib    - K+ 3.9  Mg++ 2.0, Cr 0.97-->0.85, trop <0.04      Cardiac ROS:  reports breathing is better, denies CP. Chronic class 3-4 dyspnea- no home O2. Patient denies any exertional chest pain, palpitations, syncope, orthopnea, edema or paroxysmal nocturnal dyspnea. Review of Systems: chronic fatigue, weakness, No nausea, indigestion, vomiting, pain, cough, sputum. No bleeding. NPO for ANTWAN this am, Taking po. Appetite       CARDIAC EVALUATION   ECHO 2016:EF 65%, no WMA, mild TR, RVSP 41 mmHg  ECHO 17: EF 60% No WMA.  Mild MR, Trivial TR, PASP 43mmgHg, mild pulm HTN    Objective:     Visit Vitals    BP (!) 151/109    Pulse (!) 108    Temp 97.2 °F (36.2 °C)    Resp 21    Ht 5' 11\" (1.803 m)    Wt 307 lb 12.2 oz (139.6 kg)    SpO2 95%    BMI 42.92 kg/m2      O2 Flow Rate (L/min): 4 l/min O2 Device: Nasal cannula    Temp (24hrs), Av.6 °F (36.4 °C), Min:97.2 °F (36.2 °C), Max:98.5 °F (36.9 °C)      Intake/Output Summary (Last 24 hours) at 17 0757  Last data filed at 17 0700   Gross per 24 hour   Intake           2339.5 ml   Output              935 ml   Net           1404.5 ml       TELE: afib rate 80-90's with occ PVCs    General: AAOx3 cooperative, no acute distress. HEENT: Atraumatic. Pink and dry. Anicteric sclerae. Neck: Supple     Lungs: 3 lpm nc, decreased throughout No wheezing/rhonchi/crackles. Heart: PMI: unable to palpate, HS distant, Irregular rhythm, difficult to appreciate  murmur, no S3, no rubs, no gallops. Difficult to assess  JVD. No carotid bruits. Abdomen: Soft, non-distended, non-tender. + Bowel sounds. No bruits. : voiding   Extremities: general edema, no clubbing, no cyanosis. No calf tenderness  Neurologic: Grossly intact. Alert and oriented X 3. No acute neurological distress. Psych: Good insight. Not anxious nor agitated.     Care Plan discussed with:    Comments   Patient y    Family      RN y    Care Manager y                   Consultant:  nelida pulm        Data Review:   CMP:   Lab Results   Component Value Date/Time     2017 03:50 AM    K 3.9 2017 03:50 AM     2017 03:50 AM    CO2 25 2017 03:50 AM    AGAP 11 2017 03:50 AM     (H) 2017 03:50 AM    BUN 16 2017 03:50 AM    CREA 0.85 2017 03:50 AM    GFRAA >60 2017 03:50 AM    GFRNA >60 2017 03:50 AM    CA 8.6 2017 03:50 AM    MG 2.0 2017 03:50 AM     CBC:   Lab Results   Component Value Date/Time    WBC 11.4 (H) 2017 03:50 AM    HGB 14.4 2017 03:50 AM    HCT 44.3 2017 03:50 AM     2017 03:50 AM     All Cardiac Markers in the last 24 hours:   Lab Results   Component Value Date/Time    TROIQ <0.04 04/06/2017 11:45 AM     Recent Glucose Results:   Lab Results   Component Value Date/Time    GLUCPOC 93 04/07/2017 07:33 AM    GLUCPOC 126 (H) 04/06/2017 10:29 PM    GLUCPOC 179 (H) 04/06/2017 04:54 PM     (H) 04/07/2017 03:50 AM     ABG: No results found for: PH, PHI, PCO2, PCO2I, PO2, PO2I, HCO3, HCO3I, FIO2, FIO2I  LIPIDS:  Cholesterol, total   Date Value Ref Range Status   09/03/2016 168 <200 MG/DL Final     Triglyceride   Date Value Ref Range Status   09/03/2016 198 (H) <150 MG/DL Final     Comment:     Based on NCEP-ATP III:  Triglycerides <150 mg/dL  is considered normal, 150-199 mg/dL  borderline high,  200-499 mg/dL high and  greater than or equal to 500 mg/dL very high. HDL Cholesterol   Date Value Ref Range Status   09/03/2016 41 MG/DL Final     Comment:     Based on NCEP ATP III, HDL Cholesterol <40 mg/dL is considered low and >60 mg/dL is elevated.      LDL, calculated   Date Value Ref Range Status   09/03/2016 87.4 0 - 100 MG/DL Final     Comment:     Based on the NCEP-ATP: LDL-C concentrations are considered  optimal <100 mg/dL,  near optimal/above Normal 100-129 mg/dL  Borderline High: 130-159, High: 160-189 mg/dL  Very High: Greater than or equal to 190 mg/dL       VLDL, calculated   Date Value Ref Range Status   09/03/2016 39.6 MG/DL Final     CHOL/HDL Ratio   Date Value Ref Range Status   09/03/2016 4.1 0 - 5.0   Final       Medications reviewed  Current Facility-Administered Medications   Medication Dose Route Frequency    sodium chloride (NS) flush 5-10 mL  5-10 mL IntraVENous Q8H    sodium chloride (NS) flush 5-10 mL  5-10 mL IntraVENous PRN    acetaminophen (TYLENOL) tablet 650 mg  650 mg Oral Q4H PRN    HYDROmorphone (PF) (DILAUDID) injection 0.5 mg  0.5 mg IntraVENous Q4H PRN    zolpidem (AMBIEN) tablet 5 mg  5 mg Oral QHS PRN    ALPRAZolam (XANAX) tablet 0.25 mg  0.25 mg Oral TID PRN    [START ON 4/9/2017] cloNIDine (CATAPRES) 0.2 mg/24 hr patch 1 Patch  1 Patch TransDERmal Q7D    predniSONE (DELTASONE) tablet 50 mg  50 mg Oral DAILY WITH BREAKFAST    pyridostigmine (MESTINON) tablet 60 mg  60 mg Oral BID    traMADol (ULTRAM) tablet 50 mg  50 mg Oral Q6H PRN    prochlorperazine (COMPAZINE) injection 5 mg  5 mg IntraVENous Q6H PRN    enoxaparin (LOVENOX) injection 100 mg  100 mg SubCUTAneous Q12H    And    enoxaparin (LOVENOX) injection 40 mg  40 mg SubCUTAneous Q12H    dilTIAZem (CARDIZEM) IR tablet 30 mg  30 mg Oral TIDAC    0.45% sodium chloride infusion  75 mL/hr IntraVENous CONTINUOUS    midazolam (VERSED) injection 4 mg  4 mg IntraVENous ONCE    fentaNYL citrate (PF) injection 100 mcg  100 mcg IntraVENous ONCE    flumazenil (ROMAZICON) 0.1 mg/mL injection 0.5 mg  0.5 mg IntraVENous ONCE    insulin lispro (HUMALOG) injection   SubCUTAneous AC&HS    glucose chewable tablet 16 g  4 Tab Oral PRN    dextrose (D50W) injection syrg 12.5-25 g  12.5-25 g IntraVENous PRN    glucagon (GLUCAGEN) injection 1 mg  1 mg IntraMUSCular PRN    dilTIAZem (CARDIZEM) 125 mg in dextrose 5% 125 mL infusion  0-15 mg/hr IntraVENous TITRATE       Reshma Bansal, NP Student   Pt seen and examined agree w/ assessment and plan Yrn Oliveira, HEATHERP

## 2017-04-07 NOTE — PROCEDURES
[facility] [address]  *** FINAL REPORT ***    Name: Barbara Robins  MRN: [id number]    Inpatient  : 1945  HIS Order #: [id number]  [id number] [facility] Visit #: [id number]  Date: 2017    TYPE OF TEST: Peripheral Venous Testing    REASON FOR TEST  Pulmonary embolism    Right Leg:-  Deep venous thrombosis:           No  Superficial venous thrombosis:    No  Deep venous insufficiency:        No  Superficial venous insufficiency: No    Left Leg:-  Deep venous thrombosis:           Yes  Proximal extent of thrombus:      Mid Femoral  Superficial venous thrombosis:    No  Deep venous insufficiency:        No  Superficial venous insufficiency: No      INTERPRETATION/FINDINGS  PROCEDURE:  BILATERAL LE VENOUS DUPLEX. Evaluation of lower extremity veins with ultrasound (B-mode imaging,  pulsed Doppler, color Doppler). Includes the common femoral, deep  femoral, femoral, popliteal, posterior tibial, peroneal, and great  saphenous veins. FINDINGS:   Elbridge Prophet scale and color flow duplex of the  left mid femoral   vein, and prox to distal left popliteal vein demonstrated non-  compressability and partial color fill consistent with chronic deep  vein thrombosis. Unable to fully evaluate the left paired peroneal  veins due to patient body habitus Gray scale and color flow duplex  images of the remaining veins in both lower extremities demonstrate  normal compressibility, spontaneous and augmented flow profiles, and  absence of filling defects throughout the deep and superficial veins  in both lower extremities. CONCLUSION:  Bilateral lower extremity venous duplex positive for deep   venous thrombosis. Negative for thrombophlebitis. 2. Deep venous thrombosis identified in the mid femoral,  popliteal(above knee), popliteal(fossa) and popliteal(below knee)  veins. 3. Thrombus appears chronic. 4. Superficial vein(s) visualized include the great saphenous vein.   5. No evidence of superficial thrombosis detected. ADDITIONAL COMMENTS    I have personally reviewed the data relevant to the interpretation of  this  study. TECHNOLOGIST: Leland Mckeon RVT  Signed: 04/07/2017 10:46 AM    PHYSICIAN: Merlin Roman.  Kateryna Padilla MD  Signed: 04/10/2017 11:34 AM

## 2017-04-07 NOTE — PROCEDURES
CARDIOVERSION PROCEDURE NOTE    Date of Procedure: 4/7/2017   Cardiologist: Julianna Chen MD  Anesthesia:  IV concious sedation    Preprocedure Diagnosis:   1. Afib    Postoperative Diagnosis:  Afib      Complications: none  Blood Loss: none  Specimen removed: none      Procedure Note:  After informed consent was obtained, the patient was brought back to the lab in fasting condition. The presenting rhythm was atrial fibrillation. AP and PA defib pads were placed in the appropriate regions on the chest wall. Patient was given Versed and Fentanyl for conscious sedation per nursing personnel. Continuous pulse oximetry and cuff BP monitoring was done. Once appropriately sedated, the patient was given one 360J biphasic synchronized shock that resulted in Sinus Rhythm. The patient was observed until alert and oriented times 3. This Study Demonstrates:  1. Afib    Recommendations:  1. Change IV cardizem to PO Cardizem  mg daily. 2. Chronic Anticoagulation. Allen Munroe MD, [facility]

## 2017-04-07 NOTE — PROCEDURES
ANTWAN completed. Full note to follow. No JOHNNY thrombus  Moderate 2-3+ MR  Mild TR  Normal LV and RV function. Allen Hallman MD, [facility]

## 2017-04-07 NOTE — PROGRESS NOTES
Alvino Keita nae [address] 81  [facility], [address] [place] [address]  [phone]      Medical Progress Note      NAME: Hamzah Roman. :  1945  MRM:  [id number]    Date/Time: 2017  1:25 PM         Subjective:     Chief Complaint:  \"I'm okay\"     No complaints this AM. Is s/p cardioversion this AM and now in sinus rhythm. Only request is \"can I eat? \"     ROS:  (bold if positive, if negative)           Objective:       Vitals:          Last 24hrs VS reviewed since prior progress note. Most recent are:    Visit Vitals    /78    Pulse 81    Temp 97.1 °F (36.2 °C)    Resp 19    Ht 5' 11\" (1.803 m)    Wt 139.6 kg (307 lb 12.2 oz)    SpO2 98%    BMI 42.92 kg/m2     SpO2 Readings from Last 6 Encounters:   17 98%   17 96%   17 96%   16 97%   10/21/16 98%   16 94%    O2 Flow Rate (L/min): 2 l/min       Intake/Output Summary (Last 24 hours) at 17 1539  Last data filed at 17 1501   Gross per 24 hour   Intake          1600.42 ml   Output             1435 ml   Net           165.42 ml          Exam:     Physical Exam:    Gen:  Well-developed, well-nourished, in no acute distress  HEENT:  Pink conjunctivae, PERRL, hearing intact to voice, moist mucous membranes  Neck:  Supple, without masses, thyroid non-tender  Resp:  No accessory muscle use, clear breath sounds without wheezes rales or rhonchi  Card: RRR. No MRG   Abd: Obese ab. Distended.  Non-tender   Musc:  No cyanosis or clubbing  Skin:  No rashes or ulcers, skin turgor is good  Neuro:  Cranial nerves 3-12 are grossly intact,  strength is 5/5 bilaterally and dorsi / plantarflexion is 5/5 bilaterally, follows commands appropriately  Psych:  Good insight, oriented to person, place and time, alert    Medications Reviewed: (see below)    Lab Data Reviewed: (see below)    ______________________________________________________________________    Medications:     Current Facility-Administered Medications   Medication Dose Route Frequency    apixaban (ELIQUIS) tablet 10 mg  10 mg Oral BID    dilTIAZem CD (CARDIZEM CD) capsule 240 mg  240 mg Oral DAILY    sodium chloride (NS) flush 5-10 mL  5-10 mL IntraVENous Q8H    sodium chloride (NS) flush 5-10 mL  5-10 mL IntraVENous PRN    acetaminophen (TYLENOL) tablet 650 mg  650 mg Oral Q4H PRN    HYDROmorphone (PF) (DILAUDID) injection 0.5 mg  0.5 mg IntraVENous Q4H PRN    zolpidem (AMBIEN) tablet 5 mg  5 mg Oral QHS PRN    ALPRAZolam (XANAX) tablet 0.25 mg  0.25 mg Oral TID PRN    [START ON 4/9/2017] cloNIDine (CATAPRES) 0.2 mg/24 hr patch 1 Patch  1 Patch TransDERmal Q7D    predniSONE (DELTASONE) tablet 50 mg  50 mg Oral DAILY WITH BREAKFAST    pyridostigmine (MESTINON) tablet 60 mg  60 mg Oral BID    traMADol (ULTRAM) tablet 50 mg  50 mg Oral Q6H PRN    prochlorperazine (COMPAZINE) injection 5 mg  5 mg IntraVENous Q6H PRN    midazolam (VERSED) injection 4 mg  4 mg IntraVENous ONCE    fentaNYL citrate (PF) injection 100 mcg  100 mcg IntraVENous ONCE    flumazenil (ROMAZICON) 0.1 mg/mL injection 0.5 mg  0.5 mg IntraVENous ONCE    insulin lispro (HUMALOG) injection   SubCUTAneous AC&HS    glucose chewable tablet 16 g  4 Tab Oral PRN    dextrose (D50W) injection syrg 12.5-25 g  12.5-25 g IntraVENous PRN    glucagon (GLUCAGEN) injection 1 mg  1 mg IntraMUSCular PRN            Lab Review:     Recent Labs      04/07/17   0350  04/06/17   0436  04/05/17   1549   WBC  11.4*  10.8  17.4*   HGB  14.4  13.9  15.9   HCT  44.3  45.3  51.3*   PLT  228  210  248     Recent Labs      04/07/17   0350  04/06/17   0436  04/05/17   1625  04/05/17   1549   NA  142  146*   --   140   K  3.9  4.0   --   4.9   CL  106  111*   --   105   CO2  25  28   --   22   GLU  110*  104*   --   158*   BUN  16  19   --   21*   CREA  0.85  0.97   --   1.30   CA  8.6  7.7*   --   9.3   MG  2.0  2.0   --    --    PHOS   --   3.8   --    --    ALB   --    -- --   3.1*   SGOT   --    --    --   44*   ALT   --    --    --   106*   INR   --    --   1.0   --      No components found for: [place]         Assessment / Plan:   Pulmonary emboli ([facility] (4/5/2017) - likely 2/2 relative immobility though may have a hypercoagulable disorder considering family history   -f/u hypercoagulability studies   -continue therapeutic anticoagulation; now on Eliquis        PAF (paroxysmal atrial fibrillation) ([facility] (9/3/2016) - likely triggered by pulmonary emboli. S/p cardioversion   -continue dilt; now on PO   -continue Eliquis      Myasthenia gravis with exacerbation, adult form ([facility] (4/4/2017)  -continue home Mestinon  -PT/OT on board   -d/c with [facility]      History of stroke (4/5/2017) - in the setting of PAF argues in favor of anticoagulation especially in combination with PE   -on Eliquis       ARF (acute renal failure) ([facility] (4/5/2017) - resolved       Leukocytosis (4/5/2017) - ?reactive. ?CAP. Resolved   -complete course of azithromycin       Morbid obesity with BMI of 40.0-44.9, adult ([facility] (4/5/2017)  -counseled on weight loss       Hyperglycemia (4/5/2017) - no h/o DM.  A1c 7.2  -ISS   -BG checks AC TID and qHS   -diabetes educator   -start metformin at discharge; will need test strips and lancets at discharge     Total time spent with patient: 28 [address] [facility] discussed with: Patient and Nursing Staff    Discussed:  Code Status, Care Plan and D/C Planning    Prophylaxis:  Lovenox    Disposition:  Home w/Family           ___________________________________________________    Attending Physician: Jaswant Reed MD

## 2017-04-07 NOTE — PROGRESS NOTES
Bedside and Verbal shift change report given to The anila RN (oncoming nurse) by Shaila Soto RN (offgoing nurse). Report included the following information SBAR, Kardex, Intake/Output, MAR, Recent Results and Cardiac Rhythm Afib. Shift Summary:  During the morning the patient was fairly drowsy, on 4L NC. Hypertensive, and would desat intermittently. Pt was in a tachy Afib. The patient was transported down to cath lab for cardioversion, which was successful. Pt arrived back to unit in NSR, but fairly lethargic. Throughout the rest of the shift the patient became more alert. He was started on Cardizem 240 PO, and the dilt gtt was turned off. In the evening orders were received from Dr. Clare Rosa NP to transfer the patient to Telemetry. Bedside and Verbal shift change report given to Acacia Ramos RN (oncoming nurse) by The anila RN (offgoing nurse).  Report included the following information SBAR, Kardex, Procedure Summary, Intake/Output, MAR, Recent Results and Cardiac Rhythm NSr.

## 2017-04-07 NOTE — PROGRESS NOTES
Occupational Therapy Note:  Chart reviewed and spoke with nursing. Patient off the floor at this time for a cardioversion. Will continue to follow.     Radha Cordova, OTR/L

## 2017-04-07 NOTE — CDMP QUERY
Please clarify if this patient is being treated/managed for:    => Possible Community acquired Pneumonia POA treated with Azithromycin or   CAP ruled out   =>Other Explanation of clinical findings  =>Unable to Determine (no explanation of clinical findings)    The medical record reflects the following clinical findings, treatment, and risk factors:  Risk Factors: 74 yo M admitted with Bilateral PE's and Myasthenia Gravis exacerbation   Clinical Indicators:  Abnormal chest CT: RUL airspace disease may represent a pulmonary infarct   H/P states \" Leukocytosis (4/5/2017) - chronically elevated, likely due to chronic steroid use\"  4/6 PN states \" Leukocytosis (4/5/2017) - ?reactive. ?CAP. Resolved -complete course of azithromycin\"   Treatment: IV azithromycin 500 mg qd      Please clarify and document your clinical opinion in the progress notes and discharge summary including the definitive and/or presumptive diagnosis, (suspected or probable), related to the above clinical findings. Please include clinical findings supporting your diagnosis.     Thank you for your time   [facility] RN/BSN, [address] [place] [address]  Desk:   [phone]   Other:  [phone]

## 2017-04-07 NOTE — PROGRESS NOTES
TRANSFER - OUT REPORT:    Verbal report given to [address] on [employer].  being transferred to [facility] for routine progression of care       Report consisted of patients Situation, Background, Assessment and   Recommendations(SBAR). Information from the following report(s) SBAR was reviewed with the receiving nurse. Lines:   Peripheral IV 04/05/17 Left Antecubital (Active)   Site Assessment Clean, dry, & intact 4/7/2017  7:55 AM   Phlebitis Assessment 0 4/7/2017  7:55 AM   Infiltration Assessment 0 4/7/2017  7:55 AM   Dressing Status Clean, dry, & intact 4/7/2017  7:55 AM   Dressing Type Tape;Transparent 4/7/2017  7:55 AM   Hub Color/Line Status Green 4/7/2017  7:55 AM   Action Taken Blood drawn 4/5/2017  3:50 PM   Alcohol Cap Used Yes 4/7/2017  7:55 AM        Opportunity for questions and clarification was provided.       Patient transported with:   Registered Nurse

## 2017-04-07 NOTE — DIABETES MGMT
DTC Consult Note     POC Glucose last 24hrs:     Lab Results   Component Value Date/Time     (H) 04/07/2017 03:50 AM    GLUCPOC 154 (H) 04/07/2017 12:00 PM    GLUCPOC 93 04/07/2017 07:33 AM    GLUCPOC 126 (H) 04/06/2017 10:29 PM        Recommendations/ Comments: Pt's spouse was agreeable to suggestions, engaged during interview. Was given an opportunity to ask questions. Verbalized understanding of suggestions. Pt will require the following prescriptions at discharge:   1. Accu-chek Coco Plus Test Strips #100  2. Accu-chek FastClix Lancets (Drums) 100+2  -Brands are medically necessary      If appropriate, please consider Metformin as follows:   Please consider the following uptitration for the medical management of Type 2 Diabetes using Metformin:   Week 1: Metformin 500 mg with breakfast only  Week 2: Metformin 500 mg with breakfast; 500 mg with dinner   Week 3: Metformin 1000 mg with breakfast; 500 mg with dinner  Week 4: Metformin 1000 mg with breakfast; 1000 mg with dinner  -If gastrointestinal side effects appear as doses advanced, decrease to previous lower dose and try to advance the dose at a later time. Medical Management of Hyperglycemia in Type 2 Diabetes: A Consensus Algorithm for the Initiation and Adjustment of Therapy. Diabetes Care 31:111, 2008      Consult received from Lizeth Craft MD  for  []    Assessment of home management  []    Meal planning  []    Meter / Monitoring  [x]    New Diagnosis  []    Insulin education  []    Insulin pump notification  []    Medication recommendations  []    Hospital glucose management  []    Mackenzie Pablo  []    Outpatient Education - Information forwarded to [employer] [facility] who will follow-up with pt 1 week after discharge     Hospital (inpatient) medications:  1.  Correction Scale: Lispro (Humalog) Normal Sensitivity scale to cover for glucose > 139 mg/dL before meals and for glucose >199 at bedtime      Assessment / Plan: Chart reviewed and initial evaluation complete on An[employer]. Meeta Taylor is a 72 yo  male newly diagnosed with  DM type 2, per Dr. Mili Das MD's Medical Progress note dated 2017. A1C:   Lab Results   Component Value Date/Time    Hemoglobin A1c 7.2 2017 04:35 AM    Hemoglobin A1c 5.6 2016 07:59 PM       Assessed and provided patient verbal and/or written information on the following  · Diabetes: disease process   · Blood sugar goals   · Causes of high / low blood glucose and treatment  · Lab results: A1C - target   · Blood sugar monitoring  · Use of sliding scale / correction formula  · Sharps disposal  · Sick day guidelines  · Meal planning: currently, pt eats 6 meals a day- classified as snacks. Encouraged increase protein contact. Wife states pt is allergic to sugar. · Activity guidelines   · Foot care  · Chronic complications and Standards of Care  · Delayed healing      Encouraged the following:   · dietary modifications: eat 3 meals a day; protein with each meal  · regular blood sugar monitorin times daily     Provided patient with the following: [x]    Diabetes Self-Care Guide                [x]    Outpatient DTC contact number               [x]    Glucometer - Accu-chek Coco Connect               []    Insulin Education Guide               []    Carbohydrate Counting Guide               []    Sample meal plan                 []    Chair exercises guide               []    [employer] Reli-[employer] Product Catalog                Patient's wife was able to give return demonstration of [x]    glucometer, []    saline injection with []    no/ [x]    minimal assistance needed. []    Nurse to have patient self inject prior to discharge. Thank you.     Serenity Pike, Certified Diabetes Educator    [phone]

## 2017-04-07 NOTE — PROGRESS NOTES
[facility] PROGRESS NOTE  Pulmonary, Critical Care, and Sleep Medicine    Name: Suzan Mendoza. MRN: [id number]   : 1945 Hospital: [facility] [facility]   Date: 2017  Admission Date: 2017     Chart and notes reviewed. Data reviewed. I review the patient's current medications in the medical record at each encounter. I have evaluated and examined the patient. Overnight events reviewed:  Afebrile  HR 100s and irregular  Sats 95% on 4L  ECHO: EF 60%; mild pulm HTN, normal RVSF  BLE VD positive for LLE DVT      ROS: Had mild CP this morning, but improving. Denies SOB. Denies fever, chills, or cough. Denies abd pain or LE pain/swelling. Vital Signs:  Visit Vitals    BP (!) 151/109    Pulse (!) 108    Temp 97.2 °F (36.2 °C)    Resp 21    Ht 5' 11\" (1.803 m)    Wt 139.6 kg (307 lb 12.2 oz)    SpO2 95%    BMI 42.92 kg/m2       O2 Device: Nasal cannula   O2 Flow Rate (L/min): 4 l/min   Temp (24hrs), Av.6 °F (36.4 °C), Min:97.2 °F (36.2 °C), Max:98.5 °F (36.9 °C)       Intake/Output:   Last shift:         Last 3 shifts: 1901 -  0700  In: 5472 [P.O.:600; I.V.:3021]  Out: 935 [Urine:935]    Intake/Output Summary (Last 24 hours) at 17 0824  Last data filed at 17 0700   Gross per 24 hour   Intake           2339.5 ml   Output              935 ml   Net           1404.5 ml          Physical Exam:   General:  Alert, cooperative, no distress, appears stated age. Head:  Normocephalic, without obvious abnormality, atraumatic. Eyes:  Conjunctivae/corneas clear. Nose: Nares normal. Septum midline. Mucosa normal.   Throat: Lips, mucosa, and tongue normal. Class 4 airway. Neck: Thick, supple, symmetrical, trachea midline, no adenopathy. Lungs:   Clear to auscultation bilaterally. No wheeze or rales. Chest wall:  No tenderness or deformity. Heart:  Regular rate and rhythm, S1, S2 normal, no murmur, click, rub or gallop.    Abdomen: Obese, soft, non-tender. Bowel sounds normal. No masses,  No organomegaly. Extremities: Extremities normal, atraumatic, no cyanosis, mild LE edema. Pulses: 2+ and symmetric all extremities. Skin: Skin color, texture, turgor normal. No rashes or lesions   Lymph nodes: Cervical, supraclavicular nodes normal.   Neurologic: Grossly nonfocal     DATA:  MAR reviewed and pertinent medications noted or modified as needed    Labs:  Recent Labs      04/07/17   0350  04/06/17   0436  04/05/17   1549   WBC  11.4*  10.8  17.4*   HGB  14.4  13.9  15.9   HCT  44.3  45.3  51.3*   PLT  228  210  248     Recent Labs      04/07/17   0350  04/06/17   0436  04/05/17   1625  04/05/17   1549   NA  142  146*   --   140   K  3.9  4.0   --   4.9   CL  106  111*   --   105   CO2  25  28   --   22   GLU  110*  104*   --   158*   BUN  16  19   --   21*   CREA  0.85  0.97   --   1.30   CA  8.6  7.7*   --   9.3   MG  2.0  2.0   --    --    PHOS   --   3.8   --    --    ALB   --    --    --   3.1*   TBILI   --    --    --   0.5   SGOT   --    --    --   44*   ALT   --    --    --   106*   INR   --    --   1.0   --        Imaging:  No new images this morning. IMPRESSION:   · Acute Hypoxic Respiratory Failure  · Bilateral Central Pulmonary Emboli  · LLE DVT  · Abnormal Chest CT: with pleural based RUL nodular density likely representing a pulmonary infarct  · Afib with RVR  · Snoring: with probable underlying KWADWO  · Myasthenia Gravis  · Leukocytosis: on chronic steroids   · Morbid Obesity  · Family H/O DVT: in Mother and 2 Sisters      PLAN:   · Wean O2 as able to keep sats > 90%; check 6MWT prior to discharge. Not on home O2 at baseline. · Switch Lovenox to Eliquis; discussed with Cardiology.   Start with 10 mg BID for 7 days, then 5 mg BID.    · F/U hypercoaguable panel  · Rate control per Cardiology recs with po diltiazem  · ANTWAN/DC Cardioversion planned for today  · Will need outpatient sleep eval/study  · GI prophylaxis: not indicated         Daija Man [place]

## 2017-04-07 NOTE — PROGRESS NOTES
If a NOAC is decided upon today,please let case management know so coupon for one free month can be given to pt. And his sister. Pt has medicare & medicaid for prescription coverage so medication coverage for a NOAC will be affordable . When discharged,pt will be opened to [facility] for skilled nursing and home PT. When discharged,pt will follow-up with Dr Bettie Kumar for management of his myasthenia gravis and his PCP, [facility]. The plan is to start IG infusions through Dr Alba Julien office next week.     Ward Fuentes

## 2017-04-07 NOTE — PROGRESS NOTES
Spoke with SARAH Hancock at [phone] who said lower extremity duplex was ok to do in the morning.

## 2017-04-07 NOTE — PROGRESS NOTES
I discussed pt with cardiology NP and pulmonology PA. Pt is being started on eliquis. I left a coupon in pt.'s room and explained to pt that I will come back to explain coupon to his sister when she arrives @ hospital.    When discharged,to insure that pt can use the one coupon for one free month of eliquis,attending please provide pt with two separate scripts as pt is on the eliquis PE protocol.(eliquis 10 mg po BID x7 days and a separate script for the eliquis 5 mg as prescribed by physician. The plan today is for a ANTWAN/cardioversion. If discharged over the weekend,nursing please notify [facility] regarding discharge by calling [phone]. Please fax the AVS and discharge summary to [phone] if discharged this weekend. Thank you.   Joseph Osuna

## 2017-04-08 NOTE — PROGRESS NOTES
Cardiology Progress Note [facility]         NAME:  Lyndsey Gil. :   1945   MRN:   [id number]     Assessment/Plan:   AFib- new onset RVR - recurrent post DCCV - trigger PE's  - substrate (morbid obesity, likely KWADWO, HTN)  - [id number] Score 4 placing him at 6.5 % annual, risk of embolic stroke. Recommend Eliquis 5mg BID for long term NOAC  - optimize rate control. Would not pursue rhythm control at this juncture. Will add carvedilol  Bilateral PE's/DVT - anticoagulation  HTN:  BP OK- multi med   Morbid obesity  Poor functional capacity due to myasthenia  Likely severe KWADWO  New onset DM    Care Coordination: 35 minutes spent reviewing data, dx, treatment w/ pt and family, coordinating care with team including care management, and  arranging testing. Subjective:   Tino Dumont. is a 73y.o. year old male w/ hx:  HTN cx by  Myasthenia Gravis, CVA, morbid obesity who PW  symptoms of palpitations, dyspnea and chest tightness. Symptoms started today. No aggravating or relieving factors. No prior cardiac history. Had (-) stress test number of years ago.        CTA : bilateral PEs  A1C 7.2 proBNP 759  Trop (-)     Overnight - recurrent AF with RVR      Cardiac ROS:  Denies elizabeth palpitations. Chronic class 3-4 dyspnea- no home O2. Patient denies any exertional chest pain, palpitations, syncope, orthopnea, edema or paroxysmal nocturnal dyspnea. Review of Systems: chronic fatigue, weakness, No nausea, indigestion, vomiting, pain, cough, sputum. No bleeding. Taking po. Appetite fair   CARDIAC EVALUATION   ECHO 2016:EF 65%, no WMA, mild TR, RVSP 41 mmHg  ECHO 17: EF 60% No WMA.  Mild MR, Trivial TR, PASP 43mmgHg, mild pulm HTN    Objective:     Visit Vitals    BP (!) 143/91    Pulse (!) 116    Temp 98.5 °F (36.9 °C)    Resp 17    Ht 5' 11\" (1.803 m)    Wt 307 lb 12.2 oz (139.6 kg)    SpO2 99%    BMI 42.92 kg/m2      O2 Flow Rate (L/min): 4 l/min O2 Device: Nasal cannula    Temp (24hrs), Av °F (36.7 °C), Min:96.7 °F (35.9 °C), Max:98.7 °F (37.1 °C)      Intake/Output Summary (Last 24 hours) at 17 1038  Last data filed at 17 0800   Gross per 24 hour   Intake           242.83 ml   Output             1975 ml   Net         -1732.17 ml       TELE: afib rate 120s  General: AAOx3 cooperative, no acute distress. HEENT: Atraumatic. Pink and dry. Anicteric sclerae. Neck: Supple     Lungs: 3 lpm nc, decreased throughout No wheezing/rhonchi/crackles. Heart: PMI: unable to palpate, HS distant, Irregular rhythm, difficult to appreciate  murmur, no S3, no rubs, no gallops. Difficult to assess  JVD. No carotid bruits. Abdomen: Soft, non-distended, non-tender. + Bowel sounds. No bruits. : voiding   Extremities: general edema, no clubbing, no cyanosis. No calf tenderness  Neurologic: Grossly intact. Alert and oriented X 3. No acute neurological distress. Psych: Good insight. Not anxious nor agitated.     Care Plan discussed with:    Comments   Patient y    Family      RN y    Care Manager y                   Consultant:  y pulm        Data Review:   CMP:   Lab Results   Component Value Date/Time     2017 04:43 AM    K 3.9 2017 04:43 AM     2017 04:43 AM    CO2 29 2017 04:43 AM    AGAP 8 2017 04:43 AM    GLU 91 2017 04:43 AM    BUN 12 2017 04:43 AM    CREA 0.81 2017 04:43 AM    GFRAA >60 2017 04:43 AM    GFRNA >60 2017 04:43 AM    CA 8.8 2017 04:43 AM    MG 2.1 2017 04:43 AM     CBC:   Lab Results   Component Value Date/Time    WBC 10.8 2017 04:43 AM    HGB 14.4 2017 04:43 AM    HCT 44.6 2017 04:43 AM     2017 04:43 AM     All Cardiac Markers in the last 24 hours:   No results found for: CPK, CKMMB, CKMB, RCK3, CKMBT, CKNDX, CKND1, SUJEY, TROPT, TROIQ, GALLITO, TROPT, TNIPOC, BNP, BNPP  Recent Glucose Results:   Lab Results   Component Value Date/Time    GLUCPOC 92 04/08/2017 07:40 AM    GLUCPOC 131 (H) 04/07/2017 08:54 PM    GLUCPOC 176 (H) 04/07/2017 04:50 PM    GLU 91 04/08/2017 04:43 AM     ABG: No results found for: PH, PHI, PCO2, PCO2I, PO2, PO2I, HCO3, HCO3I, FIO2, FIO2I  LIPIDS:  Cholesterol, total   Date Value Ref Range Status   09/03/2016 168 <200 MG/DL Final     Triglyceride   Date Value Ref Range Status   09/03/2016 198 (H) <150 MG/DL Final     Comment:     Based on NCEP-ATP III:  Triglycerides <150 mg/dL  is considered normal, 150-199 mg/dL  borderline high,  200-499 mg/dL high and  greater than or equal to 500 mg/dL very high. HDL Cholesterol   Date Value Ref Range Status   09/03/2016 41 MG/DL Final     Comment:     Based on NCEP ATP III, HDL Cholesterol <40 mg/dL is considered low and >60 mg/dL is elevated.      LDL, calculated   Date Value Ref Range Status   09/03/2016 87.4 0 - 100 MG/DL Final     Comment:     Based on the NCEP-ATP: LDL-C concentrations are considered  optimal <100 mg/dL,  near optimal/above Normal 100-129 mg/dL  Borderline High: 130-159, High: 160-189 mg/dL  Very High: Greater than or equal to 190 mg/dL       VLDL, calculated   Date Value Ref Range Status   09/03/2016 39.6 MG/DL Final     CHOL/HDL Ratio   Date Value Ref Range Status   09/03/2016 4.1 0 - 5.0   Final       Medications reviewed  Current Facility-Administered Medications   Medication Dose Route Frequency    dilTIAZem CD (CARDIZEM CD) capsule 300 mg  300 mg Oral DAILY    apixaban (ELIQUIS) tablet 10 mg  10 mg Oral BID    sodium chloride (NS) flush 5-10 mL  5-10 mL IntraVENous Q8H    sodium chloride (NS) flush 5-10 mL  5-10 mL IntraVENous PRN    acetaminophen (TYLENOL) tablet 650 mg  650 mg Oral Q4H PRN    HYDROmorphone (PF) (DILAUDID) injection 0.5 mg  0.5 mg IntraVENous Q4H PRN    zolpidem (AMBIEN) tablet 5 mg  5 mg Oral QHS PRN    ALPRAZolam (XANAX) tablet 0.25 mg  0.25 mg Oral TID PRN    [START ON 4/9/2017] cloNIDine (CATAPRES) 0.2 mg/24 hr patch 1 Patch  1 Patch TransDERmal Q7D    predniSONE (DELTASONE) tablet 50 mg  50 mg Oral DAILY WITH BREAKFAST    pyridostigmine (MESTINON) tablet 60 mg  60 mg Oral BID    traMADol (ULTRAM) tablet 50 mg  50 mg Oral Q6H PRN    prochlorperazine (COMPAZINE) injection 5 mg  5 mg IntraVENous Q6H PRN    insulin lispro (HUMALOG) injection   SubCUTAneous AC&HS    glucose chewable tablet 16 g  4 Tab Oral PRN    dextrose (D50W) injection syrg 12.5-25 g  12.5-25 g IntraVENous PRN    glucagon (GLUCAGEN) injection 1 mg  1 mg IntraMUSCular PRN

## 2017-04-08 NOTE — PROGRESS NOTES
1900-REport received from offgoing nurse. Care assumed. Pt. Remains in SR on cardiac monitor in the 80's  2100-Pt. With sleep apnea. Desatting into the low 80's. O2 via NC increased to 4 liters. Pt. Is a mouth breather. NC placed in pts' open mouth. 0410-Called telemetry to notify of conversion back into a-fib et requested printed strip. Rate in the low 100's  0415-Pt. s' vitals obtained with mc=920/94 et heart rate @ 106.  0420-Pt. Denies any complaints. 0425-Dr. Hernandez notified et mag level added to a.m. Labs  0435-Labs drawn et sent. 0448-Suzette Ferrara notified. No new orders at this time. 0700-Pt. Remains in a-fib/flutter @ 100 bpm. Report given to oncoming nurse. Care relinquished at this time.

## 2017-04-08 NOTE — PROGRESS NOTES
Primary Nurse Hair Thacker RN and Stephanie Dee RN performed a dual skin assessment on this patient No impairment noted  Srinivas score is 20

## 2017-04-08 NOTE — PROGRESS NOTES
[facility] PROGRESS NOTE  Pulmonary, Critical Care, and Sleep Medicine    Name: Consuelo Boyle MRN: [id number]   : 1945 Hospital: [address] [facility]   Date: 2017  Admission Date: 2017     Chart and notes reviewed. Data reviewed. I review the patient's current medications in the medical record at each encounter. I have evaluated and examined the patient. Overnight events reviewed:  Afebrile overnight  Underwent cardioversion successfully yesterday, but went back into rapid Afib early this am  --received 300 mg PO cardizem, but rate remains in the 120s  --being followed by Cardiology  Also was noted to have nocturnal desaturations and heavy snoring suggestive of underlying KWADWO  Sats stable on 4L NC  On Eliquis for anticoagulation as he has extensive bilateral PE and LLE DVT without evidence of right heart strain on ECHO  Tolerating PO diet    ROS: No CP. Denies SOB. Denies fever, chills, or cough. Denies abd pain or LE pain/swelling. Vital Signs:  Visit Vitals    BP (!) 143/91    Pulse (!) 116    Temp 98.5 °F (36.9 °C)    Resp 17    Ht 5' 11\" (1.803 m)    Wt 139.6 kg (307 lb 12.2 oz)    SpO2 99%    BMI 42.92 kg/m2       O2 Device: Nasal cannula   O2 Flow Rate (L/min): 4 l/min   Temp (24hrs), Av °F (36.7 °C), Min:96.7 °F (35.9 °C), Max:98.7 °F (37.1 °C)       Intake/Output:   Last shift:      701 - 1900  In: 180 [P.O.:180]  Out: 250 [Urine:250]  Last 3 shifts: 1901 -  07  In: 1160.8 [I.V.:1160.8]  Out: 1113 [Urine:2860]    Intake/Output Summary (Last 24 hours) at 17 1034  Last data filed at 17 0800   Gross per 24 hour   Intake           242.83 ml   Output             1975 ml   Net         -1732.17 ml          Physical Exam:   General:  Alert, cooperative, no distress, appears stated age. Head:  Normocephalic, without obvious abnormality, atraumatic. Eyes:  Conjunctivae/corneas clear.    Nose: Nares normal. Septum midline. Mucosa normal.   Throat: Lips, mucosa, and tongue normal. Class 4 airway. Neck: Thick, supple, symmetrical, trachea midline, no adenopathy. Lungs:   Clear to auscultation bilaterally. No wheeze or rales. Chest wall:  No tenderness or deformity. Heart:  Irregularly irregular rate and rhythm, S1, S2 normal, no murmur, click, rub or gallop. Abdomen:   Obese, soft, non-tender. Bowel sounds normal. No masses,  No organomegaly. Extremities: Extremities normal, atraumatic, no cyanosis, mild LE edema. Pulses: 2+ and symmetric all extremities. Skin: Skin color, texture, turgor normal. No rashes or lesions   Lymph nodes: Cervical, supraclavicular nodes normal.   Neurologic: Grossly nonfocal     DATA:  MAR reviewed and pertinent medications noted or modified as needed    Labs:  Recent Labs      04/08/17   0443  04/07/17   0350  04/06/17   0436   WBC  10.8  11.4*  10.8   HGB  14.4  14.4  13.9   HCT  44.6  44.3  45.3   PLT  215  228  210     Recent Labs      04/08/17   0443  04/07/17   0350  04/06/17   0436  04/05/17   1625  04/05/17   1549   NA  142  142  146*   --   140   K  3.9  3.9  4.0   --   4.9   CL  105  106  111*   --   105   CO2  29  25  28   --   22   GLU  91  110*  104*   --   158*   BUN  12  16  19   --   21*   CREA  0.81  0.85  0.97   --   1.30   CA  8.8  8.6  7.7*   --   9.3   MG  2.1  2.0  2.0   --    --    PHOS   --    --   3.8   --    --    ALB   --    --    --    --   3.1*   TBILI   --    --    --    --   0.5   SGOT   --    --    --    --   44*   ALT   --    --    --    --   106*   INR   --    --    --   1.0   --        Imaging:  Images and reports personally reviewed. CTA Chest: Bilateral pulmonary emboli.  RUL airspace disease may represent a pulmonary infarct    BLE VD: LLE acute DVT+    IMPRESSION:   · Acute Hypoxic Respiratory Failure  · Bilateral Central Pulmonary Emboli: no right heart strain noted on ECHO  · Acute LLE DVT  · Abnormal Chest CT: with pleural based RUL nodular density likely representing a pulmonary infarct  · Afib with RVR: s/p failed DC Cardioversion x 1: being driven by bilateral PE and likely KWADWO  · Snoring with witnessed nocturnal desaturations: with likely underlying KWADWO  · Myasthenia Gravis  · Leukocytosis: on chronic steroids   · Morbid Obesity  · Family H/O DVT: in Mother and 2 Sisters      PLAN:   · Wean O2 as able to keep sats > 90%; check 6MWT prior to discharge. Not on home O2 at baseline.   · Continue Eliquis for minimum of 3 months: may require lifelong anticoagulation depending upon hypercoag workup results  · F/U hypercoaguable panel  · Rate control per Cardiology recs  · Will need outpatient sleep eval/study  · GI prophylaxis: not indicated  · Dispo: keep in MD Deepika, [facility]  [facility]

## 2017-04-08 NOTE — PROGRESS NOTES
SHIFT REPORT:  1900- Bedside shift change report given to Clarissa Sotelo RN (oncoming nurse) by Alonso Morrison RN (offgoing nurse). Report included the following information SBAR, Kardex, Procedure Summary, Intake/Output, Recent Results and Cardiac Rhythm. SHIFT SUMMARY:  0300-PO Tramadol given for mod headache; venous blood drawn for AM labs  0530-IV Dilaudid and po xanax for cont headache. END OF SHIFT REPORT:  0700-Bedside shift change report given to Maria Del Carmen Clarke RN (oncoming nurse) by Clarissa Sotelo RN (offgoing nurse). Report included the following information SBAR, Kardex, Procedure Summary, Intake/Output, Recent Results and Cardiac Rhythm .

## 2017-04-08 NOTE — PROGRESS NOTES
Problem: Falls - Risk of  Goal: *Absence of falls  Outcome: Progressing Towards Goal  Pt bed is locked and in low position, call bell and personal belongings within reach, instructed to call for help as needed, non slip socks in place, hourly rounding  Goal: *Knowledge of fall prevention  Outcome: Progressing Towards Goal  Pt instructed on use of the call bell and stated understanding

## 2017-04-08 NOTE — PROGRESS NOTES
Shift Summary  4/8/2017      Pt transferred to [facility] at 1600. Afib, rate 105 at time of transfer. Pt has no complaints or concerns since I assumed care. Evening meds given without difficulty, blood glucose covered with correctional scale humalog per order. Bedside and Verbal shift change report given to Pru (oncoming nurse) by Rafaela Vu (offgoing nurse). Report included the following information SBAR, Kardex, Intake/Output, MAR, Accordion, Recent Results and Cardiac Rhythm Afib.

## 2017-04-08 NOTE — PROGRESS NOTES
Occupational Therapy  Attempted to see for evaluation, however RN request to hold at this time. Noted patient in A-fib/flutter after cardioversion yesterday. Will follow up on Monday.    Moni Osullivan OTR/L

## 2017-04-08 NOTE — PROGRESS NOTES
0700 Bedside and Verbal shift change report given to Moiz Bui RN (oncoming nurse) by Kerry Neal RN (offgoing nurse). Report included the following information SBAR, Kardex, ED Summary, Procedure Summary, Intake/Output, MAR, Recent Results and Cardiac Rhythm afib.

## 2017-04-08 NOTE — PROGRESS NOTES
Physical Therapy:  Patient unable to be evaluated yesterday due to off the floor for cardioversion. Patient today continues to be in a-fib/flutter, nursing requesting deferral.   We will continue to follow and re-attempt tomorrow.   Thank you  Armin Honeycutt PT,DPT

## 2017-04-08 NOTE — PROGRESS NOTES
Alvino Keita [facility]  [address] [facility], [address] [place] [address]  [phone]      Medical Progress Note      NAME: Dick Brown. :  1945  MRM:  [id number]    Date/Time: 2017  1:25 PM         Subjective:     Chief Complaint:  \"I'm okay\"     Pt now back in a-fib despite cardioversion. Denies CP or worsening SOB     ROS:  (bold if positive, if negative)           Objective:       Vitals:          Last 24hrs VS reviewed since prior progress note. Most recent are:    Visit Vitals    BP (!) 133/99    Pulse (!) 112    Temp 96.7 °F (35.9 °C)    Resp 19    Ht 5' 11\" (1.803 m)    Wt 139.6 kg (307 lb 12.2 oz)    SpO2 99%    BMI 42.92 kg/m2     SpO2 Readings from Last 6 Encounters:   17 99%   17 96%   17 96%   16 97%   10/21/16 98%   16 94%    O2 Flow Rate (L/min): 4 l/min       Intake/Output Summary (Last 24 hours) at 17 1352  Last data filed at 17 1200   Gross per 24 hour   Intake           422.83 ml   Output             2075 ml   Net         -1652.17 ml          Exam:     Physical Exam:    Gen:  Well-developed, well-nourished, in no acute distress  HEENT:  Pink conjunctivae, PERRL, hearing intact to voice, moist mucous membranes  Neck:  Supple, without masses, thyroid non-tender  Resp:  No accessory muscle use, clear breath sounds without wheezes rales or rhonchi  Card: Irregularly irregular. HR 80-90's. 1+ pitting edema in the LLE   Abd: Obese ab. Distended.  Non-tender   Musc:  No cyanosis or clubbing  Skin:  No rashes or ulcers, skin turgor is good  Neuro:  Cranial nerves 3-12 are grossly intact,  strength is 5/5 bilaterally and dorsi / plantarflexion is 5/5 bilaterally, follows commands appropriately  Psych:  Good insight, oriented to person, place and time, alert    Medications Reviewed: (see below)    Lab Data Reviewed: (see below)    ______________________________________________________________________    Medications: Current Facility-Administered Medications   Medication Dose Route Frequency    dilTIAZem CD (CARDIZEM CD) capsule 300 mg  300 mg Oral DAILY    carvedilol (COREG) tablet 12.5 mg  12.5 mg Oral BID WITH MEALS    docusate sodium (COLACE) capsule 100 mg  100 mg Oral DAILY    magnesium hydroxide (MILK OF MAGNESIA) 400 mg/5 mL oral suspension 30 mL  30 mL Oral DAILY    apixaban (ELIQUIS) tablet 10 mg  10 mg Oral BID    sodium chloride (NS) flush 5-10 mL  5-10 mL IntraVENous Q8H    sodium chloride (NS) flush 5-10 mL  5-10 mL IntraVENous PRN    acetaminophen (TYLENOL) tablet 650 mg  650 mg Oral Q4H PRN    HYDROmorphone (PF) (DILAUDID) injection 0.5 mg  0.5 mg IntraVENous Q4H PRN    zolpidem (AMBIEN) tablet 5 mg  5 mg Oral QHS PRN    ALPRAZolam (XANAX) tablet 0.25 mg  0.25 mg Oral TID PRN    [START ON 4/9/2017] cloNIDine (CATAPRES) 0.2 mg/24 hr patch 1 Patch  1 Patch TransDERmal Q7D    predniSONE (DELTASONE) tablet 50 mg  50 mg Oral DAILY WITH BREAKFAST    pyridostigmine (MESTINON) tablet 60 mg  60 mg Oral BID    traMADol (ULTRAM) tablet 50 mg  50 mg Oral Q6H PRN    prochlorperazine (COMPAZINE) injection 5 mg  5 mg IntraVENous Q6H PRN    insulin lispro (HUMALOG) injection   SubCUTAneous AC&HS    glucose chewable tablet 16 g  4 Tab Oral PRN    dextrose (D50W) injection syrg 12.5-25 g  12.5-25 g IntraVENous PRN    glucagon (GLUCAGEN) injection 1 mg  1 mg IntraMUSCular PRN            Lab Review:     Recent Labs      04/08/17   0443  04/07/17   0350  04/06/17   0436   WBC  10.8  11.4*  10.8   HGB  14.4  14.4  13.9   HCT  44.6  44.3  45.3   PLT  215  228  210     Recent Labs      04/08/17   0443  04/07/17   0350  04/06/17   0436  04/05/17   1625  04/05/17   1549   NA  142  142  146*   --   140   K  3.9  3.9  4.0   --   4.9   CL  105  106  111*   --   105   CO2  29  25  28   --   22   GLU  91  110*  104*   --   158*   BUN  12  16  19   --   21*   CREA  0.81  0.85  0.97   --   1.30   CA  8.8  8.6  7.7*   -- 9.3   MG  2.1  2.0  2.0   --    --    PHOS   --    --   3.8   --    --    ALB   --    --    --    --   3.1*   SGOT   --    --    --    --   44*   ALT   --    --    --    --   106*   INR   --    --    --   1.0   --      No components found for: [place]         Assessment / Plan:   Pulmonary emboli ([facility] (4/5/2017) - likely 2/2 relative immobility though may have a hypercoagulable disorder considering family history   -f/u hypercoagulability studies   -continue Eliquis        PAF (paroxysmal atrial fibrillation) ([facility] (9/3/2016) - likely triggered by pulmonary emboli. S/p cardioversion   -continue PO dilt; increased to 300mg   -started on coreg by cardiology   -continue Eliquis      Myasthenia gravis with exacerbation, adult form ([facility] (4/4/2017)  -continue home Mestinon  -PT/OT on board   -d/c with [facility]      History of stroke (4/5/2017) - in the setting of PAF argues in favor of anticoagulation especially in combination with PE   -on Eliquis       ARF (acute renal failure) ([facility] (4/5/2017) - resolved       Leukocytosis (4/5/2017) - ?reactive. ?CAP. Resolved   -complete course of azithromycin       Morbid obesity with BMI of 40.0-44.9, adult ([facility] (4/5/2017)  -counseled on weight loss       Hyperglycemia (4/5/2017) - no h/o DM.  A1c 7.2  -ISS   -BG checks AC TID and qHS   -diabetes educator   -start metformin at discharge; will need test strips and lancets at discharge     Total time spent with patient: 28 [address] discussed with: Patient and Nursing Staff, Sister     Discussed:  Code Status, Care Plan and D/C Planning    Prophylaxis:  Lovenox    Disposition:  Home w/Family           ___________________________________________________    Attending Physician: Doni Earl MD

## 2017-04-09 NOTE — DISCHARGE INSTRUCTIONS
HOSPITALIST DISCHARGE INSTRUCTIONS  NAME: Sailaja Infante :  1945   MRN:  [id number]     Date/Time:  2017 11:26 AM    ADMIT DATE: 2017     DISCHARGE DATE: 2017     ADMITTING DIAGNOSIS:  Atrial fibrillation with RVR     DISCHARGE DIAGNOSIS:  As above     MEDICATIONS:  PLEASE NOTE THE CHANGE IN YOUR BLOOD THINNER. YOU WILL BE ON A HIGHER DOSE FOR THE FIRST FEW DAYS BUT THEN WILL NEED TO DROP DOWN TO A LOWER DOSE AS PER THE PRESCRIPTION   · It is important that you take the medication exactly as they are prescribed. · Keep your medication in the bottles provided by the pharmacist and keep a list of the medication names, dosages, and times to be taken in your wallet. · Do not take other medications without consulting your doctor. Pain Management: per above medications    What to do at Home    Recommended diet:  Regular Diet    Recommended activity: Activity as tolerated    If you experience any of the following symptoms then please call your primary care physician or return to the emergency room if you cannot get hold of your doctor:  Fever, chills, nausea, vomiting, diarrhea, change in mentation, falling, bleeding, shortness of breath, chest pain     Follow Up:  Dr. Krnual Wilburn MD  you are to call and set up an appointment to see them in 2 weeks. Follow-up Information     Follow up With Details Comments Contact Info    AT [address] [place] [address] John Myers MD On 2017 2:20 PM  [address] [place] [address]      Lis Rucker [address]  [phone]            Information obtained by :  I understand that if any problems occur once I am at home I am to contact my physician. I understand and acknowledge receipt of the instructions indicated above. Physician's or R.N.'s Signature                                                                  Date/Time                                                                                                                                              Patient or Representative Signature                                                          Date/Time     Atrial Fibrillation: Care Instructions  Your Care Instructions    Atrial fibrillation is an irregular and often fast heartbeat. Treating this condition is important for several reasons. It can cause blood clots, which can travel from your heart to your brain and cause a stroke. If you have a fast heartbeat, you may feel lightheaded, dizzy, and weak. An irregular heartbeat can also increase your risk for heart failure. Atrial fibrillation is often the result of another heart condition, such as high blood pressure or coronary artery disease. Making changes to improve your heart condition will help you stay healthy and active. Follow-up care is a key part of your treatment and safety. Be sure to make and go to all appointments, and call your doctor if you are having problems. It's also a good idea to know your test results and keep a list of the medicines you take. How can you care for yourself at home? Medicines  · Take your medicines exactly as prescribed. Call your doctor if you think you are having a problem with your medicine. You will get more details on the specific medicines your doctor prescribes. · If your doctor has given you a blood thinner to prevent a stroke, be sure you get instructions about how to take your medicine safely. Blood thinners can cause serious bleeding problems. · Do not take any vitamins, over-the-counter drugs, or herbal products without talking to your doctor first.  Lifestyle changes  · Do not smoke. Smoking can increase your chance of a stroke and heart attack.  If you need help quitting, talk to your doctor about stop-smoking programs and medicines. These can increase your chances of quitting for good. · Eat a heart-healthy diet. · Stay at a healthy weight. Lose weight if you need to. · Limit alcohol to 2 drinks a day for men and 1 drink a day for women. Too much alcohol can cause health problems. · Avoid colds and flu. Get a pneumococcal vaccine shot. If you have had one before, ask your doctor whether you need another dose. Get a flu shot every year. If you must be around people with colds or flu, wash your hands often. Activity  · If your doctor recommends it, get more exercise. Walking is a good choice. Bit by bit, increase the amount you walk every day. Try for at least 30 minutes on most days of the week. You also may want to swim, bike, or do other activities. Your doctor may suggest that you join a cardiac rehabilitation program so that you can have help increasing your physical activity safely. · Start light exercise if your doctor says it is okay. Even a small amount will help you get stronger, have more energy, and manage stress. Walking is an easy way to get exercise. Start out by walking a little more than you did in the hospital. Gradually increase the amount you walk. · When you exercise, watch for signs that your heart is working too hard. You are pushing too hard if you cannot talk while you are exercising. If you become short of breath or dizzy or have chest pain, sit down and rest immediately. · Check your pulse regularly. Place two fingers on the artery at the palm side of your wrist, in line with your thumb. If your heartbeat seems uneven or fast, talk to your doctor. When should you call for help? Call 911 anytime you think you may need emergency care. For example, call if:  · You have symptoms of a heart attack. These may include:  ¨ Chest pain or pressure, or a strange feeling in the chest.  ¨ Sweating. ¨ Shortness of breath. ¨ Nausea or vomiting.   ¨ Pain, pressure, or a strange feeling in the back, neck, jaw, or upper belly or in one or both shoulders or arms. ¨ Lightheadedness or sudden weakness. ¨ A fast or irregular heartbeat. After you call 911, the  may tell you to chew 1 adult-strength or 2 to 4 low-dose aspirin. Wait for an ambulance. Do not try to drive yourself. · You have symptoms of a stroke. These may include:  ¨ Sudden numbness, tingling, weakness, or loss of movement in your face, arm, or leg, especially on only one side of your body. ¨ Sudden vision changes. ¨ Sudden trouble speaking. ¨ Sudden confusion or trouble understanding simple statements. ¨ Sudden problems with walking or balance. ¨ A sudden, severe headache that is different from past headaches. · You passed out (lost consciousness). Call your doctor now or seek immediate medical care if:  · You have new or increased shortness of breath. · You feel dizzy or lightheaded, or you feel like you may faint. · Your heart rate becomes irregular. · You can feel your heart flutter in your chest or skip heartbeats. Tell your doctor if these symptoms are new or worse. Watch closely for changes in your health, and be sure to contact your doctor if you have any problems. Where can you learn more? Go to http://palmer-kendall.info/. Enter U020 in the search box to learn more about \"Atrial Fibrillation: Care Instructions. \"  Current as of: January 27, 2016  Content Version: 11.2  © 8461-5177 [employer]. Care instructions adapted under license by [employer] (which disclaims liability or warranty for this information). If you have questions about a medical condition or this instruction, always ask your healthcare professional. [place] [address] any warranty or liability for your use of this information. Metformin (By mouth)   Metformin Hydrochloride (met-FOR-min lenka-droe-KLOR-ann)  Treats type 2 diabetes.    Brand Name(s):Fortamet, Glucophage, Glucophage XR, Glumetza, Riomet   There may be other brand names for this medicine. When This Medicine Should Not Be Used: This medicine is not right for everyone. Do not use if you had an allergic reaction to metformin, or if you have severe kidney problems or metabolic acidosis. How to Use This Medicine:   Liquid, Tablet, Long Acting Tablet  · Take your medicine as directed. Your dose may need to be changed several times to find what works best for you. · It is best to take this medicine with food or milk. · Swallow the extended-release tablet whole. Do not crush, break, or chew it. Tell your doctor if you have trouble swallowing the tablets whole. · Measure the oral liquid medicine with a marked measuring spoon, oral syringe, or medicine cup. · Read and follow the patient instructions that come with this medicine. Talk to your doctor or pharmacist if you have any questions. · Missed dose: Take a dose as soon as you remember. If it is almost time for your next dose, wait until then and take a regular dose. Do not take extra medicine to make up for a missed dose. · Store the medicine in a closed container at room temperature, away from heat, moisture, and direct light. Drugs and Foods to Avoid:   Ask your doctor or pharmacist before using any other medicine, including over-the-counter medicines, vitamins, and herbal products. · Some medicines can affect how metformin works.  Tell your doctor if you are using any of the following:  ¨ Acetazolamide  ¨ Dichlorphenamide  ¨ Diuretics (water pills)  ¨ Estrogen or birth control pills  ¨ Heart or blood pressure medicine  ¨ Isoniazid  ¨ Nicotinic acid  ¨ Phenothiazine medicine  ¨ Phenytoin  ¨ Steroid medicine  ¨ Thyroid medicine  ¨ Topiramate  ¨ Zonisamide  Warnings While Using This Medicine:   · Tell your doctor if you are pregnant or breastfeeding, or if you have heart or blood vessel disease, heart failure, blood circulation problems, kidney disease, liver disease, anemia, an adrenal gland or pituitary gland disorder, or a vitamin B12 deficiency. Tell your doctor if you had a heart attack. Tell your doctor if you drink alcohol. · Too much of this medicine can cause a rare, but serious condition called lactic acidosis. · Part of the extended-release tablet may pass in your stool. This is normal.  · Make sure any doctor or dentist who treats you knows that you are using this medicine. You may need to stop using this medicine before you have surgery, an x-ray, CT scan, or other medical test.  · Your doctor will do lab tests at regular visits to check on the effects of this medicine. Keep all appointments. · Keep all medicine out of the reach of children. Never share your medicine with anyone. Possible Side Effects While Using This Medicine:   Call your doctor right away if you notice any of these side effects:  · Allergic reaction: Itching or hives, swelling in your face or hands, swelling or tingling in your mouth or throat, chest tightness, trouble breathing  · Confusion, fast heartbeat, increased hunger, shakiness  · Fever or chills  · Stomach pain, nausea, vomiting, muscle pain or cramping  · Trouble breathing, slow heartbeat, lightheadedness, dizziness  · Unusual tiredness or weakness  If you notice these less serious side effects, talk with your doctor:   · Diarrhea, gas, nausea  If you notice other side effects that you think are caused by this medicine, tell your doctor. Call your doctor for medical advice about side effects. You may report side effects to FDA at 4-373-OJY-9870  © 2016 [address] is for End User's use only and may not be sold, redistributed or otherwise used for commercial purposes. The above information is an  only. It is not intended as medical advice for individual conditions or treatments.  Talk to your doctor, nurse or pharmacist before following any medical regimen to see if it is safe and effective for you. Apixaban (By mouth)   Apixaban (a-PIX-a-ban)  Treats and prevents blood clots. This medicine is a blood thinner. Brand Name(s):Eliquis   There may be other brand names for this medicine. When This Medicine Should Not Be Used: This medicine is not right for everyone. Do not use it if you had an allergic reaction to apixaban or you have active bleeding. How to Use This Medicine:   Tablet  · Your doctor will tell you how much medicine to use. Do not use more than directed. · This medicine should come with a Medication Guide. Ask your pharmacist for a copy if you do not have one. · Missed dose: Take a dose as soon as you remember. If it is almost time for your next dose, wait until then and take a regular dose. Do not take extra medicine to make up for a missed dose. · Store the medicine in a closed container at room temperature, away from heat, moisture, and direct light. Drugs and Foods to Avoid:   Ask your doctor or pharmacist before using any other medicine, including over-the-counter medicines, vitamins, and herbal products. · Some medicines can affect how apixaban works. Tell your doctor if you are using any of the following:   ¨ Another blood thinner, such as clopidogrel, heparin, prasugrel, warfarin  ¨ An NSAID pain or arthritis medicine, such as aspirin, diclofenac, ibuprofen, naproxen, celecoxib  ¨ Depression medicine  ¨ Infection medicine, such as clarithromycin, itraconazole, ketoconazole, rifampin, ritonavir  ¨ Seizure medicine, such as carbamazepine or phenytoin  ¨ Mekhi's wort  Warnings While Using This Medicine:   · Tell your doctor if you are pregnant or breastfeeding, or if you have kidney disease, liver disease, bleeding problems, or an artificial heart valve. · Do not stop using this medicine suddenly without asking your doctor. You might have a higher risk of stroke for a short time after you stop using this medicine.   · This medicine increases your risk for bleeding that can become serious if not controlled. You may also bruise easily, and it may take longer than usual for bleeding to stop. · This medicine may increase your risk for blood clots in your spine or back if you undergo an epidural or spinal puncture. This could lead to paralysis. Tell your doctor if you ever had spine problems or back surgery. · Tell any doctor or dentist who treats you that you are using this medicine. With your doctor's supervision, you may need to stop using this medicine several days before you have surgery or medical tests. · Your doctor will do lab tests at regular visits to check on the effects of this medicine. Keep all appointments. · Keep all medicine out of the reach of children. Never share your medicine with anyone. Possible Side Effects While Using This Medicine:   Call your doctor right away if you notice any of these side effects:  · Allergic reaction: Itching or hives, swelling in your face or hands, swelling or tingling in your mouth or throat, chest tightness, trouble breathing  · Change in how much or how often you urinate, red or pink urine  · Chest pain, trouble breathing  · Coughing up blood, vomiting blood or material that looks like coffee grounds  · Numbness, tingling, or muscle weakness in your legs or feet  · Red or black, tarry stools  · Unusual bleeding, bruising, or weakness  If you notice other side effects that you think are caused by this medicine, tell your doctor. Call your doctor for medical advice about side effects. You may report side effects to FDA at 6-836-FDA-9824  © 2016 [address] is for End User's use only and may not be sold, redistributed or otherwise used for commercial purposes. The above information is an  only. It is not intended as medical advice for individual conditions or treatments.  Talk to your doctor, nurse or pharmacist before following any medical regimen to see if it is safe and effective for you.

## 2017-04-09 NOTE — PROGRESS NOTES
[facility] PROGRESS NOTE  Pulmonary, Critical Care, and Sleep Medicine    Name: Sailaja Ahmadi. MRN: [id number]   : 1945 Hospital: [facility]   Date: 2017  Admission Date: 2017     Chart and notes reviewed. Data reviewed. I review the patient's current medications in the medical record at each encounter. I have evaluated and examined the patient. Overnight events reviewed:  Afebrile overnight  Afib now rate controlled with PO Cardizem and also on Coreg  --had DC CV on Friday afternoon, but went back into rapid Afib early Saturday morning  Also was noted to have nocturnal desaturations and heavy snoring suggestive of underlying KWADWO  Sats stable on 4L NC  On Eliquis for anticoagulation as he has extensive bilateral PE and LLE DVT without evidence of right heart strain on ECHO  Tolerating PO diet    ROS: No CP. Denies SOB. Denies fever, chills, or cough. Denies abd pain or LE pain/swelling. Vital Signs:  Visit Vitals    /77 (BP 1 Location: Right arm, BP Patient Position: At rest)    Pulse 93    Temp 98.4 °F (36.9 °C)    Resp 22    Ht 5' 11\" (1.803 m)    Wt 140 kg (308 lb 9.6 oz)    SpO2 93%    BMI 43.04 kg/m2       O2 Device: Nasal cannula   O2 Flow Rate (L/min): 2 l/min   Temp (24hrs), Av.9 °F (36.6 °C), Min:96.7 °F (35.9 °C), Max:98.7 °F (37.1 °C)       Intake/Output:   Last shift:      701 - 1900  In: 60 [P.O.:60]  Out: -   Last 3 shifts: 1901 - 700  In: 460 [P.O.:460]  Out: 2150 [Urine:2150]    Intake/Output Summary (Last 24 hours) at 17 0945  Last data filed at 17 0742   Gross per 24 hour   Intake              340 ml   Output             1175 ml   Net             -835 ml          Physical Exam:   General:  Alert, cooperative, no distress, appears stated age. Head:  Normocephalic, without obvious abnormality, atraumatic. Eyes:  Conjunctivae/corneas clear.    Nose: Nares normal. Septum midline. Mucosa normal.   Throat: Lips, mucosa, and tongue normal. Class 4 airway. Neck: Thick, supple, symmetrical, trachea midline, no adenopathy. Lungs:   Clear to auscultation bilaterally. No wheeze or rales. Chest wall:  No tenderness or deformity. Heart:  Irregularly irregular rate and rhythm, S1, S2 normal, no murmur, click, rub or gallop. Abdomen:   Obese, soft, non-tender. Bowel sounds normal. No masses,  No organomegaly. Extremities: Extremities normal, atraumatic, no cyanosis, mild LE edema. Pulses: 2+ and symmetric all extremities. Skin: Skin color, texture, turgor normal. No rashes or lesions   Lymph nodes: Cervical, supraclavicular nodes normal.   Neurologic: Grossly nonfocal     DATA:  MAR reviewed and pertinent medications noted or modified as needed    Labs:  Recent Labs      04/09/17   0259  04/08/17   0443  04/07/17   0350   WBC  10.0  10.8  11.4*   HGB  14.1  14.4  14.4   HCT  43.3  44.6  44.3   PLT  218  215  228     Recent Labs      04/09/17   0259  04/08/17   0443  04/07/17   0350   NA  140  142  142   K  4.5  3.9  3.9   CL  104  105  106   CO2  28  29  25   GLU  131*  91  110*   BUN  18  12  16   CREA  0.90  0.81  0.85   CA  8.7  8.8  8.6   MG  2.1  2.1  2.0       Imaging:  Images and reports personally reviewed. CTA Chest: Bilateral pulmonary emboli.  RUL airspace disease may represent a pulmonary infarct    BLE VD: LLE acute DVT+    IMPRESSION:   · Acute Hypoxic Respiratory Failure  · Bilateral Central Pulmonary Emboli: no right heart strain noted on ECHO  · Acute LLE DVT  · Abnormal Chest CT: with pleural based RUL nodular density likely representing a pulmonary infarct  · Afib with RVR: s/p failed DC Cardioversion x 1: being driven by bilateral PE and likely KWADWO  · Snoring with witnessed nocturnal desaturations: with likely underlying KWADWO  · Myasthenia Gravis  · Leukocytosis: on chronic steroids   · Morbid Obesity  · Family H/O DVT: in Mother and 2 Sisters      PLAN: · Wean O2 as able to keep sats > 90%; check 6MWT prior to discharge. Not on home O2 at baseline.   · Continue Eliquis for minimum of 3 months: may require lifelong anticoagulation depending upon hypercoag workup results  · F/U hypercoaguable panel  · Rate control per Cardiology recs  · Start IS use  · Mobilize today; will consult PT/OT  · Will need outpatient sleep eval/study  · Nutrition: PO diet  · GI prophylaxis: not indicated  · Dispo: keep in MD Deepika, [facility]  [facility]

## 2017-04-09 NOTE — PROGRESS NOTES
Cardiology Progress Note [facility]         NAME:  Lyndsey W Dilcia Cortes. :   1945   MRN:   [id number]     Assessment/Plan:   AFib- new onset, rate improved - recurrent post DCCV - trigger PE's  - substrate (morbid obesity, likely KWADWO, HTN)  - [id number] Score 4 placing him at 6.5 % annual, risk of embolic stroke. Recommend Eliquis 5mg BID for long term NOAC  - optimize rate control. Would not pursue rhythm control at this juncture. Continue carvedilol  Bilateral PE's/DVT - anticoagulation  HTN:  BP OK- multi med   Morbid obesity  Poor functional capacity due to myasthenia  Likely severe KWADWO  New onset DM             Subjective:   [address] Cortes. is a 73y.o. year old male w/ hx:  HTN cx by  Myasthenia Gravis, CVA, morbid obesity who PW  symptoms of palpitations, dyspnea and chest tightness. Symptoms started today. No aggravating or relieving factors. No prior cardiac history. Had (-) stress test number of years ago.        CTA : bilateral PEs  A1C 7.2 proBNP 759  Trop (-)     Overnight - persistent AF       Cardiac ROS:  Denies elizabeth palpitations. Chronic class 3 dyspnea- no home O2. Patient denies any exertional chest pain, palpitations, syncope, orthopnea, or paroxysmal nocturnal dyspnea. Review of Systems: fairly sedentary due to myasthenia gravis. chronic fatigue, weakness, No nausea, indigestion, vomiting, pain, cough, sputum. No bleeding. Taking po. Appetite fair   CARDIAC EVALUATION   ECHO 2016:EF 65%, no WMA, mild TR, RVSP 41 mmHg  ECHO 17: EF 60% No WMA.  Mild MR, Trivial TR, PASP 43mmgHg, mild pulm HTN    Objective:     Visit Vitals    BP (!) 145/92 (BP 1 Location: Right arm, BP Patient Position: At rest)    Pulse 90    Temp 98.4 °F (36.9 °C)    Resp 20    Ht 5' 11\" (1.803 m)    Wt 308 lb 9.6 oz (140 kg)    SpO2 94%    BMI 43.04 kg/m2      O2 Flow Rate (L/min): 2 l/min O2 Device: Room air    Temp (24hrs), Av.5 °F (36.9 °C), Min:98.4 °F (36.9 °C), Max:98.7 °F (37.1 °C)      Intake/Output Summary (Last 24 hours) at 04/09/17 1216  Last data filed at 04/09/17 0742   Gross per 24 hour   Intake              160 ml   Output              875 ml   Net             -715 ml       TELE: afib rate 80-100s  General: AAOx3 cooperative, no acute distress. HEENT: Atraumatic. Pink and dry. Anicteric sclerae. Neck: Supple     Lungs: 3 lpm nc, decreased throughout No wheezing/rhonchi/crackles. Heart: PMI: unable to palpate, HS distant, Irregular rhythm, difficult to appreciate  murmur, no S3, no rubs, no gallops. Difficult to assess  JVD. No carotid bruits. Abdomen: Soft, non-distended, non-tender. + Bowel sounds. No bruits. : voiding   Extremities: general edema, no clubbing, no cyanosis. No calf tenderness  Neurologic: Grossly intact. Alert and oriented X 3. No acute neurological distress. Psych: Good insight. Not anxious nor agitated.     Care Plan discussed with:    Comments   Patient y    Family      RN y    Care Manager y                   Consultant:  y pulm        Data Review:   CMP:   Lab Results   Component Value Date/Time     04/09/2017 02:59 AM    K 4.5 04/09/2017 02:59 AM     04/09/2017 02:59 AM    CO2 28 04/09/2017 02:59 AM    AGAP 8 04/09/2017 02:59 AM     (H) 04/09/2017 02:59 AM    BUN 18 04/09/2017 02:59 AM    CREA 0.90 04/09/2017 02:59 AM    GFRAA >60 04/09/2017 02:59 AM    GFRNA >60 04/09/2017 02:59 AM    CA 8.7 04/09/2017 02:59 AM    MG 2.1 04/09/2017 02:59 AM     CBC:   Lab Results   Component Value Date/Time    WBC 10.0 04/09/2017 02:59 AM    HGB 14.1 04/09/2017 02:59 AM    HCT 43.3 04/09/2017 02:59 AM     04/09/2017 02:59 AM     All Cardiac Markers in the last 24 hours:   No results found for: CPK, CKMMB, CKMB, RCK3, CKMBT, CKNDX, CKND1, SUJEY, TROPT, TROIQ, GALLITO, TROPT, TNIPOC, BNP, BNPP  Recent Glucose Results:   Lab Results   Component Value Date/Time    GLUCPOC 154 (H) 04/09/2017 12:12 PM    GLUCPOC 115 (H) 04/09/2017 07:42 AM    GLUCPOC 148 (H) 04/08/2017 08:32 PM     (H) 04/09/2017 02:59 AM     ABG: No results found for: PH, PHI, PCO2, PCO2I, PO2, PO2I, HCO3, HCO3I, FIO2, FIO2I  LIPIDS:  Cholesterol, total   Date Value Ref Range Status   09/03/2016 168 <200 MG/DL Final     Triglyceride   Date Value Ref Range Status   09/03/2016 198 (H) <150 MG/DL Final     Comment:     Based on NCEP-ATP III:  Triglycerides <150 mg/dL  is considered normal, 150-199 mg/dL  borderline high,  200-499 mg/dL high and  greater than or equal to 500 mg/dL very high. HDL Cholesterol   Date Value Ref Range Status   09/03/2016 41 MG/DL Final     Comment:     Based on NCEP ATP III, HDL Cholesterol <40 mg/dL is considered low and >60 mg/dL is elevated.      LDL, calculated   Date Value Ref Range Status   09/03/2016 87.4 0 - 100 MG/DL Final     Comment:     Based on the NCEP-ATP: LDL-C concentrations are considered  optimal <100 mg/dL,  near optimal/above Normal 100-129 mg/dL  Borderline High: 130-159, High: 160-189 mg/dL  Very High: Greater than or equal to 190 mg/dL       VLDL, calculated   Date Value Ref Range Status   09/03/2016 39.6 MG/DL Final     CHOL/HDL Ratio   Date Value Ref Range Status   09/03/2016 4.1 0 - 5.0   Final       Medications reviewed  Current Facility-Administered Medications   Medication Dose Route Frequency    dilTIAZem CD (CARDIZEM CD) capsule 300 mg  300 mg Oral DAILY    carvedilol (COREG) tablet 12.5 mg  12.5 mg Oral BID WITH MEALS    docusate sodium (COLACE) capsule 100 mg  100 mg Oral DAILY    magnesium hydroxide (MILK OF MAGNESIA) 400 mg/5 mL oral suspension 30 mL  30 mL Oral DAILY    apixaban (ELIQUIS) tablet 10 mg  10 mg Oral BID    sodium chloride (NS) flush 5-10 mL  5-10 mL IntraVENous Q8H    sodium chloride (NS) flush 5-10 mL  5-10 mL IntraVENous PRN    acetaminophen (TYLENOL) tablet 650 mg  650 mg Oral Q4H PRN    HYDROmorphone (PF) (DILAUDID) injection 0.5 mg  0.5 mg IntraVENous Q4H PRN  zolpidem (AMBIEN) tablet 5 mg  5 mg Oral QHS PRN    ALPRAZolam (XANAX) tablet 0.25 mg  0.25 mg Oral TID PRN    cloNIDine (CATAPRES) 0.2 mg/24 hr patch 1 Patch  1 Patch TransDERmal Q7D    predniSONE (DELTASONE) tablet 50 mg  50 mg Oral DAILY WITH BREAKFAST    pyridostigmine (MESTINON) tablet 60 mg  60 mg Oral BID    traMADol (ULTRAM) tablet 50 mg  50 mg Oral Q6H PRN    prochlorperazine (COMPAZINE) injection 5 mg  5 mg IntraVENous Q6H PRN    insulin lispro (HUMALOG) injection   SubCUTAneous AC&HS    glucose chewable tablet 16 g  4 Tab Oral PRN    dextrose (D50W) injection syrg 12.5-25 g  12.5-25 g IntraVENous PRN    glucagon (GLUCAGEN) injection 1 mg  1 mg IntraMUSCular PRN

## 2017-04-09 NOTE — PROGRESS NOTES
Problem: Mobility Impaired (Adult and Pediatric)  Goal: *Therapy Goal (Edit Goal, Insert Text)  PHYSICAL THERAPY EVALUATION/DISCHARGE INCLUDING 6 MINUTE WALK TEST RESULTS  Patient: Hamzah Seymour (77 y.o. male)  Date: 4/9/2017  Primary Diagnosis: Pulmonary emboli [facility])        Precautions: Fall         ASSESSMENT :  Based on the objective data described below, the patient presented initially to ED with elevated HR. Patient found to have B/L PE, DVT. Patient has been anticoagulated on admission with Judy and MD requesting 6MWT prior to discharge. Patient's PLOF Indep ambulator in household (at times would use rollator) does not drive (stopped 5/4074). Patient lives with his sister in [address]. Patient states \"I am much better now than before I came in\"  Patient was able to perform bed mobiltiy Indep; transfers Supervision; ambulates short distances Indep no device 10-15'. Patient able to perform 6MWT with bariatric RW. He ambulated 300' with RW Supervision. Pateint completed all 6 min of test with 3 standing rest breaks due to dyspnea. Patient's 02 SATs remained above 95% the entire time without supplemental oxygen. Patient was educated on activity modification and need for using rollator initially upon returning to home for safety with longer distances. Patient recommended HHPT for increasing cardiovascular endurance and safety with ADLs. Patient has been cleared for discharge. Documentation for home O2:     ROOM AIR     AT REST    O2 SATS  97% HR  107bpm   ROOM AIR WITH ACTIVITY 02 SATS  97% HR  107   (0 ) LITERS OF O2 WITH ACTIVITY O2 SATS  95-97% HR  88-115bpm   (0)LITERS OF 02 PATIENT LEFT COMFORTABLY  SITTING/SUPINE 02 SATS  97% HR  102bpm   Skilled physical therapy is not indicated at this time.        PLAN :  Discharge Recommendations: Home Health  Further Equipment Recommendations for Discharge: Has SPC, ROllator, SHower chair          SUBJECTIVE:   Patient stated I would like to use the bathroom      OBJECTIVE DATA SUMMARY:   HISTORY:    Past Medical History:   Diagnosis Date    Anxiety      Arthritis      Asthma      HTN (hypertension) with goal to be determined      Morbid obesity with BMI of 40.0-44.9, adult ([facility] 4/5/2017    Myasthenia gravis ([facility]       Past Surgical History:   Procedure Laterality Date    HX ORTHOPAEDIC        HX OTHER SURGICAL         L sided neck suregry \" infection\"     Prior Level of Function/Home Situation: Indep, has used RW in the past  Personal factors and/or comorbidities impacting plan of care: see above     Home Situation  Home Environment: Private residence  # Steps to Enter: 1  Rails to Enter: Yes  Hand Rails : Bilateral  Wheelchair Ramp: No  One/Two Story Residence: One story  Living Alone: No  Support Systems: Family member(s)  Patient Expects to be Discharged to[de-identified] Private residence  Current DME Used/Available at Home: Leyda Yates, Marcy arias, rollator, [address] chair  Tub or Shower Type: Tub/Shower combination     EXAMINATION/PRESENTATION/DECISION MAKING:   Critical Behavior:  Neurologic State: Alert  Orientation Level: Oriented X4  Cognition: Appropriate decision making     Hearing: Auditory  Auditory Impairment: None  Skin:  N/a  Edema: mild LE ankles  Range Of Motion:  AROM: Generally decreased, functional                       Strength:    Strength: Generally decreased, functional                    Tone & Sensation:   Tone: Normal              Sensation: Intact               Coordination:  Coordination: Generally decreased, functional  Vision:      Functional Mobility:  Bed Mobility:  Rolling: Independent  Supine to Sit: Independent  Sit to Supine: Independent  Scooting: Independent  Transfers:  Sit to Stand: Supervision  Stand to Sit: Supervision                       Balance:   Sitting: Intact  Standing: Intact; Without support  Ambulation/Gait Training:  Distance (ft): 300 Feet (ft)  Assistive Device: Walker, rolling (bariatric size)  Ambulation - Level of Assistance: Supervision     Gait Description (WDL): Exceptions to WDL  Gait Abnormalities: Antalgic; Altered arm swing        Base of Support: Widened                                   Functional Measure:  Barthel Index:      Bathin  Bladder: 10  Bowels: 10  Groomin  Dressing: 10  Feeding: 10  Mobility: 15  Stairs: 5  Toilet Use: 10  Transfer (Bed to Chair and Back): 15  Total: 95         Barthel and G-code impairment scale:  Percentage of impairment CH  0% CI  1-19% CJ  20-39% CK  40-59% CL  60-79% CM  80-99% CN  100%   Barthel Score 0-100 100 99-80 79-60 59-40 20-39 1-19    0   Barthel Score 0-20 20 17-19 13-16 9-12 5-8 1-4 0      The Barthel ADL Index: Guidelines  1. The index should be used as a record of what a patient does, not as a record of what a patient could do. 2. The main aim is to establish degree of independence from any help, physical or verbal, however minor and for whatever reason. 3. The need for supervision renders the patient not independent. 4. A patient's performance should be established using the best available evidence. Asking the patient, friends/relatives and nurses are the usual sources, but direct observation and common sense are also important. However direct testing is not needed. 5. Usually the patient's performance over the preceding 24-48 hours is important, but occasionally longer periods will be relevant. 6. Middle categories imply that the patient supplies over 50 per cent of the effort. 7. Use of aids to be independent is allowed. Ely Melara., Barthel, D.W. (8415). Functional evaluation: the Barthel Index. [address] [facility] (14)2. LORI German Gregoria Germany., Conor Mallory., [place], [address] [place] [address] Ave (). Measuring the change indisability after inpatient rehabilitation; comparison of the responsiveness of the Barthel Index and Functional [place] Measure. Journal of Neurology, Neurosurgery, and Psychiatry, 66(4), 391-629.   Garcia Kwong Exel, N.J.A, Ashley Ramirez M.A. (2004.) Assessment of post-stroke quality of life in cost-effectiveness studies: The usefulness of the Barthel Index and the EuroQoL-5D. Quality of Life Research, 13, 310-35            G codes: In compliance with CMSs Claims Based Outcome Reporting, the following G-code set was chosen for this patient based on their primary functional limitation being treated: The outcome measure chosen to determine the severity of the functional limitation was the Barthel with a score of95/100 which was correlated with the impairment scale. · Mobility - Walking and Moving Around:               - CURRENT STATUS:    CI - 1%-19% impaired, limited or restricted               - GOAL STATUS:           CI - 1%-19% impaired, limited or restricted               - D/C STATUS:                       CI - 1%-19% impaired, limited or restricted      Physical Therapy Evaluation Charge Determination   History Examination Presentation Decision-Making   MEDIUM  Complexity : 1-2 comorbidities / personal factors will impact the outcome/ POC  MEDIUM Complexity : 3 Standardized tests and measures addressing body structure, function, activity limitation and / or participation in recreation  MEDIUM Complexity : Evolving with changing characteristics  Other outcome measures barthel  MEDIUM      Based on the above components, the patient evaluation is determined to be of the following complexity level: MEDIUM     Pain:  Pain Scale 1: Numeric (0 - 10)  Pain Intensity 1: 0  Pain Location 1: Head        Pain Intervention(s) 1: Medication (see MAR)  Activity Tolerance:   See above  Please refer to the flowsheet for vital signs taken during this treatment.   After treatment:   [ ]         Patient left in no apparent distress sitting up in chair  [X]         Patient left in no apparent distress in bed  [ ]         Call bell left within reach  [X]         Nursing notified  [ ] Caregiver present  [ ]         Bed alarm activated      COMMUNICATION/EDUCATION:   Communication/Collaboration:  [X]   Fall prevention education was provided and the patient/caregiver indicated understanding. [ ]   Patient/family have participated as able and agree with findings and recommendations. [ ]   Patient is unable to participate in plan of care at this time.   Findings and recommendations were discussed with: Registered Nurse     Thank you for this referral.  Laura Hickey, DPT   Time Calculation: 31 mins

## 2017-04-09 NOTE — PROGRESS NOTES
7061  I have reviewed discharge instructions with the patient and spouse. The patient and spouse verbalized understanding. Medications reviewed. Prescriptions provided. IV removed and telemetry discontinued. Pt transported with PCT via wheelchair to vehicle.

## 2017-04-09 NOTE — DISCHARGE SUMMARY
Physician Discharge Summary     Patient ID:  Maxwell Manriquez  [id number]  73 y.o.  1945    Admit date: 4/5/2017    Discharge date and time: 4/9/2017    Admission Diagnoses: Pulmonary emboli [facility])    Discharge Diagnoses/Hospital Course   Pulmonary emboli ([facility] (4/5/2017) - likely 2/2 relative immobility though may have a hypercoagulable disorder considering family history. Hypercoagulability studies pending. Now on Eliquis. Counseled on the risks of bleeding      PAF (paroxysmal atrial fibrillation) ([facility] (9/3/2016) - likely triggered by pulmonary emboli. S/p cardioversion however then converted back to a-fib. Now rate controlled on dilt and coreg. Also on Eliquis as above      Myasthenia gravis with exacerbation, adult form ([facility] (4/4/2017) - on home Mestinon. Home PT/OT arranged by case management. Did NOT qualify for home O2     History of stroke (4/5/2017) - in the setting of PAF argues in favor of anticoagulation especially in combination with PE. Now on Eliquis as above      ARF (acute renal failure) ([facility] (4/5/2017) - resolved      Leukocytosis (4/5/2017) - ?reactive. Resolved s/p azithromycin     Morbid obesity with BMI of 40.0-44.9, adult ([facility] (4/5/2017) - counseled on weight loss      Hyperglycemia (4/5/2017) - no h/o DM. A1c 7.2.  Will need test strips, lancets and metformin at discharge     Possible Community acquired Pneumonia POA treated with Azithromycin      PCP: Chago Bartholomew MD     Consults: Cardiology and Pulmonary/Intensive care    Discharge Exam:  Visit Vitals    BP (!) 145/92 (BP 1 Location: Right arm, BP Patient Position: At rest)    Pulse 90    Temp 98.4 °F (36.9 °C)    Resp 20    Ht 5' 11\" (1.803 m)    Wt 140 kg (308 lb 9.6 oz)    SpO2 94%    BMI 43.04 kg/m2     Gen: Well-developed, well-nourished, in no acute distress  HEENT: Pink conjunctivae, PERRL, hearing intact to voice, moist mucous membranes  Neck: Supple, without masses, thyroid non-tender  Resp: No accessory muscle use, clear breath sounds without wheezes rales or rhonchi  Card: Irregularly irregular. HR 80-90's. Trace pitting edema in the LLE   Abd: Obese ab. Distended. Non-tender   Musc: No cyanosis or clubbing  Skin: No rashes or ulcers, skin turgor is good  Neuro: Cranial nerves 3-12 are grossly intact,  strength is 5/5 bilaterally and dorsi / plantarflexion is 5/5 bilaterally, follows commands appropriately  Psych: Good insight, oriented to person, place and time, alert     Disposition: home    Patient Instructions:   Current Discharge Medication List      START taking these medications    Details   apixaban (ELIQUIS) 5 mg tablet Please take 10mg twice daily for 5 days then decrease to 5mg twice daily thereafter  Indications: pulmonary thromboembolism  Qty: 60 Tab, Refills: 0      carvedilol (COREG) 12.5 mg tablet Take 1 Tab by mouth two (2) times daily (with meals). Qty: 60 Tab, Refills: 0      dilTIAZem CD (CARDIZEM CD) 300 mg ER capsule Take 1 Cap by mouth daily. Qty: 30 Cap, Refills: 0      metFORMIN (GLUCOPHAGE) 500 mg tablet Take 1 Tab by mouth daily (with breakfast). Qty: 30 Tab, Refills: 0      OTHER,NON-FORMULARY, 1) AccuCheck Coco Plus Test Strips #100 with 1 refill  2) AccuCheck FastClix Lancets (Drums) #100 with 1 refill  Qty: 1 Each, Refills: 0         CONTINUE these medications which have NOT CHANGED    Details   traMADol (ULTRAM) 50 mg tablet Take 50 mg by mouth every six (6) hours as needed for Pain. acetaminophen (TYLENOL) 500 mg tablet Take 1,000 mg by mouth every eight (8) hours as needed for Pain.      pyridostigmine (MESTINON) 60 mg tablet Take 60 mg by mouth two (2) times a day. b complex vitamins (B COMPLEX 1) tablet Take 3 Tabs by mouth daily. cholecalciferol, VITAMIN D3, (VITAMIN D3) 5,000 unit tab tablet Take 5,000 Units by mouth daily. ascorbic acid, vitamin C, (VITAMIN C) 500 mg tablet Take 1,000-1,500 mg by mouth daily.       predniSONE (DELTASONE) 10 mg tablet Take 5 Tabs by mouth daily (with breakfast). Qty: 450 Tab, Refills: 3      potassium chloride SA (MICRO-K) 10 mEq capsule Take 10 mEq by mouth two (2) times a day. Associated Diagnoses: Myasthenia gravis in crisis ([address]      ALPRAZolam (XANAX) 0.25 mg tablet Take 0.25 mg by mouth three (3) times daily as needed for Anxiety. Associated Diagnoses: Myasthenia gravis in crisis ([address]      cloNIDine (CATAPRES) 0.2 mg/24 hr patch 1 Patch by TransDERmal route every seven (7) days.   Qty: 4 Patch, Refills: 0         STOP taking these medications       ibuprofen (ADVIL) 200 mg tablet Comments:   Reason for Stopping:             Activity: Activity as tolerated  Diet: Regular Diet  Wound Care: None needed    Follow-up with Rufino Malone MD in one week   Follow-up Information     Follow up With Details Comments [address]    Meredith Cannon MD On 4/21/2017 2:20 PM  [address] [place] [address] [address] [facility] [address]  [phone]            Approximate time spent in patient care on day of discharge: 34 minutes     Signed:  Ricardo Chapa MD  4/9/2017  12:38 PM

## 2017-04-10 PROBLEM — E11.9 TYPE II DIABETES MELLITUS (HCC): Status: ACTIVE | Noted: 2017-01-01

## 2017-04-10 PROBLEM — N17.9 ARF (ACUTE RENAL FAILURE) (HCC): Status: RESOLVED | Noted: 2017-01-01 | Resolved: 2017-01-01

## 2017-04-10 PROBLEM — D72.829 LEUKOCYTOSIS: Chronic | Status: RESOLVED | Noted: 2017-01-01 | Resolved: 2017-01-01

## 2017-04-10 PROBLEM — K92.2 GI BLEED: Status: ACTIVE | Noted: 2017-01-01

## 2017-04-10 PROBLEM — R73.9 HYPERGLYCEMIA: Status: RESOLVED | Noted: 2017-01-01 | Resolved: 2017-01-01

## 2017-04-10 NOTE — CONSULTS
Name: Kacy Foster. Hospital: [address]   : 1945 Admit Date: 4/10/2017   Phone: [phone]  Room: [facility]/01   PCP: Lacy Clancy MD  MRN: [id number]   Date: 4/10/2017  Code: Full Code        HPI:    Chart and notes reviewed. Data reviewed. I review the patient's current medications in the medical record at each encounter. I have evaluated and examined the patient. 3:44 PM       History was obtained from patient. History of Present Illness:    Mr. Fae Canavan presents for rectal bleeding x 3 and dizziness. He was admitted from 17 - 17 for PE. His CTA on 17 showed bilateral pulmonary emboli with RUL airspace disease which may represent pulmonary infarct. He was discharged on Eliquis. This morning he noticed bright red rectal bleeding and came right back into ED per hosptial discharge instructions. He has a history of A.fib, seasonal/environmental allergies, PE, myesthenia gravis, HTN, CVA, anxiety and obesity. He has no history of asthma or COPD or any lung disease. He is a lifelong non smoker and was not exposed to any second hand smoke throughout his life. No family history of sarcoidosis, asthma, or ILD. Today he does have some slight SOB with exertion (baseline), but denies cough, wheezing, fever, chills, N/V. Of note, he snores, has witnessed gasping, and wakes up multiple times per night. Images: CXR shows persistent mild atelectasis at the left lung base and mild opacities in the right mid lung zone with pulmonary infarct is again noted.      WBC 17.2  HgB 13.3  Na 139  K 3.9  INR 1.1    Creatinine 1.09  Troponin < 0.04  Lower extremity dopplers showed left leg DVT mid femoral       Past Medical History:   Diagnosis Date    Anxiety     Arthritis     Asthma     Atrial fibrillation (HCC)     [facility]     HTN (hypertension) with goal to be determined     Morbid obesity with BMI of 40.0-44.9, adult ([facility] 2017    [facility]  Pulmonary emboli (HCC)     with bilateral DVT's        Past Surgical History:   Procedure Laterality Date    HX ORTHOPAEDIC      HX OTHER SURGICAL      L sided neck suregry \" infection\"       Family History   Problem Relation Age of Onset    Diabetes Mother     Hypertension Mother     Deep Vein Thrombosis Mother     Deep Vein Thrombosis Sister        Social History   Substance Use Topics    Smoking status: Never Smoker    Smokeless tobacco: Not on file    Alcohol use No       Allergies   Allergen Reactions    Lactose Itching    Fructose Other (comments)     States added sugar to foods causes sore throat/ear ache.     Inderal [Propranolol] Unknown (comments)    Lisinopril Angioedema    Gluten Nausea Only       Current Facility-Administered Medications   Medication Dose Route Frequency    sodium chloride (NS) flush 5-10 mL  5-10 mL IntraVENous Q8H    sodium chloride (NS) flush 5-10 mL  5-10 mL IntraVENous PRN    acetaminophen (TYLENOL) tablet 650 mg  650 mg Oral Q4H PRN    oxyCODONE-acetaminophen (PERCOCET) 5-325 mg per tablet 1 Tab  1 Tab Oral Q4H PRN    naloxone (NARCAN) injection 0.4 mg  0.4 mg IntraVENous PRN    ondansetron (ZOFRAN) injection 4 mg  4 mg IntraVENous Q4H PRN    insulin lispro (HUMALOG) injection   SubCUTAneous AC&HS    glucose chewable tablet 16 g  4 Tab Oral PRN    dextrose (D50W) injection syrg 12.5-25 g  12.5-25 g IntraVENous PRN    glucagon (GLUCAGEN) injection 1 mg  1 mg IntraMUSCular PRN    ALPRAZolam (XANAX) tablet 0.25 mg  0.25 mg Oral TID PRN    [START ON 4/11/2017] ascorbic acid (vitamin C) (VITAMIN C) tablet 1,000 mg  1,000 mg Oral DAILY    carvedilol (COREG) tablet 12.5 mg  12.5 mg Oral BID WITH MEALS    [START ON 4/11/2017] cholecalciferol (VITAMIN D3) tablet 5,000 Units  5,000 Units Oral DAILY    [START ON 4/16/2017] cloNIDine (CATAPRES) 0.2 mg/24 hr patch 1 Patch  1 Patch TransDERmal Q7D    [START ON 4/11/2017] dilTIAZem CD (CARDIZEM CD) capsule 300 mg 300 mg Oral DAILY    [START ON 4/11/2017] metFORMIN (GLUCOPHAGE) tablet 500 mg  500 mg Oral DAILY WITH BREAKFAST    [START ON 4/11/2017] predniSONE (DELTASONE) tablet 50 mg  50 mg Oral DAILY WITH BREAKFAST    pyridostigmine (MESTINON) tablet 60 mg  60 mg Oral BID    traMADol (ULTRAM) tablet 50 mg  50 mg Oral Q6H PRN    pantoprazole (PROTONIX) 40 mg in sodium chloride 0.9 % 10 mL injection  40 mg IntraVENous Q12H         REVIEW OF SYSTEMS   Negative except as stated in the HPI. Physical Exam:   Visit Vitals    /69    Pulse (!) 104    Temp 97.8 °F (36.6 °C)    Resp 20    Ht 5' 11\" (1.803 m)    Wt 137.4 kg (303 lb)    SpO2 96%    BMI 42.26 kg/m2       General:  Alert, cooperative, no distress, appears stated age, skin pale   Head:  Normocephalic, without obvious abnormality, atraumatic. Eyes:  Conjunctivae/corneas clear. Nose: Nares normal. Septum midline. Mucosa normal.    Throat: Lips, mucosa, and tongue normal.    Neck: Supple, symmetrical, trachea midline, no adenopathy. Lungs:   Clear to auscultation bilaterally. Chest wall:  No tenderness or deformity. Heart:  Irregular rate and rhythm, S1, S2 normal, no murmur, click, rub or gallop. Abdomen:   Soft, non-tender. Bowel sounds normal. No masses,  No organomegaly. Extremities: Extremities normal, atraumatic, no cyanosis or edema. Pulses: 2+ and symmetric all extremities.    Skin: Skin color, texture, turgor normal. No rashes or lesions   Lymph nodes: Cervical, supraclavicular nodes normal.   Neurologic: Grossly nonfocal       Lab Results   Component Value Date/Time    Sodium 139 04/10/2017 01:20 PM    Potassium 3.9 04/10/2017 01:20 PM    Chloride 101 04/10/2017 01:20 PM    CO2 29 04/10/2017 01:20 PM    BUN 21 04/10/2017 01:20 PM    Creatinine 1.09 04/10/2017 01:20 PM    Glucose 137 04/10/2017 01:20 PM    Calcium 8.6 04/10/2017 01:20 PM    Magnesium 2.1 04/09/2017 02:59 AM    Phosphorus 3.8 04/06/2017 04:36 AM    Lactic acid 1.6 09/02/2016 07:58 PM       Lab Results   Component Value Date/Time    WBC 17.2 04/10/2017 01:20 PM    HGB 13.3 04/10/2017 01:20 PM    PLATELET 929 56/50/7234 01:20 PM    .0 04/10/2017 01:20 PM       Lab Results   Component Value Date/Time    INR 1.1 04/10/2017 01:20 PM    aPTT 24.5 04/10/2017 01:20 PM    AST (SGOT) 29 04/10/2017 01:20 PM    Alk.  phosphatase 68 04/10/2017 01:20 PM    Protein, total 5.5 04/10/2017 01:20 PM    Albumin 2.7 04/10/2017 01:20 PM    Globulin 2.8 04/10/2017 01:20 PM       Lab Results   Component Value Date/Time    Ferritin 369 09/08/2016 10:03 AM       Lab Results   Component Value Date/Time    TSH 0.32 04/05/2017 04:39 PM        No results found for: PH, PHI, PCO2, PCO2I, PO2, PO2I, HCO3, HCO3I, FIO2, FIO2I    Lab Results   Component Value Date/Time    CK 34 04/05/2017 03:49 PM    CK-MB Index Cannot be calulated 04/05/2017 03:49 PM    Troponin-I, Qt. <0.04 04/10/2017 01:20 PM        Lab Results   Component Value Date/Time    Culture result: ENTEROBACTER CLOACAE 09/16/2016 10:10 PM    Culture result: KLEBSIELLA OXYTOCA 09/16/2016 10:10 PM    Culture result: MODERATE  NORMAL RESPIRATORY KUNAL   09/03/2016 09:24 AM    Culture result: LIGHT  APPARENT  JESSA ALBICANS   09/03/2016 09:24 AM       No results found for: TOXA1, RPR, HBCM, HBSAG, HAAB, HCAB, HCAB1, HAAT, G6PD, CRYAC, HIVGT, HIVR, HIV1, HIV12, HIVPC, HIVRPI    Lab Results   Component Value Date/Time    CK 34 04/05/2017 03:49 PM    CK 30 09/28/2016 07:45 AM       Lab Results   Component Value Date/Time    Color DARK YELLOW 09/16/2016 10:10 PM    Appearance CLOUDY 09/16/2016 10:10 PM    pH (UA) 6.0 09/16/2016 10:10 PM    Protein NEGATIVE  09/16/2016 10:10 PM    Glucose NEGATIVE  09/16/2016 10:10 PM    Ketone NEGATIVE  09/16/2016 10:10 PM    Bilirubin NEGATIVE  09/16/2016 10:10 PM    Blood LARGE 09/16/2016 10:10 PM    Urobilinogen 2.0 09/16/2016 10:10 PM    Nitrites POSITIVE 09/16/2016 10:10 PM    Leukocyte Esterase MODERATE 09/16/2016 10:10 PM    WBC  09/16/2016 10:10 PM    RBC  09/16/2016 10:10 PM    Bacteria 4+ 09/16/2016 10:10 PM       IMPRESSION  · GI bleed  · PE  · Left leg DVT  · A.fib  · Myasthenia Gravis  · HTN  · Suspected KWADWO  · Obesity    PLAN  · Maintain oxygen saturations > 90%  · GI consult before any IVC filter placement considered  · Would recommend outpatient PSG to evaluation for possible KWADWO. · GI prophylaxis: not indicated  · DVT prophylaxis: not indicated at this time; stopped Eliquis      Thank you for allowing us to participate in the care of this patient. We will be happy to follow along in his progress with you.     Hailee Briggs NP

## 2017-04-10 NOTE — ED NOTES
Spoke with Dr. Marylou Jacob order that pt may travel to floor without cardiac monitor or MARICRUZ Singletary, transport tech, to take pt at this time to the floor. Floor notified of order as well.

## 2017-04-10 NOTE — PROGRESS NOTES
Primary Nurse Bee Watt, SARAH and Karis Sharma RN performed a dual skin assessment on this patient No impairment noted  Srinivas score is 21

## 2017-04-10 NOTE — PROGRESS NOTES
4/10/2017   CARE MANAGEMENT NOTE:  CM is following pt in the ER for READMISSION. EMR reviewed. Per chart hx, pt was hospitalized at [facility] from 4/5/17 - 4/9/17 with dx of PE. Pt was discharged home with discount coupon for Eliquis (one month free). Also, homecare was arranged thru [facility]. Initial Assessment:  Reportedly, pt resides in [place], [place] but he has been staying in his sister's [place]) [place] home. Pt was using a cane or rollator and was progressing and doing ADLs independently most recently. Hospital PT assessment shows that pt ambulated 300 ft without assistive device. Pt has Medicaid for RX drug coverage and he uses [address] on [address] PCP is Dr. Katlin Zelaya. Readmission Assessment:  Pt presents to the ER one day post discharge with cc: rectal bleed. He will be readmitted for further medical management and dx of GIB. Action Plan:  CM will follow and will assist with resumption of home healthcare thru [facility] - a resumption order will be required.   Alissa

## 2017-04-10 NOTE — PROGRESS NOTES
[facility]: MED RECONCILIATION    Comments/Recommendations: - Verified allergies as documented  - Patient discharged from [facility] on 4/9/17.   - Started on eliquis for a PE in-patient on 10mg BID dose on 4/7/17 PM, wife reports that he took two tablets (10mg) last night but she only gave him 1 tablet today due to the bleeding.  - Patient reports adherence to his medications. - He was also discharged on carvedilol, metformin and diltiazem, which his wife reports that he started taking.  - Did not clarify whether he has been self monitoring blood pressure and blood glucose. - He reports rotating sites for the clonidine patch. Medications added:     · None    Medications removed:    · None    Medications adjusted:    · None    Information obtained from: Patient, RxQuery, EMR    Allergies: Lactose; Fructose; Inderal [propranolol]; Lisinopril; and Gluten    Prior to Admission Medications:   Prior to Admission Medications   Prescriptions Last Dose Informant Patient Reported? Taking? ALPRAZolam (XANAX) 0.25 mg tablet 4/9/2017 at HS Significant Other Yes Yes   Sig: Take 0.25 mg by mouth three (3) times daily as needed for Anxiety. acetaminophen (TYLENOL) 500 mg tablet Unknown at Unknown time Significant Other Yes No   Sig: Take 1,000 mg by mouth every eight (8) hours as needed for Pain. apixaban (ELIQUIS) 5 mg tablet 4/10/2017 at AM Significant Other No Yes   Sig: Please take 10mg twice daily for 5 days then decrease to 5mg twice daily thereafter  Indications: pulmonary thromboembolism   ascorbic acid, vitamin C, (VITAMIN C) 500 mg tablet 4/9/2017 at AM Significant Other Yes Yes   Sig: Take 1,000-1,500 mg by mouth daily. b complex vitamins (B COMPLEX 1) tablet 4/9/2017 at AM Significant Other Yes Yes   Sig: Take 3 Tabs by mouth daily. carvedilol (COREG) 12.5 mg tablet 4/9/2017 at HS Significant Other No Yes   Sig: Take 1 Tab by mouth two (2) times daily (with meals).    cholecalciferol, VITAMIN D3, (VITAMIN D3) 5,000 unit tab tablet 2017 at AM Significant Other Yes Yes   Sig: Take 5,000 Units by mouth daily. cloNIDine (CATAPRES) 0.2 mg/24 hr patch 2017 at AM Significant Other No No   Si Patch by TransDERmal route every seven (7) days. dilTIAZem CD (CARDIZEM CD) 300 mg ER capsule 4/10/2017 at AM Significant Other No Yes   Sig: Take 1 Cap by mouth daily. metFORMIN (GLUCOPHAGE) 500 mg tablet 4/10/2017 at AM Significant Other No Yes   Sig: Take 1 Tab by mouth daily (with breakfast). potassium chloride SA (MICRO-K) 10 mEq capsule 4/10/2017 at AM Significant Other Yes Yes   Sig: Take 10 mEq by mouth two (2) times a day. predniSONE (DELTASONE) 10 mg tablet 4/10/2017 at AM Significant Other No Yes   Sig: Take 5 Tabs by mouth daily (with breakfast). pyridostigmine (MESTINON) 60 mg tablet 2017 at HS Significant Other Yes Yes   Sig: Take 60 mg by mouth two (2) times a day. traMADol (ULTRAM) 50 mg tablet 4/10/2017 at Unknown time Significant Other Yes Yes   Sig: Take 50 mg by mouth every six (6) hours as needed for Pain.       Facility-Administered Medications: None       Thank you,  Allison Mccoy, PharmD     Contact: [phone]

## 2017-04-10 NOTE — ED NOTES
TRANSFER - OUT REPORT:    Verbal report given to Jacinda Mata RN(name) on [employer].  being transferred to 328(unit) for routine progression of care       Report consisted of patients Situation, Background, Assessment and   Recommendations(SBAR). Information from the following report(s) SBAR, ED Summary, MAR, Recent Results and Cardiac Rhythm afib was reviewed with the receiving nurse. Lines:   Peripheral IV 04/10/17 Left Forearm (Active)   Site Assessment Clean, dry, & intact 4/10/2017  1:20 PM   Phlebitis Assessment 0 4/10/2017  1:20 PM   Infiltration Assessment 0 4/10/2017  1:20 PM   Dressing Status Clean, dry, & intact 4/10/2017  1:20 PM   Dressing Type Tape;Transparent 4/10/2017  1:20 PM   Hub Color/Line Status Pink;Patent; Flushed 4/10/2017  1:20 PM   Action Taken Blood drawn 4/10/2017  1:20 PM        Opportunity for questions and clarification was provided.       Patient transported with:   Monitor  O2 @ 2 liters  Tech (Paramedic)

## 2017-04-10 NOTE — TELEPHONE ENCOUNTER
Return call placed to pt (IDx2). Pt c/o bleeding from rectum. Bright red blood not only with stools but throughout the night/day while sitting and with activity. Pt feeling dizzy and SOB. Pt with recent hospitalization for b/l PE. Writer informed pt to go to urgent care of ED for treatment. Opportunities for questions, clarifications, and concerns provided.

## 2017-04-10 NOTE — CONSULTS
[facility] Pepper [id number]  Office [phone]      Date of  Admission: 4/10/2017  1:02 PM       Assessment/Plan:   1. A fib: recent onset. Was recurrent post DCCV last week. Discharged yesterday and readmitted today due to BRBPR. Substrate (morbid obesity, likely KWADWO, HTN). -    [id number] Score 4 placing him at 6.5 % annual, risk of embolic stroke. However in setting of GI bleed benefit outweighs risk until source of bleeding sorted out . Would cont PTA coreg and dilt. 2. Bilat PE's /DVT :  Considering IVC filter. 3.  GI bleed: GI consult pending . Recommendations pending. Serial CBC's.   3.  Obesity:   4. Likely KWADWO:   5.  DM: recent dx  6. Functional capacity minimal due to MG. PT/OT       ATTENDING CARDIOLOGIST  Agree with holding anticoagulation until GI evaluation. I will relay on Gi to direct us when we may initiate anticoagulation. PATIENT was  Personally examined and chart reviewed. All the elements of history and examination were personally performed and I agree with the plan as listed above. Treatment plan was addressed with the patient. No need to see until decisions made. Call if we cn be of further help. Peter Pepper MD        Thank you for allowing us to participate in [address] care. We will be happy to follow along. Please call for any questions. Consult requested by Jett Gonzalez MD for GI bleed    Subjective:  [address] is a 73 y.o.   male  with PMH significant for a fib discharged yesterday from [facility] for PE/a fib. He presented today with chief complain of rectal bleeding. Per sister, the pt this morning began experiencing several episodes of bright red rectal bleeding, prompting the pt to be brought to the ED. Became weak and dizzy at home.       The patient denies chest pain, dependent edema, diaphoresis, MOORE, orthopnea, palpitations, PND, shortness of breath, claudication or syncope. LABS:   hgb 13.1 , trop .04     Cardiographics:   Telemetry: a fib 90's   ECG: a fib 90    Cardiac Testing/ Procedures: A. Cardiac Cath/PCI:   B.ECHO/ANTWAN: 4/6/17 ECHO: LVEF 60% No WMA. Mild MR, Mild PaHTN  C.StressNuclear/Stress ECHO/Stress test:   D.Vascular:   E. EP:   F. Miscellaneous    SOCIAL HX:  FAMILY HX:      Patient Active Problem List    Diagnosis Date Noted    GI bleed 04/10/2017    Pulmonary emboli ([facility] 04/05/2017    History of stroke 04/05/2017    Morbid obesity with BMI of 40.0-44.9, [facility] 04/05/2017    Hyperglycemia 04/05/2017    Myasthenia gravis with exacerbation, adult form [facility] 04/04/2017    Cervical radiculopathy due to degenerative joint disease of spine 09/14/2016    PAF (paroxysmal [facility] fibrillation) ([facility] 09/03/2016      Past Medical History:   Diagnosis Date    Anxiety     Arthritis     Asthma     HTN (hypertension) with goal to be determined     Morbid obesity with BMI of 40.0-44.9, adult ([facility] 4/5/2017    Myasthenia gravis ([facility]       Past Surgical History:   Procedure Laterality Date    HX ORTHOPAEDIC      HX OTHER SURGICAL      L sided neck suregry \" infection\"     Allergies   Allergen Reactions    Lactose Itching    Fructose Other (comments)     States added sugar to foods causes sore throat/ear ache.     Inderal [Propranolol] Unknown (comments)    Lisinopril Angioedema    Gluten Nausea Only      Current Facility-Administered Medications   Medication Dose Route Frequency    sodium chloride (NS) flush 5-10 mL  5-10 mL IntraVENous Q8H    sodium chloride (NS) flush 5-10 mL  5-10 mL IntraVENous PRN    acetaminophen (TYLENOL) tablet 650 mg  650 mg Oral Q4H PRN    oxyCODONE-acetaminophen (PERCOCET) 5-325 mg per tablet 1 Tab  1 Tab Oral Q4H PRN    naloxone (NARCAN) injection 0.4 mg  0.4 mg IntraVENous PRN    ondansetron (ZOFRAN) injection 4 mg  4 mg IntraVENous Q4H PRN     Current Outpatient Prescriptions   Medication Sig    apixaban (ELIQUIS) 5 mg tablet Please take 10mg twice daily for 5 days then decrease to 5mg twice daily thereafter  Indications: pulmonary thromboembolism    carvedilol (COREG) 12.5 mg tablet Take 1 Tab by mouth two (2) times daily (with meals).  dilTIAZem CD (CARDIZEM CD) 300 mg ER capsule Take 1 Cap by mouth daily.  metFORMIN (GLUCOPHAGE) 500 mg tablet Take 1 Tab by mouth daily (with breakfast).  traMADol (ULTRAM) 50 mg tablet Take 50 mg by mouth every six (6) hours as needed for Pain.  acetaminophen (TYLENOL) 500 mg tablet Take 1,000 mg by mouth every eight (8) hours as needed for Pain.  pyridostigmine (MESTINON) 60 mg tablet Take 60 mg by mouth two (2) times a day.  b complex vitamins (B COMPLEX 1) tablet Take 3 Tabs by mouth daily.  cholecalciferol, VITAMIN D3, (VITAMIN D3) 5,000 unit tab tablet Take 5,000 Units by mouth daily.  ascorbic acid, vitamin C, (VITAMIN C) 500 mg tablet Take 1,000-1,500 mg by mouth daily.  predniSONE (DELTASONE) 10 mg tablet Take 5 Tabs by mouth daily (with breakfast).  potassium chloride SA (MICRO-K) 10 mEq capsule Take 10 mEq by mouth two (2) times a day.  ALPRAZolam (XANAX) 0.25 mg tablet Take 0.25 mg by mouth three (3) times daily as needed for Anxiety.  cloNIDine (CATAPRES) 0.2 mg/24 hr patch 1 Patch by TransDERmal route every seven (7) days. Review of Symptoms:  Constitutional: positive for fatigue and malaise  ENT: negative   Respiratory: negative for dyspnea on exertion  Gastrointestinal: positive for melena  Genitourinary: No dysuria, hematuria, frequency   Musculoskeletal:negative for myalgias  Neurological: + dizziness  Other systems reviewed and negative except as above.   Social History     Social History    Marital status:      Spouse name: N/A    Number of children: N/A    Years of education: N/A     Social History Main Topics    Smoking status: Never Smoker    Smokeless tobacco: None    Alcohol use No  Drug use: None    Sexual activity: Not Asked     Other Topics Concern    None     Social History Narrative     Family History   Problem Relation Age of Onset    Diabetes Mother     Hypertension Mother     Deep Vein Thrombosis Mother     Deep Vein Thrombosis Sister          Physical Exam    Visit Vitals    /89    Pulse 100    Temp 98 °F (36.7 °C)    Resp 21    Ht 5' 11\" (1.803 m)    Wt 303 lb (137.4 kg)    SpO2 95%    BMI 42.26 kg/m2     General: awake, alert, and oriented. MAEx4. No acute distress. Pale. HEENT Exam:     Normocephalic, atraumatic. Sclera anicteric . Neck: supple, no lymphadenopathy  Lung Exam:     Not  dyspneic. Respirations unlabored. O2 @  Room air 95% Sat:  . Lungs: No wheezes, crackles, rhonchi, or rubs heard on auscultation. Heart Exam:       PMI nondisplaced, : Rhythm: irrregular,   No  S3, S4 gallop, murmur, click, or rub. No JVD, brisk carotid upstroke, no carotid bruits. Abdomen Exam:      obese, soft, nontender. + Bowel sounds. No palpable masses; organomegaly. No bruits or pulsatile mass. : voiding     Extremities Exam:      Atraumatic. No clubbing, cyanosis, edema, ulcers, varicose veins, rash, swelling, erythema.     Vascular Exam:      radial, brachial, dorsalis pedis, posterior tibial, are equal and strong bilaterally     Neuro exam:  No focal deficits   Psy Exam: Normal affect, does not appear anxious or agitaed        Recent Results (from the past 12 hour(s))   CBC WITH AUTOMATED DIFF    Collection Time: 04/10/17  1:20 PM   Result Value Ref Range    WBC 17.2 (H) 4.1 - 11.1 K/uL    RBC 4.28 4.10 - 5.70 M/uL    HGB 13.3 12.1 - 17.0 g/dL    HCT 42.8 36.6 - 50.3 %    .0 (H) 80.0 - 99.0 FL    MCH 31.1 26.0 - 34.0 PG    MCHC 31.1 30.0 - 36.5 g/dL    RDW 15.3 (H) 11.5 - 14.5 %    PLATELET 017 145 - 604 K/uL    NEUTROPHILS 89 (H) 32 - 75 %    LYMPHOCYTES 5 (L) 12 - 49 %    MONOCYTES 6 5 - 13 %    EOSINOPHILS 0 0 - 7 %    BASOPHILS 0 0 - 1 %    ABS. NEUTROPHILS 15.3 (H) 1.8 - 8.0 K/UL    ABS. LYMPHOCYTES 0.9 0.8 - 3.5 K/UL    ABS. MONOCYTES 1.0 0.0 - 1.0 K/UL    ABS. EOSINOPHILS 0.0 0.0 - 0.4 K/UL    ABS. BASOPHILS 0.0 0.0 - 0.1 K/UL    RBC COMMENTS NORMOCYTIC, NORMOCHROMIC     METABOLIC PANEL, COMPREHENSIVE    Collection Time: 04/10/17  1:20 PM   Result Value Ref Range    Sodium 139 136 - 145 mmol/L    Potassium 3.9 3.5 - 5.1 mmol/L    Chloride 101 97 - 108 mmol/L    CO2 29 21 - 32 mmol/L    Anion gap 9 5 - 15 mmol/L    Glucose 137 (H) 65 - 100 mg/dL    BUN 21 (H) 6 - 20 MG/DL    Creatinine 1.09 0.70 - 1.30 MG/DL    BUN/Creatinine ratio 19 12 - 20      GFR est AA >60 >60 ml/min/1.73m2    GFR est non-AA >60 >60 ml/min/1.73m2    Calcium 8.6 8.5 - 10.1 MG/DL    Bilirubin, total 0.5 0.2 - 1.0 MG/DL    ALT (SGPT) 73 12 - 78 U/L    AST (SGOT) 29 15 - 37 U/L    Alk.  phosphatase 68 45 - 117 U/L    Protein, total 5.5 (L) 6.4 - 8.2 g/dL    Albumin 2.7 (L) 3.5 - 5.0 g/dL    Globulin 2.8 2.0 - 4.0 g/dL    A-G Ratio 1.0 (L) 1.1 - 2.2     TYPE & SCREEN    Collection Time: 04/10/17  1:20 PM   Result Value Ref Range    Crossmatch Expiration 04/13/2017     ABO/Rh(D) Arturo Griffiths POSITIVE     Antibody screen NEG    PROTHROMBIN TIME + INR    Collection Time: 04/10/17  1:20 PM   Result Value Ref Range    INR 1.1 0.9 - 1.1      Prothrombin time 11.2 (H) 9.0 - 11.1 sec   PTT    Collection Time: 04/10/17  1:20 PM   Result Value Ref Range    aPTT 24.5 22.1 - 32.5 sec    aPTT, therapeutic range     58.0 - 77.0 SECS   TROPONIN I    Collection Time: 04/10/17  1:20 PM   Result Value Ref Range    Troponin-I, Qt. <0.04 <0.05 ng/mL       Atha Nj MS P [facility]

## 2017-04-10 NOTE — ED TRIAGE NOTES
Patient recently here for blood clots and place don elequis. Today started with rectal bleeding. Dizziness and weakness.

## 2017-04-10 NOTE — H&P
[facility]  [address], [address]  [phone]    Admission History and Physical      NAME:  Shalini Eagle. :   1945   MRN:  [id number]     PCP:  Irwin Jasso MD     Date/Time:  4/10/2017         Subjective:     CHIEF COMPLAINT: \"I had bleeding\"     HISTORY OF PRESENT ILLNESS:     Mr. Alyce Nixon is a 73 y.o.  male discharged on  after being admitted for PE, DVT and a-fib with RVR. He was started on Eliquis. No evidence of bleeding while he was in the hospital but per pt, noted this AM to have BRBPR. He does have a h/o hemorrhoids. No ab pain. No hematemesis. Past Medical History:   Diagnosis Date    Anxiety     Arthritis     Asthma     HTN (hypertension) with goal to be determined     Morbid obesity with BMI of 40.0-44.9, adult ([facility] 2017    Myasthenia gravis ([facility]         Past Surgical History:   Procedure Laterality Date    HX ORTHOPAEDIC      HX OTHER SURGICAL      L sided neck suregry \" infection\"       Social History   Substance Use Topics    Smoking status: Never Smoker    Smokeless tobacco: Not on file    Alcohol use No        Family History   Problem Relation Age of Onset    Diabetes Mother     Hypertension Mother     Deep Vein Thrombosis Mother     Deep Vein Thrombosis Sister         Allergies   Allergen Reactions    Lactose Itching    Fructose Other (comments)     States added sugar to foods causes sore throat/ear ache.  Inderal [Propranolol] Unknown (comments)    Lisinopril Angioedema    Gluten Nausea Only        Prior to Admission medications    Medication Sig Start Date End Date Taking? Authorizing Provider   apixaban (ELIQUIS) 5 mg tablet Please take 10mg twice daily for 5 days then decrease to 5mg twice daily thereafter  Indications: pulmonary thromboembolism 17  Yes Tiago Felder MD   carvedilol (COREG) 12.5 mg tablet Take 1 Tab by mouth two (2) times daily (with meals).  17 Yes Mili Das MD   dilTIAZem CD (CARDIZEM CD) 300 mg ER capsule Take 1 Cap by mouth daily. 4/9/17  Yes Mili Das MD   metFORMIN (GLUCOPHAGE) 500 mg tablet Take 1 Tab by mouth daily (with breakfast). 4/9/17  Yes Mili Das MD   traMADol (ULTRAM) 50 mg tablet Take 50 mg by mouth every six (6) hours as needed for Pain. Yes Amalia Velazquez MD   pyridostigmine (MESTINON) 60 mg tablet Take 60 mg by mouth two (2) times a day. Yes Historical Provider   b complex vitamins (B COMPLEX 1) tablet Take 3 Tabs by mouth daily. Yes Historical Provider   cholecalciferol, VITAMIN D3, (VITAMIN D3) 5,000 unit tab tablet Take 5,000 Units by mouth daily. Yes Historical Provider   ascorbic acid, vitamin C, (VITAMIN C) 500 mg tablet Take 1,000-1,500 mg by mouth daily. Yes Historical Provider   predniSONE (DELTASONE) 10 mg tablet Take 5 Tabs by mouth daily (with breakfast). 11/21/16  Yes Jessy Salinas MD   potassium chloride SA (MICRO-K) 10 mEq capsule Take 10 mEq by mouth two (2) times a day. Yes Historical Provider   ALPRAZolam (XANAX) 0.25 mg tablet Take 0.25 mg by mouth three (3) times daily as needed for Anxiety. Yes Historical Provider   acetaminophen (TYLENOL) 500 mg tablet Take 1,000 mg by mouth every eight (8) hours as needed for Pain. Historical Provider   cloNIDine (CATAPRES) 0.2 mg/24 hr patch 1 Patch by TransDERmal route every seven (7) days.  9/16/16   Maddie Chamorro MD         Review of Systems:  (bold if positive, if negative)    Gen:  Eyes:  ENT:  CVS:  Pulm:  GI: BRBPR   :    MS:  Skin:  Psych:  Endo:    Hem:  Renal:    Neuro:            Objective:      VITALS:    Vital signs reviewed; most recent are:    Visit Vitals    /69    Pulse 100    Temp 97.8 °F (36.6 °C)    Resp 20    Ht 5' 11\" (1.803 m)    Wt 137.4 kg (303 lb)    SpO2 96%    BMI 42.26 kg/m2     SpO2 Readings from Last 6 Encounters:   04/10/17 96%   04/09/17 94%   04/04/17 96%   02/24/17 96%   11/21/16 97%   10/21/16 98%    O2 Flow Rate (L/min): 2 l/min   No intake or output data in the 24 hours ending 04/10/17 1524         Exam:     Physical Exam:    Gen: Obese. Mild increased WOB (at baseline from prior hospitalizations due to PE)   HEENT:  Pink conjunctivae, PERRL, hearing intact to voice, moist mucous membranes  Neck:  Supple, without masses, thyroid non-tender  Resp:  No accessory muscle use, clear breath sounds without wheezes rales or rhonchi  Card: Irregularly irregular. No murmurs, normal S1, S2 without thrills, bruits  Abd:  Soft, non-tender, non-distended, normoactive bowel sounds are present, no palpable organomegaly  Lymph:  No cervical adenopathy  Musc:  No cyanosis or clubbing  Skin:  No rashes or ulcers, skin turgor is good  Neuro:  Cranial nerves 3-12 are grossly intact,  strength is 5/5 bilaterally, dorsi / plantarflexion strength is 5/5 bilaterally, follows commands appropriately  Psych: Moderate insight     Labs:    Recent Labs      04/10/17   1320   WBC  17.2*   HGB  13.3   HCT  42.8   PLT  229     Recent Labs      04/10/17   1320  04/09/17   0259   NA  139  140   K  3.9  4.5   CL  101  104   CO2  29  28   GLU  137*  131*   BUN  21*  18   CREA  1.09  0.90   CA  8.6  8.7   MG   --   2.1   ALB  2.7*   --    SGOT  29   --    ALT  73   --      No components found for: GLPOC  No results for input(s): PH, PCO2, PO2, HCO3, FIO2 in the last 72 hours. Recent Labs      04/10/17   1320   INR  1.1     CXR =>   1. Persistent mild atelectasis at the left lung base  2. Mild opacities in right mid zone consistent with pulmonary infarct is again  noted. Assessment/Plan:     GI bleed (4/10/2017) - recently started on Eliquis.  No issues while in house   -at this time; due to h/o DVT, PE and a-fib, would not be prudent to reverse as not a life threatening bleed and concern for more clot formation   -hold Xarelto   -GI eval to determine etiology of bleeding   -serial CBC's; type and screen   -at this point we have two options; if GI feels that anticoagulation is CONTRAINDICATED, pt will need an IVC filter however, will still not be optimized for CVA prevention in the setting of a-fib. Alternatively, can start heparin gtt and monitor for cessation of bleeding and correct the underlying GI etiology that is leading to bleeding. If hemorrhoids are the problem perhaps hemorrhoidectomy?   -will ask pulm and ladonna to comment since they were both involved in his care during recent admission   -start IV PPI BID in the event that this is an upper GI bleed but lower suspicion       PAF (paroxysmal atrial fibrillation) ([facility] (9/3/2016) - CHADS-vasc score of 4. Ideally needs to be on NOAC or high dose anticoagulation   -see discussion above   -cardiology eval   -continue coreg and dilt       Myasthenia gravis with exacerbation, adult form ([facility] (4/4/2017)   -continue home meds (Mestinon, Prednisone)      Pulmonary emboli ([facility] (4/5/2017)   -see discussion above       History of stroke (4/5/2017)  -PT/OT eval   -see discussion above       Morbid obesity with BMI of 40.0-44.9, adult ([facility] (4/5/2017)  -counseled on weight loss     DM - type II  -ISS   -BG checks AC TID and qHS   -diabetic diet once GI eval complete   -continue metformin     Leukocytosis - ?reactive to steroids   -CXR without acute process   -check UA     Surrogate decision maker: Sister     Total time spent with patient: [address] discussed with: Patient and Family    Discussed:  Code Status, Care Plan and D/C Planning    Prophylaxis:  Pt was on Eliquis; holding due to GI bleed.  No SCD's due to DVT's     Probable Disposition:  Home w/Family           ___________________________________________________    Attending Physician: Mary Bustillo MD

## 2017-04-10 NOTE — PROGRESS NOTES
Physical Therapy Note:    Orders acknowledged, chart reviewed. Pt with continuous bedrest orders, admitted this date with acute GI bleed in setting of anticoagulation due to LE DVT and RML PE. Pt awaiting GI consult to determine appropriateness of IVC filter placement. Will follow and complete PT evaluation when activity orders allow and pt appropriate for assessment.

## 2017-04-10 NOTE — PROGRESS NOTES
Will hold on IVC filter unless pulm feels that it is immediately indicated.  Would wait to see the source of GI bleed prior to stating pt cannot be anticoagulated

## 2017-04-10 NOTE — ED PROVIDER NOTES
HPI Comments: 73 y.o. male with past medical history significant for HTN, anxiety, myasthenia gravis and morbid obesity who presents from home with chief complaint of rectal bleeding. Per sister, the pt this morning began experiencing several episodes of bright red rectal bleeding, prompting the pt to be brought to the ED. Pt's sister notes that the pt was just recently discharged from the hospital yesterday after being evaluated for PE, DVT and AFib. Pt reports that he has been experiencing increased generalized weakness with associated HA and some dizziness. Pt denies abdominal pain. Pt denies hx of colonoscopy. There are no other acute medical concerns at this time. Sx's moderate. No relieving factors. PCP: Lillian Gao MD     Note written by Dipesh Sagastume. Wilber Leigh, as dictated by Yaquelin Osullivan MD 1:45 PM      The history is provided by the patient. No  was used. Past Medical History:   Diagnosis Date    Anxiety     Arthritis     Asthma     HTN (hypertension) with goal to be determined     Morbid obesity with BMI of 40.0-44.9, adult ([facility] 4/5/2017    Myasthenia gravis ([facility]        Past Surgical History:   Procedure Laterality Date    HX ORTHOPAEDIC      HX OTHER SURGICAL      L sided neck suregry \" infection\"         Family History:   Problem Relation Age of Onset    Diabetes Mother     Hypertension Mother     Deep Vein Thrombosis Mother     Deep Vein Thrombosis Sister        Social History     Social History    Marital status:      Spouse name: N/A    Number of children: N/A    Years of education: N/A     Occupational History    Not on file. Social History Main Topics    Smoking status: Never Smoker    Smokeless tobacco: Not on file    Alcohol use No    Drug use: Not on file    Sexual activity: Not on file     Other Topics Concern    Not on file     Social History Narrative         ALLERGIES: Lactose; Fructose;  Inderal [propranolol]; Lisinopril; and Gluten    Review of Systems   Gastrointestinal: Positive for blood in stool. Negative for abdominal pain. Neurological: Positive for dizziness, weakness (generalized) and headaches. All other systems reviewed and are negative. Vitals:    04/10/17 1237 04/10/17 1353   BP: 127/65    Pulse: (!) 115    Resp: 20    Temp: 98 °F (36.7 °C)    SpO2: 94% 95%   Weight: 137.4 kg (303 lb)    Height: 5' 11\" (1.803 m)             Physical Exam   Constitutional: He is oriented to person, place, and time. He appears well-developed and well-nourished. No distress. HENT:   Head: Normocephalic and atraumatic. Eyes: Conjunctivae are normal. No scleral icterus. Neck: Neck supple. No tracheal deviation present. Cardiovascular: Normal rate, regular rhythm, normal heart sounds and intact distal pulses. Exam reveals no gallop and no friction rub. No murmur heard. Pulmonary/Chest: Effort normal and breath sounds normal. He has no wheezes. He has no rales. Abdominal: Soft. He exhibits no distension. There is no tenderness. There is no rebound and no guarding. Genitourinary:   Genitourinary Comments: Gross blood on rectal exam.   Musculoskeletal: He exhibits no edema. Neurological: He is alert and oriented to person, place, and time. Skin: Skin is warm and dry. No rash noted. Psychiatric: He has a normal mood and affect. Nursing note and vitals reviewed. Note written by Janelle Hanson, as dictated by Franco Mercado MD 1:45 PM      [facility]  ED Course       Procedures    ED EKG interpretation:  Rhythm: atrial fib; Rate (approx.): 91; ST/T wave: non-specific changes; Note written by Janelle Hanson, as dictated by Franco Mercado MD 1:45 PM    2:29 PM  Patient is being admitted to the hospital.  The results of their tests and reasons for their admission have been discussed with them and/or available family.   They convey agreement and understanding for the need to be admitted and for their admission diagnosis. Consultation will be made now with the inpatient physician for hospitalization. CONSULT NOTE:  2:29 PM Zhou Todd MD spoke with Dr. Ruba Arredondo, Consult for Hospitalist.  Discussed available diagnostic tests and clinical findings. She is in agreement with care plans as outlined. Dr. Ruba Arredondo will see the pt. A/P: rectal bleeding on Eliquis; initial tachycardia has resolved; Hgb stable; admit for further management.   Zhou Todd MD  3:25 PM

## 2017-04-11 PROBLEM — E11.9 TYPE II DIABETES MELLITUS (HCC): Chronic | Status: ACTIVE | Noted: 2017-01-01

## 2017-04-11 PROBLEM — D62 ACUTE BLOOD LOSS ANEMIA: Status: ACTIVE | Noted: 2017-01-01

## 2017-04-11 PROBLEM — I26.99 ACUTE PULMONARY EMBOLUS (HCC): Status: ACTIVE | Noted: 2017-01-01

## 2017-04-11 NOTE — ROUTINE PROCESS
TRANSFER - IN REPORT:    Verbal report received from bedside RN on Radha Marrero.  being received from ICU(unit) for ordered procedure      Report consisted of patients Situation, Background, Assessment and   Recommendations(SBAR). Information from the following report(s) SBAR was reviewed with the receiving nurse. Opportunity for questions and clarification was provided. Assessment completed upon patients arrival to unit and care assumed.

## 2017-04-11 NOTE — PERIOP NOTES
1235- Pt out of OR. Remains intubated on SIMV. Plan to extubate. A-fib on monitor, 120-130's.    1250- POC hemacue-10.3. FSBG-168.    1320- Pt -140's A-fib. Pt did not receive AM meds due to NPO status. Spoke with Stephania Payan NP. Order received for Dilt bolus and gtt. 1330- Dilt bolus given and gtt started. Pt arousable, following commands with -600. Order to extubate received. Pt extubated to 4L/NC. 1340- Pt -110's. Will continue to monitor. 1415- TRANSFER - OUT REPORT:    Verbal report given to SARAH Patten on [employer].  being transferred to ICU 10 for routine post - op       Report consisted of patients Situation, Background, Assessment and   Recommendations(SBAR). Information from the following report(s) SBAR and OR Summary was reviewed with the receiving nurse. Lines:   Triple Lumen 04/11/17 Right Internal jugular (Active)   Central Line Being Utilized Yes 4/11/2017  1:00 PM   Criteria for Appropriate Use Limited/no vessel suitable for conventional peripheral access 4/11/2017  1:00 PM   Site Assessment Clean, dry, & intact 4/11/2017  1:00 PM   Date of Last Dressing Change 04/11/17 4/11/2017  1:00 PM   Dressing Status Clean, dry, & intact 4/11/2017  1:00 PM   Dressing Type Disk with Chlorhexadine gluconate (CHG); Transparent 4/11/2017  1:00 PM   Proximal Hub Color/Line Status White;Capped 4/11/2017  1:00 PM   Positive Blood Return (Medial Site) Yes 4/11/2017  1:00 PM   Medial Hub Color/Line Status Blue;Capped 4/11/2017  1:00 PM   Positive Blood Return (Lateral Site) Yes 4/11/2017  1:00 PM   Distal Hub Color/Line Status Brown; Infusing 4/11/2017  1:00 PM   Positive Blood Return (Site #3) Yes 4/11/2017  1:00 PM       Peripheral IV 04/10/17 Left Forearm (Active)   Site Assessment Clean, dry, & intact 4/11/2017  1:00 PM   Phlebitis Assessment 0 4/11/2017  1:00 PM   Infiltration Assessment 0 4/11/2017  1:00 PM   Dressing Status Clean, dry, & intact 4/11/2017  1:00 PM Dressing Type Tape;Transparent 4/11/2017  1:00 PM   Hub Color/Line Status Pink 4/11/2017  1:00 PM   Action Taken Open ports on tubing capped 4/11/2017  7:00 AM   Alcohol Cap Used Yes 4/11/2017  7:00 AM       Peripheral IV 04/10/17 Left Forearm (Active)   Site Assessment Clean, dry, & intact 4/11/2017  1:00 PM   Phlebitis Assessment 0 4/11/2017  1:00 PM   Infiltration Assessment 0 4/11/2017  1:00 PM   Dressing Status Clean, dry, & intact 4/11/2017  1:00 PM   Dressing Type Tape;Transparent 4/11/2017  1:00 PM   Hub Color/Line Status Pink 4/11/2017  1:00 PM   Action Taken Open ports on tubing capped 4/11/2017  7:00 AM   Alcohol Cap Used Yes 4/11/2017  1:00 PM        Opportunity for questions and clarification was provided.       Patient transported with:   Monitor  O2 @ 4 liters  Registered Nurse

## 2017-04-11 NOTE — H&P
Radiology History and Physical    Patient: Radha Liu 73 y.o. male       Chief Complaint: Rectal Bleeding      History of Present Illness: lower gi bleed    History:    Past Medical History:   Diagnosis Date    Anxiety     Arthritis     Asthma     Atrial fibrillation ([facility]     Diabetes ([facility]     HTN (hypertension) with goal to be determined     Morbid obesity with BMI of 40.0-44.9, adult ([facility] 4/5/2017    Myasthenia gravis ([facility]     Pulmonary emboli (HCC)     with bilateral DVT's      Family History   Problem Relation Age of Onset    Diabetes Mother     Hypertension Mother     Deep Vein Thrombosis Mother     Deep Vein Thrombosis Sister      Social History     Social History    Marital status:      Spouse name: N/A    Number of children: N/A    Years of education: N/A     Occupational History    Not on file. Social History Main Topics    Smoking status: Never Smoker    Smokeless tobacco: Not on file    Alcohol use No    Drug use: Not on file    Sexual activity: Not on file     Other Topics Concern    Not on file     Social History Narrative       Allergies: Allergies   Allergen Reactions    Lactose Itching    Fructose Other (comments)     States added sugar to foods causes sore throat/ear ache.     Inderal [Propranolol] Unknown (comments)    Lisinopril Angioedema    Gluten Nausea Only       Current Medications:  Current Facility-Administered Medications   Medication Dose Route Frequency    vancomycin (VANCOCIN) 2,000 mg in 0.9% sodium chloride 500 mL IVPB  2,000 mg IntraVENous Q12H    piperacillin-tazobactam (ZOSYN) 3.375 g in 0.9% sodium chloride (MBP/ADV) 100 mL  3.375 g IntraVENous Q8H    PHENYLephrine (NEOSYNEPHRINE) 30,000 mcg in 0.9% sodium chloride 250 mL infusion   mcg/min IntraVENous TITRATE    magnesium sulfate 2 g/50 ml IVPB (premix or compounded)  2 g IntraVENous ONCE    0.9% sodium chloride infusion 250 mL  250 mL IntraVENous PRN    dilTIAZem (CARDIZEM) 125 mg in dextrose 5% 125 mL infusion  5-15 mg/hr IntraVENous TITRATE    HYDROmorphone (PF) 15 mg/30 ml (DILAUDID) PCA   IntraVENous TITRATE    sodium chloride (NS) flush 5-10 mL  5-10 mL IntraVENous Q8H    sodium chloride (NS) flush 5-10 mL  5-10 mL IntraVENous PRN    acetaminophen (TYLENOL) tablet 650 mg  650 mg Oral Q4H PRN    oxyCODONE-acetaminophen (PERCOCET) 5-325 mg per tablet 1 Tab  1 Tab Oral Q4H PRN    naloxone (NARCAN) injection 0.4 mg  0.4 mg IntraVENous PRN    ondansetron (ZOFRAN) injection 4 mg  4 mg IntraVENous Q4H PRN    insulin lispro (HUMALOG) injection   SubCUTAneous AC&HS    glucose chewable tablet 16 g  4 Tab Oral PRN    dextrose (D50W) injection syrg 12.5-25 g  12.5-25 g IntraVENous PRN    glucagon (GLUCAGEN) injection 1 mg  1 mg IntraMUSCular PRN    ALPRAZolam (XANAX) tablet 0.25 mg  0.25 mg Oral TID PRN    ascorbic acid (vitamin C) (VITAMIN C) tablet 1,000 mg  1,000 mg Oral DAILY    cholecalciferol (VITAMIN D3) tablet 5,000 Units  5,000 Units Oral DAILY    [START ON 4/16/2017] cloNIDine (CATAPRES) 0.2 mg/24 hr patch 1 Patch  1 Patch TransDERmal Q7D    predniSONE (DELTASONE) tablet 50 mg  50 mg Oral DAILY WITH BREAKFAST    pyridostigmine (MESTINON) tablet 60 mg  60 mg Oral BID    traMADol (ULTRAM) tablet 50 mg  50 mg Oral Q6H PRN    pantoprazole (PROTONIX) 40 mg in sodium chloride 0.9 % 10 mL injection  40 mg IntraVENous Q12H    0.9% sodium chloride infusion  125 mL/hr IntraVENous CONTINUOUS    0.9% sodium chloride infusion 250 mL  250 mL IntraVENous PRN        Physical Exam:  Blood pressure 120/87, pulse (!) 127, temperature 97.3 °F (36.3 °C), resp. rate 11, height 5' 11\" (1.803 m), weight 137.3 kg (302 lb 11.1 oz), SpO2 100 %.   LUNG: clear to auscultation bilaterally, HEART: regular rate and rhythm, S1, S2 normal, no murmur, click, rub or gallop      Alerts:    Hospital Problems  Date Reviewed: 4/10/2017          Codes Class Noted POA    Acute blood loss anemia ICD-10-CM: D62  ICD-9-CM: 285.1  4/11/2017 Yes        Acute pulmonary embolus ([facility] ICD-10-CM: I26.99  ICD-9-CM: 415.19  4/11/2017 Yes        * (Principal)GI bleed ICD-10-CM: K92.2  ICD-9-CM: 578.9  4/10/2017 Yes        Type II diabetes mellitus ([facility] (Chronic) ICD-10-CM: E11.9  ICD-9-CM: 250.00  4/10/2017 Yes        History of stroke (Chronic) ICD-10-CM: F72.66  ICD-9-CM: V12.54  4/5/2017 Yes        Morbid obesity with BMI of 40.0-44.9, adult ([facility] (Chronic) ICD-10-CM: E66.01, Z68.41  ICD-9-CM: 278.01, V85.41  4/5/2017 Yes        Myasthenia gravis with exacerbation, adult form ([facility] (Chronic) ICD-10-CM: G70.01  ICD-9-CM: 358.01  4/4/2017 Yes        PAF (paroxysmal atrial fibrillation) (HCC) (Chronic) ICD-10-CM: I48.0  ICD-9-CM: 427.31  9/3/2016 Yes              Laboratory:      Recent Labs      04/11/17   1023  04/11/17   0235   04/10/17   1320   HGB  9.1*  11.3*   < >  13.3   HCT  27.6*  34.6*   < >  42.8   WBC   --   11.4*   < >  17.2*   PLT   --   218   < >  229   INR   --    --    --   1.1   BUN   --   21*   --   21*   CREA   --   1.10   --   1.09   K   --   4.4   --   3.9    < > = values in this interval not displayed. Plan of Care/Planned Procedure:  Risks, benefits, and alternatives reviewed with patient and he agrees to proceed with the procedure.      Plan is for mesenteric arteriogram    Plan is for IVC filter placement       Sidney Bella MD

## 2017-04-11 NOTE — PROGRESS NOTES
Shift Summary    0700: Report received and care assumed. Pt resting in bed, denies pain. A-Fib 125-145 bmp.    0730: Large bloody stool obtained. Incontinence care provided. 4800: BP 83/67. Pt states, \"I am going to pass out and throw up\". 4 mg Zofran given. Dr. Nelson Hebert made aware and has ordered at 1000 mL NS bolus now. 0840: NS bolus started. Medium bloody stool obtained. Incontinence care provided. 0935: Cath lab at bedside to take pt to cath lab for IVC filter and triple lumen catheter placement. Pt will immediately go to OR afterwards. Report given to OR nurse Santiago Paredes. Nurse unsuccessfully attempted three times to get blood for HGB and HCT check. Cath lab will obtain HGB and HCT per Moreno Payton RN.     1327: Pt off floor to cath lab now. 1415: Pt back in room with two PACU RNs. VSS. Pt moaning and reporting pain. Pt remains drowsy. 1455: Dr. Pablo Hughes paged to request pain medication and additional labs. 1700: Pt resting in bed with VSS. Pt moans when sleeping and is using his Dilaudid PCA appropriately. Pt's sister, who he lives with, states that he often moans when sleeping. Pt remains drowsy, but is easily arouseable. 1900: At change of shift, VSS. Pt resting in bed with family at bedside.

## 2017-04-11 NOTE — PROGRESS NOTES
Called Dr. Miguelina Moody about patients active GI bleed. Order for stat H&H, transfer to ICU, and GI consult stat.

## 2017-04-11 NOTE — PROGRESS NOTES
Occupational Therapy Note:  Orders acknowledged, chart reviewed and spoke with nursing. Patient is currently off the floor to the OR for a procedure will likely be vented post procedure per RN. Patient will need new orders for initiation of OT/PT services, currently orders completed; RN agrees with plan,  Please re-consult when appropriate. Thank you.   Nishi Ferguson, OTR/L

## 2017-04-11 NOTE — PROGRESS NOTES
Nutrition Assessment:    RECOMMENDATIONS/INTERVENTION(S):   Further diet advancement per surgery--when able to fully advance, recommend Diabetic (CCD)/Gluten Free diet  Monitor BG, electrolytes  Honor food preferences as diet permits  Continue to monitor appetite/PO intake, diet advancement/tolerance, and initiate ONS    ASSESSMENT:   Chart reviewed. This is a 72 yo male s/p exp lap with sigmoid colectomy, colostomy. Hx Afib, DM, PE, myasthenia gravis. Visited pt this afternoon. Lethargic from recent surgery. Dietary hx obtained from sister. This writer asked about patient's fructose allergy. She reported pt does not consume \"sugar\" and follows a gluten-free diet at home. ? sister understanding as to what foods contain fructose as she stated pt does consume fresh fruit daily. Diet at home typically consists of meat, fresh fruit, and vegetables--no breads, cereals, or pastas including those that are gluten-free. \"He doesn't eat any of that even if it is gluten free. \"  Labs/Meds reviewed. -[phone], A1c elevated 10.5%--? newly dx DM. Mg repleted. LBM noted 4/22. Skin--no pressure injury; abd incision; 2+ pitting edema BUE, 3+ pitting edema BLE. Central line in place. Weight record reviewed. Pt with weight loss since January, however amount not significant for timeframe. SUBJECTIVE/OBJECTIVE:     Diet Order: Clear liquids  % Eaten:  No data found.     Pertinent Medications: [x] Reviewed    Chemistries:  Lab Results   Component Value Date/Time    Sodium 139 04/11/2017 02:35 AM    Potassium 4.4 04/11/2017 02:35 AM    Chloride 105 04/11/2017 02:35 AM    CO2 28 04/11/2017 02:35 AM    Anion gap 6 04/11/2017 02:35 AM    Glucose 144 04/11/2017 02:35 AM    BUN 21 04/11/2017 02:35 AM    Creatinine 1.10 04/11/2017 02:35 AM    BUN/Creatinine ratio 19 04/11/2017 02:35 AM    GFR est AA >60 04/11/2017 02:35 AM    GFR est non-AA >60 04/11/2017 02:35 AM    Calcium 7.5 04/11/2017 02:35 AM    AST (SGOT) 29 04/10/2017 01:20 PM    Alk. phosphatase 68 04/10/2017 01:20 PM    Protein, total 5.5 04/10/2017 01:20 PM    Albumin 2.7 04/10/2017 01:20 PM    Globulin 2.8 04/10/2017 01:20 PM    A-G Ratio 1.0 04/10/2017 01:20 PM    ALT (SGPT) 73 04/10/2017 01:20 PM      Anthropometrics: Height: 5' 11\" (180.3 cm) Weight: 137.3 kg (302 lb 11.1 oz)    IBW (%IBW): 75.5 kg (166 lb 7.2 oz) ( ) UBW (%UBW):   (  %)    BMI: Body mass index is 42.22 kg/(m^2). This BMI is indicative of:   [] Underweight    [] Normal    [] Overweight    []  Obesity    [x]  Extreme Obesity (BMI>40)  Estimated Nutrition Needs (Based on): 2287 Kcals/day (BMR (2144) x 1.3 AF - 500) , 113 g (-136 g/d (1.5-1.8 g/Kg IBW)) Protein  Carbohydrate: At Least 130 g/day  Fluids: 2300 mL/day    Last BM: 4/11   [x]Active     []Hyperactive  [x]Hypoactive       [] Absent   BS  Skin:    [] Intact   [x] Incision--abd  [] Breakdown   [] DTI   [] Tears/Excoriation/Abrasion  [x]Edema--3+ pitting edema BLE;  2+ pitting edema BUE [] Other:    Wt Readings from Last 30 Encounters:   04/11/17 137.3 kg (302 lb 11.1 oz)   04/09/17 140 kg (308 lb 9.6 oz)   04/04/17 137.7 kg (303 lb 9.6 oz)   02/24/17 143.5 kg (316 lb 6.4 oz)   01/23/17 143.3 kg (316 lb)   11/21/16 141.6 kg (312 lb 3.2 oz)   10/21/16 142.9 kg (315 lb 1.6 oz)   10/01/16 146.5 kg (323 lb)   09/15/16 (!) 161.7 kg (356 lb 7.7 oz)      NUTRITION DIAGNOSES:   Problem:  Altered GI function Increased protein needs   Etiology: related to GIB, surgical wound healing     Signs/Symptoms: as evidenced by s/p exp lap with sigmoid colectomy and colostomy      NUTRITION INTERVENTIONS:  Meals/Snacks: General/healthful diet (once able to advance to PO diet)                  GOAL:   Pt will tolerate PO diet advancement in next 1-3 days    Cultural, Jewish, or Ethnic Dietary Needs: None     LEARNING NEEDS (Diet, Food/Nutrient-Drug Interaction):    [x] None Identified   [] Identified and Education Provided/Documented   [] Identified and Pt declined/was not appropriate      [] Interdisciplinary Care Plan Reviewed/Documented    [] Discharge Needs:    tbd   [] No Nutrition Related Discharge Needs    NUTRITION RISK:   Pt Is At Nutrition Risk  [x]     No Nutrition Risk Identified  []       PT SEEN FOR:    []  MD Consult: []Calorie Count      []Diabetic Diet Education        []Diet Education     []Electrolyte Management     []General Nutrition Management and Supplements     []Management of Tube Feeding     []TPN Recommendations    []  RN Referral:  []MST score >=2     []Enteral/Parenteral Nutrition PTA     []Pregnant: Gestational DM or Multigestation                 [] Pressure Ulcer       []  Low BMI      []  Length of Stay       [] Dysphagia Diet         [x] Ventilator--s/p extubation post-op      []  Follow-Up                 Ron Andino, [address] [place] [address]   Pager [phone]

## 2017-04-11 NOTE — CONSULTS
Surgery Consult    Subjective:      Suzanne Jaffe is a 73 y.o. male who i was asked to see for persistent rectal bleeding. Patient had presented with bright red rectal bleeding. He was recently diagnosed with LLE DVT and bilateral PE and was started on Eliquis. This is currently held. Patient had been seen by GI and was being prepped for colonoscopy but had a major bleed last night and had IR intervention with coiling - in spite of this patient has had persistent bright red rectal bleeding. He is now hypotensive and tachycardic. His last HB at 4 am was stable.      Past Medical History:   Diagnosis Date    Anxiety     Arthritis     Asthma     [facility]     Diabetes ([facility]     HTN (hypertension) with goal to be determined     Morbid obesity with BMI of 40.0-44.9, adult ([facility] 4/5/2017    Myasthenia gravis ([facility]     Pulmonary emboli (HCC)     with bilateral DVT's      Past Surgical History:   Procedure Laterality Date    HX ORTHOPAEDIC      HX OTHER SURGICAL      L sided neck suregry \" infection\"      Family History   Problem Relation Age of Onset    Diabetes Mother     Hypertension Mother     Deep Vein Thrombosis Mother     Deep Vein Thrombosis Sister      Social History     Social History    Marital status:      Spouse name: N/A    Number of children: N/A    Years of education: N/A     Social History Main Topics    Smoking status: Never Smoker    Smokeless tobacco: None    Alcohol use No    Drug use: None    Sexual activity: Not Asked     Other Topics Concern    None     Social History Narrative      Current Facility-Administered Medications   Medication Dose Route Frequency    vancomycin (VANCOCIN) 2,000 mg in 0.9% sodium chloride 500 mL IVPB  2,000 mg IntraVENous Q12H    piperacillin-tazobactam (ZOSYN) 3.375 g in 0.9% sodium chloride (MBP/ADV) 100 mL  3.375 g IntraVENous Q8H    midazolam (VERSED) 1 mg/mL injection        fentaNYL citrate (PF) 50 mcg/mL injection        lidocaine (XYLOCAINE) 10 mg/mL (1 %) injection        iopamidol (ISOVUE-370) 76 % injection        PHENYLephrine (NEOSYNEPHRINE) 30,000 mcg in 0.9% sodium chloride 250 mL infusion   mcg/min IntraVENous TITRATE    magnesium sulfate 2 g/50 ml IVPB (premix or compounded)  2 g IntraVENous ONCE    sodium chloride (NS) flush 5-10 mL  5-10 mL IntraVENous Q8H    sodium chloride (NS) flush 5-10 mL  5-10 mL IntraVENous PRN    acetaminophen (TYLENOL) tablet 650 mg  650 mg Oral Q4H PRN    oxyCODONE-acetaminophen (PERCOCET) 5-325 mg per tablet 1 Tab  1 Tab Oral Q4H PRN    naloxone (NARCAN) injection 0.4 mg  0.4 mg IntraVENous PRN    ondansetron (ZOFRAN) injection 4 mg  4 mg IntraVENous Q4H PRN    insulin lispro (HUMALOG) injection   SubCUTAneous AC&HS    glucose chewable tablet 16 g  4 Tab Oral PRN    dextrose (D50W) injection syrg 12.5-25 g  12.5-25 g IntraVENous PRN    glucagon (GLUCAGEN) injection 1 mg  1 mg IntraMUSCular PRN    ALPRAZolam (XANAX) tablet 0.25 mg  0.25 mg Oral TID PRN    ascorbic acid (vitamin C) (VITAMIN C) tablet 1,000 mg  1,000 mg Oral DAILY    carvedilol (COREG) tablet 12.5 mg  12.5 mg Oral BID WITH MEALS    cholecalciferol (VITAMIN D3) tablet 5,000 Units  5,000 Units Oral DAILY    [START ON 4/16/2017] cloNIDine (CATAPRES) 0.2 mg/24 hr patch 1 Patch  1 Patch TransDERmal Q7D    dilTIAZem CD (CARDIZEM CD) capsule 300 mg  300 mg Oral DAILY    predniSONE (DELTASONE) tablet 50 mg  50 mg Oral DAILY WITH BREAKFAST    pyridostigmine (MESTINON) tablet 60 mg  60 mg Oral BID    traMADol (ULTRAM) tablet 50 mg  50 mg Oral Q6H PRN    pantoprazole (PROTONIX) 40 mg in sodium chloride 0.9 % 10 mL injection  40 mg IntraVENous Q12H    0.9% sodium chloride infusion  125 mL/hr IntraVENous CONTINUOUS    0.9% sodium chloride infusion 250 mL  250 mL IntraVENous PRN    peg 3350-electrolytes (COLYTE) 4000 mL  4,000 mL Oral ONCE      Allergies   Allergen Reactions    Lactose Itching  Fructose Other (comments)     States added sugar to foods causes sore throat/ear ache.  Inderal [Propranolol] Unknown (comments)    Lisinopril Angioedema    Gluten Nausea Only       Review of Systems:REVIEW OF SYSTEMS:     []     Unable to obtain  ROS due to  []    mental status change  []    sedated   []    intubated   []    Total of 12 systems reviewed as follows:  Constitutional: negative fever, negative chills, negative weight loss  Eyes:   negative visual changes  ENT:   negative sore throat, tongue or lip swelling  Respiratory:  negative cough, negative dyspnea  Cards:  negative for chest pain, palpitations, lower extremity edema  GI:   negative for nausea, vomiting, diarrhea, and abdominal pain  :  negative for frequency, dysuria  Integument:  negative for rash and pruritus  Heme:  negative for easy bruising and gum/nose bleeding  Musculoskel: negative for myalgias,  back pain and muscle weakness  Neuro:  negative for headaches, dizziness, vertigo  Psych:  negative for feelings of anxiety, depression     Objective:      Patient Vitals for the past 8 hrs:   BP Temp Pulse Resp SpO2 Weight   17 0701 - - (!) 124 - - -   17 0700 - 97.7 °F (36.5 °C) - - - -   17 0500 105/71 - (!) 124 12 95 % -   17 0400 117/77 - (!) 115 12 93 % -   17 0315 - 98.1 °F (36.7 °C) (!) 119 16 100 % -   17 0300 - 98.1 °F (36.7 °C) (!) 111 23 100 % -   17 0245 - - - - - 137.3 kg (302 lb 11.1 oz)   17 0200 103/76 98.3 °F (36.8 °C) (!) 105 19 98 % -   17 0130 100/62 97.9 °F (36.6 °C) (!) 126 17 - -       Temp (24hrs), Av.1 °F (36.7 °C), Min:97.6 °F (36.4 °C), Max:98.6 °F (37 °C)      Physical Exam:  General:  Alert, cooperative, no distress, appears stated age. Eyes:  Conjunctivae/corneas clear. PERRL, EOMs intact. Nose: Nares normal. Septum midline. Mucosa normal. No drainage or sinus tenderness.    Mouth/Throat: Lips, mucosa, and tongue normal. Teeth and gums normal. Neck: Supple, symmetrical, trachea midline, no adenopathy, thyroid: no enlargment/tenderness/nodules, no carotid bruit and no JVD. Back:   Symmetric, no curvature. ROM normal. No CVA tenderness. Lungs:   Clear to auscultation bilaterally. Heart:  Regular rate and rhythm, S1, S2 normal, no murmur, click, rub or gallop. Abdomen:   Soft, non-tender. Bowel sounds normal. No masses,  No organomegaly. Extremities: Extremities normal, atraumatic, no cyanosis or edema. Pulses: 2+ and symmetric all extremities. Skin: Skin color, texture, turgor normal. No rashes or lesions   Lymph nodes: Cervical, supraclavicular, and axillary nodes normal.     Labs: Recent Labs      04/11/17   0235   WBC  11.4*   HGB  11.3*   HCT  34.6*   PLT  218     Recent Labs      04/11/17   0235  04/10/17   1320   NA  139  139   K  4.4  3.9   CL  105  101   CO2  28  29   GLU  144*  137*   BUN  21*  21*   CREA  1.10  1.09   CA  7.5*  8.6   MG  1.9   --    ALB   --   2.7*   TBILI   --   0.5   SGOT   --   29   ALT   --   73     Recent Labs      04/10/17   1320   INR  1.1         Assessment:     I evaluated the CT scan, the IR images   Patient has persistent lower GI hemorrhage with hypotension    Plan:       I agree that he needs ex laparotomy and sigmoid colectomy because of failure of radiological intervention. I discussed the procedure with him and also called his sister and spoke to her at length. Discussed the risk of surgery including bleeding, infection, need for colostomy, need for IVC filter filter, possibility of further clots, further bleeding, sepsis, abscess and ,  and the risks of general anesthetic. The patient understands the risks; any and all questions were answered to the patient's satisfaction.     Signed By: Ricardo Root MD     April 11, 2017

## 2017-04-11 NOTE — PROGRESS NOTES
Shift Summary  4/10/2017    Pt arrived to unit from ED, A/O only complaint of HA.  VS have been stable throughout my shift, pt states he has not had any BM's since arriving to unit. Skin check done; at time of skin check there was no active rectal bleeding noted. At time of shift report to Maria Fernanda, pt states he would like to change pads & sweatpants because he thinks he may be leaking from his rectum again; asked PCT to assist patient to change pants/bed pad while RNs continued to report. Bedside and Verbal shift change report given to Maria Fernanda (oncoming nurse) by Rebecca Philip (offgoing nurse). Report included the following information SBAR, Kardex, ED Summary, Intake/Output, MAR, Accordion and Cardiac Rhythm Sinus Tach.

## 2017-04-11 NOTE — PROGRESS NOTES
GI Note    Reviewed patient chart. He is currently not in room and in preparation for emergent surgical intervention. Overnight patient continue to have rectal bleeding. Required IR embolization and 4 coils placed in J[facility]ON[facility]. CT scan revealed 1. Sigmoid colonic hemorrhage. 2. Small adjacent mass in the sigmoid. Diverticulitis slightly favored over a small colon carcinoma. 3. 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 4. Resolved pulmonary emboli in the visualized portions of the left lower lobe. Stable or slightly decreased right lung emboli with small developing right upper lobe pulmonary infarction. Despite embolization he continued to have rectal bleeding, became hypotensive and tachycardic. Evaluated by surgeon and plan is for urgent ex laparotomy and sigmoid colectomy. Cancel plans for endoscopic evaluation.     Tai MEAD PA-C  04/11/17  10:20 AM

## 2017-04-11 NOTE — PROGRESS NOTES
@2000 Patient urgently transferred for active GI bleed with large amounts of bright red bloody stool overflowing in the bed. Vitals:  Temp 97.6, Afib 98 bpm, /70 (82), RR 19, and O2sats 93% on 2 lpm nasal canula. Denies pain or nausea. Wt.  137.4 kg and I&O 340/0 with a net +340 ml for today. 20 G LFA PIV: S/L. Voids via urinal.    Day events:  Transferred from [facility] for above note hgb drawn and sent and GI paged stat for consult. Assessment:    General:  Appears well groom developed with blood everywhere around his legs and bed but calm and in good spirits. EENT:  Head is atraumatic normocephalic with short fine grey hair. Ears are symmetric from helix, tragus, and lobules. Whisper test, min, and rinne test wnl. Denies any ringing in the ears. Eyes are PERRLA with 4 mm pupils, brown iris, white sclera, pale conjunctiva, and EOM is intact. Denies any floaters, blurry vision, or streaks. Nose is mid line with patency assessed and lower turbinates visualized. Able to smell alcohol swab. Lips are pale and mouth mucosa is pale and moist.  Normal dentition and uvula is midline. Trachea is palpable and midline and neck supple to palpitation. Neuro:  GCS 15, RASS of 0, A&O X4, pleasant, calm, cooperative, with excellent in sight. Face is symmetric from eyebrows and smile. Able to purse lips, puff cheeks, and clench jaw. Able to discern soft and sharp sensations on his face, stereognostic WNL, Able to glide heels down shins, rapid finger to nose, and +2 DTRs. States numbness and tingling to the RUE which he states is chronic. Turn his head left and right, shrug shoulders, equal  bilateral, and +5 gross motor strength. Denies any headaches. Cardio:  Afib with S1S2 irregular irregular without murmurs, gallops, clicks, or bruits. Denies chest pain/pressure. +2 palpable pulses. -JVD, lift, heaves, or thrills. Wt. 137.4 kg and I&O 340/0 with a net +340 ml for today and admission. TTE on 04/7/17:  EF 60% with NWMA. Mild biatrial enlargement. Moderate MR/TR. 12 Lead EKG: Afib 91 bpm with low voltage QRS. K 3.9, Mag 2.1, troponin <004, and Pro-. TSH on 04/05/17 0.32. BLE Venous duplex: +Bilateral DVT in the mid femoral, popliteal above knee, popliteal (fossa), and popliteal (Below knee)    Was on eliquis recently for afib. Resp:  On 2 lpm nasal canula. C/o mild SOB which he states was baseline from being discharge for PE recently. Clear vesicular breath sounds. CTA of 04/05/17:  1. Bilateral pulmonary emboli. 2. RUL airspace disease may represent a pulmonary infarct . CXR:  1. Persistent mild atelectasis at the left lung base  2. Mild opacities in right mid zone consistent with pulmonary infarct is again noted. GI:  Abdomen is obese soft tender to palpitation R>L, with bowel sounds X4. Denies nausea. Multiple bloody BMs.  BUN is trending up now 21. :  Voids via urinal.  Creatinine 1.09. Skin:  Warm, dry, intact, with uniform color distribution. Endocrine:  AC&HS with normal sensitivity. DVT prophylaxis:  None    PPI: Protonix    Plan of Care:  1. Maintain O2sats >90%   A. 2 liters nasal canula   B. Pulmonary Toilet:     1. HOB >30 degrees    2. TCDB, suction, and IS     2. Maintain SBP >90 mmHg and MAP >65 mmHg   A. CVP 8-10    B. Will ask MD for fluids secondary for aggressive volume expansion with him currently bleeding    3. Maintain Temp 96.6 to 99.6   A.  <96.6 apply Mame Hugger   B.  >101 treat 1st with APAP and then if necessary apply cooling blanket   C. Pan culture for hypothermia/fever spikes     4. Nutrition   A.  NPO    5. Tight Glycemic control   A. Normal Sensitivity    6. Replace Lytes as needed   A.  Keep K >4 or Mag >2   B. ICU labs    7. Mobility    A. Q2H turns   B. PT/OT     8.  Hgb<7 or drops by 2 gms   A. Jaziel YU and inquire about PRBC transfusion and keep 2 units ahead.   Will ask now as he appears very pale with HR in the 120s though BP is stable and Hgb is now 12.6.   B.  Will also inquire about bleed scan for active bleed. C.  Await GI to evaluate    @2000 Discuss case with Dr. Emili Felix and inquire for blood transfusion and IVFs. Orders received for Type and screen and  ml/H.    @2130 Dr. Gamaliel Boyer evaluates patient and plans for CTA for active bleed and possible embolization of source. Agrees with Dr. Emili Felix for transfusion of 1 unit of PRBCs. Also plans for bowel prep for possible colonoscopy tomorrow. Consents obtain. @2200 Noted large bright red bloody BM covering the entire bottom of bed. Not[facility] Dr[facility] and CT per plans discussed and wisked him away to CT for CTA of abdomen and pelvis. @12 Dr. Emili Felix appraised of situation and then [facility] paged again to please notify IR the need for them to be here for embolization. Also by this time he has had 4 bloody BMs. Also Blood glucose 143 no coverage required. @56 Dr. Emili Felix spoke with radiologist and IR and plan is to proceed with embolization and consult general surgery due to mass and abscess found in CT with active bleeding. Dr. Emili Felix adds on Vancomycin and Zosyn for abscess for broad coverage. CT results:    1. Sigmoid colonic hemorrhage. 2. Small adjacent mass in the sigmoid. Diverticulitis slightly favored over a small colon carcinoma. 3. 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 4. Resolved pulmonary emboli in the visualized portions of the left lower lobe. Stable or slightly decreased right lung emboli with small developing right upper lobe pulmonary infarction. @2355 PRBCs started. @1 Dr. Toma Willett page for consult; however, no return call. Updated Dr. Emili Felix as patient is being prep by Angio for procedure. Dr. Emili Felix instructs me to hold 2nd paged till IR is either able to stop the bleed or not.   If not and still bleeding heavily re page  Linwood Mendiola but if the bleed tapers down then he can be consulted in the am regarding th abscess and bleed. @0100 Angio takes patient down for embolization. @0220 4 coils placed in the JHONY per report with satisfaction. @0245 AM lab work obtained and sent. Wt. 137.3 kg, -0.1 kg since yesterday. @1112 Pharmacist updates me that she is holding his metformin secondary to the dye for today. @7142 Noted another bloody BM still loose but more maroon and dark.

## 2017-04-11 NOTE — ROUTINE PROCESS
@1900 Bedside and Verbal shift change report given to Jhony Knight RN (oncoming nurse) by Alyce Martinez RN (offgoing nurse). Report included the following information SBAR, Kardex, ED Summary, Procedure Summary, Intake/Output, MAR, Accordion, Recent Results, Med Rec Status, Cardiac Rhythm afib and Alarm Parameters . @8353 Bedside and Verbal shift change report given to [address] (oncoming nurse) by Jhony Knight RN (offgoing nurse). Report included the following information SBAR, Kardex, ED Summary, OR Summary, Procedure Summary, Intake/Output, MAR, Accordion, Recent Results, Med Rec Status, Cardiac Rhythm Afib and Alarm Parameters .

## 2017-04-11 NOTE — ROUTINE PROCESS
0700: Bedside and Verbal shift change report received from [employer] by Mitali Mott RN. Report included the following information SBAR, ED Summary, Intake/Output, MAR, Accordion, Recent Results and Cardiac Rhythm A-Fib.     0935: TRANSFER - OUT REPORT:    Verbal report given to Solo on [employer].  being transferred to Cath Lab and then OR for ordered procedure       Report consisted of patients Situation, Background, Assessment and   Recommendations(SBAR). Information from the following report(s) SBAR, Kardex, ED Summary, Intake/Output, MAR, Recent Results and Cardiac Rhythm A-Fib was reviewed with the receiving nurse. Lines:   Triple Lumen 04/11/17 Right Internal jugular (Active)   Central Line Being Utilized Yes 4/11/2017 10:18 AM   Criteria for Appropriate Use Hemodynamically unstable, requiring monitoring lines, vasopressors, or volume resuscitation 4/11/2017 10:18 AM   Site Assessment Clean, dry, & intact 4/11/2017 10:18 AM   Date of Last Dressing Change 04/11/17 4/11/2017 10:18 AM   Dressing Status Clean, dry, & intact 4/11/2017 10:18 AM   Dressing Type Disk with Chlorhexadine gluconate (CHG); Transparent 4/11/2017 10:18 AM   Proximal Hub Color/Line Status White 4/11/2017 10:18 AM   Positive Blood Return (Medial Site) Yes 4/11/2017 10:18 AM   Medial Hub Color/Line Status Blue 4/11/2017 10:18 AM   Positive Blood Return (Lateral Site) Yes 4/11/2017 10:18 AM   Distal Hub Color/Line Status Brown 4/11/2017 10:18 AM   Positive Blood Return (Site #3) Yes 4/11/2017 10:18 AM       Peripheral IV 04/10/17 Left Forearm (Active)   Site Assessment Clean, dry, & intact 4/11/2017  7:00 AM   Phlebitis Assessment 0 4/11/2017  7:00 AM   Infiltration Assessment 0 4/11/2017  7:00 AM   Dressing Status Clean, dry, & intact 4/11/2017  7:00 AM   Dressing Type Tape;Transparent 4/11/2017  7:00 AM   Hub Color/Line Status Pink; Infusing;Flushed;Patent 4/11/2017  7:00 AM   Action Taken Open ports on tubing capped 4/11/2017  7:00 AM   Alcohol Cap Used Yes 4/11/2017  7:00 AM       Peripheral IV 04/10/17 Left Forearm (Active)   Site Assessment Clean, dry, & intact 4/11/2017  7:00 AM   Phlebitis Assessment 0 4/11/2017  7:00 AM   Infiltration Assessment 0 4/11/2017  7:00 AM   Dressing Status Clean, dry, & intact 4/11/2017  7:00 AM   Dressing Type Tape;Transparent 4/11/2017  7:00 AM   Hub Color/Line Status Pink;Capped;Flushed;Patent 4/11/2017  7:00 AM   Action Taken Open ports on tubing capped 4/11/2017  7:00 AM   Alcohol Cap Used Yes 4/11/2017  7:00 AM        Opportunity for questions and clarification was provided. Patient transported with:   Monitor  O2 @ 2 liters  Registered Nurse    [id number]: TRANSFER - IN REPORT:    Verbal report received from Marien Schirmer on [employer].  being received from PACU for routine post - op      Report consisted of patients Situation, Background, Assessment and   Recommendations(SBAR). Information from the following report(s) SBAR, Kardex, OR Summary, Procedure Summary, Intake/Output, MAR, Accordion, Recent Results and Cardiac Rhythm A-fib was reviewed with the receiving nurse. Opportunity for questions and clarification was provided. Assessment completed upon patients arrival to unit and care assumed. 1415: Primary Nurse Ligia Stephen RN and Marta Rogers RN performed a dual skin assessment on this patient No impairment noted  Srinivas score is 13.    1900: Bedside and Verbal shift change report given to [employer] by Severa Binder RN. Report included the following information SBAR, ED Summary, Intake/Output, MAR, Accordion, Recent Results and Cardiac Rhythm A-Fib.

## 2017-04-11 NOTE — PROGRESS NOTES
Called by the nurse to inform me that pt had significant amount of rectal bleeding. Transfer pt to ICU for close monitoring. Check H/H q 6 hrs and type and cross 2 units of PRBC. Asked the nurse to call GI stat. 21:28  Evaluated pt in ICU. Tachycardiac, otherwise stable. Continue to monitor closely and will transfuse if starts to bleed again. 21:40  Spoke with GI, Dr Aida Ortiz, GI who came to see pt. Recommended to check CTA if he starts to bleed again and prepare for AM colonoscopy. 22:00  Spoke with Dr Aida Ortiz in ICU and plan is to transfuse pt with one unit of PRBC and watch closely. If he starts to bleed will do stat CTA and consult IR for possible embolization. Spoke with nurse supervisor. 00:50  Spoke with radiologist, Dr Gloria Nagy and IR about CTA finding, which showed active sigmoid bleeding with abscess and fistula. Started on broad spectrum ABx. Consult general surgery. 04: 46.   Pt had embolization by IR and greatly appreciated. Pt's H/H result noted. Had already one unit of PRBC. Continue to monitor H/H and transfuse as needed. No further bleeding noted. Pt sleeping comfortably. Spent a total of 90  minutes of critical time.

## 2017-04-11 NOTE — ROUTINE PROCESS
TRANSFER - OUT REPORT:    [facility]  being [facility] to OR(unit) for ordered procedure       Report consisted of patients Situation, Background, Assessment and   Recommendations(SBAR). Information from the following report(s) SBAR was reviewed with the receiving nurse. Lines:   Triple Lumen 04/11/17 Right Internal jugular (Active)   Central Line Being Utilized Yes 4/11/2017 10:18 AM   Criteria for Appropriate Use Hemodynamically unstable, requiring monitoring lines, vasopressors, or volume resuscitation 4/11/2017 10:18 AM   Site Assessment Clean, dry, & intact 4/11/2017 10:18 AM   Date of Last Dressing Change 04/11/17 4/11/2017 10:18 AM   Dressing Status Clean, dry, & intact 4/11/2017 10:18 AM   Dressing Type Disk with Chlorhexadine gluconate (CHG); Transparent 4/11/2017 10:18 AM   Proximal Hub Color/Line Status White 4/11/2017 10:18 AM   Positive Blood Return (Medial Site) Yes 4/11/2017 10:18 AM   Medial Hub Color/Line Status Blue 4/11/2017 10:18 AM   Positive Blood Return (Lateral Site) Yes 4/11/2017 10:18 AM   Distal Hub Color/Line Status Brown 4/11/2017 10:18 AM   Positive Blood Return (Site #3) Yes 4/11/2017 10:18 AM       Peripheral IV 04/10/17 Left Forearm (Active)   Site Assessment Clean, dry, & intact 4/11/2017  3:29 AM   Phlebitis Assessment 0 4/11/2017  3:29 AM   Infiltration Assessment 0 4/11/2017  3:29 AM   Dressing Status Clean, dry, & intact 4/11/2017  3:29 AM   Dressing Type Transparent 4/11/2017  3:29 AM   Hub Color/Line Status Pink; Infusing;Flushed 4/11/2017  3:29 AM   Action Taken Open ports on tubing capped 4/11/2017  3:29 AM   Alcohol Cap Used Yes 4/11/2017  3:29 AM       Peripheral IV 04/10/17 Left Forearm (Active)   Site Assessment Clean, dry, & intact 4/11/2017  3:29 AM   Phlebitis Assessment 0 4/11/2017  3:29 AM   Infiltration Assessment 0 4/11/2017  3:29 AM   Dressing Status Clean, dry, & intact 4/11/2017  3:29 AM   Dressing Type Transparent 4/11/2017  3:29 AM   Hub Color/Line Status Pink;Capped;Flushed 4/11/2017  3:29 AM   Action Taken Open ports on tubing capped 4/11/2017  3:29 AM   Alcohol Cap Used Yes 4/11/2017  3:29 AM        Opportunity for questions and clarification was provided.       Patient transported with:   Registered Nurse

## 2017-04-11 NOTE — ANESTHESIA PREPROCEDURE EVALUATION
Anesthetic History   No history of anesthetic complications            Review of Systems / Medical History  Patient summary reviewed, nursing notes reviewed and pertinent labs reviewed    Pulmonary  Within defined limits          Asthma        Neuro/Psych             Comments: myasthenia gravis well controlled per patient Cardiovascular  Within defined limits  Hypertension        Dysrhythmias : atrial fibrillation           GI/Hepatic/Renal  Within defined limits              Endo/Other  Within defined limits  Diabetes    Morbid obesity and arthritis     Other Findings              Physical Exam    Airway  Mallampati: III  TM Distance: 4 - 6 cm  Neck ROM: normal range of motion   Mouth opening: Normal     Cardiovascular    Rhythm: irregular  Rate: abnormal        Comments: Patient is visibly dyspnic, appears pale Dental  No notable dental hx       Pulmonary  Breath sounds clear to auscultation               Abdominal         Other Findings            Anesthetic Plan    ASA: 4, emergent  Anesthesia type: general        Post procedure ventilation     Anesthetic plan and risks discussed with: Patient      Abbreviated interview due to emergency nature. Will begin fluid resuscitation prior to induction.

## 2017-04-11 NOTE — PROGRESS NOTES
@6176 Patient resting in bed with family at bedside but moaning. Vitals:  Temp 97.8, Afib 115 bpm, /90 (104), RR 24, and O2sats 100% on 4 lpm nasal canula. Wt.  137.3 kg and I&O 5776/195 with a net +5581 ml for today. 8/10 constant throbbing abdominal pain. Denies nausea. RIJ CVC:  Cardizem 15 mg/H,  ml/H, and Dilauidid PCA. Patten with clear yellow urine. Penrose drain to mid abdomen. LLQ colostomy with pink stoma. Day Events:  Received 1 liter NS bolus and then taken to angio for IVC filter placement. Next, transferred to OR  For exploratory lap, sigmoid colectomy, and flower procedure. Started on Cardizem for rate control. Assessment:     General: Appears well groom developed with blood everywhere around his legs and bed but calm and in good spirits.     EENT: Head is atraumatic normocephalic with short fine grey hair. Ears are symmetric from helix, tragus, and lobules. Whisper test, min, and rinne test wnl. Denies any ringing in the ears. Eyes are PERRLA with 2 mm pupils, brown iris, white sclera, pale conjunctiva, and EOM is intact. Denies any floaters, blurry vision, or streaks. Nose is mid line with patency assessed and lower turbinates visualized. Able to smell alcohol swab. Lips are pale and mouth mucosa is pale and moist. Normal dentition and uvula is midline. Trachea is palpable and midline and neck supple to palpitation.     Neuro: GCS 15, RASS of 0, A&O X4, pleasant, calm, cooperative, with excellent in sight. Face is symmetric from eyebrows and smile. Able to purse lips, puff cheeks, and clench jaw. Able to discern soft and sharp sensations on his face, stereognostic WNL, Able to glide heels down shins, rapid finger to nose, and +2 DTRs. States numbness and tingling to the RUE which he states is chronic. Turn his head left and right, shrug shoulders, equal  bilateral, and +5 gross motor strength.  Denies any headaches.     Cardio: Afib with S1S2 irregular irregular without murmurs, gallops, clicks, or bruits. Denies chest pain/pressure. +2 palpable pulses. -JVD, lift, heaves, or thrills. Wt. 137.3 kg, -0.1 kg since yesterday. I&O 6405/552 with a net +5581 ml for today. +7484 ml since admission.      TTE on 04/7/17: EF 60% with NWMA. Mild biatrial enlargement. Moderate MR/TR.     12 Lead EKG: Afib 91 bpm with low voltage QRS.    K 4.4, Mag 1.9, and TSH on 04/05/17 0. 32.     BLE Venous duplex: +Bilateral DVT in the mid femoral, popliteal above knee, popliteal (fossa), and popliteal (Below knee)     Was on eliquis recently for afib.     Resp: On 2 lpm nasal canula. C/o mild SOB which he states was baseline from being discharge for PE recently. Clear vesicular breath sounds.      CTA of 04/05/17: 1. Bilateral pulmonary emboli. 2. RUL airspace disease may represent a pulmonary infarct .     CXR: 1. Persistent mild atelectasis at the left lung base  2. Mild opacities in right mid zone consistent with pulmonary infarct is again noted.     GI: Abdomen is obese soft tender to palpitation R>L, with bowel sounds X4. Denies nausea. LLQ colostomy with sanguinous drainage. Mid line abdominal dressing CDI. Diet: Clear liquid.     : Voids via urinal. Creatinine 1.09.     Skin: Warm, dry, intact, with uniform color distribution.      Endocrine: AC&HS with normal sensitivity. Received total of 2 units of coverage today. Blood glucose running in the 140s to 160s.     DVT prophylaxis: SCDs     PPI: Protonix     Plan of Care:  1. Maintain O2sats >90%   A. 2 liters nasal canula   B. Pulmonary Toilet:     1. HOB >30 degrees    2. TCDB, suction, and IS      2. Maintain SBP >90 mmHg and MAP >65 mmHg   A. CVP 8-10    B.  ml/H    3. Maintain Temp 96.6 to 99.6   A. <96.6 apply Mame Hugger   B. >101 treat 1st with APAP and then if necessary apply cooling blanket   C. Pan culture for hypothermia/fever spikes   D. Zosyn/vancomycin      4. Nutrition   A. Clear liquid     5.  Tight Glycemic control   A. Normal Sensitivity     6. Replace Lytes as needed   A. Keep K >4 or Mag >2   B. ICU labs     7. Mobility    A. Q2H turns   B. PT/OT      8. Hgb<7 or drops by 2 gms   A. Will page MD and inquire about transfusion   B. H&H Q6H  9.  AFib   A. Cardizem for rate control per cardiology with goal HR <110 bpm     @2000 Instructed patient to hit PCA button for effect.    @2015 Patient resting comfortably in bed no longer moaning. @2100 Patient appears under control now; however, HR sustaining in the 130s with cardizem running at 15 mg/H. Will page Cardiology for further direction. @2200 Blood glucose 169 will cover with 2 units of sq insulin. @2795 Discuss case with Dr. Coletet Durant and received orders to give digoxin 250 mcg IVP now and repeat in 4 hours. @0045 H&H drawn and sent. @0200 Hgb 12.2, stable. @5934 AM Lab work obtain and sent. Also observe patient with periods of apnea assoicated with drop in O2sats to 80% for about 30 seconds before returning to 95% or greater once breathing again. Curious if the MD would want him on a overnight pulse ox tonight coming up.    @0500 Wt. 146.9 kg, +9.6 kg since yesterday.

## 2017-04-11 NOTE — PROGRESS NOTES
Tech was asked to clean pt while still getting report. Pt telemetry box didn't work, was full of blood. New box was brought in later. Pt was found actively bleeding, H and H was drawn. Sent to lab  Report given. 2036 Pt in ICU. MD was notified. MD Mae Pastor called for update and he said he is coming to see the pt on ICU.

## 2017-04-11 NOTE — PROGRESS NOTES
Cardiology Progress Note   ICU       NAME:  Lyndsey Albert. :   1945   MRN:   [id number]     Assessment/Plan: Critically ill    Lower GI bleed - persistent      - plans for urgent OR  Persistent afib now w/  RVR 2/2 shock       - volume support        - pressor support if needed  Hypotension- 2/2 hypovolemic shock-        - volume support       - pressor support if needed   Hx PE- plans for IVC filter  Myasthenia gravis      Care Coordination: 35 minutes spent reviewing data, dx, treatment w/ pt, coordinating care with team including care management       Subjective:   Kalen Vaughn is a 73y.o. year old male w/ hx:  AFib  cx by T2DM, Myasthenia gravis, morbid obesity, recent PE  discharged 17  from [facility] for PE/a fib. He presented today with chief complain of rectal bleeding. Per sister, the pt this morning began experiencing several episodes of bright red rectal bleeding, prompting the pt to be brought to the ED. Became weak and dizzy at home.        LABS:  hgb 13.1 , trop . 04      Overnight activities reviewed:    - 10 bloody stools overnight   -  4 coils in mesenteric artery last pm- continues to bleed   - BP hypotensive  This am- receiving IVF bolus     - Tele afib w/ progressive RVR this am    - K+4. 4     Mg++1.9,     Cr 1.10    - Hgb 12-> 11- 1 unit PRC       Cardiac ROS: mild SOB, chronic edema,  Patient denies any exertional chest pain,   palpitations, syncope, orthopnea,   or paroxysmal nocturnal dyspnea. Review of Systems: BRB from rectum, c/o abdominal pain, headache,  nausea, indigestion, vomiting,  cough, sputum. NPO     CARDIAC EVALUATION   ECHO 2016:EF 65%, no WMA, mild TR, RVSP 41 mmHg  ECHO 17  LVEF 60% No WMA.  Mild MR, Mild PaHTN        Objective:     Visit Vitals    /71    Pulse (!) 124    Temp 97.7 °F (36.5 °C)    Resp 12    Ht 5' 11\" (1.803 m)    Wt 302 lb 11.1 oz (137.3 kg)    SpO2 95%    BMI 42.22 kg/m2      O2 Flow Rate (L/min): 2 l/min O2 Device: Nasal cannula    Temp (24hrs), Av.1 °F (36.7 °C), Min:97.6 °F (36.4 °C), Max:98.6 °F (37 °C)        Intake/Output Summary (Last 24 hours) at 17 09  Last data filed at 17 0329   Gross per 24 hour   Intake          1462.91 ml   Output                0 ml   Net          1462.91 ml       TELE: afib rate 120-140 range     General: AAOx3 cooperative, mild  distress. HEENT: Atraumatic. Pale  and moist.  Anicteric sclerae. Neck: Supple,     Lungs: decreased bibasilar No wheezing/rhonchi/crackles. Heart: PMI:diminished, Irregular tachy rhythm, no murmur, no S3, no rubs, no gallops. Difficult to assess  JVD. No carotid bruits. Abdomen: Soft,  Distended, tender. Quiet bowel sounds, . No bruits. : voiding   Extremities: general edema  no clubbing, no cyanosis. No calf tenderness  Neurologic: Grossly intact. Alert and oriented X 3. No acute neurological distress. Psych: Good insight. Not anxious nor agitated.       Care Plan discussed with:    Comments   Patient y    Family      RN y    Care Manager                    Consultant:  y Surgery, pulm CC        Data Review:   CMP: Lab Results   Component Value Date/Time     2017 02:35 AM    K 4.4 2017 02:35 AM     2017 02:35 AM    CO2 28 2017 02:35 AM    AGAP 6 2017 02:35 AM     (H) 2017 02:35 AM    BUN 21 (H) 2017 02:35 AM    CREA 1.10 2017 02:35 AM    GFRAA >60 2017 02:35 AM    GFRNA >60 2017 02:35 AM    CA 7.5 (L) 2017 02:35 AM    MG 1.9 2017 02:35 AM    ALB 2.7 (L) 04/10/2017 01:20 PM    TBILI 0.5 04/10/2017 01:20 PM    TP 5.5 (L) 04/10/2017 01:20 PM    GLOB 2.8 04/10/2017 01:20 PM    AGRAT 1.0 (L) 04/10/2017 01:20 PM    SGOT 29 04/10/2017 01:20 PM    ALT 73 04/10/2017 01:20 PM     CBC: Lab Results   Component Value Date/Time    WBC 11.4 (H) 2017 02:35 AM    HGB 11.3 (L) 2017 02:35 AM    HCT 34.6 (L) 2017 02:35 AM     04/11/2017 02:35 AM     All Cardiac Markers in the last 24 hours: Lab Results   Component Value Date/Time    TROIQ <0.04 04/10/2017 01:20 PM     Recent Glucose Results: Lab Results   Component Value Date/Time    GLUCPOC 142 (H) 04/11/2017 07:46 AM    GLUCPOC 143 (H) 04/10/2017 11:13 PM    GLUCPOC 181 (H) 04/10/2017 04:01 PM     (H) 04/11/2017 02:35 AM     (H) 04/10/2017 01:20 PM     ABG: No results found for: PH, PHI, PCO2, PCO2I, PO2, PO2I, HCO3, HCO3I, FIO2, FIO2I  LIPIDS:  Cholesterol, total   Date Value Ref Range Status   09/03/2016 168 <200 MG/DL Final     Triglyceride   Date Value Ref Range Status   09/03/2016 198 (H) <150 MG/DL Final     Comment:     Based on NCEP-ATP III:  Triglycerides <150 mg/dL  is considered normal, 150-199 mg/dL  borderline high,  200-499 mg/dL high and  greater than or equal to 500 mg/dL very high. HDL Cholesterol   Date Value Ref Range Status   09/03/2016 41 MG/DL Final     Comment:     Based on NCEP ATP III, HDL Cholesterol <40 mg/dL is considered low and >60 mg/dL is elevated.      LDL, calculated   Date Value Ref Range Status   09/03/2016 87.4 0 - 100 MG/DL Final     Comment:     Based on the NCEP-ATP: LDL-C concentrations are considered  optimal <100 mg/dL,  near optimal/above Normal 100-129 mg/dL  Borderline High: 130-159, High: 160-189 mg/dL  Very High: Greater than or equal to 190 mg/dL       VLDL, calculated   Date Value Ref Range Status   09/03/2016 39.6 MG/DL Final     CHOL/HDL Ratio   Date Value Ref Range Status   09/03/2016 4.1 0 - 5.0   Final       Medications reviewed  Current Facility-Administered Medications   Medication Dose Route Frequency    vancomycin (VANCOCIN) 2,000 mg in 0.9% sodium chloride 500 mL IVPB  2,000 mg IntraVENous Q12H    piperacillin-tazobactam (ZOSYN) 3.375 g in 0.9% sodium chloride (MBP/ADV) 100 mL  3.375 g IntraVENous Q8H    midazolam (VERSED) 1 mg/mL injection        fentaNYL citrate (PF) 50 mcg/mL injection        lidocaine (XYLOCAINE) 10 mg/mL (1 %) injection        iopamidol (ISOVUE-370) 76 % injection        PHENYLephrine (NEOSYNEPHRINE) 30,000 mcg in 0.9% sodium chloride 250 mL infusion   mcg/min IntraVENous TITRATE    magnesium sulfate 2 g/50 ml IVPB (premix or compounded)  2 g IntraVENous ONCE    sodium chloride (NS) flush 5-10 mL  5-10 mL IntraVENous Q8H    sodium chloride (NS) flush 5-10 mL  5-10 mL IntraVENous PRN    acetaminophen (TYLENOL) tablet 650 mg  650 mg Oral Q4H PRN    oxyCODONE-acetaminophen (PERCOCET) 5-325 mg per tablet 1 Tab  1 Tab Oral Q4H PRN    naloxone (NARCAN) injection 0.4 mg  0.4 mg IntraVENous PRN    ondansetron (ZOFRAN) injection 4 mg  4 mg IntraVENous Q4H PRN    insulin lispro (HUMALOG) injection   SubCUTAneous AC&HS    glucose chewable tablet 16 g  4 Tab Oral PRN    dextrose (D50W) injection syrg 12.5-25 g  12.5-25 g IntraVENous PRN    glucagon (GLUCAGEN) injection 1 mg  1 mg IntraMUSCular PRN    ALPRAZolam (XANAX) tablet 0.25 mg  0.25 mg Oral TID PRN    ascorbic acid (vitamin C) (VITAMIN C) tablet 1,000 mg  1,000 mg Oral DAILY    carvedilol (COREG) tablet 12.5 mg  12.5 mg Oral BID WITH MEALS    cholecalciferol (VITAMIN D3) tablet 5,000 Units  5,000 Units Oral DAILY    [START ON 4/16/2017] cloNIDine (CATAPRES) 0.2 mg/24 hr patch 1 Patch  1 Patch TransDERmal Q7D    dilTIAZem CD (CARDIZEM CD) capsule 300 mg  300 mg Oral DAILY    predniSONE (DELTASONE) tablet 50 mg  50 mg Oral DAILY WITH BREAKFAST    pyridostigmine (MESTINON) tablet 60 mg  60 mg Oral BID    traMADol (ULTRAM) tablet 50 mg  50 mg Oral Q6H PRN    pantoprazole (PROTONIX) 40 mg in sodium chloride 0.9 % 10 mL injection  40 mg IntraVENous Q12H    0.9% sodium chloride infusion  125 mL/hr IntraVENous CONTINUOUS    0.9% sodium chloride infusion 250 mL  250 mL IntraVENous PRN    peg 3350-electrolytes (COLYTE) 4000 mL  4,000 mL Oral ONCE         Emily Zepeda RN, ACNP-BC, AACC

## 2017-04-11 NOTE — PROGRESS NOTES
[address]  [address] [facility], [address] [place] [address]  [phone]      Medical Progress Note      NAME: Tino Dumont. :  1945  MRM:  [id number]    Date/Time: 2017  7:41 AM         Subjective:     Chief Complaint:  GI bleed: continued overnight, required IR intervention, seems to have slacked off now    ROS:  (bold if positive, if negative)                                Objective:       Vitals:          Last 24hrs VS reviewed since prior progress note.  Most recent are:    Visit Vitals    /71    Pulse (!) 124    Temp 97.7 °F (36.5 °C)    Resp 12    Ht 5' 11\" (1.803 m)    Wt 137.3 kg (302 lb 11.1 oz)    SpO2 95%    BMI 42.22 kg/m2     SpO2 Readings from Last 6 Encounters:   17 95%   17 94%   17 96%   17 96%   16 97%   10/21/16 98%    O2 Flow Rate (L/min): 2 l/min     Intake/Output Summary (Last 24 hours) at 17 0741  Last data filed at 17 0329   Gross per 24 hour   Intake          1462.91 ml   Output                0 ml   Net          1462.91 ml          Exam:     Physical Exam:    Gen:  Well-developed, obese, frail, elderly, chronically ill-appearing, in no acute distress  HEENT:  Pink conjunctivae, PERRL, hearing intact to voice, moist mucous membranes  Neck:  Supple, without masses, thyroid non-tender  Resp:  No accessory muscle use, clear breath sounds without wheezes rales or rhonchi  Card:  No murmurs, tachy, irreg S1, S2 without thrills, bruits or peripheral edema  Abd:  Soft, non-tender, non-distended, normoactive bowel sounds are present, no palpable organomegaly and no detectable hernias  Lymph:  No cervical or inguinal adenopathy  Musc:  No cyanosis or clubbing  Skin:  No rashes or ulcers, skin turgor is good  Neuro:  Cranial nerves are grossly intact, no focal motor weakness, follows commands appropriately  Psych:  Good insight, oriented to person, place and time, alert       Telemetry reviewed: AFIB    Medications Reviewed: (see below)    Lab Data Reviewed: (see below)    ______________________________________________________________________    Medications:     Current Facility-Administered Medications   Medication Dose Route Frequency    vancomycin (VANCOCIN) 2,000 mg in 0.9% sodium chloride 500 mL IVPB  2,000 mg IntraVENous Q12H    piperacillin-tazobactam (ZOSYN) 3.375 g in 0.9% sodium chloride (MBP/ADV) 100 mL  3.375 g IntraVENous Q8H    midazolam (VERSED) 1 mg/mL injection        fentaNYL citrate (PF) 50 mcg/mL injection        lidocaine (XYLOCAINE) 10 mg/mL (1 %) injection        iopamidol (ISOVUE-370) 76 % injection        [START ON 4/13/2017] metFORMIN (GLUCOPHAGE) tablet 500 mg  500 mg Oral DAILY WITH BREAKFAST    sodium chloride (NS) flush 5-10 mL  5-10 mL IntraVENous Q8H    sodium chloride (NS) flush 5-10 mL  5-10 mL IntraVENous PRN    acetaminophen (TYLENOL) tablet 650 mg  650 mg Oral Q4H PRN    oxyCODONE-acetaminophen (PERCOCET) 5-325 mg per tablet 1 Tab  1 Tab Oral Q4H PRN    naloxone (NARCAN) injection 0.4 mg  0.4 mg IntraVENous PRN    ondansetron (ZOFRAN) injection 4 mg  4 mg IntraVENous Q4H PRN    insulin lispro (HUMALOG) injection   SubCUTAneous AC&HS    glucose chewable tablet 16 g  4 Tab Oral PRN    dextrose (D50W) injection syrg 12.5-25 g  12.5-25 g IntraVENous PRN    glucagon (GLUCAGEN) injection 1 mg  1 mg IntraMUSCular PRN    ALPRAZolam (XANAX) tablet 0.25 mg  0.25 mg Oral TID PRN    ascorbic acid (vitamin C) (VITAMIN C) tablet 1,000 mg  1,000 mg Oral DAILY    carvedilol (COREG) tablet 12.5 mg  12.5 mg Oral BID WITH MEALS    cholecalciferol (VITAMIN D3) tablet 5,000 Units  5,000 Units Oral DAILY    [START ON 4/16/2017] cloNIDine (CATAPRES) 0.2 mg/24 hr patch 1 Patch  1 Patch TransDERmal Q7D    dilTIAZem CD (CARDIZEM CD) capsule 300 mg  300 mg Oral DAILY    predniSONE (DELTASONE) tablet 50 mg  50 mg Oral DAILY WITH BREAKFAST    pyridostigmine (MESTINON) tablet 60 mg  60 mg Oral BID    traMADol (ULTRAM) tablet 50 mg  50 mg Oral Q6H PRN    pantoprazole (PROTONIX) 40 mg in sodium chloride 0.9 % 10 mL injection  40 mg IntraVENous Q12H    0.9% sodium chloride infusion  125 mL/hr IntraVENous CONTINUOUS    0.9% sodium chloride infusion 250 mL  250 mL IntraVENous PRN    peg 3350-electrolytes (COLYTE) 4000 mL  4,000 mL Oral ONCE            Lab Review:     Recent Labs      04/11/17   0235  04/10/17   2013  04/10/17   1320   WBC  11.4*  12.6*  17.2*   HGB  11.3*  12.6  13.3   HCT  34.6*  37.9  42.8   PLT  218  245  229     Recent Labs      04/11/17   0235  04/10/17   1320  04/09/17   0259   NA  139  139  140   K  4.4  3.9  4.5   CL  105  101  104   CO2  28  29  28   GLU  144*  137*  131*   BUN  21*  21*  18   CREA  1.10  1.09  0.90   CA  7.5*  8.6  8.7   MG  1.9   --   2.1   ALB   --   2.7*   --    TBILI   --   0.5   --    SGOT   --   29   --    ALT   --   73   --    INR   --   1.1   --      Lab Results   Component Value Date/Time    Glucose (POC) 143 04/10/2017 11:13 PM    Glucose (POC) 181 04/10/2017 04:01 PM    Glucose (POC) 154 04/09/2017 12:12 PM    Glucose (POC) 115 04/09/2017 07:42 AM    Glucose (POC) 148 04/08/2017 08:32 PM     No results for input(s): PH, PCO2, PO2, HCO3, FIO2 in the last 72 hours.   Recent Labs      04/10/17   1320   INR  1.1     No results found for: SDES  Lab Results   Component Value Date/Time    Culture result: ENTEROBACTER CLOACAE 09/16/2016 10:10 PM    Culture result: KLEBSIELLA OXYTOCA 09/16/2016 10:10 PM    Culture result: MODERATE  NORMAL RESPIRATORY KUNAL   09/03/2016 09:24 AM    Culture result: LIGHT  APPARENT  JESSA ALBICANS   09/03/2016 09:24 AM            Assessment:     Principal Problem:    GI bleed (4/10/2017)    Active Problems:    PAF (paroxysmal atrial fibrillation) ([address] (9/3/2016)      Myasthenia gravis with exacerbation, adult form ([facility] (4/4/2017)      History of stroke (4/5/2017)      Morbid obesity with BMI of 40.0-44.9, adult ([facility] (4/5/2017)      Type II diabetes mellitus ([facility] (4/10/2017)      Acute blood loss anemia (4/11/2017)      Acute pulmonary embolus ([facility] (4/11/2017)           Plan:     Principal Problem:    GI bleed (4/10/2017)   - s/p IR emoblization   - may have mass as underlying cause   - will need colonoscopy at some point   - GI following    Active Problems:    PAF (paroxysmal atrial fibrillation) ([facility] (9/3/2016)   - rate not well controlled this AM, may need to start IV diltiazem   - Cardiology following       Myasthenia gravis with exacerbation, adult form ([facility] (4/4/2017)   - continue Mestinon      History of stroke (4/5/2017)   - off anticoagulation due to bleed as above      Morbid obesity with BMI of 40.0-44.9, adult ([facility] (4/5/2017)   - counseled on weight loss      Type II diabetes mellitus ([facility] (0/63/9420)   - no complications   - follow BS on SSI   - hold metformin due to IV contrast for embolization      Acute blood loss anemia (4/11/2017)   - Hgb down slightly   - follow, transfuse as needed      Acute pulmonary embolus ([facility] (4/11/2017)   - diagnosed last admission   - off anticoagulation due to acute bleed   - may not be able to go back on anticoagulation safely, will need colonoscopy first   - defer to Pulmonary whether to place IVC filter      Total time spent with patient: 35 minutes                  Care Plan discussed with: Patient, Care Manager and Nursing Staff    Discussed:  Code Status, Care Plan and D/C Planning    Prophylaxis:  H2B/PPI    Disposition:  TBD           ___________________________________________________    Attending Physician: Nat Boas, MD

## 2017-04-11 NOTE — ROUTINE PROCESS
@2000 TRANSFER - IN REPORT:    Verbal report received from [facility] RN(name) on [address]  being received from [facility](unit) for urgent transfer      Report consisted of patients Situation, Background, Assessment and   Recommendations(SBAR). Information from the following report(s) SBAR, Kardex, ED Summary, Intake/Output, MAR, Accordion, Recent Results, Med Rec Status, Cardiac Rhythm Afib and Alarm Parameters  was reviewed with the receiving nurse. Opportunity for questions and clarification was provided. Assessment completed upon patients arrival to unit and care assumed. @0700 Bedside and Verbal shift change report given to Holly Owusu RN (oncoming nurse) by Jonas Stauffer RN (offgoing nurse). Report included the following information SBAR, Kardex, ED Summary, Procedure Summary, Intake/Output, MAR, Accordion, Recent Results, Med Rec Status, Cardiac Rhythm Afib and Alarm Parameters .

## 2017-04-11 NOTE — BRIEF OP NOTE
BRIEF OPERATIVE NOTE    Date of Procedure: 4/11/2017   Preoperative Diagnosis: GI Bleeding  Postoperative Diagnosis: GI Bleeding    Procedure(s):  LAPAROTOMY EXPLORATORY, SIGMOID COLECTOMY, COLOSTOMY  Surgeon(s) and Role:      * Dyanne Goltz, MD - Primary            Surgical Staff:  Circ-1: Fazal Dodd RN  Circ-2: Chris Rosa RN  Circ-Relief: Nicola Mares RN  Scrub Tech-1: Honor Sprink  Surg Asst-1: Oliver Omer RN  Event Time In   Incision Start 1105   Incision Close      Anesthesia: General   Estimated Blood Loss: 50 ml  Specimens:   ID Type Source Tests Collected by Time Destination   1 : Sigmoid colon Preservative Sigmoid  Dyanne Goltz, MD 4/11/2017 1119 Pathology      Findings: mass in sigmoid colon - appeared to be diverticular   Complications: none  Implants: * No implants in log *

## 2017-04-11 NOTE — PROGRESS NOTES
Physical Therapy Note:  Orders acknowledged, chart reviewed and spoke with nursing. Patient is currently off the floor to the OR for a procedure will likely be vented post procedure per RN. Patient will need new orders for initiation of OT/PT services, currently orders completed; RN agrees with plan, Please re-consult when appropriate.  Thank you

## 2017-04-11 NOTE — PROGRESS NOTES
[address]       INTENSIVIST DAILY PROGRESS NOTE  Name: Suzanne Jaffe : 1945   MRN: [id number]   Date: 2017 8:48 AM   I have reviewed the flowsheet and previous days notes. The patient is critically ill in hemorrhagic shock. Overnight events reviewed:    Admitted with acute GI bleed  --received 1 unit PRBCs last night  --seen by GI who was planning to perform colonoscopy this am  --continued to bleed per rectum overnight  --had CTA abd/pelvis performed and IR placed 4 coils in the inferior mesenteric artery as he had bleeding from sigmoid colon  --bleeding persists this am  --now hypotensive and in shock  --CTA Abd/Pelvis demonstrated possible mass adjacent to the sigmoid colon also: Surgery consulted              ROS: lightheadedness, generalized malaise, rectal bleeding    Vital Signs:    Visit Vitals    /71    Pulse (!) 124    Temp 97.7 °F (36.5 °C)    Resp 12    Ht 5' 11\" (1.803 m)    Wt 137.3 kg (302 lb 11.1 oz)    SpO2 95%    BMI 42.22 kg/m2       O2 Device: Nasal cannula   O2 Flow Rate (L/min): 2 l/min   Temp (24hrs), Av.1 °F (36.7 °C), Min:97.6 °F (36.4 °C), Max:98.6 °F (37 °C)       Intake/Output:   Last shift:         Last 3 shifts:  1901 -  0700  In: 1462.9 [I.V.:1185.4]  Out: -     Intake/Output Summary (Last 24 hours) at 17 0848  Last data filed at 17 0329   Gross per 24 hour   Intake          1462.91 ml   Output                0 ml   Net          1462.91 ml       Physical Exam:  General:  Alert, cooperative, no distress, appears stated age. Head:  Normocephalic, without obvious abnormality, atraumatic. Eyes:  Conjunctivae/corneas clear. Significant pallor+, PERRLA, EOMI    Nose: Nares normal. Septum midline. Mucosa normal. No drainage or sinus tenderness.    Throat: Lips, mucosa, and tongue normal. Teeth and gums normal.   Neck: Supple, symmetrical, trachea midline, no adenopathy, thyroid: no enlargment/tenderness/nodules, no carotid bruit and no JVD. Back:   Symmetric, no curvature. ROM normal.   Lungs:   Clear to auscultation bilaterally. Chest wall:  No tenderness or deformity. Heart:  Regular rate and rhythm, S1, S2 normal, no murmur, click, rub or gallop. Abdomen:   Soft, non-tender, distended. Bowel sounds hypoactive. No masses,  No organomegaly. Abdomen is obese   Extremities: Extremities normal, atraumatic, no cyanosis or edema. Pulses: 2+ and symmetric all extremities.    Skin: Skin color, texture, turgor normal. No rashes or lesions   Lymph nodes: Cervical, supraclavicular, and axillary nodes normal.   Neurologic: Grossly nonfocal       DATA:   Current Facility-Administered Medications   Medication Dose Route Frequency    vancomycin (VANCOCIN) 2,000 mg in 0.9% sodium chloride 500 mL IVPB  2,000 mg IntraVENous Q12H    piperacillin-tazobactam (ZOSYN) 3.375 g in 0.9% sodium chloride (MBP/ADV) 100 mL  3.375 g IntraVENous Q8H    midazolam (VERSED) 1 mg/mL injection        fentaNYL citrate (PF) 50 mcg/mL injection        lidocaine (XYLOCAINE) 10 mg/mL (1 %) injection        iopamidol (ISOVUE-370) 76 % injection        PHENYLephrine (NEOSYNEPHRINE) 30,000 mcg in 0.9% sodium chloride 250 mL infusion   mcg/min IntraVENous TITRATE    sodium chloride 0.9 % bolus infusion 1,000 mL  1,000 mL IntraVENous ONCE    magnesium sulfate 2 g/50 ml IVPB (premix or compounded)  2 g IntraVENous ONCE    sodium chloride (NS) flush 5-10 mL  5-10 mL IntraVENous Q8H    sodium chloride (NS) flush 5-10 mL  5-10 mL IntraVENous PRN    acetaminophen (TYLENOL) tablet 650 mg  650 mg Oral Q4H PRN    oxyCODONE-acetaminophen (PERCOCET) 5-325 mg per tablet 1 Tab  1 Tab Oral Q4H PRN    naloxone (NARCAN) injection 0.4 mg  0.4 mg IntraVENous PRN    ondansetron (ZOFRAN) injection 4 mg  4 mg IntraVENous Q4H PRN    insulin lispro (HUMALOG) injection   SubCUTAneous AC&HS    glucose chewable tablet 16 g  4 Tab Oral PRN    dextrose (D50W) injection syrg 12.5-25 g  12.5-25 g IntraVENous PRN    glucagon (GLUCAGEN) injection 1 mg  1 mg IntraMUSCular PRN    ALPRAZolam (XANAX) tablet 0.25 mg  0.25 mg Oral TID PRN    ascorbic acid (vitamin C) (VITAMIN C) tablet 1,000 mg  1,000 mg Oral DAILY    carvedilol (COREG) tablet 12.5 mg  12.5 mg Oral BID WITH MEALS    cholecalciferol (VITAMIN D3) tablet 5,000 Units  5,000 Units Oral DAILY    [START ON 4/16/2017] cloNIDine (CATAPRES) 0.2 mg/24 hr patch 1 Patch  1 Patch TransDERmal Q7D    dilTIAZem CD (CARDIZEM CD) capsule 300 mg  300 mg Oral DAILY    predniSONE (DELTASONE) tablet 50 mg  50 mg Oral DAILY WITH BREAKFAST    pyridostigmine (MESTINON) tablet 60 mg  60 mg Oral BID    traMADol (ULTRAM) tablet 50 mg  50 mg Oral Q6H PRN    pantoprazole (PROTONIX) 40 mg in sodium chloride 0.9 % 10 mL injection  40 mg IntraVENous Q12H    0.9% sodium chloride infusion  125 mL/hr IntraVENous CONTINUOUS    0.9% sodium chloride infusion 250 mL  250 mL IntraVENous PRN    peg 3350-electrolytes (COLYTE) 4000 mL  4,000 mL Oral ONCE       Telemetry: sinus tachycardia          Labs:  Recent Labs      04/11/17   0235  04/10/17   2013  04/10/17   1320   WBC  11.4*  12.6*  17.2*   HGB  11.3*  12.6  13.3   HCT  34.6*  37.9  42.8   PLT  218  245  229     Recent Labs      04/11/17   0235  04/10/17   1320  04/09/17   0259   NA  139  139  140   K  4.4  3.9  4.5   CL  105  101  104   CO2  28  29  28   GLU  144*  137*  131*   BUN  21*  21*  18   CREA  1.10  1.09  0.90   CA  7.5*  8.6  8.7   MG  1.9   --   2.1   ALB   --   2.7*   --    SGOT   --   29   --    ALT   --   73   --    INR   --   1.1   --      No results for input(s): PH, PCO2, PO2, HCO3, FIO2 in the last 72 hours. Imaging:  I have personally reviewed the patients radiographs and reports. CXR: heart size is normal. There is persistent elevation of left hemidiaphragm with mild atelectasis   in left lower lobe unchanged.  Minimal patchy opacities in the right mid zone peripherally likely   representing pulmonary infarct is again noted. There is no pulmonary edema no significant change. CT Abd/Pelvis: Sigmoid colonic hemorrhage. Small adjacent mass in the sigmoid. Diverticulitis   slightly favored over a small colon carcinoma. 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 Resolved pulmonary emboli in the visualized portions of the left lower lobe. Stable or slightly decreased right lung emboli with small developing right upper lobe pulmonary   infarction. IMPRESSION:   · Acute GI Bleed: secondary to hemorrhage in the sigmoid colon: s/p coiling x 4 of Inferior mesenteric artery by IR last night: bleeding continues this am  · Hemorrhagic/Hypovolemic Shock  · Blood Loss Anemia  · Possible mass/abscess/hematoma adjacent to the sigmoid colon  · Recent Bilateral PE  · Recent LLE DVT  · Atrial Fibrillation  · Myasthenia Gravis  · Suspected KWADWO  · Morbid Obesity   PLAN:   · Wean O2 to keep sats > 90%  · Continue IVF: bolus 1 liter now  · Start peripheral Neosynephrine and titrate to keep MAPs > 65  · IR to place urgent central line and IVC filter now  · General Surgery planning sigmoid colectomy and colostomy  · GI following  · Watch H&H closely and transfuse as needed  · Continue abxs  · Continue Prednisone and Mestinon  · Cardiology following Afib  · Replete electrolytes as needed: will replete Mag  · Tight glycemic control  · Nutrition: NPO   GLOBAL ISSUES:   · Head of bed elevated  · GI Prophylaxis: Protonix BID  · DVT Prophylaxis: add Bilateral SCDs  · Lines: Peripheral IVs  · Medical Decision Maker: Patient/Sister     The pt is critically ill with continuing GI bleed and hemorrhagic shock. See my orders for details    My assessment/plan was discussed with: Patient, IR, General Surgery, Cardiology, Hospital Medicine, Nursing, and Respiratory Therapy.     Total critical care time exclusive of procedures: 60 minutes.     Cedric Gusman MD, [facility]  [facility]

## 2017-04-11 NOTE — CONSULTS
TODD Wilson MD  [phone] office     Gastroenterology Consultation Note      Admit Date: 4/10/2017  Consult Date: 4/10/2017   I greatly appreciate your asking me to see Tino Dumont., thank you very much for the opportunity to participate in his care. Narrative Assessment and Plan   · Mr. Wendy Oneil is a 73year old male with myasthenia gravis, Afib, who was recently diagnosed with bilateral pulmonary emboli, who now presents with significant hematochezia in the setting of anticoagulation. Despite this bleeding, his Hgb is relatively stable and normal. He is stable hemodynamically, albeit with tachycardia off of his typical beta blocker. This bleeding has been painless, most suggestive of diverticular bleeding. He has never had a colonoscopy. At this time, proceeding with an unprepped colonoscopy would be futile. He does not appear to be bleeding significantly enough for an angiogram to be high yield. We will start a bowel prep tonight in anticipation of a colonoscopy tomorrow. If he were to develop hemodynamically significant bleeding, then he should go for a CT angiogram (or nuclear medicine bleeding scan - whichever Radiology prefers). His Cr is normal.     The larger issue is his need for anticoagulation. I have discussed at length with the patient and his family the seriousness of bilateral pulmonary emboli. Cardiology and Pulmonary have seen him today, and for now they are OK with holding his anticoagulation. I have discussed his care at length with Dr. Kelly Freeman. We will go ahead and given him one unit of PRBC's. GI will follow along in the morning. Subjective:     Chief Complaint: Hematochezia    History of Present Illness:     Mr. Wendy Oneil is a 73year old male who was recently diagnosed with bilateral pulmonary emboli. He was discharged yesterday on Eliquis. This morning he noted the onset of painless rectal bleeding.  This continued, which prompted him to come to the ER. His initial Hgb was 13.3, now 12.6. He has had no significant abdominal pain. No upper GI symptoms. No vomiting. His BUN is normal. He has never had a colonoscopy. He was seen earlier today by Pulmonary and Cardiology, and his anticoagulation is currently on hold. He has remained stable hemodynamically. He is not dizzy or lightheaded. Krunal Wilburn MD    Past Medical History:   Diagnosis Date    Anxiety     Arthritis     Asthma     [facility] [address]     Diabetes [facility] [address]     HTN (hypertension) with goal to be determined     Morbid obesity with BMI of 40.0-44.9, adult ([facility] [address] 4/5/2017    Myasthenia gravis ([facility] [address]     Pulmonary emboli (HCC)     with bilateral DVT's         Past Surgical History:   Procedure Laterality Date    HX ORTHOPAEDIC      HX OTHER SURGICAL      L sided neck suregry \" infection\"       Social History   Substance Use Topics    Smoking status: Never Smoker    Smokeless tobacco: Not on file    Alcohol use No        Family History   Problem Relation Age of Onset    Diabetes Mother     Hypertension Mother     Deep Vein Thrombosis Mother     Deep Vein Thrombosis Sister         Allergies   Allergen Reactions    Lactose Itching    Fructose Other (comments)     States added sugar to foods causes sore throat/ear ache.  Inderal [Propranolol] Unknown (comments)    Lisinopril Angioedema    Gluten Nausea Only            Home Medications:  Prior to Admission Medications   Prescriptions Last Dose Informant Patient Reported? Taking? ALPRAZolam (XANAX) 0.25 mg tablet 4/9/2017 at HS Significant Other Yes Yes   Sig: Take 0.25 mg by mouth three (3) times daily as needed for Anxiety. acetaminophen (TYLENOL) 500 mg tablet Unknown at Unknown time Significant Other Yes No   Sig: Take 1,000 mg by mouth every eight (8) hours as needed for Pain.    apixaban (ELIQUIS) 5 mg tablet 4/10/2017 at AM Significant Other No Yes   Sig: Please take 10mg twice daily for 5 days then decrease to 5mg twice daily thereafter  Indications: pulmonary thromboembolism   ascorbic acid, vitamin C, (VITAMIN C) 500 mg tablet 2017 at AM Significant Other Yes Yes   Sig: Take 1,000-1,500 mg by mouth daily. b complex vitamins (B COMPLEX 1) tablet 2017 at AM Significant Other Yes Yes   Sig: Take 3 Tabs by mouth daily. carvedilol (COREG) 12.5 mg tablet 2017 at HS Significant Other No Yes   Sig: Take 1 Tab by mouth two (2) times daily (with meals). cholecalciferol, VITAMIN D3, (VITAMIN D3) 5,000 unit tab tablet 2017 at AM Significant Other Yes Yes   Sig: Take 5,000 Units by mouth daily. cloNIDine (CATAPRES) 0.2 mg/24 hr patch 2017 at AM Significant Other No No   Si Patch by TransDERmal route every seven (7) days. dilTIAZem CD (CARDIZEM CD) 300 mg ER capsule 4/10/2017 at AM Significant Other No Yes   Sig: Take 1 Cap by mouth daily. metFORMIN (GLUCOPHAGE) 500 mg tablet 4/10/2017 at AM Significant Other No Yes   Sig: Take 1 Tab by mouth daily (with breakfast). potassium chloride SA (MICRO-K) 10 mEq capsule 4/10/2017 at AM Significant Other Yes Yes   Sig: Take 10 mEq by mouth two (2) times a day. predniSONE (DELTASONE) 10 mg tablet 4/10/2017 at AM Significant Other No Yes   Sig: Take 5 Tabs by mouth daily (with breakfast). pyridostigmine (MESTINON) 60 mg tablet 2017 at HS Significant Other Yes Yes   Sig: Take 60 mg by mouth two (2) times a day. traMADol (ULTRAM) 50 mg tablet 4/10/2017 at Unknown time Significant Other Yes Yes   Sig: Take 50 mg by mouth every six (6) hours as needed for Pain.       Facility-Administered Medications: None       Hospital Medications:  Current Facility-Administered Medications   Medication Dose Route Frequency    sodium chloride (NS) flush 5-10 mL  5-10 mL IntraVENous Q8H    sodium chloride (NS) flush 5-10 mL  5-10 mL IntraVENous PRN    acetaminophen (TYLENOL) tablet 650 mg  650 mg Oral Q4H PRN    oxyCODONE-acetaminophen (PERCOCET) 5-325 mg per tablet 1 Tab  1 Tab Oral Q4H PRN    naloxone (NARCAN) injection 0.4 mg  0.4 mg IntraVENous PRN    ondansetron (ZOFRAN) injection 4 mg  4 mg IntraVENous Q4H PRN    insulin lispro (HUMALOG) injection   SubCUTAneous AC&HS    glucose chewable tablet 16 g  4 Tab Oral PRN    dextrose (D50W) injection syrg 12.5-25 g  12.5-25 g IntraVENous PRN    glucagon (GLUCAGEN) injection 1 mg  1 mg IntraMUSCular PRN    ALPRAZolam (XANAX) tablet 0.25 mg  0.25 mg Oral TID PRN    [START ON 4/11/2017] ascorbic acid (vitamin C) (VITAMIN C) tablet 1,000 mg  1,000 mg Oral DAILY    carvedilol (COREG) tablet 12.5 mg  12.5 mg Oral BID WITH MEALS    [START ON 4/11/2017] cholecalciferol (VITAMIN D3) tablet 5,000 Units  5,000 Units Oral DAILY    [START ON 4/16/2017] cloNIDine (CATAPRES) 0.2 mg/24 hr patch 1 Patch  1 Patch TransDERmal Q7D    [START ON 4/11/2017] dilTIAZem CD (CARDIZEM CD) capsule 300 mg  300 mg Oral DAILY    [START ON 4/11/2017] metFORMIN (GLUCOPHAGE) tablet 500 mg  500 mg Oral DAILY WITH BREAKFAST    [START ON 4/11/2017] predniSONE (DELTASONE) tablet 50 mg  50 mg Oral DAILY WITH BREAKFAST    pyridostigmine (MESTINON) tablet 60 mg  60 mg Oral BID    traMADol (ULTRAM) tablet 50 mg  50 mg Oral Q6H PRN    pantoprazole (PROTONIX) 40 mg in sodium chloride 0.9 % 10 mL injection  40 mg IntraVENous Q12H    0.9% sodium chloride infusion  125 mL/hr IntraVENous CONTINUOUS       Review of Systems:  Full ROS was done and was negative except as noted in the HPI.        Objective:     Physical Exam:  Visit Vitals    /68    Pulse (!) 121    Temp 97.6 °F (36.4 °C)    Resp 23    Ht 5' 11\" (1.803 m)    Wt 137.4 kg (303 lb)    SpO2 97%    BMI 42.26 kg/m2     SpO2 Readings from Last 6 Encounters:   04/10/17 97%   04/09/17 94%   04/04/17 96%   02/24/17 96%   11/21/16 97%   10/21/16 98%    O2 Flow Rate (L/min): 1 l/min   No intake or output data in the 24 hours ending 04/10/17 9361   General: no distress, comfortable, pale  Skin:  No rash or jaundice  HEENT: Pupils equal, sclera anicteric  Cardiovascular: Irregularly irregular, well perfused, + edema  Respiratory:  Clear bilaterally, normal respiratory effort  GI:  Abdomen soft, nondistended, nontender, no rebound or guarding. Musculoskeletal:  No skeletal deformity nor acute arthritis noted. Neurological:  Motor and sensory function intact in upper extremities  Psychiatric:  Normal affect, memory intact, appears to have insight into current illness    Laboratory:    Recent Results (from the past 24 hour(s))   CBC WITH AUTOMATED DIFF    Collection Time: 04/10/17  1:20 PM   Result Value Ref Range    WBC 17.2 (H) 4.1 - 11.1 K/uL    RBC 4.28 4.10 - 5.70 M/uL    HGB 13.3 12.1 - 17.0 g/dL    HCT 42.8 36.6 - 50.3 %    .0 (H) 80.0 - 99.0 FL    MCH 31.1 26.0 - 34.0 PG    MCHC 31.1 30.0 - 36.5 g/dL    RDW 15.3 (H) 11.5 - 14.5 %    PLATELET 499 693 - 337 K/uL    NEUTROPHILS 89 (H) 32 - 75 %    LYMPHOCYTES 5 (L) 12 - 49 %    MONOCYTES 6 5 - 13 %    EOSINOPHILS 0 0 - 7 %    BASOPHILS 0 0 - 1 %    ABS. NEUTROPHILS 15.3 (H) 1.8 - 8.0 K/UL    ABS. LYMPHOCYTES 0.9 0.8 - 3.5 K/UL    ABS. MONOCYTES 1.0 0.0 - 1.0 K/UL    ABS. EOSINOPHILS 0.0 0.0 - 0.4 K/UL    ABS. BASOPHILS 0.0 0.0 - 0.1 K/UL    RBC COMMENTS NORMOCYTIC, NORMOCHROMIC     METABOLIC PANEL, COMPREHENSIVE    Collection Time: 04/10/17  1:20 PM   Result Value Ref Range    Sodium 139 136 - 145 mmol/L    Potassium 3.9 3.5 - 5.1 mmol/L    Chloride 101 97 - 108 mmol/L    CO2 29 21 - 32 mmol/L    Anion gap 9 5 - 15 mmol/L    Glucose 137 (H) 65 - 100 mg/dL    BUN 21 (H) 6 - 20 MG/DL    Creatinine 1.09 0.70 - 1.30 MG/DL    BUN/Creatinine ratio 19 12 - 20      GFR est AA >60 >60 ml/min/1.73m2    GFR est non-AA >60 >60 ml/min/1.73m2    Calcium 8.6 8.5 - 10.1 MG/DL    Bilirubin, total 0.5 0.2 - 1.0 MG/DL    ALT (SGPT) 73 12 - 78 U/L    AST (SGOT) 29 15 - 37 U/L    Alk.  phosphatase 68 45 - 117 U/L    Protein, total 5.5 (L) 6.4 - 8.2 g/dL Albumin 2.7 (L) 3.5 - 5.0 g/dL    Globulin 2.8 2.0 - 4.0 g/dL    A-G Ratio 1.0 (L) 1.1 - 2.2     TYPE & SCREEN    Collection Time: 04/10/17  1:20 PM   Result Value Ref Range    Crossmatch Expiration 04/13/2017     ABO/Rh(D) O POSITIVE     Antibody screen NEG    PROTHROMBIN TIME + INR    Collection Time: 04/10/17  1:20 PM   Result Value Ref Range    INR 1.1 0.9 - 1.1      Prothrombin time 11.2 (H) 9.0 - 11.1 sec   PTT    Collection Time: 04/10/17  1:20 PM   Result Value Ref Range    aPTT 24.5 22.1 - 32.5 sec    aPTT, therapeutic range     58.0 - 77.0 SECS   TROPONIN I    Collection Time: 04/10/17  1:20 PM   Result Value Ref Range    Troponin-I, Qt. <0.04 <0.05 ng/mL   PRO-BNP    Collection Time: 04/10/17  1:20 PM   Result Value Ref Range    NT pro- (H) 0 - 125 PG/ML   EKG, 12 LEAD, INITIAL    Collection Time: 04/10/17  1:33 PM   Result Value Ref Range    Ventricular Rate 91 BPM    Atrial Rate 127 BPM    QRS Duration 72 ms    Q-T Interval 362 ms    QTC Calculation (Bezet) 445 ms    Calculated R Axis 21 degrees    Calculated T Axis 39 degrees    Diagnosis       Atrial fibrillation  Low voltage QRS  Abnormal ECG  When compared with ECG of 05-APR-2017 15:50,  Vent.  rate has decreased BY  80 BPM  Nonspecific T wave abnormality, improved in Inferior leads  Nonspecific T wave abnormality no longer evident in Lateral leads     GLUCOSE, POC    Collection Time: 04/10/17  4:01 PM   Result Value Ref Range    Glucose (POC) 181 (H) 65 - 100 mg/dL    Performed by Mahi Hernandez (PCT)    CBC WITH AUTOMATED DIFF    Collection Time: 04/10/17  8:13 PM   Result Value Ref Range    WBC 12.6 (H) 4.1 - 11.1 K/uL    RBC 3.93 (L) 4.10 - 5.70 M/uL    HGB 12.6 12.1 - 17.0 g/dL    HCT 37.9 36.6 - 50.3 %    MCV 96.4 80.0 - 99.0 FL    MCH 32.1 26.0 - 34.0 PG    MCHC 33.2 30.0 - 36.5 g/dL    RDW 15.0 (H) 11.5 - 14.5 %    PLATELET 157 556 - 665 K/uL    NEUTROPHILS 91 (H) 32 - 75 %    LYMPHOCYTES 3 (L) 12 - 49 %    MONOCYTES 4 (L) 5 - 13 % EOSINOPHILS 0 0 - 7 %    BASOPHILS 0 0 - 1 %    METAMYELOCYTES 1 (H) 0 %    MYELOCYTES 1 (H) 0 %    NRBC 1.0 (H) 0  WBC    ABS. NEUTROPHILS 11.5 (H) 1.8 - 8.0 K/UL    ABS. LYMPHOCYTES 0.4 (L) 0.8 - 3.5 K/UL    ABS. MONOCYTES 0.5 0.0 - 1.0 K/UL    ABS. EOSINOPHILS 0.0 0.0 - 0.4 K/UL    ABS. BASOPHILS 0.0 0.0 - 0.1 K/UL    DF MANUAL      RBC COMMENTS NORMOCYTIC, NORMOCHROMIC           Assessment/Plan:     Principal Problem:    GI bleed (4/10/2017)    Active Problems:    PAF (paroxysmal atrial fibrillation) ([facility] [address] (9/3/2016)      Cervical radiculopathy due to degenerative joint disease of spine (9/14/2016)      Myasthenia gravis with exacerbation, adult form ([address] (4/4/2017)      Pulmonary emboli ([address] (4/5/2017)      History of stroke (4/5/2017)      Morbid obesity with BMI of 40.0-44.9, adult ([address] (4/5/2017)      Type II diabetes mellitus ([address] (4/10/2017)         See above narrative for full detail.

## 2017-04-11 NOTE — OP NOTES
[facility] [address] [place] [address]   [facility], [address]   OP NOTE       Name:  Camelia Liang   MR#:  [id number]   :  1945   Account #:  [de-identified]    Surgery Date:  2017   Date of Adm:  04/10/2017       PREOPERATIVE DIAGNOSIS: Lower gastrointestinal bleeding. POSTOPERATIVE DIAGNOSES   1. Lower gastrointestinal bleeding. 2. Possible diverticulum. PROCEDURES PERFORMED: Exploratory laparotomy, sigmoid   colectomy, and Marin's. SURGEON: Cassie Kirk MD     ANESTHESIA: General.    ANESTHESIOLOGIST: Melo Smith MD    ESTIMATED BLOOD LOSS: Approximately 50 mL. SPECIMENS REMOVED: Sigmoid colon. COUNTS: Correct at the completion of surgery. INDICATION: As documented in the history and physical and   consultation note. FINDINGS: The patient had a mass in the sigmoid colon, which   appeared to be on the mesenteric side. On opening the specimen, this   appeared to be a diverticular mass with peridiverticular inflammation. There was blood within the sigmoid colon, clearly visible both from the   outside as well as on opening the specimen. DESCRIPTION OF PROCEDURE: The patient was brought to the   operating suite and preoperatively the procedure was discussed with   the patient again. The patient also had an IVC filter and a central line   placed by Interventional Radiology prior to coming to the OR. Under   general endotracheal anesthesia and with the patient supine on the   operating table, a Patten catheter was placed and the abdomen was   cleaned, prepped and draped. A time out was performed, confirming   patient, date of birth, procedure, antibiotics, and special equipment   needed. A vertical midline incision was made, extending from well above the   umbilicus down to the pubis. The linea alba was identified and the   peritoneal cavity was entered carefully. The abdomen was opened   along the entire length of the incision.  A large wound retractor was   placed. The sigmoid colon was examined and the area with the mass   was noted. Mobilization of the sigmoid colon was performed by dividing   the avascular line of Toldt. The rectosigmoid junction was clearly   identified and a window was created in the rectosigmoid mesentery. The intestine at this level was then transected using a Covidien stapler   with a tan load. The sigmoid mesentery was then sequentially divided   using the LigaSure. The proximal level of transection was chosen   about 5 cm proximal to the clinically palpable mass. Once again, a   window was created at the level close to the bowel wall, and the colon   was transected with a tan load on the Covidien stapler. Specimen was   removed. The descending colon was mobilized to allow creation of a colostomy. An opening was made in the left flank. The rectus fascia was incised. The muscle was retracted, and the end of the colon was retrieved   through this. Hemostasis was secured within the peritoneal cavity. The   rectal stump was then closed with a running suture of 2-0 Prolene,   which was left long to allow for future identification. The abdomen was then closed with #1 looped PDS applied in a   continuous mass closure fashion. Skin was closed with staples, with a   Penrose drain placed in the subcutaneous plane. A dry dressing was   applied. The colostomy was matured in the left flank using 3-0   Monocryl. Blood loss approximately 50 mL. The patient was transferred to recovery room in stable condition.         MD GABI Ratliff / Delaney Chavez   D:  04/11/2017   12:16   T:  04/11/2017   12:37   Job #:  [id number]

## 2017-04-11 NOTE — ADVANCED PRACTICE NURSE
Called from PACU pt post op - intubated    s/p colectomy    's   remains afib RVR - Hgb 10.5   will give IV cardizem and gtt DC po guero blocking agents since NPO

## 2017-04-11 NOTE — PROGRESS NOTES
[facility] Dosing Services: Antimicrobial Stewardship Progress Note    Consult for antibiotic dosing of Vanomcyin by Dr. Salvador Naranjo  Pharmacist reviewed antibiotic appropriateness for 73year old , male  for indication of intra-abdominal infection  Day of Therapy 1    Plan:  Vancomycin therapy:  Start Vancomycin therapy, with loading dose of 2.5 grams  Follow with maintenance dose of 2 grams every 12 hours. Dose calculated to approximate a therapeutic trough of 15-20 mcg/mL. Last trough level / Plan for level: after 3rd dose  Pharmacy to follow daily and will make changes to dose and/or frequency based on clinical status. Other Antimicrobial  (not dosed by pharmacist)   Zosyn 3.375 grams every 8 hours   Serum Creatinine     Lab Results   Component Value Date/Time    Creatinine 1.10 04/11/2017 02:35 AM       Creatinine Clearance Estimated Creatinine Clearance: 89.7 mL/min (based on Cr of 1.1).      Temp   @[facility]@    WBC   Lab Results   Component Value Date/Time    WBC 12.6 04/10/2017 08:13 PM       H/H   Lab Results   Component Value Date/Time    HGB 12.6 04/10/2017 08:13 PM        Platelets   Lab Results   Component Value Date/Time    PLATELET 825 47/24/0886 08:13 PM          Pharmacist: Tara Logan,[address] information:

## 2017-04-11 NOTE — ANESTHESIA POSTPROCEDURE EVALUATION
Post-Anesthesia Evaluation and Assessment    Patient: Consuelo Boyle MRN: [id number]  SSN: [id number]    YOB: 1945  Age: 73 y.o. Sex: male       Cardiovascular Function/Vital Signs  Visit Vitals    /77    Pulse (!) 112    Temp 36.3 °C (97.3 °F)    Resp 14    Ht 5' 11\" (1.803 m)    Wt 137.3 kg (302 lb 11.1 oz)    SpO2 98%    BMI 42.22 kg/m2       Patient is status post general anesthesia for Procedure(s):  LAPAROTOMY EXPLORATORY, SIGMOID COLECTOMY, COLOSTOMY. Nausea/Vomiting: None    Postoperative hydration reviewed and adequate. Pain:  Pain Scale 1: Numeric (0 - 10) (04/11/17 1335)  Pain Intensity 1: 0 (04/11/17 1335)   Managed    Neurological Status:   Neuro (WDL): Exceptions to WDL (04/11/17 1335)  Neuro  Neurologic State: Drowsy (04/11/17 1335)  Orientation Level: Oriented X4 (04/11/17 0730)  Cognition: Appropriate decision making; Appropriate for age attention/concentration; Appropriate safety awareness; Follows commands (04/11/17 0730)  Speech: Clear (04/11/17 0730)  Assessment L Pupil: Brisk;Round (04/11/17 0730)  Size L Pupil (mm): 3 (04/11/17 0730)  Assessment R Pupil: Brisk;Round (04/11/17 0730)  Size R Pupil (mm): 3 (04/11/17 0730)   At baseline    Mental Status and Level of Consciousness: Arousable    Pulmonary Status:   O2 Device: Nasal cannula (04/11/17 1335)   Adequate oxygenation and airway patent    Complications related to anesthesia: None    Post-anesthesia assessment completed.  No concerns    Signed By: Rosalba Crawley MD     April 11, 2017

## 2017-04-12 PROBLEM — K63.89 COLONIC MASS: Status: ACTIVE | Noted: 2017-01-01

## 2017-04-12 PROBLEM — R91.8 LUNG NODULES: Status: ACTIVE | Noted: 2017-01-01

## 2017-04-12 NOTE — PROGRESS NOTES
Al[facility]  [address] [facility], [place], [address] [place] [address]  [phone]      Medical Progress Note      NAME: Dada Gallo. :  1945  MRM:  [id number]    Date/Time: 2017  7:54 AM          Subjective:     Chief Complaint:  GI bleed: continued with bleeding after I saw him yesterday, taken for urgent IVC filter and exploratory laparotomy with sigmoid colectomy with questionable diverticular mass    ROS:  (bold if positive, if negative)                Abd Pain      Tolerating diet - clears          Objective:       Vitals:          Last 24hrs VS reviewed since prior progress note.  Most recent are:    Visit Vitals    BP (!) 146/91    Pulse (!) 118    Temp 97.5 °F (36.4 °C)    Resp 14    Ht 5' 11\" (1.803 m)    Wt 146.9 kg (323 lb 13.7 oz)    SpO2 100%    BMI 45.17 kg/m2     SpO2 Readings from Last 6 Encounters:   17 100%   17 94%   17 96%   17 96%   16 97%   10/21/16 98%    O2 Flow Rate (L/min): 4 l/min       Intake/Output Summary (Last 24 hours) at 17 0754  Last data filed at 17 0610   Gross per 24 hour   Intake          8519.69 ml   Output              625 ml   Net          7894.69 ml          Exam:     Physical Exam:    Gen:  Well-developed, obese, frail, elderly, chronically ill-appearing, in no acute distress  HEENT:  Pink conjunctivae, PERRL, hearing intact to voice, moist mucous membranes  Neck:  Supple, without masses, thyroid non-tender  Resp:  No accessory muscle use, clear breath sounds without wheezes rales or rhonchi  Card:  No murmurs, tachy, irreg S1, S2 without thrills, bruits or peripheral edema  Abd:  Soft, diffusely tender, non-distended, normoactive bowel sounds are present, no palpable organomegaly and no detectable hernias  Lymph:  No cervical or inguinal adenopathy  Musc:  No cyanosis or clubbing  Skin:  No rashes or ulcers, skin turgor is good  Neuro:  Cranial nerves are grossly intact, no focal motor weakness, follows commands appropriately  Psych:  Good insight, oriented to person, place and time, alert       Telemetry reviewed:   AFIB    Medications Reviewed: (see below)    Lab Data Reviewed: (see below)    ______________________________________________________________________    Medications:     Current Facility-Administered Medications   Medication Dose Route Frequency    vancomycin (VANCOCIN) 2,000 mg in 0.9% sodium chloride 500 mL IVPB  2,000 mg IntraVENous Q12H    piperacillin-tazobactam (ZOSYN) 3.375 g in 0.9% sodium chloride (MBP/ADV) 100 mL  3.375 g IntraVENous Q8H    PHENYLephrine (NEOSYNEPHRINE) 30,000 mcg in 0.9% sodium chloride 250 mL infusion   mcg/min IntraVENous TITRATE    0.9% sodium chloride infusion 250 mL  250 mL IntraVENous PRN    dilTIAZem (CARDIZEM) 125 mg in dextrose 5% 125 mL infusion  5-15 mg/hr IntraVENous TITRATE    HYDROmorphone (PF) 15 mg/30 ml (DILAUDID) PCA   IntraVENous TITRATE    labetalol (NORMODYNE;TRANDATE) injection 10 mg  10 mg IntraVENous Q6H PRN    sodium chloride (NS) flush 5-10 mL  5-10 mL IntraVENous Q8H    sodium chloride (NS) flush 5-10 mL  5-10 mL IntraVENous PRN    acetaminophen (TYLENOL) tablet 650 mg  650 mg Oral Q4H PRN    oxyCODONE-acetaminophen (PERCOCET) 5-325 mg per tablet 1 Tab  1 Tab Oral Q4H PRN    naloxone (NARCAN) injection 0.4 mg  0.4 mg IntraVENous PRN    ondansetron (ZOFRAN) injection 4 mg  4 mg IntraVENous Q4H PRN    insulin lispro (HUMALOG) injection   SubCUTAneous AC&HS    glucose chewable tablet 16 g  4 Tab Oral PRN    dextrose (D50W) injection syrg 12.5-25 g  12.5-25 g IntraVENous PRN    glucagon (GLUCAGEN) injection 1 mg  1 mg IntraMUSCular PRN    ALPRAZolam (XANAX) tablet 0.25 mg  0.25 mg Oral TID PRN    ascorbic acid (vitamin C) (VITAMIN C) tablet 1,000 mg  1,000 mg Oral DAILY    cholecalciferol (VITAMIN D3) tablet 5,000 Units  5,000 Units Oral DAILY    [START ON 4/16/2017] cloNIDine (CATAPRES) 0.2 mg/24 hr patch 1 Patch  1 Patch TransDERmal Q7D    predniSONE (DELTASONE) tablet 50 mg  50 mg Oral DAILY WITH BREAKFAST    pyridostigmine (MESTINON) tablet 60 mg  60 mg Oral BID    traMADol (ULTRAM) tablet 50 mg  50 mg Oral Q6H PRN    pantoprazole (PROTONIX) 40 mg in sodium chloride 0.9 % 10 mL injection  40 mg IntraVENous Q12H    0.9% sodium chloride infusion  125 mL/hr IntraVENous CONTINUOUS    0.9% sodium chloride infusion 250 mL  250 mL IntraVENous PRN            Lab Review:     Recent Labs      04/12/17   0309  04/12/17   0055  04/11/17   1815   04/11/17   0235  04/10/17   2013   WBC  27.7*   --    --    --   11.4*  12.6*   HGB  11.4*  12.2  12.4   < >  11.3*  12.6   HCT  34.6*  35.3*  36.1*   < >  34.6*  37.9   PLT  207   --    --    --   218  245    < > = values in this interval not displayed. Recent Labs      04/12/17   0309  04/11/17   0235  04/10/17   1320   NA  143  139  139   K  4.6  4.4  3.9   CL  109*  105  101   CO2  24  28  29   GLU  147*  144*  137*   BUN  25*  21*  21*   CREA  1.24  1.10  1.09   CA  7.4*  7.5*  8.6   MG  2.2  1.9   --    PHOS  5.0*   --    --    ALB  2.2*   --   2.7*   TBILI  0.5   --   0.5   SGOT  16   --   29   ALT  41   --   73   INR   --    --   1.1     Lab Results   Component Value Date/Time    Glucose (POC) 169 04/11/2017 09:32 PM    Glucose (POC) 149 04/11/2017 04:11 PM    Glucose (POC) 168 04/11/2017 12:51 PM    Glucose (POC) 142 04/11/2017 07:46 AM    Glucose (POC) 143 04/10/2017 11:13 PM     No results for input(s): PH, PCO2, PO2, HCO3, FIO2 in the last 72 hours.   Recent Labs      04/10/17   1320   INR  1.1     No results found for: SDES  Lab Results   Component Value Date/Time    Culture result: ENTEROBACTER CLOACAE 09/16/2016 10:10 PM    Culture result: KLEBSIELLA OXYTOCA 09/16/2016 10:10 PM    Culture result: MODERATE  NORMAL RESPIRATORY KUNAL   09/03/2016 09:24 AM    Culture result: LIGHT  APPARENT  JESSA ALBICANS   09/03/2016 09:24 AM            Assessment: Principal Problem:    GI bleed (4/10/2017)    Active Problems:    PAF (paroxysmal atrial fibrillation) ([facility] [address] (9/3/2016)      Myasthenia gravis with exacerbation, adult form ([address] (4/4/2017)      History of stroke (4/5/2017)      Morbid obesity with BMI of 40.0-44.9, adult ([address] (4/5/2017)      Type II diabetes mellitus ([address] (4/10/2017)      Acute blood loss anemia (4/11/2017)      Acute pulmonary embolus ([address] (4/11/2017)      Colonic mass (4/12/2017)           Plan:     Principal Problem:    GI bleed (4/10/2017)   - due to apparent diverticular mass, pathology pending   - s/p sigmoid colectomy   - General Surgery following   - no evidence of recurrent bleeding    Active Problems:    PAF (paroxysmal atrial fibrillation) ([address] (9/3/2016)   - on IV diltiazem, rate not well controlled overnight, required IV digoxin   - Cardiology following       Myasthenia gravis with exacerbation, adult form ([address] (4/4/2017)   - continue Mestinon      History of stroke (4/5/2017)   - off anticoagulation due to bleed as above, may be able to resume soon, defer to Surgery      Morbid obesity with BMI of 40.0-44.9, adult ([address] (4/5/2017)   - counseled on weight loss      Type II diabetes mellitus ([facility] [address] (4/42/2667)   - no complications   - follow BS on SSI   - hold metformin due to IV contrast for embolization      Acute blood loss anemia (4/11/2017)   - received multiple units of blood during acute bleeding yesterday   - because of this, Hgb stable despite massive blood loss      Acute pulmonary embolus ([address] (4/11/2017)   - diagnosed last admission   - off anticoagulation due to acute bleed   - s/p IVC filter   - resume anticoagulation when okay with Surgery      Total time spent with patient: 35 minutes                  Care Plan discussed with: Patient, Care Manager and Nursing Staff    Discussed:  Code Status, Care Plan and D/C Planning    Prophylaxis:  H2B/PPI    Disposition: TBD           ___________________________________________________    Attending Physician: Fantasma Fang MD

## 2017-04-12 NOTE — PROGRESS NOTES
[address]       INTENSIVIST DAILY PROGRESS NOTE  Name: Sera Simental. : 1945   MRN: [id number]   Date: 2017 8:48 AM   I have reviewed the flowsheet and previous days notes. Admitted with acute GI bleed:  --received 1 unit PRBCs initially  --seen by GI who was planning to perform colonoscopy, but he continued to bleed   --then had CTA abd/pelvis performed and IR placed 4 coils in the inferior mesenteric artery as he had bleeding from sigmoid colon; however, bleeding persisted and he became hypotensive and developed shock  --CTA Abd/Pelvis demonstrated possible mass adjacent to the sigmoid colon also: Surgery consulted  --taken to OR yesterday and is s/p exp lap with sigmoid colon resection and colostomy placement  --also had potential diverticular mass adjacent to the sigmoid colon which was resected  --received 2 more units of PRBCs in the OR    Overnight events reviewed:   Afebrile overnight  White count elevated today  Not on vasopressors  On IVF  Afib with RVR yesterday afternoon  --now on Cardizem drip at 15 mg/hr  --Cardiology following  Pain is well controlled  Minimal bloody fluid seen in colostomy bag  Tolerating liquids              ROS: mild abdominal pain at surgical site    Vital Signs:    Visit Vitals    BP (!) 146/91    Pulse (!) 118    Temp 97.5 °F (36.4 °C)    Resp 14    Ht 5' 11\" (1.803 m)    Wt 146.9 kg (323 lb 13.7 oz)    SpO2 100%    BMI 45.17 kg/m2       O2 Device: Nasal cannula   O2 Flow Rate (L/min): 4 l/min   Temp (24hrs), Av.7 °F (36.5 °C), Min:97.3 °F (36.3 °C), Max:98.3 °F (36.8 °C)       Intake/Output:   Last shift:         Last 3 shifts: 04/10 1901 -  0700  In: 13224.2 [P.O.:120;  I.V.:9024.7]  Out: 625 [Urine:565]    Intake/Output Summary (Last 24 hours) at 17 0902  Last data filed at 17 0610   Gross per 24 hour   Intake          8269.69 ml   Output              625 ml   Net          7644.69 ml Physical Exam:  General:  Alert, cooperative, no distress, appears stated age. Head:  Normocephalic, without obvious abnormality, atraumatic. Eyes:  Conjunctivae/corneas clear. Significant pallor+, PERRLA, EOMI    Nose: Nares normal. Septum midline. Mucosa normal. No drainage or sinus tenderness. Throat: Lips, mucosa, and tongue normal. Teeth and gums normal.   Neck: Supple, symmetrical, trachea midline, no adenopathy, thyroid: no enlargment/tenderness/nodules, no carotid bruit and no JVD. Back:   Symmetric, no curvature. ROM normal.   Lungs:   Clear to auscultation bilaterally. Chest wall:  No tenderness or deformity. Heart:  Regular rate and rhythm, S1, S2 normal, no murmur, click, rub or gallop. Abdomen:   Soft, non-tender, distended. Bowel sounds hypoactive. No masses,  No organomegaly. Abdomen is obese. Surgical site intact; mild bloody fluid seen in colostomy bag   Extremities: Extremities normal, atraumatic, no cyanosis or edema. Pulses: 2+ and symmetric all extremities.    Skin: Skin color, texture, turgor normal. No rashes or lesions   Lymph nodes: Cervical, supraclavicular, and axillary nodes normal.   Neurologic: Grossly nonfocal       DATA:   Current Facility-Administered Medications   Medication Dose Route Frequency    vancomycin trough at 1330, 4/12/17   Other ONCE    vancomycin (VANCOCIN) 2,000 mg in 0.9% sodium chloride 500 mL IVPB  2,000 mg IntraVENous Q12H    piperacillin-tazobactam (ZOSYN) 3.375 g in 0.9% sodium chloride (MBP/ADV) 100 mL  3.375 g IntraVENous Q8H    0.9% sodium chloride infusion 250 mL  250 mL IntraVENous PRN    dilTIAZem (CARDIZEM) 125 mg in dextrose 5% 125 mL infusion  5-15 mg/hr IntraVENous TITRATE    HYDROmorphone (PF) 15 mg/30 ml (DILAUDID) PCA   IntraVENous TITRATE    labetalol (NORMODYNE;TRANDATE) injection 10 mg  10 mg IntraVENous Q6H PRN    sodium chloride (NS) flush 5-10 mL  5-10 mL IntraVENous Q8H    sodium chloride (NS) flush 5-10 mL  5-10 mL IntraVENous PRN    acetaminophen (TYLENOL) tablet 650 mg  650 mg Oral Q4H PRN    oxyCODONE-acetaminophen (PERCOCET) 5-325 mg per tablet 1 Tab  1 Tab Oral Q4H PRN    naloxone (NARCAN) injection 0.4 mg  0.4 mg IntraVENous PRN    ondansetron (ZOFRAN) injection 4 mg  4 mg IntraVENous Q4H PRN    insulin lispro (HUMALOG) injection   SubCUTAneous AC&HS    glucose chewable tablet 16 g  4 Tab Oral PRN    dextrose (D50W) injection syrg 12.5-25 g  12.5-25 g IntraVENous PRN    glucagon (GLUCAGEN) injection 1 mg  1 mg IntraMUSCular PRN    ALPRAZolam (XANAX) tablet 0.25 mg  0.25 mg Oral TID PRN    ascorbic acid (vitamin C) (VITAMIN C) tablet 1,000 mg  1,000 mg Oral DAILY    cholecalciferol (VITAMIN D3) tablet 5,000 Units  5,000 Units Oral DAILY    [START ON 4/16/2017] cloNIDine (CATAPRES) 0.2 mg/24 hr patch 1 Patch  1 Patch TransDERmal Q7D    predniSONE (DELTASONE) tablet 50 mg  50 mg Oral DAILY WITH BREAKFAST    pyridostigmine (MESTINON) tablet 60 mg  60 mg Oral BID    traMADol (ULTRAM) tablet 50 mg  50 mg Oral Q6H PRN    pantoprazole (PROTONIX) 40 mg in sodium chloride 0.9 % 10 mL injection  40 mg IntraVENous Q12H    0.9% sodium chloride infusion  75 mL/hr IntraVENous CONTINUOUS    0.9% sodium chloride infusion 250 mL  250 mL IntraVENous PRN       Telemetry: sinus tachycardia          Labs:  Recent Labs      04/12/17   0309  04/12/17   0055  04/11/17   1815   04/11/17   0235  04/10/17   2013   WBC  27.7*   --    --    --   11.4*  12.6*   HGB  11.4*  12.2  12.4   < >  11.3*  12.6   HCT  34.6*  35.3*  36.1*   < >  34.6*  37.9   PLT  207   --    --    --   218  245    < > = values in this interval not displayed.      Recent Labs      04/12/17   0309  04/11/17   0235  04/10/17   1320   NA  143  139  139   K  4.6  4.4  3.9   CL  109*  105  101   CO2  24  28  29   GLU  147*  144*  137*   BUN  25*  21*  21*   CREA  1.24  1.10  1.09   CA  7.4*  7.5*  8.6   MG  2.2  1.9   --    PHOS  5.0*   --    --    ALB  2.2*   -- 2.7*   SGOT  16   --   29   ALT  41   --   73   INR   --    --   1.1     No results for input(s): PH, PCO2, PO2, HCO3, FIO2 in the last 72 hours. Imaging:  I have personally reviewed the patients radiographs and reports. CXR: heart size is normal. There is persistent elevation of left hemidiaphragm with mild atelectasis   in left lower lobe unchanged. Minimal patchy opacities in the right mid zone peripherally likely   representing pulmonary infarct is again noted. There is no pulmonary edema no significant change. CT Abd/Pelvis: Sigmoid colonic hemorrhage. Small adjacent mass in the sigmoid. Diverticulitis   slightly favored over a small colon carcinoma. 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 Resolved pulmonary emboli in the visualized portions of the left lower lobe. Stable or slightly decreased right lung emboli with small developing right upper lobe pulmonary   infarction.      IMPRESSION:   · Acute GI Bleed: secondary to hemorrhage in the sigmoid colon: s/p coiling x 4 of Inferior mesenteric artery by IR with continued bleeding: s/p exp lap with sigmoid colon resection and colostomy placement  · Blood Loss Anemia  · Possible diverticular mass: s/p resection  · Recent Bilateral PE  · Recent LLE DVT  · Atrial Fibrillation with RVR  · Myasthenia Gravis  · Suspected KWADWO: with snoring and witnessed nocturnal desaturations  · Morbid Obesity   PLAN:   · Wean O2 to keep sats > 90%  · Continue IVF but cut back dosing to 75 ml/hr today  · Rate control per Cardiology recs  · Watch H&H closely and transfuse as needed  · Continue broad spectrum abxs  · Continue Prednisone and Mestinon  · Surgery following surgical wound  · F/U path from mass  · Replete electrolytes as needed  · Encourage IS use  · PT/OT consult today to help with mobilization  · Tight glycemic control  · Nutrition: on liquid diet: recs per Surgery  · Will need outpatient sleep eval/study with us following discharge  · Dispo: keep in ICU today   GLOBAL ISSUES:   · Head of bed elevated  · GI Prophylaxis: Protonix BID  · DVT Prophylaxis: SCDs on RLE only (has acute DVT in LLE)  · Lines: Peripheral IVs  · Medical Decision Maker: Patient/Sister     See my orders for details    My assessment/plan was discussed with: Patient, IR, General Surgery, Cardiology, Hospital Medicine, Nursing, and Respiratory Therapy.     Titus Rodriguez MD, [facility]  [facility]

## 2017-04-12 NOTE — WOUND CARE
Ostomy nurse consult per nursing for ostomy assessment and education. In the ICU, sister and staff at bedside. S/p Day #1 Exploratory laparotomy, sigmoid colectomy, and Marin's. 4-11 per Dr Bishop Back. Assessment  LLQ ostomy appliance in place with small amount of serosanguinous drainage, mid line abdominal dressing intact, not disturbed. Plan of care and education provided to sister, patient lives with sister who will support him on discharge. Educational material left for her review, will plan to meet on 4-13 with sister to change appliance and start education. Case management consult for Home health ostomy education. Will follow.     Tony Alvarenga

## 2017-04-12 NOTE — PROGRESS NOTES
Problem: Mobility Impaired (Adult and Pediatric)  Goal: *Acute Goals and Plan of Care (Insert Text)  Physical Therapy Goals  Initiated 4/12/2017  1. Patient will move from supine to sit and sit to supine , scoot up and down and roll side to side in bed with moderate assistance within 7 day(s). 2. Patient will transfer from bed to chair and chair to bed with moderate assistance using the least restrictive device within 7 day(s). 3. Patient will perform sit to stand with moderate assistance within 7 day(s). 4. Patient will ambulate with moderate assistance for 20 feet with the least restrictive device within 7 day(s). 5. Patient will ascend/descend 5 stairs with 2 handrail(s) with moderate assistance within 7 day(s). PHYSICAL THERAPY EVALUATION  Patient: Bernabe Valdez (77 y.o. male)  Date: 4/12/2017  Primary Diagnosis: GI bleed  GI Bleeding  Procedure(s) (LRB):  LAPAROTOMY EXPLORATORY, SIGMOID COLECTOMY, COLOSTOMY (N/A) 1 Day Post-Op   Precautions: fall, pressure ulcer         ASSESSMENT :  Based on the objective data described below, the morbidly obese patient presents very drowsy, hypertensive (151/89) and tachycardic (up to 113 bpm) with moderate abdominal pain and very limited functional mobility. Nurse states the PCA dilaudid has made him sleepy. Pt was admitted for GI bleed and IVC filter insertion and sigmoid colectomy was done yesterday. Pt's sister states Pt was ambulating short household distances independently prior to admission. Attempted to get Pt to sit edge of bed with maximal assist x2. Pt opened his eyes at times, was sleeping at times, and was unable to assist with bed mobility. Pt reported abdominal pain during movement. Pt was returned to supine and scooted up bed with total assist x2 and bed in [place] position. B LE passive/active assisted exercises were done in supine with Pt contributing very little.  Pt was encouraged to exercise his legs frequently and was instructed in fall prevention. Pt did not desaturate with activity on 2 l/minute supplemental O2. Pt will benefit from rehab before returning home where he lives with his sister. Patient will benefit from skilled intervention to address the above impairments. Patients rehabilitation potential is considered to be Fair  Factors which may influence rehabilitation potential include:   [ ]         None noted  [X]         Mental ability/status  [X]         Medical condition  [ ]         Home/family situation and support systems  [ ]         Safety awareness  [X]         Pain tolerance/management  [ ]         Other:        PLAN :  Recommendations and Planned Interventions:  [X]           Bed Mobility Training             [X]    Neuromuscular Re-Education  [X]           Transfer Training                   [ ]    Orthotic/Prosthetic Training  [X]           Gait Training                         [ ]    Modalities  [X]           Therapeutic Exercises           [ ]    Edema Management/Control  [X]           Therapeutic Activities            [X]    Patient and Family Training/Education  [ ]           Other (comment):     Frequency/Duration: Patient will be followed by physical therapy  5 times a week to address goals. Discharge Recommendations: Inpatient Rehab  Further Equipment Recommendations for Discharge: none       SUBJECTIVE:   Patient stated Its 2017.       OBJECTIVE DATA SUMMARY:   HISTORY:    Past Medical History:   Diagnosis Date    Anxiety      Arthritis      Asthma      [facility]      Diabetes ([facility]      HTN (hypertension) with goal to be determined      Morbid obesity with BMI of 40.0-44.9, adult ([facility] 4/5/2017    Myasthenia gravis ([facility]      Pulmonary emboli (HCC)       with bilateral DVT's      Past Surgical History:   Procedure Laterality Date    HX ORTHOPAEDIC        HX OTHER SURGICAL         L sided neck suregry \" infection\"     Prior Level of Function/Home Situation: Pt was an independent short household distance ambulator, was independent in ADL's, and has not fallen in the past year. Personal factors and/or comorbidities impacting plan of care: recent myasthenia gravis crisis; recent PE and DVT; PAF           EXAMINATION/PRESENTATION/DECISION MAKING:   Critical Behavior:  Neurologic State: Drowsy  Orientation Level: Oriented to person, Oriented to place, Oriented to time  Cognition: Decreased attention/concentration, Decreased command following     Hearing: Auditory  Auditory Impairment: None  Skin:  Dressing ni place, bruising noted on abdomen  Edema: B LE's  Range Of Motion:  AROM: Generally decreased, functional           PROM: Generally decreased, functional           Strength:    Strength: Generally decreased, functional                    Tone & Sensation:   Tone: Normal              Sensation: Intact (sister states Pt had R UE tingling/numbness at times)               Coordination:     Vision:      Functional Mobility:  Bed Mobility:  Rolling: Total assistance        Scooting: Total assistance  Transfers:                             Balance:   Sitting:  (attempted sitting edge of bed - Pt too sleepy)  Ambulation/Gait Training:                                                                               Stairs: Therapeutic Exercises:   Heel glides and ankle pumps done in supine - passive/active assisted.  Instructed in ankle pumps and heel glides and encouraged to exercise on own     Functional Measure:  Barthel Index:      Bathin  Bladder: 0  Bowels: 0  Groomin  Dressin  Feedin  Mobility: 0  Stairs: 0  Toilet Use: 0  Transfer (Bed to Chair and Back): 0  Total: 10         Barthel and G-code impairment scale:  Percentage of impairment CH  0% CI  1-19% CJ  20-39% CK  40-59% CL  60-79% CM  80-99% CN  100%   Barthel Score 0-100 100 99-80 79-60 59-40 20-39 1-19    0   Barthel Score 0-20 20 17-19 13-16 9-12 5-8 1-4 0      The Barthel ADL Index: Guidelines  1. The index should be used as a record of what a patient does, not as a record of what a patient could do. 2. The main aim is to establish degree of independence from any help, physical or verbal, however minor and for whatever reason. 3. The need for supervision renders the patient not independent. 4. A patient's performance should be established using the best available evidence. Asking the patient, friends/relatives and nurses are the usual sources, but direct observation and common sense are also important. However direct testing is not needed. 5. Usually the patient's performance over the preceding 24-48 hours is important, but occasionally longer periods will be relevant. 6. Middle categories imply that the patient supplies over 50 per cent of the effort. 7. Use of aids to be independent is allowed. Kamille Jimenez., Barthel, DLalithaW. (1811). Functional evaluation: the Barthel Index. [address] [facility] (14)2. LORI Torres, Yudelka Foster., Rhett Romeoand., [place], [address] [place] [address] (1999). Measuring the change indisability after inpatient rehabilitation; comparison of the responsiveness of the Barthel Index and Functional [place] Measure. Journal of Neurology, Neurosurgery, and Psychiatry, 66(4), 569-725. Saranya Boswell, N.J.A, BRYCE Lebron, & Giovanni Cisneros MJAMIL. (2004.) Assessment of post-stroke quality of life in cost-effectiveness studies: The usefulness of the Barthel Index and the EuroQoL-5D. Quality of Life Research, 13, 290-75         G codes: In compliance with CMSs Claims Based Outcome Reporting, the following G-code set was chosen for this patient based on their primary functional limitation being treated: The outcome measure chosen to determine the severity of the functional limitation was the Barthel Index with a score of 10/100 which was correlated with the impairment scale.       · Mobility - Walking and Moving Around:               - CURRENT STATUS:    CM - 80%-99% impaired, limited or restricted               - GOAL STATUS:           CL - 60%-79% impaired, limited or restricted               - D/C STATUS:                       ---------------To be determined---------------      Physical Therapy Evaluation Charge Determination   History Examination Presentation Decision-Making   HIGH Complexity :3+ comorbidities / personal factors will impact the outcome/ POC  MEDIUM Complexity : 3 Standardized tests and measures addressing body structure, function, activity limitation and / or participation in recreation  MEDIUM Complexity : Evolving with changing characteristics  HIGH complexity      Based on the above components, the patient evaluation is determined to be of the following complexity level: MEDIUM     Pain:  Pain Scale 1: Numeric (0 - 10)  Pain Intensity 1: 0              Activity Tolerance:   poor  Please refer to the flowsheet for vital signs taken during this treatment. After treatment:   [ ]         Patient left in no apparent distress sitting up in chair  [X]         Patient left in no apparent distress in bed  [X]         Call bell left within reach  [X]         Nursing notified  [X]         Caregiver present  [X]         Bed alarm activated      COMMUNICATION/EDUCATION:   The patients plan of care was discussed with: Registered Nurse.  [X]         Fall prevention education was provided and the patient/caregiver indicated understanding. [X]         Patient/family have participated as able in goal setting and plan of care. [X]         Patient/family agree to work toward stated goals and plan of care. [ ]         Patient understands intent and goals of therapy, but is neutral about his/her participation. [ ]         Patient is unable to participate in goal setting and plan of care.      Thank you for this referral.  Jad Mace, PT   Time Calculation: 23 mins

## 2017-04-12 NOTE — PROGRESS NOTES
Cardiology Progress Note   ICU       NAME:  [address]. :   1945   MRN:   [id number]     Assessment/Plan: Critically ill    Persistent afib now w/  RVR        - cont cardizem gtt       - dig added       - resume po coreg        - no anticoagulation   Lower GI bleed/inferior mesenteric artery bleed s/p coils/s/p expl lap w/ sigmoid colon resection, colostomy, ? Mass adjacent to sigmoid colon  Hx PE- s/p IVC filter  Myasthenia gravis       ATTENDING CARDIOLOGIST  His rate is in low 100 but may be related to pain. PATIENT was  Personally examined and chart reviewed. All the elements of history and examination were personally performed and I agree with the plan as listed above. Treatment plan was addressed with the patient. Beverly Valverde MD      Care Coordination: 25 minutes spent reviewing data, dx, treatment w/ pt, coordinating care with team including care management       Subjective:   Cindy Shankar is a 73y.o. year old male w/ hx:  AFib  cx by T2DM, Myasthenia gravis, morbid obesity, recent PE  discharged 17  [facility]/a fib. He presented today with chief complain of rectal bleeding. Per sister, the pt this morning began experiencing several episodes of bright red rectal bleeding, prompting the pt to be brought to the ED. Became weak and dizzy at home.        LABS:  hgb 13.1 , trop . 04      Overnight activities reviewed:    - s/p expl lap, sigmoid colectomy, colostomy, IVC filter  Yesterday    - extubated in PACU   - afib RVR cardizem  IV IV dig  Started for RVR    - Cardizem gtt @ 15     - PCAD   -  K+4.6    Mg++ 2.2   Cr 1.24    - 4 units PRC yesterday  Hgb 11.4,    - uo 565 I/O +8L total +9.8L    -       Cardiac ROS: unable to provide history 2/2 sedation     Review of Systems:   unable to provide history 2/2 sedation    CARDIAC EVALUATION   ECHO 2016:EF 65%, no WMA, mild TR, RVSP 41 mmHg  ECHO 17  LVEF 60% No WMA.  Mild MR, Mild PaHTN        Objective:     Visit Vitals    BP (!) 146/91    Pulse (!) 118    Temp 97.5 °F (36.4 °C)    Resp 14    Ht 5' 11\" (1.803 m)    Wt 323 lb 13.7 oz (146.9 kg)    SpO2 100%    BMI 45.17 kg/m2      O2 Flow Rate (L/min): 4 l/min O2 Device: Nasal cannula    Temp (24hrs), Av.7 °F (36.5 °C), Min:97.3 °F (36.3 °C), Max:98.3 °F (36.8 °C)        Intake/Output Summary (Last 24 hours) at 17 0849  Last data filed at 17 0610   Gross per 24 hour   Intake          8394.69 ml   Output              625 ml   Net          7769.69 ml       TELE: afib rate 120-140 range     General: sedated PCA pump no  distress. HEENT: Atraumatic. Pale  and moist.  Anicteric sclerae. Neck: Supple,   R TL   Lungs: decreased bibasilar No wheezing/rhonchi/crackles. Heart: PMI:diminished, Irregular tachy rhythm, no murmur, no S3, no rubs, no gallops. Difficult to assess  JVD. No carotid bruits. Abdomen: Soft,  Distended, large dressing, colostomy stoma pink, bloody drainage,  Quiet bowel sounds, . No bruits. : voiding   Extremities: general edema  no clubbing, no cyanosis. No calf tenderness  Neurologic: lethargic 2/2 sedation  No focal  neurological distress. Psych: sedated Not anxious nor agitated.       Care Plan discussed with:    Comments   Patient y    Family  y sister   RN y    Care Manager                    Consultant:  y pulm CC        Data Review:   CMP:   Lab Results   Component Value Date/Time     2017 03:09 AM    K 4.6 2017 03:09 AM     (H) 2017 03:09 AM    CO2 24 2017 03:09 AM    AGAP 10 2017 03:09 AM     (H) 2017 03:09 AM    BUN 25 (H) 2017 03:09 AM    CREA 1.24 2017 03:09 AM    GFRAA >60 2017 03:09 AM    GFRNA 58 (L) 2017 03:09 AM    CA 7.4 (L) 2017 03:09 AM    MG 2.2 2017 03:09 AM    PHOS 5.0 (H) 2017 03:09 AM    ALB 2.2 (L) 2017 03:09 AM    TBILI 0.5 2017 03:09 AM    TP 4.6 (L) 04/12/2017 03:09 AM    GLOB 2.4 04/12/2017 03:09 AM    AGRAT 0.9 (L) 04/12/2017 03:09 AM    SGOT 16 04/12/2017 03:09 AM    ALT 41 04/12/2017 03:09 AM     CBC:   Lab Results   Component Value Date/Time    WBC 27.7 (H) 04/12/2017 03:09 AM    HGB 11.4 (L) 04/12/2017 03:09 AM    HCT 34.6 (L) 04/12/2017 03:09 AM     04/12/2017 03:09 AM     All Cardiac Markers in the last 24 hours: No results found for: CPK, CKMMB, CKMB, RCK3, CKMBT, CKNDX, CKND1, SUJEY, TROPT, TROIQ, GALLITO, TROPT, TNIPOC, BNP, BNPP  Recent Glucose Results:   Lab Results   Component Value Date/Time    GLUCPOC 161 (H) 04/12/2017 08:38 AM    GLUCPOC 169 (H) 04/11/2017 09:32 PM    GLUCPOC 149 (H) 04/11/2017 04:11 PM     (H) 04/12/2017 03:09 AM     ABG: No results found for: PH, PHI, PCO2, PCO2I, PO2, PO2I, HCO3, HCO3I, FIO2, FIO2I  LIPIDS:  Cholesterol, total   Date Value Ref Range Status   09/03/2016 168 <200 MG/DL Final     Triglyceride   Date Value Ref Range Status   09/03/2016 198 (H) <150 MG/DL Final     Comment:     Based on NCEP-ATP III:  Triglycerides <150 mg/dL  is considered normal, 150-199 mg/dL  borderline high,  200-499 mg/dL high and  greater than or equal to 500 mg/dL very high. HDL Cholesterol   Date Value Ref Range Status   09/03/2016 41 MG/DL Final     Comment:     Based on NCEP ATP III, HDL Cholesterol <40 mg/dL is considered low and >60 mg/dL is elevated.      LDL, calculated   Date Value Ref Range Status   09/03/2016 87.4 0 - 100 MG/DL Final     Comment:     Based on the NCEP-ATP: LDL-C concentrations are considered  optimal <100 mg/dL,  near optimal/above Normal 100-129 mg/dL  Borderline High: 130-159, High: 160-189 mg/dL  Very High: Greater than or equal to 190 mg/dL       VLDL, calculated   Date Value Ref Range Status   09/03/2016 39.6 MG/DL Final     CHOL/HDL Ratio   Date Value Ref Range Status   09/03/2016 4.1 0 - 5.0   Final       Medications reviewed  Current Facility-Administered Medications   Medication Dose Route Frequency    vancomycin trough at 1330, 4/12/17   Other ONCE    vancomycin (VANCOCIN) 2,000 mg in 0.9% sodium chloride 500 mL IVPB  2,000 mg IntraVENous Q12H    piperacillin-tazobactam (ZOSYN) 3.375 g in 0.9% sodium chloride (MBP/ADV) 100 mL  3.375 g IntraVENous Q8H    PHENYLephrine (NEOSYNEPHRINE) 30,000 mcg in 0.9% sodium chloride 250 mL infusion   mcg/min IntraVENous TITRATE    0.9% sodium chloride infusion 250 mL  250 mL IntraVENous PRN    dilTIAZem (CARDIZEM) 125 mg in dextrose 5% 125 mL infusion  5-15 mg/hr IntraVENous TITRATE    HYDROmorphone (PF) 15 mg/30 ml (DILAUDID) PCA   IntraVENous TITRATE    labetalol (NORMODYNE;TRANDATE) injection 10 mg  10 mg IntraVENous Q6H PRN    sodium chloride (NS) flush 5-10 mL  5-10 mL IntraVENous Q8H    sodium chloride (NS) flush 5-10 mL  5-10 mL IntraVENous PRN    acetaminophen (TYLENOL) tablet 650 mg  650 mg Oral Q4H PRN    oxyCODONE-acetaminophen (PERCOCET) 5-325 mg per tablet 1 Tab  1 Tab Oral Q4H PRN    naloxone (NARCAN) injection 0.4 mg  0.4 mg IntraVENous PRN    ondansetron (ZOFRAN) injection 4 mg  4 mg IntraVENous Q4H PRN    insulin lispro (HUMALOG) injection   SubCUTAneous AC&HS    glucose chewable tablet 16 g  4 Tab Oral PRN    dextrose (D50W) injection syrg 12.5-25 g  12.5-25 g IntraVENous PRN    glucagon (GLUCAGEN) injection 1 mg  1 mg IntraMUSCular PRN    ALPRAZolam (XANAX) tablet 0.25 mg  0.25 mg Oral TID PRN    ascorbic acid (vitamin C) (VITAMIN C) tablet 1,000 mg  1,000 mg Oral DAILY    cholecalciferol (VITAMIN D3) tablet 5,000 Units  5,000 Units Oral DAILY    [START ON 4/16/2017] cloNIDine (CATAPRES) 0.2 mg/24 hr patch 1 Patch  1 Patch TransDERmal Q7D    predniSONE (DELTASONE) tablet 50 mg  50 mg Oral DAILY WITH BREAKFAST    pyridostigmine (MESTINON) tablet 60 mg  60 mg Oral BID    traMADol (ULTRAM) tablet 50 mg  50 mg Oral Q6H PRN    pantoprazole (PROTONIX) 40 mg in sodium chloride 0.9 % 10 mL injection  40 mg IntraVENous Q12H  0.9% sodium chloride infusion  125 mL/hr IntraVENous CONTINUOUS    0.9% sodium chloride infusion 250 mL  250 mL IntraVENous PRN         Emily Canada RN, ACNP-BC, AACC

## 2017-04-12 NOTE — ROUTINE PROCESS
@4295 Bedside and Verbal shift change report given to Lucrecia Winter RN (oncoming nurse) by Juan David Wood RN (offgoing nurse). Report included the following information SBAR, Kardex, ED Summary, OR Summary, Procedure Summary, Intake/Output, MAR, Accordion, Recent Results, Med Rec Status, Cardiac Rhythm Afib and Alarm Parameters . @0700 Bedside and Verbal shift change report given to Brittney [address] (oncoming nurse) by Lucrecia Winter RN (offgoing nurse). Report included the following information SBAR, Kardex, ED Summary, OR Summary, Procedure Summary, Intake/Output, MAR, Accordion, Recent Results, Med Rec Status, Cardiac Rhythm Afib and Alarm Parameters .

## 2017-04-12 NOTE — PROGRESS NOTES
Gastroenterology Progress Note    April 12, 2017  Admit Date: 4/10/2017         Narrative Assessment and Plan   · Lower GI bleed  · Anemia secondary to GI blood loss    Plan  - no new recommendations from GI standpoint  - postop management per surgery team  - will await pathology results  - continue to monitor H/H and transfuse as needed    Subjective:   · Drowsy, Complains of abdominal pain around incision. Denies N/V. S/P exploratory laparotomy with sigmoid colectomy and colostomy    ROS:  The previous review of systems on initial consultation / H&P is noted and reviewed. Specific changes noted above in HPI.     Current Medications:     Current Facility-Administered Medications   Medication Dose Route Frequency    vancomycin trough at 1330, 4/12/17   Other ONCE    carvedilol (COREG) tablet 6.25 mg  6.25 mg Oral BID WITH MEALS    vancomycin (VANCOCIN) 2,000 mg in 0.9% sodium chloride 500 mL IVPB  2,000 mg IntraVENous Q12H    piperacillin-tazobactam (ZOSYN) 3.375 g in 0.9% sodium chloride (MBP/ADV) 100 mL  3.375 g IntraVENous Q8H    0.9% sodium chloride infusion 250 mL  250 mL IntraVENous PRN    dilTIAZem (CARDIZEM) 125 mg in dextrose 5% 125 mL infusion  5-15 mg/hr IntraVENous TITRATE    HYDROmorphone (PF) 15 mg/30 ml (DILAUDID) PCA   IntraVENous TITRATE    labetalol (NORMODYNE;TRANDATE) injection 10 mg  10 mg IntraVENous Q6H PRN    sodium chloride (NS) flush 5-10 mL  5-10 mL IntraVENous Q8H    sodium chloride (NS) flush 5-10 mL  5-10 mL IntraVENous PRN    acetaminophen (TYLENOL) tablet 650 mg  650 mg Oral Q4H PRN    oxyCODONE-acetaminophen (PERCOCET) 5-325 mg per tablet 1 Tab  1 Tab Oral Q4H PRN    naloxone (NARCAN) injection 0.4 mg  0.4 mg IntraVENous PRN    ondansetron (ZOFRAN) injection 4 mg  4 mg IntraVENous Q4H PRN    insulin lispro (HUMALOG) injection   SubCUTAneous AC&HS    glucose chewable tablet 16 g  4 Tab Oral PRN    dextrose (D50W) injection syrg 12.5-25 g  12.5-25 g IntraVENous PRN    glucagon (GLUCAGEN) injection 1 mg  1 mg IntraMUSCular PRN    ALPRAZolam (XANAX) tablet 0.25 mg  0.25 mg Oral TID PRN    ascorbic acid (vitamin C) (VITAMIN C) tablet 1,000 mg  1,000 mg Oral DAILY    cholecalciferol (VITAMIN D3) tablet 5,000 Units  5,000 Units Oral DAILY    [START ON 2017] cloNIDine (CATAPRES) 0.2 mg/24 hr patch 1 Patch  1 Patch TransDERmal Q7D    predniSONE (DELTASONE) tablet 50 mg  50 mg Oral DAILY WITH BREAKFAST    pyridostigmine (MESTINON) tablet 60 mg  60 mg Oral BID    traMADol (ULTRAM) tablet 50 mg  50 mg Oral Q6H PRN    pantoprazole (PROTONIX) 40 mg in sodium chloride 0.9 % 10 mL injection  40 mg IntraVENous Q12H    0.9% sodium chloride infusion  75 mL/hr IntraVENous CONTINUOUS    0.9% sodium chloride infusion 250 mL  250 mL IntraVENous PRN       Objective:     VITALS:   Last 24hrs VS reviewed since prior progress note. Most recent are:  Visit Vitals    /82    Pulse (!) 118    Temp 97.5 °F (36.4 °C)    Resp 19    Ht 5' 11\" (1.803 m)    Wt 146.9 kg (323 lb 13.7 oz)    SpO2 92%    BMI 45.17 kg/m2     Temp (24hrs), Av.7 °F (36.5 °C), Min:97.3 °F (36.3 °C), Max:98.3 °F (36.8 °C)      Intake/Output Summary (Last 24 hours) at 17 1213  Last data filed at 17 1143   Gross per 24 hour   Intake          5550.53 ml   Output             1100 ml   Net          4450.53 ml       EXAM:  General: Comfortable, no distress    HEENT: Atraumatic skull, pupils equal  Lungs:  Breathing nonlabored. Speaking in complete sentences  Heart:  Sinus tachycardia  Abdomen: Hypoactive BS, obese, appropriately tender. Midline incision dressing clean.  Left side colostomy with minimal blood tinged output  Neurologic:  Cranial nerves grossly intact, moves all 4 extremities  Psych:   Good insight. Not anxious nor agitated    Lab Data Reviewed:   Recent Labs      17   0309  17   0055  17   1815   17   0235  04/10/17   2013   WBC  27.7*   --    --    -- 11.4*  12.6*   HGB  11.4*  12.2  12.4   < >  11.3*  12.6   HCT  34.6*  35.3*  36.1*   < >  34.6*  37.9   PLT  207   --    --    --   218  245    < > = values in this interval not displayed. Recent Labs      04/12/17   0309  04/11/17   0235  04/10/17   1320   NA  143  139  139   K  4.6  4.4  3.9   CL  109*  105  101   CO2  24  28  29   GLU  147*  144*  137*   BUN  25*  21*  21*   CREA  1.24  1.10  1.09   CA  7.4*  7.5*  8.6   MG  2.2  1.9   --    PHOS  5.0*   --    --    ALB  2.2*   --   2.7*   TBILI  0.5   --   0.5   SGOT  16   --   29   ALT  41   --   73   INR   --    --   1.1     Lab Results   Component Value Date/Time    Glucose (POC) 172 04/12/2017 11:42 AM    Glucose (POC) 161 04/12/2017 08:38 AM    Glucose (POC) 169 04/11/2017 09:32 PM    Glucose (POC) 149 04/11/2017 04:11 PM    Glucose (POC) 168 04/11/2017 12:51 PM     No results for input(s): PH, PCO2, PO2, HCO3, FIO2 in the last 72 hours.   Recent Labs      04/10/17   1320   INR  1.1           Assessment:   (See above)  Principal Problem:    GI bleed (4/10/2017)    Active Problems:    [facility]oxys[facility] (9/3/2016)      Myasthenia gravis with exacerbation, adult form ([facility] (4/4/2017)      History of stroke (4/5/2017)      Morbid obesity with BMI of 40.0-44.9, adult ([facility] (4/5/2017)      Type II diabetes mellitus ([facility] (4/10/2017)      Acute blood loss anemia (4/11/2017)      Acute pulmonary embolus ([facility] (4/11/2017)      Colonic mass (4/12/2017)      Lung nodules (4/12/2017)        Plan:   (See above)    Signed By:  Gordy Montes PA-C  4/12/2017  12:13 PM

## 2017-04-12 NOTE — PROGRESS NOTES
General Surgery Daily Progress Note    Patient: Sera Kwong MRN: [id number]  SSN: [id number]    YOB: 1945  Age: 73 y.o. Sex: male      Admit Date: 4/10/2017    Subjective:   Pain controlled with PCA, somnolent on my exam. No N/V, tolerating clear liquids. UOP good.      Current Facility-Administered Medications   Medication Dose Route Frequency    carvedilol (COREG) tablet 6.25 mg  6.25 mg Oral BID WITH MEALS    vancomycin (VANCOCIN) 2,000 mg in 0.9% sodium chloride 500 mL IVPB  2,000 mg IntraVENous Q12H    piperacillin-tazobactam (ZOSYN) 3.375 g in 0.9% sodium chloride (MBP/ADV) 100 mL  3.375 g IntraVENous Q8H    0.9% sodium chloride infusion 250 mL  250 mL IntraVENous PRN    dilTIAZem (CARDIZEM) 125 mg in dextrose 5% 125 mL infusion  5-15 mg/hr IntraVENous TITRATE    HYDROmorphone (PF) 15 mg/30 ml (DILAUDID) PCA   IntraVENous TITRATE    labetalol (NORMODYNE;TRANDATE) injection 10 mg  10 mg IntraVENous Q6H PRN    sodium chloride (NS) flush 5-10 mL  5-10 mL IntraVENous Q8H    sodium chloride (NS) flush 5-10 mL  5-10 mL IntraVENous PRN    acetaminophen (TYLENOL) tablet 650 mg  650 mg Oral Q4H PRN    oxyCODONE-acetaminophen (PERCOCET) 5-325 mg per tablet 1 Tab  1 Tab Oral Q4H PRN    naloxone (NARCAN) injection 0.4 mg  0.4 mg IntraVENous PRN    ondansetron (ZOFRAN) injection 4 mg  4 mg IntraVENous Q4H PRN    insulin lispro (HUMALOG) injection   SubCUTAneous AC&HS    glucose chewable tablet 16 g  4 Tab Oral PRN    dextrose (D50W) injection syrg 12.5-25 g  12.5-25 g IntraVENous PRN    glucagon (GLUCAGEN) injection 1 mg  1 mg IntraMUSCular PRN    ALPRAZolam (XANAX) tablet 0.25 mg  0.25 mg Oral TID PRN    ascorbic acid (vitamin C) (VITAMIN C) tablet 1,000 mg  1,000 mg Oral DAILY    cholecalciferol (VITAMIN D3) tablet 5,000 Units  5,000 Units Oral DAILY    [START ON 4/16/2017] cloNIDine (CATAPRES) 0.2 mg/24 hr patch 1 Patch  1 Patch TransDERmal Q7D    predniSONE (DELTASONE) tablet 50 mg  50 mg Oral DAILY WITH BREAKFAST    pyridostigmine (MESTINON) tablet 60 mg  60 mg Oral BID    traMADol (ULTRAM) tablet 50 mg  50 mg Oral Q6H PRN    pantoprazole (PROTONIX) 40 mg in sodium chloride 0.9 % 10 mL injection  40 mg IntraVENous Q12H    0.9% sodium chloride infusion  75 mL/hr IntraVENous CONTINUOUS    0.9% sodium chloride infusion 250 mL  250 mL IntraVENous PRN        Objective:   04/12 0701 - 04/12 1900  In: 2173.7 [P.O.:840; I.V.:1333.7]  Out: 708 [Urine:875]  04/10 1901 - 04/12 0700  In: 64783.9 [P.O.:120; I.V.:9141.4]  Out: 700 [Urine:640]  Patient Vitals for the past 8 hrs:   BP Temp Pulse Resp SpO2   04/12/17 1500 - - (!) 102 18 97 %   04/12/17 1400 (!) 138/103 - (!) 102 17 98 %   04/12/17 1300 163/79 - (!) 103 17 94 %   04/12/17 1200 151/86 97.8 °F (36.6 °C) (!) 117 22 90 %   04/12/17 1107 161/82 - (!) 118 - -   04/12/17 1100 161/82 - (!) 118 19 92 %   04/12/17 1000 168/88 - (!) 132 15 (!) 84 %   04/12/17 0900 161/82 - (!) 110 20 99 %   04/12/17 0800 (!) 161/91 97.5 °F (36.4 °C) (!) 113 20 100 %   04/12/17 0740 - - (!) 118 - -       Physical Exam:  General: Sleepy, cooperative, NAD  Lungs: Unlabored  Heart:  Tachycardic  Abdomen: Soft, ATTP, distended. + bowel sounds. Dressing c/d/i, ostomy pink, SS drainage in bag. Extremities: Warm, moves all  Skin:  Warm and dry, no rash    Labs: Recent Labs      04/12/17   0309   WBC  27.7*   HGB  11.4*   HCT  34.6*   PLT  207     Recent Labs      04/12/17   0309   NA  143   K  4.6   CL  109*   CO2  24   GLU  147*   BUN  25*   CREA  1.24   CA  7.4*   MG  2.2   PHOS  5.0*   ALB  2.2*   TBILI  0.5   SGOT  16   ALT  41       Assessment / Plan:   · POD#1 ex lap, sigmoidectomy, end colostomy for lower GI bleed  · Hgb 12.4 postop following transfusion. Now 11.4. No evidence of bleeding, no pressors. If hgb stable in am, may anticoagulate with heparin gtt.    · Continue clear liquids until bowel function returns  · Continue PCA  · Will change dressing tomorrow on rounds  · Cardiology managing AF w/ RVR, on Cardizem gtt  · Leukocytosis- on ABX, recheck in am  · PT/OT, encourage IS

## 2017-04-12 NOTE — ROUTINE PROCESS
0700: Bedside and Verbal shift change report received from [facility] by Lian Li RN . Report included the following information SBAR, Kardex, ED Summary, OR Summary, Intake/Output, MAR, Accordion, Recent Results and Cardiac Rhythm A-Fib. .     1900: Bedside and Verbal shift change report given to [facility] by Lian Li RN . Report included the following information SBAR, Kardex, ED Summary, OR Summary, Intake/Output, MAR, Accordion, Recent Results and Cardiac Rhythm A-Fib. [facility] Setting

## 2017-04-12 NOTE — PROGRESS NOTES
Vancomycin Pharmacy Consult 04/12/17    Vancomycin trough = 17.1 mcg/ml (drawn 13hrs post dose), extrapolates to a trough of 18.27 mcg/ml. Level is therapeutic.  Continue current regimen    Thank you  Earline Reed, PharmD  [phone]

## 2017-04-12 NOTE — PROGRESS NOTES
Shift Summary    0700: Report received and care assumed. Pt resting in bed with VSS.    1200: VSS. Pt resting in bed. Sister at bedside. 1350: Cardizem titrated down to 10 mg/hr. 1510: Cardizem titrated down to 5 mg/hr. 1645: Cardizem off now. Pt resting in bed, using IS with encouragement. Pt is tolerating liquid diet well. Pt pulled at his dressing. Moderate amount of new bloody drainage noted on dressing and on skin. Dressing changed and pt encouraged to stop pulling off his dressings. 1750: Pt continues to hang right arm down off of the bed in a dependent position. Pt encouraged to keep arm in a less dependent position as now edema is a +3 in the right arm and a +2 in the left arm.     1900: At change of shift, VSS. No major changes.

## 2017-04-13 NOTE — PROGRESS NOTES
Primary Nurse Renetta Velazquez RN and Caleb Hackett RN performed a dual skin assessment on this patient. Impairment noted - blanchable pink area on R elbow and R buttock. Areas off-loaded with pillows. Scattered bruises. Abdominal surgical incision and ostomy. Srinivas score is 15. Bedside and Verbal shift change report given to Caleb Hackett RN (oncoming nurse) by Nathaniel Davey RN (offgoing nurse). Report included the following information SBAR, Kardex, Procedure Summary, Intake/Output, MAR, Recent Results and Cardiac Rhythm A lyn Corona

## 2017-04-13 NOTE — PROGRESS NOTES
Orders noted for home health from wound care team.When pt was discharged recently from the hospital,pt was opened to home health services through [facility]. Pt is a readmission with a RRAT score of 22 making pt a moderate to high risk for a future readmission. Pt was admitted from 4/5/17 to 4/9/17 with a PE. Pt currently lives with his sister @ the address on demographic sheet. Sister is Venkata Enriquez. Her number is [phone]. Pt.'s home is in [address] [place] [address]. Pt sees Dr Sanya Marr for management of his myasthenia gravis. Pt will be receiving IG infusions through Dr Kolby Lackey office when stable. At home,pt has a shower chair and walker,On last admission,pt.'s sister was concerned that pt would quickly deteriorate in terms of his mobility due to his myasthenia gravis. I appreciate PT & OT orders. Pt was admitted with a lower GIB . Preferred pharmacy is [facility]. Orders with clinicals faxed via [employer] for resumption of services through [facility]. [facility] has accepted. [facility] placed on pt.'s AVS.When sister arrives,I will meet with her as a follow-up.     Ramses Max

## 2017-04-13 NOTE — WOUND CARE
Ostomy nurse follow up for ostomy appliance change and to start education with patients sister. Alert, no distress. Remains in the ICU, sister at bedside. Assessment  LMQ Colostomy appliance removed, small amount of serosanguinous drainage in pouch, swati stomal skin intact, no redness, skin breakdown noted. Stoma dark red, moist, edges intact with sutures. Mid line incision approximated, small amount of pink serous drainage from distal incision. Treatment  LMQ ostomy appliance replaced with 2 3/4 2 piece appliance, dry dressing to mid line incision. Education   Reviewed educational material including: when and how to empty and change ostomy appliance; normal characteristics of stoma, peristomal skin and drainage; signs and symptoms of complications and measures to prevent them; changes in diet if any; how to obtain supplies with patients sister, verbalized understanding, wafer and bag left for sister to practice. Will follow up on 4-14.  to facilitate [place] on discharge for education and support.     Sandeep Echols

## 2017-04-13 NOTE — PROGRESS NOTES
[address]       INTENSIVIST DAILY PROGRESS NOTE  Name: Sera Simental. : 1945   MRN: [id number]   Date: 2017 8:48 AM   I have reviewed the flowsheet and previous days notes.      Admitted with acute GI bleed:  --received 1 unit PRBCs initially  --seen by GI who was planning to perform colonoscopy, but he continued to bleed   --then had CTA abd/pelvis performed and IR placed 4 coils in the inferior mesenteric artery as he had bleeding from sigmoid colon; however, bleeding persisted and he became hypotensive and developed shock  --CTA Abd/Pelvis demonstrated possible mass adjacent to the sigmoid colon also: Surgery consulted  --taken to OR yesterday and is s/p exp lap with sigmoid colon resection and colostomy placement  --also had potential diverticular mass adjacent to the sigmoid colon which was resected  --received 2 more units of PRBCs in the OR    Overnight events reviewed:   Afebrile overnight  White count improving  On IVF  Afib was rate controlled with PO meds  --when he sleeps, he desats (due to likely KWADWO) and then HR goes up  --back on Cardizem drip at 5 mg/hr  --Cardiology following  Pain is well controlled, but the Dilaudid dosing appears to be too much as he is quite sleepy  Minimal bloody fluid seen in colostomy bag  Hgb now 9 from 11.4  Tolerating liquids  Sugars are stable              ROS: mild abdominal pain at surgical site, intermittently somnolent    Vital Signs:    Visit Vitals    /75    Pulse (!) 127    Temp 98.3 °F (36.8 °C)    Resp 13    Ht 5' 11\" (1.803 m)    Wt 149.9 kg (330 lb 7.5 oz)    SpO2 99%    BMI 46.09 kg/m2       O2 Device: 02 face tent   O2 Flow Rate (L/min): 4 l/min   Temp (24hrs), Av.6 °F (37 °C), Min:97.8 °F (36.6 °C), Max:99.1 °F (37.3 °C)       Intake/Output:   Last shift:         Last 3 shifts:  1901 -  0700  In: 7212.6 [P.O.:1080; I.V.:6132.6]  Out: 2685 [Urine:2645]    Intake/Output Summary (Last 24 hours) at 04/13/17 0945  Last data filed at 04/13/17 0600   Gross per 24 hour   Intake          3592.59 ml   Output             2005 ml   Net          1587.59 ml       Physical Exam:  General:  Alert, cooperative, no distress, appears stated age. Head:  Normocephalic, without obvious abnormality, atraumatic. Eyes:  Conjunctivae/corneas clear. Significant pallor+, PERRLA, EOMI    Nose: Nares normal. Septum midline. Mucosa normal. No drainage or sinus tenderness. Throat: Lips, mucosa, and tongue normal. Teeth and gums normal.   Neck: Supple, symmetrical, trachea midline, no adenopathy, thyroid: no enlargment/tenderness/nodules, no carotid bruit and no JVD. Back:   Symmetric, no curvature. ROM normal.   Lungs:   Clear to auscultation bilaterally. Chest wall:  No tenderness or deformity. Heart:  Regular rate and rhythm, S1, S2 normal, no murmur, click, rub or gallop. Abdomen:   Soft, non-tender, distended. Bowel sounds hypoactive. No masses,  No organomegaly. Abdomen is obese. Surgical site intact; mild bloody fluid seen in colostomy bag   Extremities: Extremities normal, atraumatic, no cyanosis or edema. Pulses: 2+ and symmetric all extremities.    Skin: Skin color, texture, turgor normal. No rashes or lesions   Lymph nodes: Cervical, supraclavicular, and axillary nodes normal.   Neurologic: Grossly nonfocal       DATA:   Current Facility-Administered Medications   Medication Dose Route Frequency    dilTIAZem (CARDIZEM) IR tablet 30 mg  30 mg Oral AC&HS    magnesium sulfate 2 g/50 ml IVPB (premix or compounded)  2 g IntraVENous ONCE    carvedilol (COREG) tablet 6.25 mg  6.25 mg Oral BID WITH MEALS    vancomycin (VANCOCIN) 2,000 mg in 0.9% sodium chloride 500 mL IVPB  2,000 mg IntraVENous Q12H    piperacillin-tazobactam (ZOSYN) 3.375 g in 0.9% sodium chloride (MBP/ADV) 100 mL  3.375 g IntraVENous Q8H    0.9% sodium chloride infusion 250 mL  250 mL IntraVENous PRN    HYDROmorphone (PF) 15 mg/30 ml (DILAUDID) PCA   IntraVENous TITRATE    labetalol (NORMODYNE;TRANDATE) injection 10 mg  10 mg IntraVENous Q6H PRN    sodium chloride (NS) flush 5-10 mL  5-10 mL IntraVENous Q8H    sodium chloride (NS) flush 5-10 mL  5-10 mL IntraVENous PRN    acetaminophen (TYLENOL) tablet 650 mg  650 mg Oral Q4H PRN    oxyCODONE-acetaminophen (PERCOCET) 5-325 mg per tablet 1 Tab  1 Tab Oral Q4H PRN    naloxone (NARCAN) injection 0.4 mg  0.4 mg IntraVENous PRN    ondansetron (ZOFRAN) injection 4 mg  4 mg IntraVENous Q4H PRN    insulin lispro (HUMALOG) injection   SubCUTAneous AC&HS    glucose chewable tablet 16 g  4 Tab Oral PRN    dextrose (D50W) injection syrg 12.5-25 g  12.5-25 g IntraVENous PRN    glucagon (GLUCAGEN) injection 1 mg  1 mg IntraMUSCular PRN    ALPRAZolam (XANAX) tablet 0.25 mg  0.25 mg Oral TID PRN    ascorbic acid (vitamin C) (VITAMIN C) tablet 1,000 mg  1,000 mg Oral DAILY    cholecalciferol (VITAMIN D3) tablet 5,000 Units  5,000 Units Oral DAILY    [START ON 4/16/2017] cloNIDine (CATAPRES) 0.2 mg/24 hr patch 1 Patch  1 Patch TransDERmal Q7D    predniSONE (DELTASONE) tablet 50 mg  50 mg Oral DAILY WITH BREAKFAST    pyridostigmine (MESTINON) tablet 60 mg  60 mg Oral BID    traMADol (ULTRAM) tablet 50 mg  50 mg Oral Q6H PRN    pantoprazole (PROTONIX) 40 mg in sodium chloride 0.9 % 10 mL injection  40 mg IntraVENous Q12H    0.9% sodium chloride infusion 250 mL  250 mL IntraVENous PRN       Telemetry: sinus tachycardia          Labs:  Recent Labs      04/13/17   0318  04/12/17   0309  04/12/17   0055   04/11/17   0235   WBC  19.2*  27.7*   --    --   11.4*   HGB  9.0*  11.4*  12.2   < >  11.3*   HCT  26.7*  34.6*  35.3*   < >  34.6*   PLT  161  207   --    --   218    < > = values in this interval not displayed.      Recent Labs      04/13/17   0318  04/12/17   0309  04/11/17   0235  04/10/17   1320   NA  138  143  139  139   K  4.0  4.6  4.4  3.9   CL  105  109*  105  101   CO2  24  24 28  29   GLU  129*  147*  144*  137*   BUN  14  25*  21*  21*   CREA  0.75  1.24  1.10  1.09   CA  7.6*  7.4*  7.5*  8.6   MG  1.7  2.2  1.9   --    PHOS  2.9  5.0*   --    --    ALB  1.8*  2.2*   --   2.7*   SGOT  16  16   --   29   ALT  32  41   --   73   INR   --    --    --   1.1     No results for input(s): PH, PCO2, PO2, HCO3, FIO2 in the last 72 hours. Imaging:  I have personally reviewed the patients radiographs and reports. CXR: heart size is normal. There is persistent elevation of left hemidiaphragm with mild atelectasis   in left lower lobe unchanged. Minimal patchy opacities in the right mid zone peripherally likely   representing pulmonary infarct is again noted. There is no pulmonary edema no significant change. CT Abd/Pelvis: Sigmoid colonic hemorrhage. Small adjacent mass in the sigmoid. Diverticulitis   slightly favored over a small colon carcinoma. 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 Resolved pulmonary emboli in the visualized portions of the left lower lobe. Stable or slightly decreased right lung emboli with small developing right upper lobe pulmonary   infarction.      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: with snoring and witnessed nocturnal desaturations  · Morbid Obesity   PLAN:   · Wean O2 to keep sats > 90%  · D/C IVF today  · Pain control: continue Dilaudid PCA, but will cut dosing in half  · Rate control: switch off Cardizem drip back to PO: Cardiology following  · Watch H&H closely and transfuse as needed  · Continue broad spectrum abxs  · Continue Prednisone and Mestinon  · Surgery following surgical wound  · F/U path from mass  · Replete electrolytes as needed: will replete Mag today  · Encourage IS use  · PT/OT daily to help with mobilization  · Tight glycemic control  · Nutrition: on liquid diet: recs per Surgery  · Will need outpatient sleep eval/study with us following discharge  · Will place on auto CPaP while in the hospital with 2L O2 bleed in; to be used at night and during naps   · Dispo: ok to go out of the ICU to the Surgical Floor with remote telemetry today   GLOBAL ISSUES:   · Head of bed elevated  · GI Prophylaxis: Protonix BID  · DVT Prophylaxis: SCDs on RLE only (has acute DVT in LLE): can resume anticoagulation when ok with Surgery  · Lines: Peripheral IVs  · Medical Decision Maker: Patient/Sister     See my orders for details    My assessment/plan was discussed with: Patient, IR, General Surgery, Cardiology, [address],   \Nursing, and Respiratory Therapy.     Reinaldo Garcia MD, [facility]  [facility]

## 2017-04-13 NOTE — PROGRESS NOTES
Nutrition Assessment:    RECOMMENDATIONS/INTERVENTION(S):   Further diet advancement per surgery--when able to fully advance, recommend Diabetic (CCD)/Gluten Free diet    Start Ensure Clear TID until diet advances from CLD    Monitor BG, electrolytes  Honor food preferences as diet permits  Continue to monitor appetite/PO intake, diet advancement/tolerance, and initiate ONS    ASSESSMENT:   4/13:  Discussed case in ICU rounds. Sleep apnea causing O2 desats, pt now on Bi-pap. POD#2 ex lap, sigmoidectomy, end colostomy for lower GI bleed. Surgery notes CLD until bowel function returns. Last BM 4/13, hypoactive BS, poor appetite. WOCN in room currently. Will start Ensure Clear while awaiting diet advancement. Off IVF. Labs/meds reviewed. -[phone]. On prednisone. 2+ pitting edema BLE, BUE, generalized 1+. Will attempt f/u visit w/ pt again to encourage PO of diet/ONS. 4/11:  Chart reviewed. This is a 74 yo male s/p exp lap with sigmoid colectomy, colostomy. Hx Afib, DM, PE, myasthenia gravis. Visited pt this afternoon. Lethargic from recent surgery. Dietary hx obtained from sister. This writer asked about patient's fructose allergy. She reported pt does not consume \"sugar\" and follows a gluten-free diet at home. ? sister understanding as to what foods contain fructose as she stated pt does consume fresh fruit daily. Diet at home typically consists of meat, fresh fruit, and vegetables--no breads, cereals, or pastas including those that are gluten-free. \"He doesn't eat any of that even if it is gluten free. \"  Labs/Meds reviewed. -[phone], A1c elevated 10.5%--? newly dx DM. Mg repleted. LBM noted 4/22. Skin--no pressure injury; abd incision; 2+ pitting edema BUE, 3+ pitting edema BLE. Central line in place. Weight record reviewed. Pt with weight loss since January, however amount not significant for timeframe.       SUBJECTIVE/OBJECTIVE:     Diet Order: Clear liquids  % Eaten:    Patient Vitals for the past 72 hrs:   % Diet Eaten   04/12/17 0905 20 %     Pertinent Medications: [x] Reviewed    Chemistries:  Lab Results   Component Value Date/Time    Sodium 138 04/13/2017 03:18 AM    Potassium 4.0 04/13/2017 03:18 AM    Chloride 105 04/13/2017 03:18 AM    CO2 24 04/13/2017 03:18 AM    Anion gap 9 04/13/2017 03:18 AM    Glucose 129 04/13/2017 03:18 AM    BUN 14 04/13/2017 03:18 AM    Creatinine 0.75 04/13/2017 03:18 AM    BUN/Creatinine ratio 19 04/13/2017 03:18 AM    GFR est AA >60 04/13/2017 03:18 AM    GFR est non-AA >60 04/13/2017 03:18 AM    Calcium 7.6 04/13/2017 03:18 AM    AST (SGOT) 16 04/13/2017 03:18 AM    Alk. phosphatase 40 04/13/2017 03:18 AM    Protein, total 4.4 04/13/2017 03:18 AM    Albumin 1.8 04/13/2017 03:18 AM    Globulin 2.6 04/13/2017 03:18 AM    A-G Ratio 0.7 04/13/2017 03:18 AM    ALT (SGPT) 32 04/13/2017 03:18 AM      Anthropometrics: Height: 5' 11\" (180.3 cm) Weight: 149.9 kg (330 lb 7.5 oz)    IBW (%IBW): 75.5 kg (166 lb 7.2 oz) ( ) UBW (%UBW):   (  %)    BMI: Body mass index is 46.09 kg/(m^2). This BMI is indicative of:   [] Underweight    [] Normal    [] Overweight    []  Obesity    [x]  Extreme Obesity (BMI>40)  Estimated Nutrition Needs (Based on): 2287 Kcals/day (BMR (2144) x 1.3 AF - 500) , 113 g (-136 g/d (1.5-1.8 g/Kg IBW)) Protein  Carbohydrate: At Least 130 g/day  Fluids: 2300 mL/day    Last BM: 4/13   []Active     []Hyperactive  [x]Hypoactive       [] Absent   BS  Skin:    [] Intact   [x] Incision--abd  [] Breakdown   [] DTI   [] Tears/Excoriation/Abrasion  [x]Edema--2+ pitting edema BLE;  2+ pitting edema BUE [] Other:    Wt Readings from Last 30 Encounters:   04/13/17 149.9 kg (330 lb 7.5 oz)   04/09/17 140 kg (308 lb 9.6 oz)   04/04/17 137.7 kg (303 lb 9.6 oz)   02/24/17 143.5 kg (316 lb 6.4 oz)   01/23/17 143.3 kg (316 lb)   11/21/16 141.6 kg (312 lb 3.2 oz)   10/21/16 142.9 kg (315 lb 1.6 oz)   10/01/16 146.5 kg (323 lb)   09/15/16 (!) 161.7 kg (356 lb 7.7 oz)      NUTRITION DIAGNOSES:   Problem:  Altered GI function Increased protein needs   Etiology: related to GIB, surgical wound healing     Signs/Symptoms: as evidenced by s/p exp lap with sigmoid colectomy and colostomy      NUTRITION INTERVENTIONS:  General, healthful diet; commercial supplement                  GOAL:   Advanced diet w/ no s/s of intolerance in the next 2-4 days    Cultural, Zoroastrianism, or Ethnic Dietary Needs: None     LEARNING NEEDS (Diet, Food/Nutrient-Drug Interaction):    [x] None Identified   [] Identified and Education Provided/Documented   [] Identified and Pt declined/was not appropriate      [] Interdisciplinary Care Plan Reviewed/Documented    [] Discharge Needs:    tbd   [] No Nutrition Related Discharge Needs    NUTRITION RISK:   Pt Is At Nutrition Risk  [x]     No Nutrition Risk Identified  []       PT SEEN FOR:    []  MD Consult: []Calorie Count      []Diabetic Diet Education        []Diet Education     []Electrolyte Management     []General Nutrition Management and Supplements     []Management of Tube Feeding     []TPN Recommendations    []  RN Referral:  []MST score >=2     []Enteral/Parenteral Nutrition PTA     []Pregnant: Gestational DM or Multigestation                 [] Pressure Ulcer       []  Low BMI      []  Length of Stay       [] Dysphagia Diet         [x] Ventilator--s/p extubation post-op      []  [address] [facility], [address]   Pager [phone]

## 2017-04-13 NOTE — PROGRESS NOTES
Bedside and Verbal shift change report given to Jennifer Gonzalez (oncoming nurse) by Anselmo Killian (offgoing nurse). Report included the following information SBAR, Kardex, Intake/Output, MAR and Recent Results.

## 2017-04-13 NOTE — PROGRESS NOTES
Problem: Mobility Impaired (Adult and Pediatric)  Goal: *Acute Goals and Plan of Care (Insert Text)  Physical Therapy Goals  Initiated 4/12/2017  1. Patient will move from supine to sit and sit to supine , scoot up and down and roll side to side in bed with moderate assistance within 7 day(s). 2. Patient will transfer from bed to chair and chair to bed with moderate assistance using the least restrictive device within 7 day(s). 3. Patient will perform sit to stand with moderate assistance within 7 day(s). 4. Patient will ambulate with moderate assistance for 20 feet with the least restrictive device within 7 day(s). 5. Patient will ascend/descend 5 stairs with 2 handrail(s) with moderate assistance within 7 day(s). PHYSICAL THERAPY TREATMENT  Patient: Amberly Taylor (77 y.o. male)  Date: 4/13/2017  Diagnosis: GI bleed  GI Bleeding GI bleed  Procedure(s) (LRB):  LAPAROTOMY EXPLORATORY, SIGMOID COLECTOMY, COLOSTOMY (N/A) 2 Days Post-Op  Precautions:  Fall      ASSESSMENT:  Pt Hong Moralez presents with slowly improving alertness and participation. He participates in bed mobility as below for skin assessment and repositioning, then a few exercises with bed in chair position. Participation limited by pt falling asleep after every 2-3 repetitions. He declines sitting edge of bed but agrees to participate in edge of bed sitting and possible transfer to chair next treatment day if indicated. Progression toward goals:  [ ]    Improving appropriately and progressing toward goals  [X]    Improving slowly and progressing toward goals  [ ]    Not making progress toward goals and plan of care will be adjusted       PLAN:  Patient continues to benefit from skilled intervention to address the above impairments. Continue treatment per established plan of care.   Discharge Recommendations:  Rehab  Further Equipment Recommendations for Discharge:  Defer to rehab       SUBJECTIVE:   Patient stated I'll get in the chair tomorrow.       OBJECTIVE DATA SUMMARY:   Critical Behavior:  Neurologic State: Appropriate for age  Orientation Level: Oriented X4  Cognition: Appropriate decision making, Appropriate for age attention/concentration, Appropriate safety awareness, Follows commands     Functional Mobility Training:  Bed Mobility:  Rolling: Moderate assistance; cues for techniques. Scooting: Total assistance (up in bed)                                                                                   Therapeutic Exercises: Ankle pumps 5 sets 3 reps; SAQ 4 sets 3 reps; heel slides 3 sets 3 reps (all with bed in chair position; bed returned to supine with HOB elevated after exercises due to LE swelling)  Pain:  Pain Scale 1: Numeric (0 - 10)  Pain Intensity 1: 8  Pain Location 1: Abdomen  Pain Orientation 1: Anterior  Pain Description 1: Aching   Interventions: PCA within reach, repositioned     Activity Tolerance:   Limited by drowsiness  Please refer to the flowsheet for vital signs taken during this treatment.   After treatment:   [ ]    Patient left in no apparent distress sitting up in chair  [X]    Patient left in no apparent distress in bed  [X]    Call bell left within reach  [X]    Nursing notified  [X]    Caregiver present (sister Rumalda Schlatter)  [X]    Bed alarm activated      COMMUNICATION/COLLABORATION:   The patients plan of care was discussed with: Registered Nurse     Machelle Caldwell, PT, DPT   Time Calculation: 18 mins

## 2017-04-13 NOTE — PROGRESS NOTES
General Surgery Daily Progress Note    Patient: Bruno Sarmiento. MRN: [id number]  SSN: [id number]    YOB: 1945  Age: 73 y.o. Sex: male      Admit Date: 4/10/2017    Subjective:   Pain controlled with PCA, somnolent on my exam. No N/V, tolerating clear liquids. UOP good. Tachycardia and periodic desaturation, per RN seems to be related to sleep apnea symptoms and resolves when he is awakened.      Current Facility-Administered Medications   Medication Dose Route Frequency    dilTIAZem (CARDIZEM) IR tablet 30 mg  30 mg Oral AC&HS    magnesium sulfate 2 g/50 ml IVPB (premix or compounded)  2 g IntraVENous ONCE    carvedilol (COREG) tablet 6.25 mg  6.25 mg Oral BID WITH MEALS    vancomycin (VANCOCIN) 2,000 mg in 0.9% sodium chloride 500 mL IVPB  2,000 mg IntraVENous Q12H    piperacillin-tazobactam (ZOSYN) 3.375 g in 0.9% sodium chloride (MBP/ADV) 100 mL  3.375 g IntraVENous Q8H    0.9% sodium chloride infusion 250 mL  250 mL IntraVENous PRN    HYDROmorphone (PF) 15 mg/30 ml (DILAUDID) PCA   IntraVENous TITRATE    labetalol (NORMODYNE;TRANDATE) injection 10 mg  10 mg IntraVENous Q6H PRN    sodium chloride (NS) flush 5-10 mL  5-10 mL IntraVENous Q8H    sodium chloride (NS) flush 5-10 mL  5-10 mL IntraVENous PRN    acetaminophen (TYLENOL) tablet 650 mg  650 mg Oral Q4H PRN    oxyCODONE-acetaminophen (PERCOCET) 5-325 mg per tablet 1 Tab  1 Tab Oral Q4H PRN    naloxone (NARCAN) injection 0.4 mg  0.4 mg IntraVENous PRN    ondansetron (ZOFRAN) injection 4 mg  4 mg IntraVENous Q4H PRN    insulin lispro (HUMALOG) injection   SubCUTAneous AC&HS    glucose chewable tablet 16 g  4 Tab Oral PRN    dextrose (D50W) injection syrg 12.5-25 g  12.5-25 g IntraVENous PRN    glucagon (GLUCAGEN) injection 1 mg  1 mg IntraMUSCular PRN    ALPRAZolam (XANAX) tablet 0.25 mg  0.25 mg Oral TID PRN    ascorbic acid (vitamin C) (VITAMIN C) tablet 1,000 mg  1,000 mg Oral DAILY    cholecalciferol (VITAMIN D3) tablet 5,000 Units  5,000 Units Oral DAILY    [START ON 4/16/2017] cloNIDine (CATAPRES) 0.2 mg/24 hr patch 1 Patch  1 Patch TransDERmal Q7D    predniSONE (DELTASONE) tablet 50 mg  50 mg Oral DAILY WITH BREAKFAST    pyridostigmine (MESTINON) tablet 60 mg  60 mg Oral BID    traMADol (ULTRAM) tablet 50 mg  50 mg Oral Q6H PRN    pantoprazole (PROTONIX) 40 mg in sodium chloride 0.9 % 10 mL injection  40 mg IntraVENous Q12H    0.9% sodium chloride infusion 250 mL  250 mL IntraVENous PRN        Objective:      04/11 1901 - 04/13 0700  In: 7212.6 [P.O.:1080; I.V.:6132.6]  Out: 2685 [Urine:2645]  Patient Vitals for the past 8 hrs:   BP Temp Pulse Resp SpO2 Weight   04/13/17 0735 131/75 98.3 °F (36.8 °C) (!) 127 13 99 % -   04/13/17 0701 - - (!) 111 - - -   04/13/17 0600 126/72 - (!) 116 13 97 % -   04/13/17 0500 137/63 - (!) 106 14 100 % -   04/13/17 0400 127/66 - (!) 115 17 100 % -   04/13/17 0300 118/78 99.1 °F (37.3 °C) (!) 115 25 100 % 149.9 kg (330 lb 7.5 oz)       Physical Exam:  General: Sleepy, cooperative, NAD  Lungs: Unlabored  Heart:  Tachycardic  Abdomen: Soft, ATTP, distended. + bowel sounds. Incision c/d/i, ostomy pink, SS drainage in bag. Extremities: Warm, moves all  Skin:  Warm and dry, no rash    Labs:   Recent Labs      04/13/17 0318   WBC  19.2*   HGB  9.0*   HCT  26.7*   PLT  161     Recent Labs      04/13/17 0318   NA  138   K  4.0   CL  105   CO2  24   GLU  129*   BUN  14   CREA  0.75   CA  7.6*   MG  1.7   PHOS  2.9   ALB  1.8*   TBILI  0.4   SGOT  16   ALT  32       Assessment / Plan:   · POD#2 ex lap, sigmoidectomy, end colostomy for lower GI bleed  · Hgb 9.0 from 11.4. No overt sign of bleeding. Ostomy output SS. Continue to trend. No heparin today. · Continue clear liquids until bowel function returns  · Continue PCA, decrease dose   · Daily dressing changes.    · Cardiology managing AF w/ RVR, on Cardizem gtt  · Leukocytosis- trending down  · D/c briggs  · PT/OT, encourage IS  · Ok to transfer out of ICU from our perspective

## 2017-04-13 NOTE — ROUTINE PROCESS
0700: Bedside and Verbal shift change report received from [employer] by Jacqueline Fletcher RN. Report included the following information SBAR, Kardex, ED Summary, OR Summary, Intake/Output, MAR, Recent Results and Cardiac Rhythm A Fib.     1340: TRANSFER - OUT REPORT:    Verbal report given to [address] on [employer].  being transferred to 4th Floor Remote Telemetry for routine progression of care       Report consisted of patients Situation, Background, Assessment and   Recommendations(SBAR). Information from the following report(s) SBAR, Kardex, ED Summary, OR Summary, Intake/Output, MAR, Recent Results and Cardiac Rhythm A-Fib was reviewed with the receiving nurse. Lines:   Triple Lumen 04/11/17 Right Internal jugular (Active)   Central Line Being Utilized Yes 4/13/2017 12:00 PM   Criteria for Appropriate Use Hemodynamically unstable, requiring monitoring lines, vasopressors, or volume resuscitation 4/13/2017 12:00 PM   Site Assessment Clean, dry, & intact 4/13/2017 12:00 PM   Infiltration Assessment 0 4/13/2017 12:00 PM   Affected Extremity/Extremities Color distal to insertion site pink (or appropriate for race) 4/13/2017 12:00 PM   Date of Last Dressing Change 04/11/17 4/13/2017 12:00 PM   Dressing Status Clean, dry, & intact 4/13/2017 12:00 PM   Dressing Type Disk with Chlorhexadine gluconate (CHG); Transparent 4/13/2017 12:00 PM   Action Taken Open ports on tubing capped 4/13/2017 12:00 PM   Proximal Hub Color/Line Status White;Capped;Flushed;Patent 4/13/2017 12:00 PM   Positive Blood Return (Medial Site) Yes 4/13/2017 12:00 PM   Medial Hub Color/Line Status Blue;Capped;Flushed;Patent 4/13/2017 12:00 PM   Positive Blood Return (Lateral Site) Yes 4/13/2017 12:00 PM   Distal Hub Color/Line Status Brown; Infusing;Flushed;Patent 4/13/2017 12:00 PM   Positive Blood Return (Site #3) Yes 4/13/2017 12:00 PM   Alcohol Cap Used Yes 4/13/2017  8:00 AM        Opportunity for questions and clarification was provided.       Patient transported with:   Monitor  O2 @ 4 liters  Patient-specific medications from Pharmacy  Registered Nurse  Tech

## 2017-04-13 NOTE — PROGRESS NOTES
Cardiology Progress Note   ICU       NAME:  Lyndsey Riojas. :   1945   MRN:   [id number]     Assessment/Plan: Critically ill    Persistent afib now w/  RVR        - cont po coreg ( lower dose for now PTA dose was 12.5 BID)       -  resume po cardizem - use IR        - dig prn        - no anticoagulation   HTN- BP reasonable      - coreg, cardizem      - catapres patch - may need to DC if BP an issue w/ addition of AV guero blocking agents   Anemia- per attending    Lower GI bleed/inferior mesenteric artery bleed s/p coils/s/p expl lap w/ sigmoid colon resection, colostomy, ? Mass adjacent to sigmoid colon  Hx PE/DVT - s/p IVC filter  Myasthenia gravis        ATTENDING CARDIOLOGIST  He is unchanged and rate up somewhat earlier. No anticoagulation. PATIENT was  Personally examined and chart reviewed. All the elements of history and examination were personally performed and I agree with the plan as listed above. Treatment plan was addressed with the patient. Kaitlin Cruz MD          Care Coordination: 25 minutes spent reviewing data, dx, treatment w/ pt, coordinating care with team including care management         Subjective:   Myrtie Kayser. is a 73y.o. year old male w/ hx:  AFib  cx by T2DM, Myasthenia gravis, morbid obesity, recent PE  discharged 17  from [facility] for [facility]/a fib. He presented today with chief complain of rectal bleeding. Per sister, the pt this morning began experiencing several episodes of bright red rectal bleeding, prompting the pt to be brought to the ED. Became weak and dizzy at home.        LABS:  hgb 13.1 , trop .04     : s/p expl lap, sigmoid colectomy, colostomy, IVC filter     4/10 LED (+) DVT   Overnight activities reviewed:    - abd pain- on PCA    - cardizem gtt off- po coreg               - -140/70 range   - desat 82% when sleeping    - Hgb 9    -  K+4.     Mg++ 1.7   Cr better 0.75     - 4 units PRC yesterday Hgb 11.4,    - uo 2215, I/O +2.2L total +12L    -       Cardiac ROS: general edema,  Denies chest pain, SOB,  palpitations,     Review of Systems: c/o general abdominal pain, denied nauseas, cough     CARDIAC EVALUATION   ECHO 2016:EF 65%, no WMA, mild TR, RVSP 41 mmHg  ECHO 17  LVEF 60% No WMA. Mild MR, Mild PaHTN        Objective:     Visit Vitals    /75    Pulse (!) 127    Temp 98.3 °F (36.8 °C)    Resp 13    Ht 5' 11\" (1.803 m)    Wt 330 lb 7.5 oz (149.9 kg)    SpO2 99%    BMI 46.09 kg/m2      O2 Flow Rate (L/min): 4 l/min O2 Device: 02 face tent    Temp (24hrs), Av.6 °F (37 °C), Min:97.8 °F (36.6 °C), Max:99.1 °F (37.3 °C)        Intake/Output Summary (Last 24 hours) at 17 0831  Last data filed at 17 0600   Gross per 24 hour   Intake          3972.59 ml   Output             2130 ml   Net          1842.59 ml       TELE: afib rate 110-120  range     General: awake and alert today, PCA pump  no  distress. HEENT: Atraumatic. Pale  and moist.  Anicteric sclerae. Neck: Supple,   R TL   Lungs: decreased bibasilar - few crackles, No wheezing/rhonchi/ .  Heart: PMI:diminished, Irregular tachy rhythm, no murmur, no S3, no rubs, no gallops. Difficult to assess  JVD. No carotid bruits. Abdomen: Soft,  Distended, large dressing, colostomy stoma pink, bloody drainage,  Few  bowel sounds, . No bruits. : voiding   Extremities: general edema  no clubbing, no cyanosis. No calf tenderness  Neurologic:  Awake and alert   No focal  neurological distress. Psych:   Not anxious nor agitated.       Care Plan discussed with:    Comments   Patient y    Family      RN y    Care Manager y                   Consultant:  y pulm CC        Data Review:   CMP:   Lab Results   Component Value Date/Time     2017 03:18 AM    K 4.0 2017 03:18 AM     2017 03:18 AM    CO2 24 2017 03:18 AM    AGAP 9 2017 03:18 AM     (H) 2017 03:18 AM    BUN 14 04/13/2017 03:18 AM    CREA 0.75 04/13/2017 03:18 AM    GFRAA >60 04/13/2017 03:18 AM    GFRNA >60 04/13/2017 03:18 AM    CA 7.6 (L) 04/13/2017 03:18 AM    MG 1.7 04/13/2017 03:18 AM    PHOS 2.9 04/13/2017 03:18 AM    ALB 1.8 (L) 04/13/2017 03:18 AM    TBILI 0.4 04/13/2017 03:18 AM    TP 4.4 (L) 04/13/2017 03:18 AM    GLOB 2.6 04/13/2017 03:18 AM    AGRAT 0.7 (L) 04/13/2017 03:18 AM    SGOT 16 04/13/2017 03:18 AM    ALT 32 04/13/2017 03:18 AM     CBC:   Lab Results   Component Value Date/Time    WBC 19.2 (H) 04/13/2017 03:18 AM    HGB 9.0 (L) 04/13/2017 03:18 AM    HCT 26.7 (L) 04/13/2017 03:18 AM     04/13/2017 03:18 AM     All Cardiac Markers in the last 24 hours: No results found for: CPK, CKMMB, CKMB, RCK3, CKMBT, CKNDX, CKND1, SUJEY, TROPT, TROIQ, GALLITO, TROPT, TNIPOC, BNP, BNPP  Recent Glucose Results:   Lab Results   Component Value Date/Time    GLUCPOC 164 (H) 04/12/2017 11:00 PM    GLUCPOC 174 (H) 04/12/2017 04:32 PM    GLUCPOC 172 (H) 04/12/2017 11:42 AM     (H) 04/13/2017 03:18 AM     ABG: No results found for: PH, PHI, PCO2, PCO2I, PO2, PO2I, HCO3, HCO3I, FIO2, FIO2I  LIPIDS:  Cholesterol, total   Date Value Ref Range Status   09/03/2016 168 <200 MG/DL Final     Triglyceride   Date Value Ref Range Status   09/03/2016 198 (H) <150 MG/DL Final     Comment:     Based on NCEP-ATP III:  Triglycerides <150 mg/dL  is considered normal, 150-199 mg/dL  borderline high,  200-499 mg/dL high and  greater than or equal to 500 mg/dL very high. HDL Cholesterol   Date Value Ref Range Status   09/03/2016 41 MG/DL Final     Comment:     Based on NCEP ATP III, HDL Cholesterol <40 mg/dL is considered low and >60 mg/dL is elevated.      LDL, calculated   Date Value Ref Range Status   09/03/2016 87.4 0 - 100 MG/DL Final     Comment:     Based on the NCEP-ATP: LDL-C concentrations are considered  optimal <100 mg/dL,  near optimal/above Normal 100-129 mg/dL  Borderline High: 130-159, High: 160-189 mg/dL  Very High: Greater than or equal to 190 mg/dL       VLDL, calculated   Date Value Ref Range Status   09/03/2016 39.6 MG/DL Final     CHOL/HDL Ratio   Date Value Ref Range Status   09/03/2016 4.1 0 - 5.0   Final       Medications reviewed  Current Facility-Administered Medications   Medication Dose Route Frequency    carvedilol (COREG) tablet 6.25 mg  6.25 mg Oral BID WITH MEALS    vancomycin (VANCOCIN) 2,000 mg in 0.9% sodium chloride 500 mL IVPB  2,000 mg IntraVENous Q12H    piperacillin-tazobactam (ZOSYN) 3.375 g in 0.9% sodium chloride (MBP/ADV) 100 mL  3.375 g IntraVENous Q8H    0.9% sodium chloride infusion 250 mL  250 mL IntraVENous PRN    dilTIAZem (CARDIZEM) 125 mg in dextrose 5% 125 mL infusion  5-15 mg/hr IntraVENous TITRATE    HYDROmorphone (PF) 15 mg/30 ml (DILAUDID) PCA   IntraVENous TITRATE    labetalol (NORMODYNE;TRANDATE) injection 10 mg  10 mg IntraVENous Q6H PRN    sodium chloride (NS) flush 5-10 mL  5-10 mL IntraVENous Q8H    sodium chloride (NS) flush 5-10 mL  5-10 mL IntraVENous PRN    acetaminophen (TYLENOL) tablet 650 mg  650 mg Oral Q4H PRN    oxyCODONE-acetaminophen (PERCOCET) 5-325 mg per tablet 1 Tab  1 Tab Oral Q4H PRN    naloxone (NARCAN) injection 0.4 mg  0.4 mg IntraVENous PRN    ondansetron (ZOFRAN) injection 4 mg  4 mg IntraVENous Q4H PRN    insulin lispro (HUMALOG) injection   SubCUTAneous AC&HS    glucose chewable tablet 16 g  4 Tab Oral PRN    dextrose (D50W) injection syrg 12.5-25 g  12.5-25 g IntraVENous PRN    glucagon (GLUCAGEN) injection 1 mg  1 mg IntraMUSCular PRN    ALPRAZolam (XANAX) tablet 0.25 mg  0.25 mg Oral TID PRN    ascorbic acid (vitamin C) (VITAMIN C) tablet 1,000 mg  1,000 mg Oral DAILY    cholecalciferol (VITAMIN D3) tablet 5,000 Units  5,000 Units Oral DAILY    [START ON 4/16/2017] cloNIDine (CATAPRES) 0.2 mg/24 hr patch 1 Patch  1 Patch TransDERmal Q7D    predniSONE (DELTASONE) tablet 50 mg  50 mg Oral DAILY WITH BREAKFAST    pyridostigmine (MESTINON) tablet 60 mg  60 mg Oral BID    traMADol (ULTRAM) tablet 50 mg  50 mg Oral Q6H PRN    pantoprazole (PROTONIX) 40 mg in sodium chloride 0.9 % 10 mL injection  40 mg IntraVENous Q12H    0.9% sodium chloride infusion  75 mL/hr IntraVENous CONTINUOUS    0.9% sodium chloride infusion 250 mL  250 mL IntraVENous PRN         Emily Jason RN, ACNP-BC, AACC

## 2017-04-13 NOTE — PROGRESS NOTES
Problem: Self Care Deficits Care Plan (Adult)  Goal: *Acute Goals and Plan of Care (Insert Text)  Occupational Therapy Goals  Initiated 4/13/2017  1. Patient will perform grooming with supervision/set-up within 7 day(s). 2. Patient will perform upper body dressing and bathing with minimum assistance within 7 day(s). 3. Patient will perform lower body dressing and bathing with moderate assistance using adaptive aides PRN within 7 day(s). 4. Patient will tolerate sitting EOB >5 minutes with moderate assist x1 while completing functional tasks in preparation for ADL transfers within 7 days. 5. Patient will participate in upper extremity therapeutic exercise/activities with supervision/set-up for 10 minutes within 7 day(s). 6. Patient will utilize energy conservation techniques during functional activities with verbal cues within 7 day(s). OCCUPATIONAL THERAPY EVALUATION  Patient: Tino Martinez (77 y.o. male)  Date: 4/13/2017  Primary Diagnosis: GI bleed  GI Bleeding  Procedure(s) (LRB):  LAPAROTOMY EXPLORATORY, SIGMOID COLECTOMY, COLOSTOMY (N/A) 2 Days Post-Op   Precautions: fall         ASSESSMENT :  Based on the objective data described below, the patient presents with decreased activity tolerance following admission on 4/10 for GI bleed. Patient underwent emergent ex lap, sigmoid colectomy + colostomy on 4/11, was intubated post procedure, now extubated and cleared for therapy. Patient with history significant for myasthenia gravis; He confirms a decline in overall independence, including moving to his sisters home since OSH admission + diagnosis September 2016. Patient with more recent admission (4/5 to 4/9) for PE, was discharged on Eliquis for tx however readmitted with GI bleed. Today, patient was received in bed lethargic able to maintain arousal with max, constant stimuli.   Patient was also limited by significant weakness, poor endurance, c/o pain despite PCA, and slow processing (lethargy?). Patient required max to total A for all bed mobility. Patient's bed was placed in chair position for supported sitting activity and exercises. Patient currently requires min A for feeding and grooming, max A for UB dressing, and total A for LB ADLs. Patient was educated on UE exercises, functional activities, and edema management techniques; Written down for patient due to limited carryover of information and instructed to completed at least 3x/day. Patient would benefit from continued skilled OT to progress towards goals and improve overall independence. Patient will benefit from skilled intervention to address the above impairments. Patients rehabilitation potential is considered to be Good  Factors which may influence rehabilitation potential include:   [X]             None noted  [ ]             Mental ability/status  [ ]             Medical condition  [ ]             Home/family situation and support systems  [ ]             Safety awareness  [ ]             Pain tolerance/management  [ ]             Other:        PLAN :  Recommendations and Planned Interventions:  [X]               Self Care Training                  [X]        Therapeutic Activities  [X]               Functional Mobility Training    [ ]        Cognitive Retraining  [X]               Therapeutic Exercises           [X]        Endurance Activities  [ ]               Balance Training                   [ ]        Neuromuscular Re-Education  [ ]               Visual/Perceptual Training     [X]   Home Safety Training  [X]               Patient Education                 [X]        Family Training/Education  [ ]               Other (comment):     Frequency/Duration: Patient will be followed by occupational therapy 5 times a week to address goals.   Discharge Recommendations: Rehab  Further Equipment Recommendations for Discharge: none at this time       SUBJECTIVE:   Patient stated It all started in September; I haven't been the same.       OBJECTIVE DATA SUMMARY:   HISTORY:   Past Medical History:   Diagnosis Date    Anxiety      Arthritis      Asthma      [facility]rial fibrillation [facility] [address]      [facility] ([email or web address]      HTN (hypertension) with goal to be determined      Morbid obesity with BMI of 40.0-44.9, adult ([facility] 4/5/2017    Myasthenia gravis ([email or web address]      Pulmonary emboli (HCC)       with bilateral DVT's      Past Surgical History:   Procedure Laterality Date    HX ORTHOPAEDIC        HX OTHER SURGICAL         L sided neck suregry \" infection\"        Prior Level of Function/Home Situation: Patient lives with his sister. He reports completing ADLs independent/mod I level just prior to admission, using a RW for short distance transfers, and sister handles home management tasks. Home Situation  Home Environment: Private residence  One/Two Story Residence: One story  Living Alone: No  Support Systems: Family member(s) (lives with his sister)  Patient Expects to be Discharged to[de-identified] Unknown (agreeable to rehab)  Current DME Used/Available at Home: Cane, straight, Commode, bedside, Walker, rolling, Shower chair  Tub or Shower Type: Shower  [X]  Right hand dominant             [ ]  Left hand dominant     EXAMINATION OF PERFORMANCE DEFICITS:  Cognitive/Behavioral Status:  Neurologic State: Lethargic (able to maintain arousal with constant stimuli)  Orientation Level: Oriented X4  Cognition: Decreased attention/concentration;Decreased command following  Perception: Cues to maintain midline in sitting  Perseveration: No perseveration noted  Safety/Judgement: Awareness of environment     Skin: Intact in the uppers     Edema: swelling noted to B hands; Educated on self manual massage for management + positioning- provided handout with written instruction and he confirmed understanding     Hearing:   Auditory  Auditory Impairment: None     Vision/Perceptual:    Tracking: Able to track stimulus in all quadrants w/o difficulty    Diplopia: No Acuity: Within Defined Limits          Range of Motion:  Decreased but functional in the uppers     Strength:  Decreased but functional in the uppers     Coordination:  Fine Motor Skills-Upper: Left Impaired;Right Impaired    Gross Motor Skills-Upper: Left Impaired;Right Impaired     Tone & Sensation:  Tone: normal  Sensation: intact        Functional Mobility and Transfers for ADLs:  Bed Mobility:  Rolling: Maximum assistance; Additional time  Supine to Sit: Total assistance (bed placed in chair position for supported sitting)  Scooting: Total assistance (scooting up higher in bed)     Transfers:  Sit to Stand:  (unable to assess)     ADL Assessment:  Feeding: Minimum assistance     Oral Facial Hygiene/Grooming: Minimum assistance     Bathing: Total assistance     Upper Body Dressing: Maximum assistance     Lower Body Dressing: Total assistance     Toileting: Total assistance           Cognitive Retraining  Safety/Judgement: Awareness of environment           Functional Measure:  Barthel Index:      Bathin  Bladder: 0  Bowels: 0  Groomin  Dressin  Feedin  Mobility: 0  Stairs: 0  Toilet Use: 0  Transfer (Bed to Chair and Back): 0  Total: 5         Barthel and G-code impairment scale:  Percentage of impairment CH  0% CI  1-19% CJ  20-39% CK  40-59% CL  60-79% CM  80-99% CN  100%   Barthel Score 0-100 100 99-80 79-60 59-40 20-39 1-19    0   Barthel Score 0-20 20 17-19 13-16 9-12 5-8 1-4 0      The Barthel ADL Index: Guidelines  1. The index should be used as a record of what a patient does, not as a record of what a patient could do. 2. The main aim is to establish degree of independence from any help, physical or verbal, however minor and for whatever reason. 3. The need for supervision renders the patient not independent. 4. A patient's performance should be established using the best available evidence.  Asking the patient, friends/relatives and nurses are the usual sources, but direct observation and common sense are also important. However direct testing is not needed. 5. Usually the patient's performance over the preceding 24-48 hours is important, but occasionally longer periods will be relevant. 6. Middle categories imply that the patient supplies over 50 per cent of the effort. 7. Use of aids to be independent is allowed. Denise Scott., Barthel, D.W. (9458). Functional evaluation: the Barthel Index. [address] [facility] (14)2. Rob Ramirez henok LORI Terrell, Angelo Alejoutant., Hortencia Tomlinson., [place], [address] [facility] (1999). Measuring the change indisability after inpatient rehabilitation; comparison of the responsiveness of the Barthel Index and Functional [place] Measure. Journal of Neurology, Neurosurgery, and Psychiatry, 66(4), 709-053. EHSAN Siddiqui, BRYCE Lebron, & Lizett Greenwood MJAMIL. (2004.) Assessment of post-stroke quality of life in cost-effectiveness studies: The usefulness of the Barthel Index and the EuroQoL-5D. Quality of Life Research, 13, 030-68         G codes: In compliance with CMSs Claims Based Outcome Reporting, the following G-code set was chosen for this patient based on their primary functional limitation being treated: The outcome measure chosen to determine the severity of the functional limitation was the Barthel Index with a score of 5/100 which was correlated with the impairment scale.       · Self Care:               - CURRENT STATUS:    CM - 80%-99% impaired, limited or restricted               - GOAL STATUS:           CL - 60%-79% impaired, limited or restricted               - D/C STATUS:                       ---------------To be determined---------------      Occupational Therapy Evaluation Charge Determination   History Examination Decision-Making   LOW Complexity : Brief history review  HIGH Complexity : 5 or more performance deficits relating to physical, cognitive , or psychosocial skils that result in activity limitations and / or participation restrictions HIGH Complexity : Patient presents with comorbidities that affect occupational performance. Signifigant modification of tasks or assistance (eg, physical or verbal) with assessment (s) is necessary to enable patient to complete evaluation       Based on the above components, the patient evaluation is determined to be of the following complexity level: LOW   Activity Tolerance:   Patient tolerated eval well. Please refer to the flowsheet for vital signs taken during this treatment. After treatment:   [ ] Patient left in no apparent distress sitting up in chair  [X] Patient left in no apparent distress in bed in chair position  [X] Call bell left within reach  [X] Nursing notified  [ ] Caregiver present  [ ] Bed alarm activated      COMMUNICATION/EDUCATION:   The patients plan of care was discussed with: Physical Therapist, Registered Nurse and patient. .  [X] Home safety education was provided and the patient/caregiver indicated understanding. [X] Patient/family have participated as able in goal setting and plan of care. [X] Patient/family agree to work toward stated goals and plan of care. [ ] Patient understands intent and goals of therapy, but is neutral about his/her participation. [ ] Patient is unable to participate in goal setting and plan of care. This patients plan of care is appropriate for delegation to [facility].      Thank you for this referral.  Michael Black, OTR/L  Time Calculation: 40 mins

## 2017-04-13 NOTE — PROGRESS NOTES
Shift Summary    0700: Report received and care assumed. Pt resting in bed with VSS.     0830: Dr. Moni Gastelum at bedside assessing pt and will order for transfer to medical remote telemetry today. 1230: VSS. Pt working with PT/OT now. Pt is drowsy. 1340: Pt transferred to 4th Floor Medical RT.

## 2017-04-13 NOTE — PROGRESS NOTES
Gastroenterology Progress Note    April 13, 2017  Admit Date: 4/10/2017         Narrative Assessment and Plan   · Lower GI bleed  · Anemia secondary to GI blood loss _ Hgb 9    Plan  - no overt signs of bleeding, continue to monitor H/H  - postop management per surgery team  - will await pathology results    Subjective:   · PCA dose was decreased due to patient being somnolent. He is now more alert is comparison to yesterday. Tolerating clear liquids. Admits to abdominal pain but controlled with PCA. Denies N/V. Discussed with RN and no signs of bleeding. ROS:  The previous review of systems on initial consultation / H&P is noted and reviewed. Specific changes noted above in HPI.     Current Medications:     Current Facility-Administered Medications   Medication Dose Route Frequency    dilTIAZem (CARDIZEM) IR tablet 30 mg  30 mg Oral AC&HS    carvedilol (COREG) tablet 6.25 mg  6.25 mg Oral BID WITH MEALS    vancomycin (VANCOCIN) 2,000 mg in 0.9% sodium chloride 500 mL IVPB  2,000 mg IntraVENous Q12H    piperacillin-tazobactam (ZOSYN) 3.375 g in 0.9% sodium chloride (MBP/ADV) 100 mL  3.375 g IntraVENous Q8H    0.9% sodium chloride infusion 250 mL  250 mL IntraVENous PRN    HYDROmorphone (PF) 15 mg/30 ml (DILAUDID) PCA   IntraVENous TITRATE    labetalol (NORMODYNE;TRANDATE) injection 10 mg  10 mg IntraVENous Q6H PRN    sodium chloride (NS) flush 5-10 mL  5-10 mL IntraVENous Q8H    sodium chloride (NS) flush 5-10 mL  5-10 mL IntraVENous PRN    acetaminophen (TYLENOL) tablet 650 mg  650 mg Oral Q4H PRN    oxyCODONE-acetaminophen (PERCOCET) 5-325 mg per tablet 1 Tab  1 Tab Oral Q4H PRN    naloxone (NARCAN) injection 0.4 mg  0.4 mg IntraVENous PRN    ondansetron (ZOFRAN) injection 4 mg  4 mg IntraVENous Q4H PRN    insulin lispro (HUMALOG) injection   SubCUTAneous AC&HS    glucose chewable tablet 16 g  4 Tab Oral PRN    dextrose (D50W) injection syrg 12.5-25 g  12.5-25 g IntraVENous PRN    glucagon (GLUCAGEN) injection 1 mg  1 mg IntraMUSCular PRN    ALPRAZolam (XANAX) tablet 0.25 mg  0.25 mg Oral TID PRN    ascorbic acid (vitamin C) (VITAMIN C) tablet 1,000 mg  1,000 mg Oral DAILY    cholecalciferol (VITAMIN D3) tablet 5,000 Units  5,000 Units Oral DAILY    [START ON 2017] cloNIDine (CATAPRES) 0.2 mg/24 hr patch 1 Patch  1 Patch TransDERmal Q7D    predniSONE (DELTASONE) tablet 50 mg  50 mg Oral DAILY WITH BREAKFAST    pyridostigmine (MESTINON) tablet 60 mg  60 mg Oral BID    traMADol (ULTRAM) tablet 50 mg  50 mg Oral Q6H PRN    pantoprazole (PROTONIX) 40 mg in sodium chloride 0.9 % 10 mL injection  40 mg IntraVENous Q12H    0.9% sodium chloride infusion 250 mL  250 mL IntraVENous PRN       Objective:     VITALS:   Last 24hrs VS reviewed since prior progress note. Most recent are:  Visit Vitals    /71    Pulse (!) 108    Temp 98.3 °F (36.8 °C)    Resp 20    Ht 5' 11\" (1.803 m)    Wt 149.9 kg (330 lb 7.5 oz)    SpO2 94%    BMI 46.09 kg/m2     Temp (24hrs), Av.6 °F (37 °C), Min:97.8 °F (36.6 °C), Max:99.1 °F (37.3 °C)      Intake/Output Summary (Last 24 hours) at 17 1125  Last data filed at 17 1015   Gross per 24 hour   Intake          3490.17 ml   Output             2080 ml   Net          1410.17 ml       EXAM:  General: Comfortable, no distress    HEENT: Atraumatic skull, pupils equal  Lungs:  Breathing nonlabored. Speaking in complete sentences  Heart:  Sinus tachycardia (HR low 100s)  Abdomen: Hypoactive BS, distended, appropriately tender. Left side colostomy with serosanguinous drainage    Lab Data Reviewed:   Recent Labs      17   0318  17   0309  17   0055   17   0235   WBC  19.2*  27.7*   --    --   11.4*   HGB  9.0*  11.4*  12.2   < >  11.3*   HCT  26.7*  34.6*  35.3*   < >  34.6*   PLT  161  207   --    --   218    < > = values in this interval not displayed.      Recent Labs      17   0318  17   0309  17 0235  04/10/17   1320   NA  138  143  139  139   K  4.0  4.6  4.4  3.9   CL  105  109*  105  101   CO2  24  24  28  29   GLU  129*  147*  144*  137*   BUN  14  25*  21*  21*   CREA  0.75  1.24  1.10  1.09   CA  7.6*  7.4*  7.5*  8.6   MG  1.7  2.2  1.9   --    PHOS  2.9  5.0*   --    --    ALB  1.8*  2.2*   --   2.7*   TBILI  0.4  0.5   --   0.5   SGOT  16  16   --   29   ALT  32  41   --   73   INR   --    --    --   1.1     Lab Results   Component Value Date/Time    Glucose (POC) 136 04/13/2017 08:26 AM    Glucose (POC) 164 04/12/2017 11:00 PM    Glucose (POC) 174 04/12/2017 04:32 PM    Glucose (POC) 172 04/12/2017 11:42 AM    Glucose (POC) 161 04/12/2017 08:38 AM     No results for input(s): PH, PCO2, PO2, HCO3, FIO2 in the last 72 hours.   Recent Labs      04/10/17   1320   INR  1.1           Assessment:   (See above)  Principal Problem:    GI bleed (4/10/2017)    Active Problems:    PAF (paroxysmal atrial fibrillation) ([address] (9/3/2016)      Myasthenia gravis with exacerbation, adult form ([address] (4/4/2017)      History of stroke (4/5/2017)      Morbid obesity with BMI of 40.0-44.9, adult ([address] (4/5/2017)      Type II diabetes mellitus ([address] (4/10/2017)      Acute blood loss anemia (4/11/2017)      Acute pulmonary embolus ([address] (4/11/2017)      Colonic mass (4/12/2017)      Lung nodules (4/12/2017)        Plan:   (See above)    Signed By:  Luda Edwards PA-C  4/13/2017  11:25 AM

## 2017-04-13 NOTE — PROGRESS NOTES
@2000 Patient resting in bed lethargic with family sitting at bedside. POD #1 for exp lap, sigmoidectomy, end colostomy for lower GI bleed. Vitals:  Temp 99.1, Afib 115 bpm, /86 (96), RR 14, and O2sats 82% on 4 lpm nasal canula. States 8/10 aching dull HA. Denies nausea. Wt. 146.9 kg and I&O 2465/2292 with a net +1563 ml for today. RIJ CVC: NS 75 ml/H & Dilaudid PCA. Patten with clear yellow urine. Day Event:  Cardizem wean off by 1700 this afternoon and coreg restarted 6.25 mg BID. Pulmonary aware of periods of apnea and drop in O2sats. Plan for outpatient sleep study. Per Cardiology will hold off on anticoagulation for now. Assessment:    General:  Appears morbidly obese lethargic and well groomed. EENT:  Head is atraumatic normocephalic with full head of short grey hair. Ears are symmetric from helix, tragus, and lobules with good recoil. Whisper test, min, and rinne test wnl. Denies ringing in the ears. Eyes are PERRLA with 2 mm pupils, brown iris, white sclera, pink conjunctivae, and EOM is intact. Denies blurry vision, floaters, or streaks. Nose is mid line with patency assessed and lower turbinates visualized. Able to smell alcohol swab. Lips are pink and moist with normal dentition, silver fillings noted to posterior molars bilaterally on upper and lower teeth. Uvula is midline, trachea is palpable and midline and neck supple to palpitation. Neuro:  GCS 15, Lethargic but oriented X4, pleasant, calm, cooperative, with good in sight. Face is symmetric from eyebrows to smile. Able to perform facial gymnastics:  Puff cheeks, purse lips, and clench jaw. Able to discern soft and sharp sensations on his face. Tongue is midline. Able to turn head left and right, shrug shoulders, equal  bilateral, wiggle toes, +5 gross motor strength. Stereognostic WNL. States numbness and tingling to right hand which he states is chronic. Has an aching dull headache 8/10.   Able to perform finger to nose and glide his heels down his shins with reinforcement. +2 DTRs. Pain appears well controlled with dilaudid PCA. Cardio:  Afib with S1S2 irregular irregular without murmurs, gallops, clicks, or bruits. Denies chest pain/pressure. -JVD, lift, heaves, or thrills. +2 BUE pitting edema. +2 BLE edema. +2 palpable pulses. Wt. 146.9 kg, +9.6 kg since yesterday. I&O [id number] with a net +1563 ml for today. +82611 ml since admission. Prior to admit had suffer DVT and PE and was started on eliquis but then suffer a GI bleed where we gave a total of 3 units of PRBCs and 4 coils to the JHONY and then the above procedure to hopefully resolve his present issue. Also had an IVC filter place as he can not be anticoagulated presently. TTE on 04/7/17: EF 60% with NWMA. Mild biatrial enlargement. Moderate MR/TR.     12 Lead EKG: Afib 91 bpm with low voltage QRS    BLE Venous duplex: +Left DVT in the mid femoral, popliteal above knee, popliteal (fossa), and popliteal (Below knee)        K 4.6, Mag 2.2, and pro-BNP on 04/10/17 794. Resp:  On 4 lpm nasal canula and apneic presently. Once awaken has clear vesicular breath sounds diminish bibasilar. States mild SOB which has been his base line prior to admission following his PE.  IS about 500 ml at his best.    CXR:  Unchanged peripheral right upper lobe airspace disease. Mild bibasilar atelectasis. GI:  Abdomen is obese semi soft tender to palpitation with hypoactive bowel sounds X4. Denies nausea. Diet:  Clear liquid. LLQ colostomy with pink stoma and sanguinous drainage. 4 coils placed by angio in the JHONY. CT of abdomen/Pelvis:    1. Sigmoid colonic hemorrhage. 2. Small adjacent mass in the sigmoid. Diverticulitis slightly favored over a small colon carcinoma. 3. 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 4. Resolved pulmonary emboli in the visualized portions of the left lower lobe.   Stable or slightly decreased right lung emboli with small developing right upper lobe pulmonary infarction. :  Patten with clear yellow urine. Urine Output:  0.7 ml/kg/H (1305 ml for the day shift). Creatinine 1.24. Skin:  Warm, dry, with uniform color distribution. Mid line abdominal incision CDI. Endocrine:  AC&HS with POC running 160s to 170s with a total of 6 units of SQ insulin given. Metformin was held from all of the contrast he had received the day prior. DVT prophylaxis:  SCDs     PPI:  Protonix    Plan of Care:  1. Maintain O2sats >90%   A. 4 lpm nasal canula/will add face tent while he sleeps as he tends to breath from his mouth. B. Pulmonary Toilet:     1. HOB >30 degrees    2. TCDB, suction, and IS      2. Maintain SBP >90 mmHg and MAP >65 mmHg   A. CVP 8-10    B. NS 75 ml/H    3. Maintain Temp 96.6 to 99.6   A.  <96.6 apply Mame Hugger   B.  >101 treat 1st with APAP and then if necessary apply cooling blanket   C. Pan culture for hypothermia/fever spikes   D. Vancomycin/Zosyn    4. Nutrition   A. Clear liquid    5. Tight Glycemic control   A. Normal sensitivity AC&HS    6.  Replace Lytes as needed   A.  Keep K >4 or Mag >2   B. ICU labs  7. Mobility    A. Q2H turns   B. PT/OT   8. Afib   A. Cardizem drip, Keep HR <110   B. Restarted on Coreg  9. Hgb <7 or drops by 2 GM and appears symptomatic   A. Will inquire with MD about possible transfusion   B. S/P 3 units of PRBCs    Disposition:  Patient resides with sister and appears well cared for. Has Medicaid for insurance. Understand she and he hope that he is able to get back home. In his previous admit he was ambulating 300 ft without assistive devices but does have a cane and walker at home. @2000 Place face tent on while he is asleep and O2sats were better sustaining now at 96% or greater. @2300 Blood glucose 164 no coverage required. @0300 Noted HR trending up to 120s.   Observe patient and noted him to be moaning. Stated pain was 9/10 abdominal achy in nature. Encouraged him to hit the PCA which he then did. Am lab work obtain and sent. @0330 Noted HR sustaining in the 120s now despite patient stating pain being better. Restarted Cardizem drip at 5 mg/H.    @0400 Wt.  149.9 kg, +3 kg since yesterday. @0430 WBC 19.2, Hgb 9, and . K 4, Mag 1.7, and Creatinine 0.75.    @0435 Noted HR sustaining 110s to 120s will increase Cardizem drip to 10 mg/H.    @0500 Wean FIO2 down to 40% on face tent. @0600 No ectopy noted overnight. Urine output:  0.46 ml/kg/H (835 ml for night shift). @3656 Noted O2sats drop to 82% with increase in HR to 110s to 120s. Observe patient in deep sleep but breathing comfortably. Once O2sats picked up to >94% HR dip to 100s.

## 2017-04-13 NOTE — PROGRESS NOTES
Alvino Keita nae [place] [address]  [address] [facility], [address] [place] [address]  [phone]      Medical Progress Note      NAME: Ryan Vogel. :  1945  MRM:  [id number]    Date/Time: 2017  7:54 AM           Subjective:     Chief Complaint:  GI bleed: no further after surgical resection. Only complaint this AM is abdominal pain: moderate, constant, aching, all over, better with PCA    ROS:  (bold if positive, if negative)                Abd Pain      Tolerating diet - clears          Objective:       Vitals:          Last 24hrs VS reviewed since prior progress note.  Most recent are:    Visit Vitals    /75    Pulse (!) 127    Temp 98.3 °F (36.8 °C)    Resp 13    Ht 5' 11\" (1.803 m)    Wt 149.9 kg (330 lb 7.5 oz)    SpO2 99%    BMI 46.09 kg/m2     SpO2 Readings from Last 6 Encounters:   17 99%   17 94%   17 96%   17 96%   16 97%   10/21/16 98%    O2 Flow Rate (L/min): 4 l/min       Intake/Output Summary (Last 24 hours) at 17 0754  Last data filed at 17 0600   Gross per 24 hour   Intake          4352.59 ml   Output             2180 ml   Net          2172.59 ml          Exam:     Physical Exam:    Gen:  Well-developed, obese, frail, elderly, chronically ill-appearing, in no acute distress  HEENT:  Pink conjunctivae, PERRL, hearing intact to voice, moist mucous membranes  Neck:  Supple, without masses, thyroid non-tender  Resp:  No accessory muscle use, clear breath sounds without wheezes rales or rhonchi  Card:  No murmurs, tachy, irreg S1, S2 without thrills, bruits or peripheral edema  Abd:  Soft, diffusely tender, non-distended, normoactive bowel sounds are present, no palpable organomegaly and no detectable hernias  Lymph:  No cervical or inguinal adenopathy  Musc:  No cyanosis or clubbing  Skin:  No rashes or ulcers, skin turgor is good  Neuro:  Cranial nerves are grossly intact, no focal motor weakness, follows commands appropriately  Psych:  Good insight, oriented to person, place and time, alert       Telemetry reviewed:   AFIB    Medications Reviewed: (see below)    Lab Data Reviewed: (see below)    ______________________________________________________________________    Medications:     Current Facility-Administered Medications   Medication Dose Route Frequency    carvedilol (COREG) tablet 6.25 mg  6.25 mg Oral BID WITH MEALS    vancomycin (VANCOCIN) 2,000 mg in 0.9% sodium chloride 500 mL IVPB  2,000 mg IntraVENous Q12H    piperacillin-tazobactam (ZOSYN) 3.375 g in 0.9% sodium chloride (MBP/ADV) 100 mL  3.375 g IntraVENous Q8H    0.9% sodium chloride infusion 250 mL  250 mL IntraVENous PRN    dilTIAZem (CARDIZEM) 125 mg in dextrose 5% 125 mL infusion  5-15 mg/hr IntraVENous TITRATE    HYDROmorphone (PF) 15 mg/30 ml (DILAUDID) PCA   IntraVENous TITRATE    labetalol (NORMODYNE;TRANDATE) injection 10 mg  10 mg IntraVENous Q6H PRN    sodium chloride (NS) flush 5-10 mL  5-10 mL IntraVENous Q8H    sodium chloride (NS) flush 5-10 mL  5-10 mL IntraVENous PRN    acetaminophen (TYLENOL) tablet 650 mg  650 mg Oral Q4H PRN    oxyCODONE-acetaminophen (PERCOCET) 5-325 mg per tablet 1 Tab  1 Tab Oral Q4H PRN    naloxone (NARCAN) injection 0.4 mg  0.4 mg IntraVENous PRN    ondansetron (ZOFRAN) injection 4 mg  4 mg IntraVENous Q4H PRN    insulin lispro (HUMALOG) injection   SubCUTAneous AC&HS    glucose chewable tablet 16 g  4 Tab Oral PRN    dextrose (D50W) injection syrg 12.5-25 g  12.5-25 g IntraVENous PRN    glucagon (GLUCAGEN) injection 1 mg  1 mg IntraMUSCular PRN    ALPRAZolam (XANAX) tablet 0.25 mg  0.25 mg Oral TID PRN    ascorbic acid (vitamin C) (VITAMIN C) tablet 1,000 mg  1,000 mg Oral DAILY    cholecalciferol (VITAMIN D3) tablet 5,000 Units  5,000 Units Oral DAILY    [START ON 4/16/2017] cloNIDine (CATAPRES) 0.2 mg/24 hr patch 1 Patch  1 Patch TransDERmal Q7D    predniSONE (DELTASONE) tablet 50 mg  50 mg Oral DAILY WITH BREAKFAST    pyridostigmine (MESTINON) tablet 60 mg  60 mg Oral BID    traMADol (ULTRAM) tablet 50 mg  50 mg Oral Q6H PRN    pantoprazole (PROTONIX) 40 mg in sodium chloride 0.9 % 10 mL injection  40 mg IntraVENous Q12H    0.9% sodium chloride infusion  75 mL/hr IntraVENous CONTINUOUS    0.9% sodium chloride infusion 250 mL  250 mL IntraVENous PRN            Lab Review:     Recent Labs      04/13/17 0318 04/12/17   0309  04/12/17   0055   04/11/17   0235   WBC  19.2*  27.7*   --    --   11.4*   HGB  9.0*  11.4*  12.2   < >  11.3*   HCT  26.7*  34.6*  35.3*   < >  34.6*   PLT  161  207   --    --   218    < > = values in this interval not displayed. Recent Labs      04/13/17 0318 04/12/17 0309 04/11/17   0235  04/10/17   1320   NA  138  143  139  139   K  4.0  4.6  4.4  3.9   CL  105  109*  105  101   CO2  24  24  28  29   GLU  129*  147*  144*  137*   BUN  14  25*  21*  21*   CREA  0.75  1.24  1.10  1.09   CA  7.6*  7.4*  7.5*  8.6   MG  1.7  2.2  1.9   --    PHOS  2.9  5.0*   --    --    ALB  1.8*  2.2*   --   2.7*   TBILI  0.4  0.5   --   0.5   SGOT  16  16   --   29   ALT  32  41   --   73   INR   --    --    --   1.1     Lab Results   Component Value Date/Time    Glucose (POC) 164 04/12/2017 11:00 PM    Glucose (POC) 174 04/12/2017 04:32 PM    Glucose (POC) 172 04/12/2017 11:42 AM    Glucose (POC) 161 04/12/2017 08:38 AM    Glucose (POC) 169 04/11/2017 09:32 PM     No results for input(s): PH, PCO2, PO2, HCO3, FIO2 in the last 72 hours.   Recent Labs      04/10/17   1320   INR  1.1     No results found for: SDES  Lab Results   Component Value Date/Time    Culture result: ENTEROBACTER CLOACAE 09/16/2016 10:10 PM    Culture result: KLEBSIELLA OXYTOCA 09/16/2016 10:10 PM    Culture result: MODERATE  NORMAL RESPIRATORY KUNAL   09/03/2016 09:24 AM    Culture result: LIGHT  APPARENT  JESSA ALBICANS   09/03/2016 09:24 AM            Assessment:     Principal Problem:    GI bleed (4/10/2017)    Active Problems:    PAF (paroxysmal atrial fibrillation) ([facility] (9/3/2016)      Myasthenia gravis with exacerbation, adult form ([facility] (4/4/2017)      History of stroke (4/5/2017)      Morbid obesity with BMI of 40.0-44.9, adult ([address] (4/5/2017)      Type II diabetes mellitus ([facility] (4/10/2017)      Acute blood loss anemia (4/11/2017)      Acute pulmonary embolus ([address] (4/11/2017)      Colonic mass (4/12/2017)      Lung nodules (4/12/2017)           Plan:     Principal Problem:    GI bleed (4/10/2017)   - due to apparent diverticular mass, pathology pending   - s/p sigmoid colectomy   - General Surgery following   - no evidence of recurrent bleeding    Active Problems:    PAF (paroxysmal atrial fibrillation) ([facility] (9/3/2016)   - back on IV diltiazem, HR up with respiratory issues overnight   - Cardiology following       Myasthenia gravis with exacerbation, adult form ([address] (4/4/2017)   - continue Mestinon      History of stroke (4/5/2017)   - off anticoagulation due to bleed as above, may be able to resume soon, defer to Surgery      Morbid obesity with BMI of 40.0-44.9, adult ([facility] (4/5/2017)   - counseled on weight loss      Type II diabetes mellitus ([facility] (4/73/6720)   - no complications   - follow BS on SSI   - hold metformin due to IV contrast for embolization      Acute blood loss anemia (4/11/2017)   - Hgb down this AM, watch closely   - hold off on anticoagulation for now      Acute pulmonary embolus ([address] (4/11/2017)   - diagnosed last admission   - off anticoagulation due to acute bleed   - s/p IVC filter   - resume anticoagulation tomorrow if no bleeding and Hgb is stable      Possible KWADWO   - having desats at night pretty consistently   - this is affecting heart rate    - Pulmonary aware, plan for outpatient testing, however may need CPAP/BiPAP acutely in hospital given decompensation   - defer to Pulmonary      Total time spent with patient: 35 minutes                  Care Plan discussed with: Patient, Care Manager and Nursing Staff    Discussed:  Code Status, Care Plan and D/C Planning    Prophylaxis:  H2B/PPI    Disposition:  TBD           ___________________________________________________    Attending Physician: James Shelley MD

## 2017-04-14 PROBLEM — C18.7 MALIGNANT NEOPLASM OF SIGMOID COLON (HCC): Status: ACTIVE | Noted: 2017-01-01

## 2017-04-14 PROBLEM — I82.409 DVT (DEEP VENOUS THROMBOSIS) (HCC): Status: ACTIVE | Noted: 2017-01-01

## 2017-04-14 PROBLEM — K65.1 ABSCESS OF ABDOMINAL CAVITY (HCC): Status: ACTIVE | Noted: 2017-01-01

## 2017-04-14 NOTE — PROGRESS NOTES
Problem: Mobility Impaired (Adult and Pediatric)  Goal: *Acute Goals and Plan of Care (Insert Text)  Physical Therapy Goals  Initiated 4/12/2017  1. Patient will move from supine to sit and sit to supine , scoot up and down and roll side to side in bed with moderate assistance within 7 day(s). 2. Patient will transfer from bed to chair and chair to bed with moderate assistance using the least restrictive device within 7 day(s). 3. Patient will perform sit to stand with moderate assistance within 7 day(s). 4. Patient will ambulate with moderate assistance for 20 feet with the least restrictive device within 7 day(s). 5. Patient will ascend/descend 5 stairs with 2 handrail(s) with moderate assistance within 7 day(s). PHYSICAL THERAPY TREATMENT  Patient: Seb Stapleton (77 y.o. male)  Date: 4/14/2017  Diagnosis: GI bleed  GI Bleeding GI bleed  Procedure(s) (LRB):  LAPAROTOMY EXPLORATORY, SIGMOID COLECTOMY, COLOSTOMY (N/A) 3 Days Post-Op  Precautions:   fall      ASSESSMENT:  Based on the objective data described below, patient tolerated treatment very well. Patient more awake compared yesterday notes. However patient little bit lethargic. Place bed to chair position for his exercise on both LE all planes. Performed active assistive range of motion exercise on both LE. Tolerated 2 sets of 5 repetitions with verbal cues to stay awake. complaining of pain on his belly 4/10 pain scale. Nurse aware and monitoring patient and agreed to place bed to flat position after an hour, sitter at bedside all throughout therapy sessions. Progression toward goals:  [ ]    Improving appropriately and progressing toward goals  [X]    Improving slowly and progressing toward goals  [ ]    Not making progress toward goals and plan of care will be adjusted       PLAN:  Patient continues to benefit from skilled intervention to address the above impairments. Continue treatment per established plan of care.   Discharge Recommendations:  Rehab and To Be Determined  Further Equipment Recommendations for Discharge:  TBD       SUBJECTIVE:   Patient stated ok we can try.       OBJECTIVE DATA SUMMARY:   Critical Behavior:  Neurologic State: Alert  Orientation Level: Oriented to person, Oriented to place, Oriented to situation, Oriented to time  Cognition: Decreased command following, Decreased attention/concentration, Impulsive  Safety/Judgement: Awareness of environment  Functional Mobility Training:  Bed Mobility:  Rolling: Total assistance  Supine to Sit:  (place bed to chair position)              Transfers:   n/t                                Balance:  Sitting: Impaired;High guard  Sitting - Static: Poor (constant support)  Sitting - Dynamic: Not tested  Ambulation/Gait Training:   n/t        Therapeutic Exercises:    Instructed patient to continue active range of motion exercise on both legs while up on bed. Pain:  Pain Scale 1: Numeric (0 - 10)  Pain Intensity 1: 2  Pain Location 1: Abdomen  Pain Orientation 1: Medial  Pain Description 1: Aching  Pain Intervention(s) 1: Encouraged PCA  Activity Tolerance:   fair  Please refer to the flowsheet for vital signs taken during this treatment. After treatment:   [ ]    Patient left in no apparent distress sitting up in chair  [X]    Patient left in no apparent distress in bed  [X]    Call bell left within reach  [X]    Nursing notified  [X]    Sitter present  [X]    Bed alarm activated      COMMUNICATION/COLLABORATION:   The patients plan of care was discussed with: Registered Nurse and patient     Efrain Simpson PT,WCC.    Time Calculation: 25 mins

## 2017-04-14 NOTE — PROGRESS NOTES
Bedside and Verbal shift change report given to [address] [place] [address] (oncoming nurse) by Joe Renteria (offgoing nurse). Report included the following information SBAR, Kardex, Intake/Output, MAR and Recent Results.

## 2017-04-14 NOTE — CONSULTS
[id number] [facility] at [address] [place] [address]  [phone]    Hematology / Oncology Consult    Reason for Visit:   Rhianna Vargas is a 73 y.o. male who is seen in consultation at the request of Dr. James Ellis for evaluation of new dx of colon cancer. History of Present Illness:     Mr Vashti Can was admitted on 4/10/2017 from the ED when he presented with BRBPR. Recent discharge from hospital (4/9/2017) after being admitted for PE, LE DVT and afib; started on Eliquis. Therefore, he was admitted for further eval and management. Currently he is sleeping, lethargic, with sitter and family at bedside. No complaints.     Hospital consults: GI, cardio, pulm, gen surgery, IR    4/10/2017 CTA ABD /PELV revealed poss mass adj to sigmoid colon    GI eval and  planning for  colonoscopy; continued with bleeding from rectum; 4/11/2017 underwent  IR embolization/ 4 coils placed; continued to bleed; became hypotensive and tachy  4/11/2017 IVC filter placed  4/11/2017 Urgent Exp lab and sigmoid colectomy/ colostomy (Pablo); mass respected; path sent    4/13/2017 Transferred out of ICU    Past Medical History:   Diagnosis Date    Anxiety     Arthritis     Asthma     Atrial fibrillation ([address]     Diabetes ([address]     HTN (hypertension) with goal to be determined     Morbid obesity with BMI of 40.0-44.9, adult ([address] 4/5/2017    Myasthenia gravis ([facility]     Pulmonary emboli (HCC)     with bilateral DVT's       Past Surgical History:   Procedure Laterality Date    HX ORTHOPAEDIC      HX OTHER SURGICAL      L sided neck suregry \" infection\"      Social History   Substance Use Topics    Smoking status: Never Smoker    Smokeless tobacco: Not on file    Alcohol use No      Family History   Problem Relation Age of Onset    Diabetes Mother     Hypertension Mother     Deep Vein Thrombosis Mother     Deep Vein Thrombosis Sister      Current Facility-Administered Medications   Medication Dose Route Frequency  carvedilol (COREG) tablet 12.5 mg  12.5 mg Oral BID WITH MEALS    digoxin (LANOXIN) tablet 0.25 mg  0.25 mg Oral DAILY    dilTIAZem (CARDIZEM) IR tablet 30 mg  30 mg Oral AC&HS    vancomycin (VANCOCIN) 2,000 mg in 0.9% sodium chloride 500 mL IVPB  2,000 mg IntraVENous Q12H    piperacillin-tazobactam (ZOSYN) 3.375 g in 0.9% sodium chloride (MBP/ADV) 100 mL  3.375 g IntraVENous Q8H    0.9% sodium chloride infusion 250 mL  250 mL IntraVENous PRN    HYDROmorphone (PF) 15 mg/30 ml (DILAUDID) PCA   IntraVENous TITRATE    labetalol (NORMODYNE;TRANDATE) injection 10 mg  10 mg IntraVENous Q6H PRN    sodium chloride (NS) flush 5-10 mL  5-10 mL IntraVENous Q8H    sodium chloride (NS) flush 5-10 mL  5-10 mL IntraVENous PRN    acetaminophen (TYLENOL) tablet 650 mg  650 mg Oral Q4H PRN    oxyCODONE-acetaminophen (PERCOCET) 5-325 mg per tablet 1 Tab  1 Tab Oral Q4H PRN    naloxone (NARCAN) injection 0.4 mg  0.4 mg IntraVENous PRN    ondansetron (ZOFRAN) injection 4 mg  4 mg IntraVENous Q4H PRN    insulin lispro (HUMALOG) injection   SubCUTAneous AC&HS    glucose chewable tablet 16 g  4 Tab Oral PRN    dextrose (D50W) injection syrg 12.5-25 g  12.5-25 g IntraVENous PRN    glucagon (GLUCAGEN) injection 1 mg  1 mg IntraMUSCular PRN    ALPRAZolam (XANAX) tablet 0.25 mg  0.25 mg Oral TID PRN    ascorbic acid (vitamin C) (VITAMIN C) tablet 1,000 mg  1,000 mg Oral DAILY    cholecalciferol (VITAMIN D3) tablet 5,000 Units  5,000 Units Oral DAILY    [START ON 4/16/2017] cloNIDine (CATAPRES) 0.2 mg/24 hr patch 1 Patch  1 Patch TransDERmal Q7D    predniSONE (DELTASONE) tablet 50 mg  50 mg Oral DAILY WITH BREAKFAST    pyridostigmine (MESTINON) tablet 60 mg  60 mg Oral BID    traMADol (ULTRAM) tablet 50 mg  50 mg Oral Q6H PRN    pantoprazole (PROTONIX) 40 mg in sodium chloride 0.9 % 10 mL injection  40 mg IntraVENous Q12H    0.9% sodium chloride infusion 250 mL  250 mL IntraVENous PRN      Allergies   Allergen Reactions    Lactose Itching    Fructose Other (comments)     States added sugar to foods causes sore throat/ear ache.  Inderal [Propranolol] Unknown (comments)    Lisinopril Angioedema    Gluten Nausea Only        Review of Systems: A complete review of systems was obtained, negative except as described above. Physical Exam:     Visit Vitals    /83 (BP 1 Location: Right arm)    Pulse (!) 102    Temp 98.5 °F (36.9 °C)    Resp 18    Ht 5' 11\" (1.803 m)    Wt 149.9 kg (330 lb 7.5 oz)    SpO2 98%    BMI 46.09 kg/m2       General: No distress, sleeping  Eyes: PERRLA, anicteric sclerae  HENT: Atraumatic, OP clear  Neck: Supple  Lymphatic: No cervical, supraclavicular, or inguinal adenopathy  Respiratory: CTAB, normal respiratory effort  CV: Normal rate, regular rhythm, no murmurs, no peripheral edema  GI: Soft, nontender, nondistended, no masses, no hepatomegaly, no splenomegaly; colostomy. Surgical wound with dressing in place, open wound draining  Skin: No rashes, ecchymoses, or petechiae. Normal temperature, turgor, and texture. Psych: Sleeping, arousable but lethargic  Neuro: CN II-XII intact    Results:     Lab Results   Component Value Date/Time    WBC 13.8 04/14/2017 10:33 AM    HGB 8.5 04/14/2017 10:33 AM    HCT 25.8 04/14/2017 10:33 AM    PLATELET 542 71/15/7263 10:33 AM    MCV 93.8 04/14/2017 10:33 AM    ABS.  NEUTROPHILS 12.4 04/14/2017 10:33 AM    Hemoglobin (POC) 10.5 04/11/2017 12:50 PM     Lab Results   Component Value Date/Time    Sodium 138 04/14/2017 06:08 AM    Potassium 3.8 04/14/2017 06:08 AM    Chloride 104 04/14/2017 06:08 AM    CO2 25 04/14/2017 06:08 AM    Glucose 104 04/14/2017 06:08 AM    BUN 9 04/14/2017 06:08 AM    Creatinine 0.72 04/14/2017 06:08 AM    GFR est AA >60 04/14/2017 06:08 AM    GFR est non-AA >60 04/14/2017 06:08 AM    Calcium 7.9 04/14/2017 06:08 AM    Glucose (POC) 196 04/14/2017 11:20 AM     Lab Results   Component Value Date/Time    Bilirubin, total 0.4 04/14/2017 06:08 AM    ALT (SGPT) 33 04/14/2017 06:08 AM    AST (SGOT) 27 04/14/2017 06:08 AM    Alk. phosphatase 48 04/14/2017 06:08 AM    Protein, total 4.9 04/14/2017 06:08 AM    Albumin 2.1 04/14/2017 06:08 AM    Globulin 2.8 04/14/2017 06:08 AM     Lab Results   Component Value Date/Time    Ferritin 369 09/08/2016 10:03 AM    Vitamin B12 1318 09/14/2016 06:02 AM    Homocysteine, plasma 10.8 09/02/2016 08:50 PM    Sed rate, automated 7 10/04/2016 04:30 AM    TSH 0.32 04/05/2017 04:39 PM    JULIA Direct NEGATIVE  09/08/2016 10:03 AM    Lipase 121 04/05/2017 03:49 PM    Hep C  virus Ab Interp. 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 Bilateral pulmonary emboli. 2. RUL airspace disease may represent a pulmonary infarct     4/06/2017 DUPLEX LE  CONCLUSION: Bilateral lower extremity venous duplex positive for deep  venous thrombosis. Negative for thrombophlebitis. 4/10/2017 CTA ABD PELV  IMPRESSION:  1. Sigmoid colonic hemorrhage. 2. Small adjacent mass in the sigmoid. Diverticulitis slightly favored over a  small colon carcinoma. 3. 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 4. Resolved pulmonary emboli in the visualized portions of the left lower lobe.   Stable or slightly decreased right lung emboli with small developing right upper  lobe pulmonary infarction. 4/12/2017 XR CHEST   IMPRESSION: Unchanged peripheral right upper lobe airspace disease. Mild  bibasilar atelectasis. Assessment and Recommendations:   1. Colon cancer  Stage I ([id number])  4/11/2017 S/p Exp lap; sigmoidectomy and colostomy  Very small focus of tumor (0.2mm), with negative lymph nodes. No role for adjuvant therapy. Recommend proceeding with surveillance as outpatient. Follow up with me in the office. Follow up with Dr. Zach Nieto for surveillance colonoscopy. 2. Bilateral PE/ LLE DVT (4/05/2017)  Initially anticoagulated, but stopped due to GI bleed. IVC filter placed 4/11/2017. Now that the source of the bleed has been addressed, I would recommend resuming his anticoagulation at some point, when cleared by surgery to do so. Consider starting with heparin or lovenox if concern remains for recurrent bleeding, and then switching to a DOAC if he is doing well. If no plans to resume anticoagulation, would obtain repeat doppler on Monday for surveillance. 2. GI Bleed  Source was sigmoid colon, based on CTA and mesenteric arteriogram.  Now s/p sigmoidectomy, no further bleeding noted. 3. Anemia, normocytic  Secondary to GI bleed and recent surgery. Consider oral Fe once bowels are moving adequately. 4. Abscesses  Noted on pathology and on imaging. Currently on abx. 5. Pulmonary nodules  Uncertain etiology, possibly inflammatory. I think unlikely to represent metastases, given the 0.2cm primary tumor. Repeat CT chest scan in 3 months    6. Afib  Cardio following. As above, consider resuming anticoagulation now that source of bleed addressed. Patient seen in conjunction with Shayy Oliveira NP.       Signed By: Pepper Durant MD     April 14, 2017

## 2017-04-14 NOTE — PROGRESS NOTES
4/14/2017 5:06 PM [facility] referral is pending. Pt will need to be sitter free for at least 24 hours before being admitted to [facility] if they can accept. No weekend discharge needs identified. 4/14/2017 10:56 AM EMR reviewed. Therapy recommendations for rehab placement noted. Met with pt to discuss, pt requested CM contact his sister, Janene Carrasquillo to discuss. CM called pt's sister at [phone] to discuss. Pt's sister reported pt was at [facility] in September 2016. Pt's sister selected [facility] as preference for rehab placement. Referral sent to [facility] via [employer]. CM will follow up.  REBECCA Bird

## 2017-04-14 NOTE — PROGRESS NOTES
Spiritual Care Assessment/Progress Notes    Rigo Pending sale to [facility] [id number]  [id number]    1945  73 y.o.  male    Patient Telephone Number: [phone] (home)   Moravian Affiliation: Unknown   Language: English   Extended Emergency Contact Information  Primary Emergency Contact: [facility] Phone: [phone]  Mobile Phone: [phone]  Relation: Sister   Patient Active Problem List    Diagnosis Date Noted    Colonic mass 04/12/2017    Lung nodules 04/12/2017    Acute blood loss anemia 04/11/2017    Acute pulmonary embolus ([facility] 04/11/2017    GI bleed 04/10/2017    Type II diabetes mellitus ([facility] 04/10/2017    History of stroke 04/05/2017    Leukocytosis 04/05/2017    Morbid obesity with BMI of 40.0-44.9, adult ([facility] 04/05/2017    Myasthenia gravis with exacerbation, adult form ([facility] 04/04/2017    Cervical radiculopathy due to degenerative joint disease of spine 09/14/2016    PAF (paroxysmal atrial fibrillation) ([facility] 09/03/2016        Date: 4/14/2017       Level of Moravian/Spiritual Activity:  []         Involved in ba tradition/spiritual practice    []         Not involved in ba tradition/spiritual practice  [x]         Spiritually oriented    []         Claims no spiritual orientation    []         seeking spiritual identity  []         Feels alienated from Faith practice/tradition  []         Feels angry about Faith practice/tradition  [x]         Spirituality/Faith tradition is a resource for coping at this time.   []         Not able to assess due to medical condition    Services Provided Today:  []         crisis intervention    []         reading Scriptures  [x]         spiritual assessment    [x]         prayer  [x]         empathic listening/emotional support  []         rites and rituals (cite in comments)  []         life review     []         Faith support  []         theological development   []         advocacy  [] ethical dialog     [x]         blessing  []         bereavement support    []         support to family  []         anticipatory grief support   []         help with AMD  []         spiritual guidance    []         meditation      Spiritual Care Needs  []         Emotional Support  [x]         Spiritual/Lutheran Care  []         Loss/Adjustment  []         Advocacy/Referral                /Ethics  []         No needs expressed at               this time  []         Other: (note in               comments)  [address]  []         Follow up visits with               pt/family  []         Provide materials  []         Schedule sacraments  []         Contact Community               Clergy  [x]         Follow up as needed  []         Other: (note in               comments)     Comments:  is visiting for an initial spiritual assessment with pt in room 407. Pt appeared a bit groggy, but was able to engage with  at the time. Pt briefly processed through his medical concerns. He has great support from two sisters. He notes he is a person of ba, but began to fade to sleep at this point.  offered spiritual support through active listening, affirmation of pt's experience, encouragement and prayer.      [address] Filomena Olsen M.Div, M.S, [place] 605 available at [phone]

## 2017-04-14 NOTE — PROGRESS NOTES
Alvino Bossman [facility]  [address]  [phone]      Medical Progress Note      NAME: Dick Brown. :  1945  MRM:  [id number]    Date/Time: 2017  9:31 AM            Subjective:     Chief Complaint:  GI bleed: no further after surgical resection. Only complaint this AM is abdominal pain: moderate, constant, aching, all over, better with PCA    ROS:  (bold if positive, if negative)                Abd Pain      Tolerating diet - clears          Objective:       Vitals:          Last 24hrs VS reviewed since prior progress note.  Most recent are:    Visit Vitals    /76 (BP 1 Location: Right arm)    Pulse (!) 111    Temp 97.4 °F (36.3 °C)    Resp 20    Ht 5' 11\" (1.803 m)    Wt 149.9 kg (330 lb 7.5 oz)    SpO2 95%    BMI 46.09 kg/m2     SpO2 Readings from Last 6 Encounters:   17 95%   17 94%   17 96%   17 96%   16 97%   10/21/16 98%    O2 Flow Rate (L/min): 2 l/min       Intake/Output Summary (Last 24 hours) at 17 0930  Last data filed at 17 6013   Gross per 24 hour   Intake             1553 ml   Output             2225 ml   Net             -672 ml          Exam:     Physical Exam:    Gen:  Well-developed, obese, frail, elderly, chronically ill-appearing, in no acute distress  HEENT:  Pink conjunctivae, PERRL, hearing intact to voice, moist mucous membranes  Neck:  Supple, without masses, thyroid non-tender  Resp:  No accessory muscle use, clear breath sounds without wheezes rales or rhonchi  Card:  No murmurs, tachy, irreg S1, S2 without thrills, bruits or peripheral edema  Abd:  Soft, diffusely tender, non-distended, normoactive bowel sounds are present, no palpable organomegaly and no detectable hernias  Lymph:  No cervical or inguinal adenopathy  Musc:  No cyanosis or clubbing  Skin:  No rashes or ulcers, skin turgor is good  Neuro:  Cranial nerves are grossly intact, no focal motor weakness, follows commands appropriately  Psych:  Good insight, oriented to person, place and time, alert       Telemetry reviewed:   AFIB    Medications Reviewed: (see below)    Lab Data Reviewed: (see below)    ______________________________________________________________________    Medications:     Current Facility-Administered Medications   Medication Dose Route Frequency    potassium, sodium phosphates (NEUTRA-PHOS) packet 2 Packet  2 Packet Oral Q4H    dilTIAZem (CARDIZEM) IR tablet 30 mg  30 mg Oral AC&HS    carvedilol (COREG) tablet 6.25 mg  6.25 mg Oral BID WITH MEALS    vancomycin (VANCOCIN) 2,000 mg in 0.9% sodium chloride 500 mL IVPB  2,000 mg IntraVENous Q12H    piperacillin-tazobactam (ZOSYN) 3.375 g in 0.9% sodium chloride (MBP/ADV) 100 mL  3.375 g IntraVENous Q8H    0.9% sodium chloride infusion 250 mL  250 mL IntraVENous PRN    HYDROmorphone (PF) 15 mg/30 ml (DILAUDID) PCA   IntraVENous TITRATE    labetalol (NORMODYNE;TRANDATE) injection 10 mg  10 mg IntraVENous Q6H PRN    sodium chloride (NS) flush 5-10 mL  5-10 mL IntraVENous Q8H    sodium chloride (NS) flush 5-10 mL  5-10 mL IntraVENous PRN    acetaminophen (TYLENOL) tablet 650 mg  650 mg Oral Q4H PRN    oxyCODONE-acetaminophen (PERCOCET) 5-325 mg per tablet 1 Tab  1 Tab Oral Q4H PRN    naloxone (NARCAN) injection 0.4 mg  0.4 mg IntraVENous PRN    ondansetron (ZOFRAN) injection 4 mg  4 mg IntraVENous Q4H PRN    insulin lispro (HUMALOG) injection   SubCUTAneous AC&HS    glucose chewable tablet 16 g  4 Tab Oral PRN    dextrose (D50W) injection syrg 12.5-25 g  12.5-25 g IntraVENous PRN    glucagon (GLUCAGEN) injection 1 mg  1 mg IntraMUSCular PRN    ALPRAZolam (XANAX) tablet 0.25 mg  0.25 mg Oral TID PRN    ascorbic acid (vitamin C) (VITAMIN C) tablet 1,000 mg  1,000 mg Oral DAILY    cholecalciferol (VITAMIN D3) tablet 5,000 Units  5,000 Units Oral DAILY    [START ON 4/16/2017] cloNIDine (CATAPRES) 0.2 mg/24 hr patch 1 Patch 1 Patch TransDERmal Q7D    predniSONE (DELTASONE) tablet 50 mg  50 mg Oral DAILY WITH BREAKFAST    pyridostigmine (MESTINON) tablet 60 mg  60 mg Oral BID    traMADol (ULTRAM) tablet 50 mg  50 mg Oral Q6H PRN    pantoprazole (PROTONIX) 40 mg in sodium chloride 0.9 % 10 mL injection  40 mg IntraVENous Q12H    0.9% sodium chloride infusion 250 mL  250 mL IntraVENous PRN            Lab Review:     Recent Labs      04/13/17 0318 04/12/17   0309  04/12/17   0055   WBC  19.2*  27.7*   --    HGB  9.0*  11.4*  12.2   HCT  26.7*  34.6*  35.3*   PLT  161  207   --      Recent Labs      04/14/17   0608  04/13/17 0318 04/12/17   0309   NA  138  138  143   K  3.8  4.0  4.6   CL  104  105  109*   CO2  25  24  24   GLU  104*  129*  147*   BUN  9  14  25*   CREA  0.72  0.75  1.24   CA  7.9*  7.6*  7.4*   MG  1.9  1.7  2.2   PHOS  2.3*  2.9  5.0*   ALB  2.1*  1.8*  2.2*   TBILI  0.4  0.4  0.5   SGOT  27  16  16   ALT  33  32  41     Lab Results   Component Value Date/Time    Glucose (POC) 123 04/14/2017 07:33 AM    Glucose (POC) 170 04/13/2017 09:28 PM    Glucose (POC) 167 04/13/2017 04:30 PM    Glucose (POC) 165 04/13/2017 12:21 PM    Glucose (POC) 136 04/13/2017 08:26 AM     No results for input(s): PH, PCO2, PO2, HCO3, FIO2 in the last 72 hours. No results for input(s): INR in the last 72 hours. No lab exists for component: INREXT, INREXT  No results found for: SDES  Lab Results   Component Value Date/Time    Culture result: MRSA NOT PRESENT 04/12/2017 05:49 PM    Culture result:  04/12/2017 05:49 PM         Screening of patient nares for MRSA is for surveillance purposes and, if positive, to facilitate isolation considerations in high risk settings. It is not intended for automatic decolonization interventions per se as regimens are not sufficiently effective to warrant routine use.     Culture result: ENTEROBACTER CLOACAE 09/16/2016 10:10 PM    Culture result: KLEBSIELLA OXYTOCA 09/16/2016 10:10 PM Assessment:     Principal Problem:    GI bleed (4/10/2017)    Active Problems:    PAF (paroxysmal atrial fibrillation) ([facility] (9/3/2016)      Myasthenia gravis with exacerbation, adult form ([address] (4/4/2017)      History of stroke (4/5/2017)      Morbid obesity with BMI of 40.0-44.9, adult ([facility] (4/5/2017)      Type II diabetes mellitus ([address] (4/10/2017)      Acute blood loss anemia (4/11/2017)      Acute pulmonary embolus ([address] (4/11/2017)      Colonic mass (4/12/2017)      Lung nodules (4/12/2017)           Plan:     Principal Problem:    GI bleed (4/10/2017)   - due to apparent diverticular mass, pathology pending   - s/p sigmoid colectomy   - General Surgery following   - no evidence of recurrent bleeding    Active Problems:    PAF (paroxysmal atrial fibrillation) ([facility] (9/3/2016)   - back on IV diltiazem, HR up with respiratory issues overnight   - Cardiology following       Myasthenia gravis with exacerbation, adult form ([address] (4/4/2017)   - continue Mestinon      History of stroke (4/5/2017)   - was off anticoagulation due to bleed as above, per Surgery okay to start IV heparin   - discussed with Cardiology, would prefer to start back on eliquis unless there is a specific reason to hold it      Morbid obesity with BMI of 40.0-44.9, adult ([facility] (4/5/2017)   - counseled on weight loss      Type II diabetes mellitus ([facility] (2/72/6682)   - no complications   - follow BS on SSI   - hold metformin due to IV contrast for embolization      Acute blood loss anemia (4/11/2017)   - Hgb down yesterday, repeat pending   - hold off on anticoagulation for now      Acute pulmonary embolus ([address] (4/11/2017)   - diagnosed last admission   - off anticoagulation due to acute bleed   - s/p IVC filter      Possible KWADWO   - again having desats at night pretty consistently   - this is affecting heart rate    - refused CPAP last night, discussed with Nursing, they will contact Respiratory about mask fit issues      Total time spent with patient: 35 minutes                  Care Plan discussed with: Patient, Care Manager and Nursing Staff    Discussed:  Code Status, Care Plan and D/C Planning    Prophylaxis:  H2B/PPI    Disposition:  TBD           ___________________________________________________    Attending Physician: Sheron Arnold MD

## 2017-04-14 NOTE — DISCHARGE SUMMARY
Physician Interim Discharge Summary     Patient ID:  Abbie Roberto  [id number]  73 y.o.  1945    Admit date: 4/10/2017      Discharge date and time: TBD    Admission Diagnoses: GI bleed  GI Bleeding    Discharge Diagnoses:  Principal Diagnosis GI bleed                                            Principal Problem:    GI bleed (4/10/2017)    Active Problems:    PAF (paroxysmal atrial fibrillation) ([facility] (9/3/2016)      Myasthenia gravis with exacerbation, adult form ([facility] (4/4/2017)      History of stroke (4/5/2017)      Morbid obesity with BMI of 40.0-44.9, adult ([facility] (4/5/2017)      Type II diabetes mellitus ([facility] (4/10/2017)      Acute blood loss anemia (4/11/2017)      Acute pulmonary embolus ([facility] (4/11/2017)      Colonic mass (4/12/2017)      Lung nodules (4/12/2017)         Resolved Problems:  Problem List as of 4/14/2017  Date Reviewed: 4/10/2017          Codes Class Noted - Resolved    Colonic mass ICD-10-CM: K63.9  ICD-9-CM: 569.89  4/12/2017 - Present        Lung nodules ICD-10-CM: R91.8  ICD-9-CM: 793.19  4/12/2017 - Present        Acute blood loss anemia ICD-10-CM: D62  ICD-9-CM: 285.1  4/11/2017 - Present        Acute pulmonary embolus ([facility] ICD-10-CM: I26.99  ICD-9-CM: 415.19  4/11/2017 - Present        * (Principal)GI bleed ICD-10-CM: K92.2  ICD-9-CM: 578.9  4/10/2017 - Present        Type II diabetes mellitus ([facility] (Chronic) ICD-10-CM: E11.9  ICD-9-CM: 250.00  4/10/2017 - Present        History of stroke (Chronic) ICD-10-CM: Z86.73  ICD-9-CM: V12.54  4/5/2017 - Present        Leukocytosis (Chronic) ICD-10-CM: M37.498  ICD-9-CM: 288.60  4/5/2017 - Present        Morbid obesity with BMI of 40.0-44.9, adult (H[facility]) (Chronic) ICD-10-CM: E66.01, Z68.41  ICD-9-CM: 278.01, V85.41  4/5/2017 - Present        Myasthenia gravis with exacerbation, adult form ([facility] (Chronic) ICD-10-CM: G70.01  ICD-9-CM: 358.01  4/4/2017 - Present        Cervical radiculopathy due to degenerative joint disease of spine (Chronic) ICD-10-CM: M47.22  ICD-9-CM: 721.0  9/14/2016 - Present        PAF (paroxysmal atrial fibrillation) (HCC) (Chronic) ICD-10-CM: I48.0  ICD-9-CM: 427.31  9/3/2016 - Present        RESOLVED: ARF (acute renal failure) ([facility] ICD-10-CM: N17.9  ICD-9-CM: 584.9  4/5/2017 - 4/10/2017        RESOLVED: Hyperglycemia ICD-10-CM: R73.9  ICD-9-CM: 790.29  4/5/2017 - 4/10/2017        RESOLVED: Myasthenia gravis in crisis [facility]) ICD-10-CM: G70.01  ICD-9-CM: 358.01  9/14/2016 - 4/4/2017        RESOLVED: Dysphagia ICD-10-CM: R13.10  ICD-9-CM: 787.20  9/14/2016 - 4/5/2017        RESOLVED: Acute respiratory failure ([facility] ICD-10-CM: J96.00  ICD-9-CM: 518.81  9/3/2016 - 4/5/2017        RESOLVED: Stroke ([facility] ICD-10-CM: I63.9  ICD-9-CM: 434.91  9/2/2016 - 4/5/2017                Hospital Course: This is an interim summary and covers the hospitalization from admission to 4/14/2017. Mr. Gwen Gayle was admitted to the Hospitalist Service on the 3rd floor for treatment of GI bleeding. His anticoagulation for his chronic atrial fibrillation and recent PE was held. He required urgent transfusion for ongoing GI bleeding and hypotension. Bleeding CT scan showed a sigmoid source with possible mass. This was embolized by Intervention Radiology with initial control of the bleeding. He had an IVC filter placed after he was taken off his anticoagulation. General Surgery was consulted for bleeding mass. Path returned with adenocarcinoma; Hem/Onc consulted. He was taken to the OR for urgent sigmoid colectomy, Marin pouch and colostomy. He experienced ongoing bleeding and required multiple units of blood. Post-operatively, he did fairly well and was advanced to a clear liquid diet by 4/14/2017. Final discharge summary will be done by the discharging physician.        Signed:  Jesus Bolton MD  4/14/2017  11:30 AM

## 2017-04-14 NOTE — PROGRESS NOTES
Problem: Self Care Deficits Care Plan (Adult)  Goal: *Acute Goals and Plan of Care (Insert Text)  Occupational Therapy Goals  Initiated 4/13/2017  1. Patient will perform grooming with supervision/set-up within 7 day(s). 2. Patient will perform upper body dressing and bathing with minimum assistance within 7 day(s). 3. Patient will perform lower body dressing and bathing with moderate assistance using adaptive aides PRN within 7 day(s). 4. Patient will tolerate sitting EOB >5 minutes with moderate assist x1 while completing functional tasks in preparation for ADL transfers within 7 days. 5. Patient will participate in upper extremity therapeutic exercise/activities with supervision/set-up for 10 minutes within 7 day(s). 6. Patient will utilize energy conservation techniques during functional activities with verbal cues within 7 day(s). OCCUPATIONAL THERAPY TREATMENT  Patient: Cindy Shankar (77 y.o. male)  Date: 4/14/2017  Diagnosis: GI bleed  GI Bleeding GI bleed  Procedure(s) (LRB):  LAPAROTOMY EXPLORATORY, SIGMOID COLECTOMY, COLOSTOMY (N/A) 3 Days Post-Op  Precautions:    Chart, occupational therapy assessment, plan of care, and goals were reviewed. ASSESSMENT:  Pt present with bed in chair position. Lorenso Frankel He fluctuated between opening and closing his eyes but with verbal and tactile stimulation would attend to grooming task. Pts sister present for treatment as well as sitter. Pts sister verbalized pt lives with her and she places toothpaste on toothbrush for pt at home. Once toothpaste applied to toothbrush pt was able to brush his teeth. He opened container of mouthwash for oral hygiene. Pt was able to squeeze therapist's hand 5 x each side and perform his elbow extensions or \"punching\" exercises 5 x each UE before fatigue. Lotion applied to each UE for massage. Placed pillows under each UE to increase comfort, decrease edema. Recommend rehab.   Progression toward goals:  [ ]          Improving appropriately and progressing toward goals  [X]          Improving slowly and progressing toward goals  [ ]          Not making progress toward goals and plan of care will be adjusted       PLAN:  Patient continues to benefit from skilled intervention to address the above impairments. Continue treatment per established plan of care. Discharge Recommendations:  Rehab  Further Equipment Recommendations for Discharge:  None       SUBJECTIVE:   Patient stated [place]      OBJECTIVE DATA SUMMARY:   Cognitive/Behavioral Status:  Neurologic State: Alert  Orientation Level: Oriented to person;Oriented to place;Oriented to situation;Oriented to time  Cognition: Decreased command following;Decreased attention/concentration; Impulsive           Functional Mobility and Transfers for ADLs:              Bed Mobility:  Rolling: Total assistance  Supine to Sit:  (place bed to chair position)                      Transfers:   Not tested. Balance:  Sitting: Impaired;High guard  Sitting - Static: Poor (constant support)  Sitting - Dynamic: Not tested  ADL Intervention:        Grooming  Grooming Assistance: Minimum assistance (chair postion in bed)  Brushing Teeth: Minimum assistance (to apply toothpaste to toothbrush)  Cues: Physical assistance              Therapeutic Exercises:   Pt squeezed therapist's hand 5 x each side as well as performed elbow flex/ext 5 x each side or \"punching\" exercises. Pain:  Pain Scale 1: Numeric (0 - 10)  Pain Intensity 1: 2  Pain Location 1: Abdomen  Pain Orientation 1: Medial  Pain Description 1: Aching  Pain Intervention(s) 1: Encouraged PCA     Activity Tolerance:    Fair  Please refer to the flowsheet for vital signs taken during this treatment.   After treatment:   [ ]  Patient left in no apparent distress sitting up in chair  [X]  Patient left in no apparent distress in bed in chair position  [X]  Call bell left within reach  [X]  Nursing notified  [X]  Caregiver present  [ ]  Bed alarm activated      COMMUNICATION/COLLABORATION:   The patients plan of care was discussed with: Occupational Therapist and Registered Nurse     NED Lizarraga  Time Calculation: 35 mins

## 2017-04-14 NOTE — PROGRESS NOTES
Gastroenterology Progress Note    April 14, 2017  Admit Date: 4/10/2017         Narrative Assessment and Plan   · Lower GI bleed  · Anemia secondary to GI blood loss     Plan  - no overt signs of bleeding  - continued postop management per surgery team  - will see again as needed    Subjective:   · Patient with complaints of abdominal pain this AM. Also has a HA. Denies N/V. Tolerating liquids. No further signs of bleeding. 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 Specific changes noted above in HPI.     Current Medications:     Current Facility-Administered Medications   Medication Dose Route Frequency    potassium, sodium phosphates (NEUTRA-PHOS) packet 2 Packet  2 Packet Oral Q4H    dilTIAZem (CARDIZEM) IR tablet 30 mg  30 mg Oral AC&HS    carvedilol (COREG) tablet 6.25 mg  6.25 mg Oral BID WITH MEALS    vancomycin (VANCOCIN) 2,000 mg in 0.9% sodium chloride 500 mL IVPB  2,000 mg IntraVENous Q12H    piperacillin-tazobactam (ZOSYN) 3.375 g in 0.9% sodium chloride (MBP/ADV) 100 mL  3.375 g IntraVENous Q8H    0.9% sodium chloride infusion 250 mL  250 mL IntraVENous PRN    HYDROmorphone (PF) 15 mg/30 ml (DILAUDID) PCA   IntraVENous TITRATE    labetalol (NORMODYNE;TRANDATE) injection 10 mg  10 mg IntraVENous Q6H PRN    sodium chloride (NS) flush 5-10 mL  5-10 mL IntraVENous Q8H    sodium chloride (NS) flush 5-10 mL  5-10 mL IntraVENous PRN    acetaminophen (TYLENOL) tablet 650 mg  650 mg Oral Q4H PRN    oxyCODONE-acetaminophen (PERCOCET) 5-325 mg per tablet 1 Tab  1 Tab Oral Q4H PRN    naloxone (NARCAN) injection 0.4 mg  0.4 mg IntraVENous PRN    ondansetron (ZOFRAN) injection 4 mg  4 mg IntraVENous Q4H PRN    insulin lispro (HUMALOG) injection   SubCUTAneous AC&HS    glucose chewable tablet 16 g  4 Tab Oral PRN    dextrose (D50W) injection syrg 12.5-25 g  12.5-25 g IntraVENous PRN    glucagon (GLUCAGEN) injection 1 mg  1 mg IntraMUSCular PRN    ALPRAZolam (XANAX) tablet 0.25 mg  0.25 mg Oral TID PRN    ascorbic acid (vitamin C) (VITAMIN C) tablet 1,000 mg  1,000 mg Oral DAILY    cholecalciferol (VITAMIN D3) tablet 5,000 Units  5,000 Units Oral DAILY    [START ON 2017] cloNIDine (CATAPRES) 0.2 mg/24 hr patch 1 Patch  1 Patch TransDERmal Q7D    predniSONE (DELTASONE) tablet 50 mg  50 mg Oral DAILY WITH BREAKFAST    pyridostigmine (MESTINON) tablet 60 mg  60 mg Oral BID    traMADol (ULTRAM) tablet 50 mg  50 mg Oral Q6H PRN    pantoprazole (PROTONIX) 40 mg in sodium chloride 0.9 % 10 mL injection  40 mg IntraVENous Q12H    0.9% sodium chloride infusion 250 mL  250 mL IntraVENous PRN       Objective:     VITALS:   Last 24hrs VS reviewed since prior progress note. Most recent are:  Visit Vitals    /76 (BP 1 Location: Right arm)    Pulse (!) 111    Temp 97.4 °F (36.3 °C)    Resp 20    Ht 5' 11\" (1.803 m)    Wt 149.9 kg (330 lb 7.5 oz)    SpO2 95%    BMI 46.09 kg/m2     Temp (24hrs), Av.5 °F (36.9 °C), Min:97.4 °F (36.3 °C), Max:99.8 °F (37.7 °C)      Intake/Output Summary (Last 24 hours) at 17 0948  Last data filed at 17 0814   Gross per 24 hour   Intake             1553 ml   Output             2225 ml   Net             -672 ml       EXAM:  General: Comfortable, no distress    Abdomen: soft, less distended, appropriately tender.  Left side colostomy with serosanguinous drainage    Lab Data Reviewed:   Recent Labs      17   0318  17   0309  17   0055   WBC  19.2*  27.7*   --    HGB  9.0*  11.4*  12.2   HCT  26.7*  34.6*  35.3*   PLT  161  207   --      Recent Labs      17   0608  17   0318  17   0309   NA  138  138  143   K  3.8  4.0  4.6   CL  104  105  109*   CO2  25  24  24   GLU  104*  129*  147*   BUN  9  14 25*   CREA  0.72  0.75  1.24   CA  7.9*  7.6*  7.4*   MG  1.9  1.7  2.2   PHOS  2.3*  2.9  5.0*   ALB  2.1*  1.8*  2.2*   TBILI  0.4  0.4  0.5   SGOT  27  16  16   ALT  33  32  41     Lab Results   Component Value Date/Time    Glucose (POC) 123 04/14/2017 07:33 AM    Glucose (POC) 170 04/13/2017 09:28 PM    Glucose (POC) 167 04/13/2017 04:30 PM    Glucose (POC) 165 04/13/2017 12:21 PM    Glucose (POC) 136 04/13/2017 08:26 AM     No results for input(s): PH, PCO2, PO2, HCO3, FIO2 in the last 72 hours. No results for input(s): INR in the last 72 hours.     No lab exists for component: INREXT, INREXT        Assessment:   (See above)  Principal Problem:    GI bleed (4/10/2017)    Active Problems:    PAF (paroxysmal atrial fibrillation) ([facility] (9/3/2016)      Myasthenia gravis with exacerbation, adult form ([facility] (4/4/2017)      History of stroke (4/5/2017)      Morbid obesity with BMI of 40.0-44.9, adult ([address] (4/5/2017)      Type II diabetes mellitus ([facility] (4/10/2017)      Acute blood loss anemia (4/11/2017)      Acute pulmonary embolus ([address] (4/11/2017)      Colonic mass (4/12/2017)      Lung nodules (4/12/2017)        Plan:   (See above)    Signed By:  Juliano Thorne PA-C  4/14/2017  9:49 AM

## 2017-04-14 NOTE — PROGRESS NOTES
General Surgery Daily Progress Note    Patient: George Langston. MRN: [id number]  SSN: [id number]    YOB: 1945  Age: 73 y.o. Sex: male      Admit Date: 4/10/2017    Subjective:   Pain controlled with PCA, no N/V, tolerating clear liquids. No ostomy output.     Current Facility-Administered Medications   Medication Dose Route Frequency    carvedilol (COREG) tablet 12.5 mg  12.5 mg Oral BID WITH MEALS    dilTIAZem (CARDIZEM) IR tablet 30 mg  30 mg Oral AC&HS    vancomycin (VANCOCIN) 2,000 mg in 0.9% sodium chloride 500 mL IVPB  2,000 mg IntraVENous Q12H    piperacillin-tazobactam (ZOSYN) 3.375 g in 0.9% sodium chloride (MBP/ADV) 100 mL  3.375 g IntraVENous Q8H    0.9% sodium chloride infusion 250 mL  250 mL IntraVENous PRN    HYDROmorphone (PF) 15 mg/30 ml (DILAUDID) PCA   IntraVENous TITRATE    labetalol (NORMODYNE;TRANDATE) injection 10 mg  10 mg IntraVENous Q6H PRN    sodium chloride (NS) flush 5-10 mL  5-10 mL IntraVENous Q8H    sodium chloride (NS) flush 5-10 mL  5-10 mL IntraVENous PRN    acetaminophen (TYLENOL) tablet 650 mg  650 mg Oral Q4H PRN    oxyCODONE-acetaminophen (PERCOCET) 5-325 mg per tablet 1 Tab  1 Tab Oral Q4H PRN    naloxone (NARCAN) injection 0.4 mg  0.4 mg IntraVENous PRN    ondansetron (ZOFRAN) injection 4 mg  4 mg IntraVENous Q4H PRN    insulin lispro (HUMALOG) injection   SubCUTAneous AC&HS    glucose chewable tablet 16 g  4 Tab Oral PRN    dextrose (D50W) injection syrg 12.5-25 g  12.5-25 g IntraVENous PRN    glucagon (GLUCAGEN) injection 1 mg  1 mg IntraMUSCular PRN    ALPRAZolam (XANAX) tablet 0.25 mg  0.25 mg Oral TID PRN    ascorbic acid (vitamin C) (VITAMIN C) tablet 1,000 mg  1,000 mg Oral DAILY    cholecalciferol (VITAMIN D3) tablet 5,000 Units  5,000 Units Oral DAILY    [START ON 4/16/2017] cloNIDine (CATAPRES) 0.2 mg/24 hr patch 1 Patch  1 Patch TransDERmal Q7D    predniSONE (DELTASONE) tablet 50 mg  50 mg Oral DAILY WITH BREAKFAST    pyridostigmine (MESTINON) tablet 60 mg  60 mg Oral BID    traMADol (ULTRAM) tablet 50 mg  50 mg Oral Q6H PRN    pantoprazole (PROTONIX) 40 mg in sodium chloride 0.9 % 10 mL injection  40 mg IntraVENous Q12H    0.9% sodium chloride infusion 250 mL  250 mL IntraVENous PRN        Objective:   04/14 0701 - 04/14 1900  In: -   Out: 700 [Urine:700]  04/12 1901 - 04/14 0700  In: 3386.7 [P.O.:960; I.V.:2426.7]  Out: 8412 [Urine:3075]  Patient Vitals for the past 8 hrs:   BP Temp Pulse Resp SpO2   04/14/17 1124 112/73 98.1 °F (36.7 °C) (!) 115 20 -   04/14/17 0735 142/76 97.4 °F (36.3 °C) (!) 111 20 95 %   04/14/17 0700 - - (!) 120 - -   04/14/17 0651 136/72 - (!) 118 - -       Physical Exam:  General: Sleepy, cooperative, NAD  Lungs: Unlabored  Heart:  Tachycardic  Abdomen: Soft, ATTP, distended. + bowel sounds. Incision c/d/i, ostomy pink, SS drainage in bag. Extremities: Warm, moves all  Skin:  Warm and dry, no rash    Labs:   Recent Labs      04/14/17   1033   WBC  13.8*   HGB  8.5*   HCT  25.8*   PLT  173     Recent Labs      04/14/17   0608   NA  138   K  3.8   CL  104   CO2  25   GLU  104*   BUN  9   CREA  0.72   CA  7.9*   MG  1.9   PHOS  2.3*   ALB  2.1*   TBILI  0.4   SGOT  27   ALT  33       Assessment / Plan:   · POD#3 ex lap, sigmoidectomy, end colostomy for lower GI bleed  · Hgb 8.5, no signs of bleeding. OK from our perspective to start heparin gtt if he needs to be anticoagulated. · Continue clear liquids until bowel function returns  · Continue PCA  · Daily dressing changes.    · Cardiology managing AF w/ RVR, on Cardizem PO  · Leukocytosis- trending down  · PT/OT, encourage IS

## 2017-04-14 NOTE — PROGRESS NOTES
Cardiology Progress Note   4th floor   NAME:  Anette Benavides :   1945   MRN:   [id number]     Assessment/Plan: Critically ill    Persistent afib now w/  RVR        - inc coreg to PTA dose       -  Cont cardizem - use IR        - can use dig prn        - no anticoagulation   HTN-      - coreg, cardizem      - catapres patch - may need to DC if BP an issue w/ addition of AV guero blocking agents   Anemia- hgb 9 ()  Lower GI bleed/inferior mesenteric artery bleed s/p coils/s/p exp lap w/ sigmoid colon resection, colostomy, ? Mass adjacent to sigmoid colon  Hx PE/DVT - s/p IVC filter  Myasthenia gravis : PT/OT        ATTENDING CARDIOLOGIST    Have added digoxin 0.25 mg to regimen today. He appears miserable with pain. Rate up and will do above. PATIENT was  Personally examined and chart reviewed. All the elements of history and examination were personally performed and I agree with the plan as listed above. Treatment plan was addressed with the patient. Barrie Cruz MD                   Subjective:   George Langston. is a 73y.o. year old male w/ hx:  AFib  cx by T2DM, Myasthenia gravis, morbid obesity, recent PE  discharged 17  from [facility] for PE/a fib. He presented today with chief complain of rectal bleeding. Per sister, the pt this morning began experiencing several episodes of bright red rectal bleeding, prompting the pt to be brought to the ED. Became weak and dizzy at home.        LABS:  hgb 13.1 , trop .04     : s/p expl lap, sigmoid colectomy, colostomy, IVC filter     4/10 LED (+) DVT     Overnight activities reviewed:    - headache   - abd pain              - K 3.8              - Mg++ 1.9               - SPO2 95% 2 liters       Cardiac ROS: general edema,  No chest pain, SOB,  palpitations      Review of Systems: c/o general abdominal pain, no nausea. CARDIAC EVALUATION   17: ECHO- LVEF 60% No WMA.  Mild MR, Mild PaHTN  2016: ECHO- EF 65%, no WMA, mild TR, RVSP 41 mmHg        Objective:     Visit Vitals    /76 (BP 1 Location: Right arm)    Pulse (!) 111    Temp 97.4 °F (36.3 °C)    Resp 20    Ht 5' 11\" (1.803 m)    Wt 330 lb 7.5 oz (149.9 kg)    SpO2 95%    BMI 46.09 kg/m2      O2 Flow Rate (L/min): 2 l/min O2 Device: Nasal cannula    Temp (24hrs), Av.5 °F (36.9 °C), Min:97.4 °F (36.3 °C), Max:99.8 °F (37.7 °C)        Intake/Output Summary (Last 24 hours) at 17 0945  Last data filed at 17 0814   Gross per 24 hour   Intake             1553 ml   Output             2225 ml   Net             -672 ml       TELE: afib rate 100-120     General: awake and alert   HEENT: Atraumatic. Pale  and moist.  Anicteric sclerae. Neck: Supple  Lungs: decreased bibasilar - few crackles, No wheezing/rhonchi. Heart: PMI:diminished, Irregular tachy rhythm, no murmur, no S3, no rubs, no gallops. Difficult to assess  JVD. No carotid bruits. Abdomen: Soft,  Distended, large abd dressing, colostomy stoma pink, scant bloody drainage,  Few  bowel sounds. : briggs   Extremities: extensive general edema,   no clubbing, no cyanosis. No calf tenderness  Neurologic:  Awake and alert. No neurological distress. Psych:   Not anxious or agitated.       Care Plan discussed with:    Comments   Patient x    Family      RN x    Care Manager                    Consultant:  x attending       Data Review:   CMP:   Lab Results   Component Value Date/Time     2017 06:08 AM    K 3.8 2017 06:08 AM     2017 06:08 AM    CO2 25 2017 06:08 AM    AGAP 9 2017 06:08 AM     (H) 2017 06:08 AM    BUN 9 2017 06:08 AM    CREA 0.72 2017 06:08 AM    GFRAA >60 2017 06:08 AM    GFRNA >60 2017 06:08 AM    CA 7.9 (L) 2017 06:08 AM    MG 1.9 2017 06:08 AM    PHOS 2.3 (L) 2017 06:08 AM    ALB 2.1 (L) 2017 06:08 AM    TBILI 0.4 2017 06:08 AM    TP 4.9 (L) 04/14/2017 06:08 AM    GLOB 2.8 04/14/2017 06:08 AM    AGRAT 0.8 (L) 04/14/2017 06:08 AM    SGOT 27 04/14/2017 06:08 AM    ALT 33 04/14/2017 06:08 AM     CBC:   No results found for: WBC, HGB, HCT, PLT, HGBEXT, HCTEXT, PLTEXT, HGBEXT, HCTEXT, PLTEXT  All Cardiac Markers in the last 24 hours: No results found for: CPK, CKMMB, CKMB, RCK3, CKMBT, CKNDX, CKND1, SUJEY, TROPT, TROIQ, GALLITO, TROPT, TNIPOC, BNP, BNPP  Recent Glucose Results:   Lab Results   Component Value Date/Time    GLUCPOC 123 (H) 04/14/2017 07:33 AM    GLUCPOC 170 (H) 04/13/2017 09:28 PM    GLUCPOC 167 (H) 04/13/2017 04:30 PM     (H) 04/14/2017 06:08 AM     ABG: No results found for: PH, PHI, PCO2, PCO2I, PO2, PO2I, HCO3, HCO3I, FIO2, FIO2I  LIPIDS:  Cholesterol, total   Date Value Ref Range Status   09/03/2016 168 <200 MG/DL Final     Triglyceride   Date Value Ref Range Status   09/03/2016 198 (H) <150 MG/DL Final     Comment:     Based on NCEP-ATP III:  Triglycerides <150 mg/dL  is considered normal, 150-199 mg/dL  borderline high,  200-499 mg/dL high and  greater than or equal to 500 mg/dL very high. HDL Cholesterol   Date Value Ref Range Status   09/03/2016 41 MG/DL Final     Comment:     Based on NCEP ATP III, HDL Cholesterol <40 mg/dL is considered low and >60 mg/dL is elevated.      LDL, calculated   Date Value Ref Range Status   09/03/2016 87.4 0 - 100 MG/DL Final     Comment:     Based on the NCEP-ATP: LDL-C concentrations are considered  optimal <100 mg/dL,  near optimal/above Normal 100-129 mg/dL  Borderline High: 130-159, High: 160-189 mg/dL  Very High: Greater than or equal to 190 mg/dL       VLDL, calculated   Date Value Ref Range Status   09/03/2016 39.6 MG/DL Final     CHOL/HDL Ratio   Date Value Ref Range Status   09/03/2016 4.1 0 - 5.0   Final       Medications reviewed  Current Facility-Administered Medications   Medication Dose Route Frequency    potassium, sodium phosphates (NEUTRA-PHOS) packet 2 Packet  2 Packet Oral Q4H  dilTIAZem (CARDIZEM) IR tablet 30 mg  30 mg Oral AC&HS    carvedilol (COREG) tablet 6.25 mg  6.25 mg Oral BID WITH MEALS    vancomycin (VANCOCIN) 2,000 mg in 0.9% sodium chloride 500 mL IVPB  2,000 mg IntraVENous Q12H    piperacillin-tazobactam (ZOSYN) 3.375 g in 0.9% sodium chloride (MBP/ADV) 100 mL  3.375 g IntraVENous Q8H    0.9% sodium chloride infusion 250 mL  250 mL IntraVENous PRN    HYDROmorphone (PF) 15 mg/30 ml (DILAUDID) PCA   IntraVENous TITRATE    labetalol (NORMODYNE;TRANDATE) injection 10 mg  10 mg IntraVENous Q6H PRN    sodium chloride (NS) flush 5-10 mL  5-10 mL IntraVENous Q8H    sodium chloride (NS) flush 5-10 mL  5-10 mL IntraVENous PRN    acetaminophen (TYLENOL) tablet 650 mg  650 mg Oral Q4H PRN    oxyCODONE-acetaminophen (PERCOCET) 5-325 mg per tablet 1 Tab  1 Tab Oral Q4H PRN    naloxone (NARCAN) injection 0.4 mg  0.4 mg IntraVENous PRN    ondansetron (ZOFRAN) injection 4 mg  4 mg IntraVENous Q4H PRN    insulin lispro (HUMALOG) injection   SubCUTAneous AC&HS    glucose chewable tablet 16 g  4 Tab Oral PRN    dextrose (D50W) injection syrg 12.5-25 g  12.5-25 g IntraVENous PRN    glucagon (GLUCAGEN) injection 1 mg  1 mg IntraMUSCular PRN    ALPRAZolam (XANAX) tablet 0.25 mg  0.25 mg Oral TID PRN    ascorbic acid (vitamin C) (VITAMIN C) tablet 1,000 mg  1,000 mg Oral DAILY    cholecalciferol (VITAMIN D3) tablet 5,000 Units  5,000 Units Oral DAILY    [START ON 4/16/2017] cloNIDine (CATAPRES) 0.2 mg/24 hr patch 1 Patch  1 Patch TransDERmal Q7D    predniSONE (DELTASONE) tablet 50 mg  50 mg Oral DAILY WITH BREAKFAST    pyridostigmine (MESTINON) tablet 60 mg  60 mg Oral BID    traMADol (ULTRAM) tablet 50 mg  50 mg Oral Q6H PRN    pantoprazole (PROTONIX) 40 mg in sodium chloride 0.9 % 10 mL injection  40 mg IntraVENous Q12H    0.9% sodium chloride infusion 250 mL  250 mL IntraVENous PRN

## 2017-04-14 NOTE — PROGRESS NOTES
Mr. Mouna Franklin was followed in the ICU. Transferred to remote telemetry on 4/13/17. Will set him up with outpatient PSG to evaluate for KWADWO. Will sign off and see again if needed.

## 2017-04-15 NOTE — PROGRESS NOTES
Progress Note                                        Alton Brock Ace, MD, [address] [facility] [address], [place], [address] [place] [address] N[place]                                                 Phone [phone]; Fax [phone]        4/15/2017 7:59 AM     Admit Date:           4/10/2017  Admit Diagnosis:  GI bleed;GI Bleeding  :          1945   MRN:          [id number]   ASSESSMENT/RECOMMENDATION:   Persistent afib now w/ RVR   - inc coreg to PTA dose  - Cont cardizem - use IR   - can use dig prn   - no anticoagulation   HTN-  - coreg, cardizem dosages increased since BP significantly elevated with elevated HR  - catapres patch - may need to DC if BP an issue w/ addition of AV guero blocking agents   Anemia- hgb 9 ()  Lower GI bleed/inferior mesenteric artery bleed s/p coils/s/p exp lap w/ sigmoid colon resection, colostomy, ? Mass adjacent to sigmoid colon + CA  Hx PE/DVT - s/p IVC filter  Myasthenia gravis : PT/OT    go forwad with anticoagulation with Eliquis but would start with lovenox if ok with surgery. 04/15 0701 - 04/15 190  In: -   Out: 700 [Urine:700]    Last 3 Recorded Weights in this Encounter    17 0306 17 0500 17 0300   Weight: 331 lb 2.1 oz (150.2 kg) 323 lb 13.7 oz (146.9 kg) 330 lb 7.5 oz (149.9 kg)          1901 - 04/15 0700  In: 700 [I.V.:700]  Out: Evelyne Reynolds [YHI:[id number]]    SUBJECTIVE           Anette Martínez Speaker. is a 73y.o. year old male w/ hx: AFib cx by T2DM, Myasthenia gravis, morbid obesity, recent PE discharged 17 from [facility] for PE/a fib. He presented today with chief complain of rectal bleeding. Per sister, the pt this morning began experiencing several episodes of bright red rectal bleeding, prompting the pt to be brought to the ED. Became weak and dizzy at home. Anette Martínez Speaker. reports feeling better.  will feel SOB at times but related to pain. .      Current Facility-Administered Medications   Medication Dose Route Frequency    carvedilol (COREG) tablet 12.5 mg  12.5 mg Oral BID WITH MEALS    digoxin (LANOXIN) tablet 0.25 mg  0.25 mg Oral DAILY    diphenhydrAMINE (BENADRYL) injection 25 mg  25 mg IntraVENous Q6H PRN    dilTIAZem (CARDIZEM) IR tablet 30 mg  30 mg Oral AC&HS    vancomycin (VANCOCIN) 2,000 mg in 0.9% sodium chloride 500 mL IVPB  2,000 mg IntraVENous Q12H    piperacillin-tazobactam (ZOSYN) 3.375 g in 0.9% sodium chloride (MBP/ADV) 100 mL  3.375 g IntraVENous Q8H    0.9% sodium chloride infusion 250 mL  250 mL IntraVENous PRN    HYDROmorphone (PF) 15 mg/30 ml (DILAUDID) PCA   IntraVENous TITRATE    labetalol (NORMODYNE;TRANDATE) injection 10 mg  10 mg IntraVENous Q6H PRN    sodium chloride (NS) flush 5-10 mL  5-10 mL IntraVENous Q8H    sodium chloride (NS) flush 5-10 mL  5-10 mL IntraVENous PRN    acetaminophen (TYLENOL) tablet 650 mg  650 mg Oral Q4H PRN    oxyCODONE-acetaminophen (PERCOCET) 5-325 mg per tablet 1 Tab  1 Tab Oral Q4H PRN    naloxone (NARCAN) injection 0.4 mg  0.4 mg IntraVENous PRN    ondansetron (ZOFRAN) injection 4 mg  4 mg IntraVENous Q4H PRN    insulin lispro (HUMALOG) injection   SubCUTAneous AC&HS    glucose chewable tablet 16 g  4 Tab Oral PRN    dextrose (D50W) injection syrg 12.5-25 g  12.5-25 g IntraVENous PRN    glucagon (GLUCAGEN) injection 1 mg  1 mg IntraMUSCular PRN    ALPRAZolam (XANAX) tablet 0.25 mg  0.25 mg Oral TID PRN    ascorbic acid (vitamin C) (VITAMIN C) tablet 1,000 mg  1,000 mg Oral DAILY    cholecalciferol (VITAMIN D3) tablet 5,000 Units  5,000 Units Oral DAILY    [START ON 4/16/2017] cloNIDine (CATAPRES) 0.2 mg/24 hr patch 1 Patch  1 Patch TransDERmal Q7D    predniSONE (DELTASONE) tablet 50 mg  50 mg Oral DAILY WITH BREAKFAST    pyridostigmine (MESTINON) tablet 60 mg  60 mg Oral BID    traMADol (ULTRAM) tablet 50 mg  50 mg Oral Q6H PRN    pantoprazole (PROTONIX) 40 mg in sodium chloride 0.9 % 10 mL injection  40 mg IntraVENous Q12H    0.9% sodium chloride infusion 250 mL  250 mL IntraVENous PRN      OBJECTIVE               Intake/Output Summary (Last 24 hours) at 04/15/17 0759  Last data filed at 04/15/17 0704   Gross per 24 hour   Intake                0 ml   Output             3350 ml   Net            -3350 ml       Review of Systems -as per HPI      PHYSICAL EXAM        Visit Vitals    BP (!) 182/97    Pulse (!) 109    Temp 99.8 °F (37.7 °C)    Resp 20    Ht 5' 11\" (1.803 m)    Wt 330 lb 7.5 oz (149.9 kg)    SpO2 99%    BMI 46.09 kg/m2       Gen: Well-developed, well-nourished, in no acute distress  alert and oriented x 3  HEENT:  Pink conjunctivae, Hearing grossly normal.No scleral icterus or conjunctival, moist mucous membranes  Neck: Supple,No JVD, No Carotid Bruit, Thyroid- non tender No cervical lymphadenopathy  Resp: No accessory muscle use, Clear breath sounds, No rales or rhonchi  Card: Regular Rate,Rythm,Normal S1, S2, No murmurs, rubs or gallop. No thrills. Abd:  Soft, non-tender, non-distended, normoactive bowel sounds are present,   MSK: No cyanosis or clubbing, good capillary refill  Skin: No rashes or ulcers, no bruising  Neuro:  Cranial nerves are grossly intact, moving all four extremities, no focal deficit, follows commands appropriately  Psych:  Good insight, oriented to person, place and time, alert, Nml Affect  LE: No edema  Vascular: Distal Pulses 2+ and symmetric      DATA REVIEW            Laboratory and Imaging have been reviewed by me and are notable for  No results for input(s): CPK, CKMB, TROIQ in the last 72 hours.   Recent Labs      04/15/17   0701  04/14/17   1033  04/14/17   0608  04/13/17   0318   NA   --    --   138  138   K   --    --   3.8  4.0   CO2   --    --   25  24   BUN   --    --   9  14   CREA   --    --   0.72  0.75   GLU   --    --   104*  129*   PHOS   --    --   2.3*  2.9   MG   --    -- 1.9  1.7   WBC  11.6*  13.8*   --   19.2*   HGB  8.7*  8.5*   --   9.0*   HCT  26.5*  25.8*   --   26.7*   PLT  201  173   --   161             Barrie Cruz MD

## 2017-04-15 NOTE — PROGRESS NOTES
Progress Note                                        Alton oFwler MD, [address] [facility] [address], [place], [address] [place] [address] [place]                                                 Phone [phone]; Fax [phone]        4/15/2017 3:54 PM     Admit Date:           4/10/2017  Admit Diagnosis:  GI bleed;GI Bleeding  :          1945   MRN:          [id number]   ASSESSMENT/RECOMMENDATION:   Persistent afib now w/ RVR   - inc coreg to PTA dose  - change Cardizem to 240 mg  - can use dig prn   - no anticoagulation   HTN-  - coreg, cardizem dosages increased since BP significantly elevated with elevated HR  - catapres patch - may need to DC if BP an issue w/ addition of AV guero blocking agents   Anemia- hgb 9 ()  Lower GI bleed/inferior mesenteric artery bleed s/p coils/s/p exp lap w/ sigmoid colon resection, colostomy, ? Mass adjacent to sigmoid colon + CA  Hx PE/DVT - s/p IVC filter  Myasthenia gravis : PT/OT   go forwad with anticoagulation with Eliquis but would start with lovenox if ok with surgery. 04/15 0701 - 04/15 190  In: -   Out: 1600 [Urine:1600]    Last 3 Recorded Weights in this Encounter    17 0306 17 0500 17 0300   Weight: 331 lb 2.1 oz (150.2 kg) 323 lb 13.7 oz (146.9 kg) 330 lb 7.5 oz (149.9 kg)          1901 - 04/15 0700  In: 700 [I.V.:700]  Out: Shanice Nance [[id number]]    SUBJECTIVE           Anette Snyder Simpler. is a 73y.o. year old male w/ hx: AFib cx by T2DM, Myasthenia gravis, morbid obesity, recent PE discharged [facility] from [facility] for PE/a fib. He presented today with chief complain of rectal bleeding. Per sister, the pt this morning began experiencing several episodes of bright red rectal bleeding, prompting the pt to be brought to the ED. Became weak and dizzy at home.      Anette Ocasio. reports feels and looks better. .      Current Facility-Administered Medications   Medication Dose Route Frequency    dilTIAZem SR (CARDIZEM SR) capsule 120 mg  120 mg Oral DAILY    carvedilol (COREG) tablet 25 mg  25 mg Oral BID WITH MEALS    enoxaparin (LOVENOX) injection 40 mg  40 mg SubCUTAneous Q12H    And    enoxaparin (LOVENOX) injection 100 mg  100 mg SubCUTAneous Q12H    [START ON 4/16/2017] Vancomycin TROUGH @0130 on 4/16/2017   Other ONCE    digoxin (LANOXIN) tablet 0.25 mg  0.25 mg Oral DAILY    diphenhydrAMINE (BENADRYL) injection 25 mg  25 mg IntraVENous Q6H PRN    vancomycin (VANCOCIN) 2,000 mg in 0.9% sodium chloride 500 mL IVPB  2,000 mg IntraVENous Q12H    piperacillin-tazobactam (ZOSYN) 3.375 g in 0.9% sodium chloride (MBP/ADV) 100 mL  3.375 g IntraVENous Q8H    0.9% sodium chloride infusion 250 mL  250 mL IntraVENous PRN    HYDROmorphone (PF) 15 mg/30 ml (DILAUDID) PCA   IntraVENous TITRATE    labetalol (NORMODYNE;TRANDATE) injection 10 mg  10 mg IntraVENous Q6H PRN    sodium chloride (NS) flush 5-10 mL  5-10 mL IntraVENous Q8H    sodium chloride (NS) flush 5-10 mL  5-10 mL IntraVENous PRN    acetaminophen (TYLENOL) tablet 650 mg  650 mg Oral Q4H PRN    oxyCODONE-acetaminophen (PERCOCET) 5-325 mg per tablet 1 Tab  1 Tab Oral Q4H PRN    naloxone (NARCAN) injection 0.4 mg  0.4 mg IntraVENous PRN    ondansetron (ZOFRAN) injection 4 mg  4 mg IntraVENous Q4H PRN    insulin lispro (HUMALOG) injection   SubCUTAneous AC&HS    glucose chewable tablet 16 g  4 Tab Oral PRN    dextrose (D50W) injection syrg 12.5-25 g  12.5-25 g IntraVENous PRN    glucagon (GLUCAGEN) injection 1 mg  1 mg IntraMUSCular PRN    ALPRAZolam (XANAX) tablet 0.25 mg  0.25 mg Oral TID PRN    ascorbic acid (vitamin C) (VITAMIN C) tablet 1,000 mg  1,000 mg Oral DAILY    cholecalciferol (VITAMIN D3) tablet 5,000 Units  5,000 Units Oral DAILY    [START ON 4/16/2017] cloNIDine (CATAPRES) 0.2 mg/24 hr patch 1 Patch  1 Patch TransDERmal Q7D  predniSONE (DELTASONE) tablet 50 mg  50 mg Oral DAILY WITH BREAKFAST    pyridostigmine (MESTINON) tablet 60 mg  60 mg Oral BID    traMADol (ULTRAM) tablet 50 mg  50 mg Oral Q6H PRN    pantoprazole (PROTONIX) 40 mg in sodium chloride 0.9 % 10 mL injection  40 mg IntraVENous Q12H    0.9% sodium chloride infusion 250 mL  250 mL IntraVENous PRN      OBJECTIVE               Intake/Output Summary (Last 24 hours) at 04/15/17 1554  Last data filed at 04/15/17 1325   Gross per 24 hour   Intake                0 ml   Output             3550 ml   Net            -3550 ml       Review of Systems - History obtained from the patient AS PER  HPI        PHYSICAL EXAM        Visit Vitals    /78 (BP 1 Location: Left arm, BP Patient Position: At rest)    Pulse (!) 112    Temp 98.3 °F (36.8 °C)    Resp 22    Ht 5' 11\" (1.803 m)    Wt 330 lb 7.5 oz (149.9 kg)    SpO2 92%    BMI 46.09 kg/m2       Gen: Well-developed, well-nourished, in no acute distress  alert and oriented x 3  HEENT:  Pink conjunctivae, Hearing grossly normal.No scleral icterus or conjunctival, moist mucous membranes  Neck: Supple,No JVD, No Carotid Bruit, Thyroid- non tender No cervical lymphadenopathy  Resp: No accessory muscle use, poor respiratory effort  Card: Irregular and rapid    MSK: No cyanosis or clubbing, good capillary refill  Skin: paleg  Neuro:  Cranial nerves are grossly intact, moving all four extremities, no focal deficit, follows commands appropriately  Psych:  Good insight, oriented to person, place and time, alert, Nml Affect  LE: + edema      DATA REVIEW            Laboratory and Imaging have been reviewed by me and are notable for  No results for input(s): CPK, CKMB, TROIQ in the last 72 hours.   Recent Labs      04/15/17   0701  04/14/17   1033  04/14/17   0608  04/13/17   0318   NA  142   --   138  138   K  3.6   --   3.8  4.0   CO2  32   --   25  24   BUN  7   --   9  14   CREA  0.62*   --   0.72  0.75   GLU  101*   --   104* 129*   PHOS  2.6   --   2.3*  2.9   MG  1.9   --   1.9  1.7   WBC  11.6*  13.8*   --   19.2*   HGB  8.7*  8.5*   --   9.0*   HCT  26.5*  25.8*   --   26.7*   PLT  201  173   --   161             Brandan Elise MD

## 2017-04-15 NOTE — PROGRESS NOTES
[facility]  [address] [facility], [address] [place] [address]  [phone]      Medical Progress Note      NAME: Julio Hashimoto. :  1945  MRM:  [id number]    Date/Time: 4/15/2017          Subjective:     Chief Complaint:  F/u PE / DVT, bowel resection    Chart/notes/labs/studies reviewed, patient examined at bedside. Feels better. Denies CP. SOB better. No abd pain. Objective:       Vitals:          Last 24hrs VS reviewed since prior progress note. Most recent are:    Visit Vitals    /78    Pulse (!) 112    Temp 98.3 °F (36.8 °C)    Resp 22    Ht 5' 11\" (1.803 m)    Wt 149.9 kg (330 lb 7.5 oz)    SpO2 96%    BMI 46.09 kg/m2     SpO2 Readings from Last 6 Encounters:   04/15/17 96%   17 94%   17 96%   17 96%   16 97%   10/21/16 98%    O2 Flow Rate (L/min): 2 l/min     Intake/Output Summary (Last 24 hours) at 04/15/17 1726  Last data filed at 04/15/17 1725   Gross per 24 hour   Intake                0 ml   Output             3550 ml   Net            -3550 ml          Exam:     Physical Exam:    Gen: obese, frail, elderly, chronically ill-appearing, in no acute distress. Pleasant. HEENT: Mignon conjunctivae, hearing intact to voice, moist mucous membranes  Neck: Supple, without masses, thyroid non-tender  Resp: No accessory muscle use, clear breath sounds without wheezes rales or rhonchi  Card: No murmurs, tachycardic, irregularly irregular. No bruits or peripheral edema  Abd: Soft, NTTP. Stoma pink. Dark liquid in ostomy.    Musc: No cyanosis or clubbing  Skin: No rashes or ulcers, skin turgor is good  Neuro: Cranial nerves are grossly intact, no focal motor weakness, follows commands appropriately  Psych: Good insight, oriented to person, place and time, alert        Medications Reviewed: (see below)    Lab Data Reviewed: (see below)    ______________________________________________________________________    Medications: Current Facility-Administered Medications   Medication Dose Route Frequency    dilTIAZem SR (CARDIZEM SR) capsule 120 mg  120 mg Oral DAILY    carvedilol (COREG) tablet 25 mg  25 mg Oral BID WITH MEALS    enoxaparin (LOVENOX) injection 40 mg  40 mg SubCUTAneous Q12H    And    enoxaparin (LOVENOX) injection 100 mg  100 mg SubCUTAneous Q12H    [START ON 4/16/2017] Vancomycin TROUGH @0130 on 4/16/2017   Other ONCE    digoxin (LANOXIN) tablet 0.25 mg  0.25 mg Oral DAILY    diphenhydrAMINE (BENADRYL) injection 25 mg  25 mg IntraVENous Q6H PRN    vancomycin (VANCOCIN) 2,000 mg in 0.9% sodium chloride 500 mL IVPB  2,000 mg IntraVENous Q12H    piperacillin-tazobactam (ZOSYN) 3.375 g in 0.9% sodium chloride (MBP/ADV) 100 mL  3.375 g IntraVENous Q8H    0.9% sodium chloride infusion 250 mL  250 mL IntraVENous PRN    HYDROmorphone (PF) 15 mg/30 ml (DILAUDID) PCA   IntraVENous TITRATE    labetalol (NORMODYNE;TRANDATE) injection 10 mg  10 mg IntraVENous Q6H PRN    sodium chloride (NS) flush 5-10 mL  5-10 mL IntraVENous Q8H    sodium chloride (NS) flush 5-10 mL  5-10 mL IntraVENous PRN    acetaminophen (TYLENOL) tablet 650 mg  650 mg Oral Q4H PRN    oxyCODONE-acetaminophen (PERCOCET) 5-325 mg per tablet 1 Tab  1 Tab Oral Q4H PRN    naloxone (NARCAN) injection 0.4 mg  0.4 mg IntraVENous PRN    ondansetron (ZOFRAN) injection 4 mg  4 mg IntraVENous Q4H PRN    insulin lispro (HUMALOG) injection   SubCUTAneous AC&HS    glucose chewable tablet 16 g  4 Tab Oral PRN    dextrose (D50W) injection syrg 12.5-25 g  12.5-25 g IntraVENous PRN    glucagon (GLUCAGEN) injection 1 mg  1 mg IntraMUSCular PRN    ALPRAZolam (XANAX) tablet 0.25 mg  0.25 mg Oral TID PRN    ascorbic acid (vitamin C) (VITAMIN C) tablet 1,000 mg  1,000 mg Oral DAILY    cholecalciferol (VITAMIN D3) tablet 5,000 Units  5,000 Units Oral DAILY    [START ON 4/16/2017] cloNIDine (CATAPRES) 0.2 mg/24 hr patch 1 Patch  1 Patch TransDERmal Q7D    predniSONE (DELTASONE) tablet 50 mg  50 mg Oral DAILY WITH BREAKFAST    pyridostigmine (MESTINON) tablet 60 mg  60 mg Oral BID    traMADol (ULTRAM) tablet 50 mg  50 mg Oral Q6H PRN    pantoprazole (PROTONIX) 40 mg in sodium chloride 0.9 % 10 mL injection  40 mg IntraVENous Q12H    0.9% sodium chloride infusion 250 mL  250 mL IntraVENous PRN            Lab Review:     Recent Labs      04/15/17   0701  04/14/17   1033  04/13/17   0318   WBC  11.6*  13.8*  19.2*   HGB  8.7*  8.5*  9.0*   HCT  26.5*  25.8*  26.7*   PLT  201  173  161     Recent Labs      04/15/17   0701  04/14/17   0608  04/13/17   0318   NA  142  138  138   K  3.6  3.8  4.0   CL  104  104  105   CO2  32  25  24   GLU  101*  104*  129*   BUN  7  9  14   CREA  0.62*  0.72  0.75   CA  8.1*  7.9*  7.6*   MG  1.9  1.9  1.7   PHOS  2.6  2.3*  2.9   ALB   --   2.1*  1.8*   SGOT   --   27  16   ALT   --   33  32     No components found for: GLPOC  No results for input(s): PH, PCO2, PO2, HCO3, FIO2 in the last 72 hours. No results for input(s): INR in the last 72 hours. No lab exists for component: INREXT  No results found for: SDES  Lab Results   Component Value Date/Time    Culture result: MRSA NOT PRESENT 04/12/2017 05:49 PM    Culture result:  04/12/2017 05:49 PM         Screening of patient nares for MRSA is for surveillance purposes and, if positive, to facilitate isolation considerations in high risk settings. It is not intended for automatic decolonization interventions per se as regimens are not sufficiently effective to warrant routine use. Culture result: ENTEROBACTER CLOACAE 09/16/2016 10:10 PM    Culture result: KLEBSIELLA OXYTOCA 09/16/2016 10:10 PM            Assessment / Plan:     72 yo WM w/ hx of pAF, DVT/PE, MG, DM p/w GIB, found to have colon CA    GI bleed (4/10/2017) / Acute blood loss anemia: due to apparent diverticular mass / colon cancer. s/p sigmoidectomy and colostomy  -- no e/o recurrent bleeding. Started on Lovenox. Discussed with general surgery  -- cont IV Zosyn due to also findings of diverticulitis     Acute bilateral pulmonary embolus / LLE DVT: diagnosed last admission, s/p IVC filter  -- resume Lovenox    PAF (paroxysmal atrial fibrillation) ([facility] (9/3/2016) / accelerated HTN: cardiology following  -- cont diltiazem, digoxin, Coreg. Also on clonidine  -- IV labetalol PRN     Myasthenia gravis with exacerbation, adult form ([facility] (4/4/2017)  -- cont pyridostigmine  -- on high dose steroids.  Will consult neurology on Monday to see if we can start to wean this as I feel his resp symptoms may be from PE     History of stroke (4/5/2017): was off anticoagulation due to bleed as above  -- resumed Lovenox     Morbid obesity with BMI of 40.0-44.9, adult ([facility] (4/5/2017)  -- counseled on weight loss     Type II diabetes mellitus ([facility] (4/10/2017):   -- follow BS on SSI      Possible KWADWO: desats at night pretty consistently  -- CPAP QHS    Lung nodule:   -- will need repeat CT imaging per guidelines      Total time spent with patient:  35 minutes                  Care Plan discussed with: Patient, Nursing Staff and >50% of time spent in counseling and coordination of care    Discussed:  Care Plan    Prophylaxis:  Lovenox    Disposition:  SNF/LTC           ___________________________________________________    Attending Physician: Corie Marques MD

## 2017-04-15 NOTE — ROUTINE PROCESS
Orthopedic & Surgical Nursing Communication Tool      9:03 AM  4/15/2017     Bedside and Verbal shift change report given to Sierra Arredondo RN (incoming nurse) by Fatuma Obrien RN (outgoing nurse) on [facility]. a 73 y.o. male who was admitted on 4/10/2017  1:02 PM. Report included the following information SBAR, Kardex and MAR. Patient received at [address] [place] [address] Rd[address] from another nurse. Patient confused and with a sitter. Patient is hard stick but incoming nurse assisted with blood draw. PCA pump cleared. Opportunity for questions and clarifications were given to the incoming nurse. Patient's bed is in low position, side rails x2, door open PRN, call bell within reach and patient not in distress.     Fatuma Obrien RN

## 2017-04-15 NOTE — PROGRESS NOTES
Feeling better   No nausea or vomiting  Afebrile  abd is soft and stoma is healthy - no gas or stool  Full liquid

## 2017-04-15 NOTE — PROGRESS NOTES
[address] [facility] at [address] [place] [address]  [phone]    Hematology / Oncology Follow-up    Reason for Visit:   Sveta Liz is a 73 y.o. male who is seen for follow-up of colon cancer, PE. Interval History:   More alert, awake this morning. Reports feeling ok, pain comes and goes. No other complaints this morning. Sitter at bedside. Nurse preparing to start IV heparin. Medications reviewed in the EMR. Allergies   Allergen Reactions    Lactose Itching    Fructose Other (comments)     States added sugar to foods causes sore throat/ear ache.  Inderal [Propranolol] Unknown (comments)    Lisinopril Angioedema    Gluten Nausea Only        Review of Systems: A 6-point review of systems was obtained, negative except as reviewed in the HPI. Physical Exam:     Visit Vitals    BP (!) 182/97    Pulse (!) 109    Temp 99.8 °F (37.7 °C)    Resp 20    Ht 5' 11\" (1.803 m)    Wt 330 lb 7.5 oz (149.9 kg)    SpO2 99%    BMI 46.09 kg/m2     General: No distress  Eyes: Anicteric sclerae  HENT: Atraumatic  Neck: Supple  Respiratory: Normal respiratory effort  CV: No peripheral edema  GI: Soft, nontender, nondistended, no masses, no hepatomegaly, no splenomegaly  Skin: No rashes, ecchymoses, or petechiae  Psych: Alert, oriented, appropriate affect, normal judgment/insight        Results:     Lab Results   Component Value Date/Time    WBC 11.6 04/15/2017 07:01 AM    HGB 8.7 04/15/2017 07:01 AM    HCT 26.5 04/15/2017 07:01 AM    PLATELET 096 67/93/9568 07:01 AM    MCV 94.0 04/15/2017 07:01 AM    ABS.  NEUTROPHILS 12.4 04/14/2017 10:33 AM    Hemoglobin (POC) 10.5 04/11/2017 12:50 PM     Lab Results   Component Value Date/Time    Sodium 142 04/15/2017 07:01 AM    Potassium 3.6 04/15/2017 07:01 AM    Chloride 104 04/15/2017 07:01 AM    CO2 32 04/15/2017 07:01 AM    Glucose 101 04/15/2017 07:01 AM    BUN 7 04/15/2017 07:01 AM    Creatinine 0.62 04/15/2017 07:01 AM    GFR est AA >60 04/15/2017 07:01 AM    GFR est non-AA >60 04/15/2017 07:01 AM    Calcium 8.1 04/15/2017 07:01 AM    Glucose (POC) 106 04/15/2017 07:17 AM     Lab Results   Component Value Date/Time    Bilirubin, total 0.4 04/14/2017 06:08 AM    ALT (SGPT) 33 04/14/2017 06:08 AM    AST (SGOT) 27 04/14/2017 06:08 AM    Alk. phosphatase 48 04/14/2017 06:08 AM    Protein, total 4.9 04/14/2017 06:08 AM    Albumin 2.1 04/14/2017 06:08 AM    Globulin 2.8 04/14/2017 06:08 AM     Lab Results   Component Value Date/Time    Ferritin 369 09/08/2016 10:03 AM    Vitamin B12 1318 09/14/2016 06:02 AM    Homocysteine, plasma 10.8 09/02/2016 08:50 PM    Sed rate, automated 7 10/04/2016 04:30 AM    TSH 0.32 04/05/2017 04:39 PM    JULIA Direct NEGATIVE  09/08/2016 10:03 AM    Lipase 121 04/05/2017 03:49 PM    Hep C  virus Ab Interp. 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 Bilateral pulmonary emboli. 2. RUL airspace disease may represent a pulmonary infarct     4/06/2017 DUPLEX LE  CONCLUSION: Bilateral lower extremity venous duplex positive for deep  venous thrombosis. Negative for thrombophlebitis. 4/10/2017 CTA ABD PELV  IMPRESSION:  1. Sigmoid colonic hemorrhage. 2. Small adjacent mass in the sigmoid. Diverticulitis slightly favored over a  small colon carcinoma.   3. 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 4. Resolved pulmonary emboli in the visualized portions of the left lower lobe. Stable or slightly decreased right lung emboli with small developing right upper  lobe pulmonary infarction. 4/12/2017 XR CHEST   IMPRESSION: Unchanged peripheral right upper lobe airspace disease. Mild  bibasilar atelectasis. Assessment and Recommendations:   1. Colon cancer  Stage I ([id number])  4/11/2017 S/p Exp lap; sigmoidectomy and colostomy  Very small focus of tumor (0.2mm), with negative lymph nodes. No role for adjuvant therapy. Recommend proceeding with surveillance as outpatient. Follow up with me in the office. Follow up with Dr. Audra Sifuentes for surveillance colonoscopy. 2. Bilateral PE/ LLE DVT (4/05/2017)  Initially anticoagulated, but stopped due to GI bleed. IVC filter placed 4/11/2017. Now that the source of the bleed has been addressed, I recommend a trial of anticoagulation. Heparin gtt has been ordered by hospitalist.  If no recurrent bleeding after a trial of heparin, can switch to a DOAC at some point. 2. GI Bleed  Source was sigmoid colon, based on CTA and mesenteric arteriogram.  Now s/p sigmoidectomy, no further bleeding noted. 3. Anemia, normocytic  Secondary to GI bleed and recent surgery. Consider oral Fe once bowels are moving adequately. Check iron studies in AM.    4. Abscesses  Noted on pathology and on imaging. Currently on abx. 5. Pulmonary nodules  Uncertain etiology, possibly inflammatory. I think unlikely to represent metastases, given the 0.2cm primary tumor. Repeat CT chest scan in 3 months    6. Afib  Cardio following. Anticoagulation being resumed, as above.       Signed By: Dougie Jc MD     April 15, 2017

## 2017-04-16 NOTE — PROGRESS NOTES
Alvino Keita [facility]  [address] [facility], [address] [place] [address]  [phone]      Medical Progress Note      NAME: Shalini Eagle. :  1945  MRM:  [id number]    Date/Time: 2017          Subjective:     Chief Complaint:  F/u PE / DVT, bowel resection    Chart/notes/labs/studies reviewed, patient examined at bedside. Feels fine. Mild abdominal soreness. Denies CP or significant SOB. Objective:       Vitals:          Last 24hrs VS reviewed since prior progress note. Most recent are:    Visit Vitals    /81 (BP 1 Location: Left arm, BP Patient Position: Head of bed elevated (Comment degrees))    Pulse 96    Temp 98.7 °F (37.1 °C)    Resp 22    Ht 5' 11\" (1.803 m)    Wt 149.9 kg (330 lb 7.5 oz)    SpO2 94%    BMI 46.09 kg/m2     SpO2 Readings from Last 6 Encounters:   17 94%   17 94%   17 96%   17 96%   16 97%   10/21/16 98%    O2 Flow Rate (L/min): 3 l/min       Intake/Output Summary (Last 24 hours) at 17 1606  Last data filed at 17 1358   Gross per 24 hour   Intake                0 ml   Output             6150 ml   Net            -6150 ml          Exam:     Physical Exam:    Gen: obese, frail, elderly, chronically ill-appearing, in no acute distress. Pleasant. HEENT: [place] conjunctivae, hearing intact to voice, moist mucous membranes  Neck: Supple, without masses, thyroid non-tender  Resp: No accessory muscle use, clear breath sounds without wheezes rales or rhonchi  Card: No murmurs, tachycardic, irregularly irregular. No bruits or peripheral edema  Abd: Soft, NTTP. Obese. Stoma pink. Dark liquid output in ostomy.    Musc: No cyanosis or clubbing  Skin: No rashes or ulcers, skin turgor is good  Neuro: Cranial nerves are grossly intact, no focal motor weakness, follows commands appropriately  Psych: Good insight, oriented to person, place and time, alert        Medications Reviewed: (see below)    Lab Data Reviewed: (see below)    ______________________________________________________________________    Medications:     Current Facility-Administered Medications   Medication Dose Route Frequency    dilTIAZem CD (CARDIZEM CD) capsule 240 mg  240 mg Oral DAILY    Vancomycin T prior to dose [email or web address]   Other ONCE    carvedilol (COREG) tablet 25 mg  25 mg Oral BID WITH MEALS    enoxaparin (LOVENOX) injection 40 mg  40 mg SubCUTAneous Q12H    And    enoxaparin (LOVENOX) injection 100 mg  100 mg SubCUTAneous Q12H    diphenhydrAMINE (BENADRYL) injection 25 mg  25 mg IntraVENous Q6H PRN    vancomycin (VANCOCIN) 2,000 mg in 0.9% sodium chloride 500 mL IVPB  2,000 mg IntraVENous Q12H    piperacillin-tazobactam (ZOSYN) 3.375 g in 0.9% sodium chloride (MBP/ADV) 100 mL  3.375 g IntraVENous Q8H    0.9% sodium chloride infusion 250 mL  250 mL IntraVENous PRN    HYDROmorphone (PF) 15 mg/30 ml (DILAUDID) PCA   IntraVENous TITRATE    labetalol (NORMODYNE;TRANDATE) injection 10 mg  10 mg IntraVENous Q6H PRN    sodium chloride (NS) flush 5-10 mL  5-10 mL IntraVENous Q8H    sodium chloride (NS) flush 5-10 mL  5-10 mL IntraVENous PRN    acetaminophen (TYLENOL) tablet 650 mg  650 mg Oral Q4H PRN    oxyCODONE-acetaminophen (PERCOCET) 5-325 mg per tablet 1 Tab  1 Tab Oral Q4H PRN    naloxone (NARCAN) injection 0.4 mg  0.4 mg IntraVENous PRN    ondansetron (ZOFRAN) injection 4 mg  4 mg IntraVENous Q4H PRN    insulin lispro (HUMALOG) injection   SubCUTAneous AC&HS    glucose chewable tablet 16 g  4 Tab Oral PRN    dextrose (D50W) injection syrg 12.5-25 g  12.5-25 g IntraVENous PRN    glucagon (GLUCAGEN) injection 1 mg  1 mg IntraMUSCular PRN    ALPRAZolam (XANAX) tablet 0.25 mg  0.25 mg Oral TID PRN    ascorbic acid (vitamin C) (VITAMIN C) tablet 1,000 mg  1,000 mg Oral DAILY    cholecalciferol (VITAMIN D3) tablet 5,000 Units  5,000 Units Oral DAILY    cloNIDine (CATAPRES) 0.2 mg/24 hr patch 1 Patch  1 Patch TransDERmal Q7D    predniSONE (DELTASONE) tablet 50 mg  50 mg Oral DAILY WITH BREAKFAST    pyridostigmine (MESTINON) tablet 60 mg  60 mg Oral BID    traMADol (ULTRAM) tablet 50 mg  50 mg Oral Q6H PRN    pantoprazole (PROTONIX) 40 mg in sodium chloride 0.9 % 10 mL injection  40 mg IntraVENous Q12H    0.9% sodium chloride infusion 250 mL  250 mL IntraVENous PRN            Lab Review:     Recent Labs      04/16/17   0156  04/15/17   0701  04/14/17   1033   WBC  9.1  11.6*  13.8*   HGB  8.4*  8.7*  8.5*   HCT  25.2*  26.5*  25.8*   PLT  200  201  173     Recent Labs      04/16/17   0156  04/15/17   0701  04/14/17   0608   NA  142  142  138   K  3.2*  3.6  3.8   CL  105  104  104   CO2  31  32  25   GLU  111*  101*  104*   BUN  5*  7  9   CREA  0.70  0.62*  0.72   CA  8.1*  8.1*  7.9*   MG  1.8  1.9  1.9   PHOS  2.2*  2.6  2.3*   ALB  2.2*   --   2.1*   SGOT  22   --   27   ALT  16   --   33     No components found for: GLPOC  No results for input(s): PH, PCO2, PO2, HCO3, FIO2 in the last 72 hours. No results for input(s): INR in the last 72 hours. No lab exists for component: INREXT, INREXT  No results found for: SDES  Lab Results   Component Value Date/Time    Culture result: MRSA NOT PRESENT 04/12/2017 05:49 PM    Culture result:  04/12/2017 05:49 PM         Screening of patient nares for MRSA is for surveillance purposes and, if positive, to facilitate isolation considerations in high risk settings. It is not intended for automatic decolonization interventions per se as regimens are not sufficiently effective to warrant routine use. Culture result: ENTEROBACTER CLOACAE 09/16/2016 10:10 PM    Culture result: KLEBSIELLA OXYTOCA 09/16/2016 10:10 PM            Assessment / Plan:     74 yo WM w/ hx of pAF, DVT/PE, MG, DM p/w GIB, found to have colon CA    GI bleed (4/10/2017) / Acute blood loss anemia: due to apparent diverticular mass / colon cancer.  s/p sigmoidectomy and colostomy  -- cont Lovenox; change to NOAC tomorrow if remains stable  -- cont IV Zosyn due to also findings of diverticulitis  -- follow up with surveillance c-scope outpatient      Acute bilateral pulmonary embolus / LLE DVT: diagnosed last admission, s/p IVC filter  -- Lovenox; possibly transition to NOAC tomorrow as above    PAF (paroxysmal atrial fibrillation) ([facility] (9/3/2016) / accelerated HTN: Cardiology following  -- increase diltiazem  -- cont Coreg  -- off digoxin  -- IV labetalol PRN     Myasthenia gravis with exacerbation, adult form ([facility] (4/4/2017)  -- cont pyridostigmine  -- on high dose steroids.  Consult neurology tomorrow to see if we can start to wean this as I feel his resp symptoms may be from PE     History of stroke (4/5/2017): was off anticoagulation due to bleed as above  -- on Lovenox     Morbid obesity with BMI of 40.0-44.9, adult ([facility] (4/5/2017)  -- counseled on weight loss     Type II diabetes mellitus ([facility] (4/10/2017):   -- follow BS on SSI      Possible KWADWO: desats at night pretty consistently  -- CPAP QHS    Lung nodule:   -- will need repeat CT imaging per guidelines      Total time spent with patient:  25 minutes                  Care Plan discussed with: Patient, Nursing Staff and >50% of time spent in counseling and coordination of care    Discussed:  Care Plan    Prophylaxis:  Lovenox    Disposition:  SNF/LTC           ___________________________________________________    Attending Physician: Leopoldo Hurst, MD

## 2017-04-16 NOTE — PROGRESS NOTES
No major changes in his overall situation  He shows no evidence of bleeding  He has not had any output from stoma  Keep on full liquids

## 2017-04-16 NOTE — PROGRESS NOTES
Bedside shift change report given to Tina (oncoming nurse) by Deric Edwards (offgoing nurse). Report included the following information SBAR, Kardex, Intake/Output and MAR.

## 2017-04-16 NOTE — PROGRESS NOTES
[address] [facility] at [facility]  [phone]    Hematology / Oncology Follow-up    Reason for Visit:   Rhianna Vargas is a 73 y.o. male who is seen for follow-up of colon cancer, PE. Interval History:   Started on lovenox, doing well with this without bleeding. Still with some abdominal pain at times. Passing gas but not stool. Tolerating liquid diet. Medications reviewed in the EMR. Allergies   Allergen Reactions    Lactose Itching    Fructose Other (comments)     States added sugar to foods causes sore throat/ear ache.  Inderal [Propranolol] Unknown (comments)    Lisinopril Angioedema    Gluten Nausea Only        Review of Systems: A 6-point review of systems was obtained, negative except as reviewed in the HPI. Physical Exam:     Visit Vitals    BP (!) 157/99    Pulse (!) 110    Temp 98 °F (36.7 °C)    Resp 24    Ht 5' 11\" (1.803 m)    Wt 330 lb 7.5 oz (149.9 kg)    SpO2 95%    BMI 46.09 kg/m2     General: No distress  Eyes: Anicteric sclerae  HENT: Atraumatic  Neck: Supple  Respiratory: Normal respiratory effort  CV: No peripheral edema  GI: Soft, nontender, nondistended, no masses, no hepatomegaly, no splenomegaly  Skin: No rashes, ecchymoses, or petechiae  Psych: Alert, oriented, appropriate affect, normal judgment/insight        Results:     Lab Results   Component Value Date/Time    WBC 9.1 04/16/2017 01:56 AM    HGB 8.4 04/16/2017 01:56 AM    HCT 25.2 04/16/2017 01:56 AM    PLATELET 170 27/76/8802 01:56 AM    MCV 93.7 04/16/2017 01:56 AM    ABS.  NEUTROPHILS 7.5 04/16/2017 01:56 AM    Hemoglobin (POC) 10.5 04/11/2017 12:50 PM     Lab Results   Component Value Date/Time    Sodium 142 04/16/2017 01:56 AM    Potassium 3.2 04/16/2017 01:56 AM    Chloride 105 04/16/2017 01:56 AM    CO2 31 04/16/2017 01:56 AM    Glucose 111 04/16/2017 01:56 AM    BUN 5 04/16/2017 01:56 AM    Creatinine 0.70 04/16/2017 01:56 AM    GFR est AA >60 04/16/2017 01:56 AM GFR est non-AA >60 04/16/2017 01:56 AM    Calcium 8.1 04/16/2017 01:56 AM    Glucose (POC) 145 04/16/2017 11:52 AM     Lab Results   Component Value Date/Time    Bilirubin, total 0.5 04/16/2017 01:56 AM    ALT (SGPT) 16 04/16/2017 01:56 AM    AST (SGOT) 22 04/16/2017 01:56 AM    Alk. phosphatase 48 04/16/2017 01:56 AM    Protein, total 4.7 04/16/2017 01:56 AM    Albumin 2.2 04/16/2017 01:56 AM    Globulin 2.5 04/16/2017 01:56 AM     Lab Results   Component Value Date/Time    Iron % saturation 17 04/16/2017 01:56 AM    Iron 34 04/16/2017 01:56 AM    TIBC 202 04/16/2017 01:56 AM    Ferritin 235 04/16/2017 01:56 AM    Vitamin B12 1318 09/14/2016 06:02 AM    Homocysteine, plasma 10.8 09/02/2016 08:50 PM    Sed rate, automated 7 10/04/2016 04:30 AM    TSH 0.32 04/05/2017 04:39 PM    JULIA Direct NEGATIVE  09/08/2016 10:03 AM    Lipase 121 04/05/2017 03:49 PM    Hep C  virus Ab Interp. 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 Bilateral pulmonary emboli. 2. RUL airspace disease may represent a pulmonary infarct     4/06/2017 DUPLEX LE  CONCLUSION: Bilateral lower extremity venous duplex positive for deep  venous thrombosis. Negative for thrombophlebitis. 4/10/2017 CTA ABD PELV  IMPRESSION:  1. Sigmoid colonic hemorrhage. 2. Small adjacent mass in the sigmoid.  Diverticulitis slightly favored over a  small colon carcinoma. 3. 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 4. Resolved pulmonary emboli in the visualized portions of the left lower lobe. Stable or slightly decreased right lung emboli with small developing right upper  lobe pulmonary infarction. 4/12/2017 XR CHEST   IMPRESSION: Unchanged peripheral right upper lobe airspace disease. Mild  bibasilar atelectasis. Assessment and Recommendations:   1. Colon cancer  Stage I ([id number])  4/11/2017 S/p Exp lap; sigmoidectomy and colostomy  Very small focus of tumor (0.2mm), with negative lymph nodes. No role for adjuvant therapy. Recommend proceeding with surveillance as outpatient. Follow up with me in the office. Follow up with Dr. Petrona Castillo for surveillance colonoscopy. 2. Bilateral PE/ LLE DVT (4/05/2017)  Initially anticoagulated, but stopped due to GI bleed. IVC filter placed 4/11/2017. Now that the source of the bleed has been addressed, anticoagulation has been resumed with a trial of lovenox. So far doing well with this. If he continues without recurrent bleeding, can switch back to a DOAC, which he will need for at least 3 months. 3. GI Bleed  Source was sigmoid colon, based on CTA and mesenteric arteriogram.  Now s/p sigmoidectomy, no further bleeding noted. 4. Anemia, normocytic  Secondary to GI bleed and recent surgery. Consider oral Fe once bowels are moving adequately. 5. Abscesses  Noted on pathology and on imaging. Currently on abx. 6. Pulmonary nodules  Uncertain etiology, possibly inflammatory. I think unlikely to represent metastases, given the 0.2cm primary tumor. Repeat CT chest scan in 3 months    7. Afib  Cardio following. Anticoagulation resumed, as above.       Signed By: Gabby Tejada MD     April 16, 2017

## 2017-04-17 NOTE — PROGRESS NOTES
[address] [facility] Oncology at [facility]  [phone]    Hematology / Oncology Follow-up    Reason for Visit:   Juanita Dodd is a 73 y.o. male who is seen for follow-up of colon cancer, PE. Interval History: Tolerating lovenox well, no bleeding. Continue with pain, on PCA. Medications reviewed in the EMR. Allergies   Allergen Reactions    Lactose Itching    Fructose Other (comments)     States added sugar to foods causes sore throat/ear ache.  Inderal [Propranolol] Unknown (comments)    Lisinopril Angioedema    Gluten Nausea Only        Review of Systems: A 6-point review of systems was obtained, negative except as reviewed in the HPI. Physical Exam:     Visit Vitals    /82 (BP 1 Location: Right arm)    Pulse 98    Temp 98.3 °F (36.8 °C)    Resp 19    Ht 5' 11\" (1.803 m)    Wt 330 lb 7.5 oz (149.9 kg)    SpO2 91%    BMI 46.09 kg/m2     General: No distress  Eyes: Anicteric sclerae  HENT: Atraumatic  Neck: Supple  Respiratory: Normal respiratory effort  CV: No peripheral edema  GI: Soft, nontender, nondistended, no masses, no hepatomegaly, no splenomegaly  Skin: No rashes, ecchymoses, or petechiae  Psych: Alert, but lethargic. Oriented, appropriate affect, normal judgment/insight        Results:     Lab Results   Component Value Date/Time    WBC 8.4 04/17/2017 12:57 AM    HGB 8.3 04/17/2017 12:57 AM    HCT 26.1 04/17/2017 12:57 AM    PLATELET 968 99/87/0244 12:57 AM    MCV 96.3 04/17/2017 12:57 AM    ABS.  NEUTROPHILS 7.2 04/17/2017 12:57 AM    Hemoglobin (POC) 10.5 04/11/2017 12:50 PM     Lab Results   Component Value Date/Time    Sodium 145 04/17/2017 12:57 AM    Potassium 3.5 04/17/2017 12:57 AM    Chloride 105 04/17/2017 12:57 AM    CO2 33 04/17/2017 12:57 AM    Glucose 107 04/17/2017 12:57 AM    BUN 6 04/17/2017 12:57 AM    Creatinine 0.71 04/17/2017 12:57 AM    GFR est AA >60 04/17/2017 12:57 AM    GFR est non-AA >60 04/17/2017 12:57 AM    Calcium 8.0 04/17/2017 12:57 AM    Glucose (POC) 107 04/17/2017 07:26 AM     Lab Results   Component Value Date/Time    Bilirubin, total 0.5 04/16/2017 01:56 AM    ALT (SGPT) 16 04/16/2017 01:56 AM    AST (SGOT) 22 04/16/2017 01:56 AM    Alk. phosphatase 48 04/16/2017 01:56 AM    Protein, total 4.7 04/16/2017 01:56 AM    Albumin 2.2 04/16/2017 01:56 AM    Globulin 2.5 04/16/2017 01:56 AM     Lab Results   Component Value Date/Time    Iron % saturation 17 04/16/2017 01:56 AM    Iron 34 04/16/2017 01:56 AM    TIBC 202 04/16/2017 01:56 AM    Ferritin 235 04/16/2017 01:56 AM    Vitamin B12 1318 09/14/2016 06:02 AM    Homocysteine, plasma 10.8 09/02/2016 08:50 PM    Sed rate, automated 7 10/04/2016 04:30 AM    TSH 0.32 04/05/2017 04:39 PM    JULIA Direct NEGATIVE  09/08/2016 10:03 AM    Lipase 121 04/05/2017 03:49 PM    Hep C  virus Ab Interp. 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 Bilateral pulmonary emboli. 2. RUL airspace disease may represent a pulmonary infarct     4/06/2017 DUPLEX LE  CONCLUSION: Bilateral lower extremity venous duplex positive for deep  venous thrombosis. Negative for thrombophlebitis. 4/10/2017 CTA ABD PELV  IMPRESSION:  1. Sigmoid colonic hemorrhage. 2. Small adjacent mass in the sigmoid. Diverticulitis slightly favored over a  small colon carcinoma.   3. 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 4. Resolved pulmonary emboli in the visualized portions of the left lower lobe. Stable or slightly decreased right lung emboli with small developing right upper  lobe pulmonary infarction. 4/12/2017 XR CHEST   IMPRESSION: Unchanged peripheral right upper lobe airspace disease. Mild  bibasilar atelectasis. Assessment and Recommendations:   1. Colon cancer  Stage I ([id number])  4/11/2017 S/p Exp lap; sigmoidectomy and colostomy  Very small focus of tumor (0.2mm), with negative lymph nodes. No role for adjuvant therapy. Recommend proceeding with surveillance as outpatient. Follow up with me in the office. Follow up with Dr. Petrona Castillo for surveillance colonoscopy. 2. Bilateral PE/ LLE DVT (4/05/2017)  Initially anticoagulated, but stopped due to GI bleed. IVC filter placed 4/11/2017. Now that the source of the bleed has been addressed, anticoagulation has been resumed with a trial of lovenox (4/15). He is doing well without evidence of bleeding, HGB stable. Consider switching back to a DOAC today or tomorrow, if no further plans for procedures. Consider apixaban 5mg PO BID (I would skip the initial 10mg bolus dosing). 3. GI Bleed  Source was sigmoid colon, based on CTA and mesenteric arteriogram.  Now s/p sigmoidectomy, no further bleeding noted. 4. Anemia, normocytic  Secondary to GI bleed and recent surgery. Consider oral Fe once bowels are moving adequately. 5. Abscesses  Noted on pathology and on imaging. Currently on abx. 6. Pulmonary nodules  Uncertain etiology, possibly inflammatory. I think unlikely to represent metastases, given the 0.2cm primary tumor. Repeat CT chest scan in 3 months    7. Afib  Cardio following. Anticoagulation resumed, as above.       Signed By: Gabby Tejada MD     April 17, 2017

## 2017-04-17 NOTE — CONSULTS
[facility]  [address] [place] [address]  [phone]     Inpatient Neurology Consult  [facility]    Name:   Sera Simental. Medical record #: [id number]  Admission Date: 4/10/2017  Consult Date:  04/17/17    Referring Provider: Lalitha Humphrey  Chief Complaint:  History of MG  Source of Hx:  Chart, sister  _____________________________________________________________________    HISTORY OF PRESENT ILLNESS:   Subjective  Rosyyesharlene Appiah is a 73 y.o. male with PMH of MG, PE, HTN, DMT2, A-fib, CVA hx and asthma. The Neurology Service is asked to evaluate for potentially weaning steroids for his MG; after admission for GIB, LLE DVT, bilateral PE's and RUL infarct with need for a coiling for GIB and placement of a sigmoid colectomy with colostomy on 4/11/17. Pt cannot provide hx post receiving Morphine after having angina earlier and is lethargic/minimally responsive from medication. Sister states this morning patient stated he felt better than he had for several days. During hospital stay, pt has recovered well after his Ex Lap and Sigmoid Colectomy and respiratory status has improved since per discussion with sister and Dr. Yassine Gonzales. Pt does need to work with PT per sister and get stronger since being in bed but not having the SOB as he was prior to admission.       Past Medical History:   Diagnosis Date    Anxiety     Arthritis     Asthma     Atrial fibrillation (HCC)     Diabetes ([facility] [address]     HTN (hypertension) with goal to be determined     Morbid obesity with BMI of 40.0-44.9, adult ([facility] [address] 4/5/2017    Myasthenia gravis ([facility] [address] [facility]     Pulmonary emboli (HCC)     with bilateral DVT's      Past Surgical History:   Procedure Laterality Date    HX ORTHOPAEDIC      HX OTHER SURGICAL      L sided neck suregry \" infection\"     Family History   Problem Relation Age of Onset    Diabetes Mother     Hypertension Mother     Deep Vein Thrombosis Mother     Deep Vein Thrombosis Sister      Social History     Social History    Marital status:      Spouse name: N/A    Number of children: N/A    Years of education: N/A     Occupational History    Not on file. Social History Main Topics    Smoking status: Never Smoker    Smokeless tobacco: Not on file    Alcohol use No    Drug use: Not on file    Sexual activity: Not on file     Other Topics Concern    Not on file     Social History Narrative       Objective  Allergies: Allergies   Allergen Reactions    Lactose Itching    Fructose Other (comments)     States added sugar to foods causes sore throat/ear ache.  Inderal [Propranolol] Unknown (comments)    Lisinopril Angioedema    Gluten Nausea Only     Outpatient Meds  No current facility-administered medications on file prior to encounter. Current Outpatient Prescriptions on File Prior to Encounter   Medication Sig Dispense Refill    apixaban (ELIQUIS) 5 mg tablet Please take 10mg twice daily for 5 days then decrease to 5mg twice daily thereafter  Indications: pulmonary thromboembolism 60 Tab 0    carvedilol (COREG) 12.5 mg tablet Take 1 Tab by mouth two (2) times daily (with meals). 60 Tab 0    dilTIAZem CD (CARDIZEM CD) 300 mg ER capsule Take 1 Cap by mouth daily. 30 Cap 0    metFORMIN (GLUCOPHAGE) 500 mg tablet Take 1 Tab by mouth daily (with breakfast). 30 Tab 0    traMADol (ULTRAM) 50 mg tablet Take 50 mg by mouth every six (6) hours as needed for Pain.  pyridostigmine (MESTINON) 60 mg tablet Take 60 mg by mouth two (2) times a day.  b complex vitamins (B COMPLEX 1) tablet Take 3 Tabs by mouth daily.  cholecalciferol, VITAMIN D3, (VITAMIN D3) 5,000 unit tab tablet Take 5,000 Units by mouth daily.  ascorbic acid, vitamin C, (VITAMIN C) 500 mg tablet Take 1,000-1,500 mg by mouth daily.  predniSONE (DELTASONE) 10 mg tablet Take 5 Tabs by mouth daily (with breakfast).  450 Tab 3    potassium chloride SA (MICRO-K) 10 mEq capsule Take 10 mEq by mouth two (2) times a day.  ALPRAZolam (XANAX) 0.25 mg tablet Take 0.25 mg by mouth three (3) times daily as needed for Anxiety.  acetaminophen (TYLENOL) 500 mg tablet Take 1,000 mg by mouth every eight (8) hours as needed for Pain.  cloNIDine (CATAPRES) 0.2 mg/24 hr patch 1 Patch by TransDERmal route every seven (7) days.  4 Patch 0       Inpatient Meds    Current Facility-Administered Medications:     apixaban (ELIQUIS) tablet 10 mg, 10 mg, Oral, Q12H, Luz Tiwari MD    labetalol (NORMODYNE;TRANDATE) injection 10 mg, 10 mg, IntraVENous, Q6H PRN, Luz Tiwari MD    dilTIAZem CD (CARDIZEM CD) capsule 240 mg, 240 mg, Oral, DAILY, Jamie Franco MD, 240 mg at 04/17/17 0911    carvedilol (COREG) tablet 25 mg, 25 mg, Oral, BID WITH MEALS, Jamie Franco MD, 25 mg at 04/17/17 0911    diphenhydrAMINE (BENADRYL) injection 25 mg, 25 mg, IntraVENous, Q6H PRN, Jesus Bolton MD, 25 mg at 04/15/17 2245    piperacillin-tazobactam (ZOSYN) 3.375 g in 0.9% sodium chloride (MBP/ADV) 100 mL, 3.375 g, IntraVENous, Q8H, Stan Chandler MD, Last Rate: 25 mL/hr at 04/17/17 0303, 3.375 g at 04/17/17 0303    0.9% sodium chloride infusion 250 mL, 250 mL, IntraVENous, PRN, Gris Rodriguez MD    HYDROmorphone (PF) 15 mg/30 ml (DILAUDID) PCA, , IntraVENous, TITRATE, She Bills MD    sodium chloride (NS) flush 5-10 mL, 5-10 mL, IntraVENous, Q8H, Ute Clarke MD, 10 mL at 04/16/17 2200    sodium chloride (NS) flush 5-10 mL, 5-10 mL, IntraVENous, PRN, Ute Clarke MD    acetaminophen (TYLENOL) tablet 650 mg, 650 mg, Oral, Q4H PRN, Ute Clarke MD, 650 mg at 04/17/17 0911    oxyCODONE-acetaminophen (PERCOCET) 5-325 mg per tablet 1 Tab, 1 Tab, Oral, Q4H PRN, Ute Clarke MD    [facility]) injection 0.4 mg, 0.4 mg, IntraVENous, PRN, Ute Clarke MD    ondansetron [facility]) injection 4 mg, 4 mg, IntraVENous, Q4H PRN, Ute Clarke MD, 4 mg at 04/11/17 5693    insulin lispro (HUMALOG) injection, , SubCUTAneous, AC&HS, Lc Clarke MD, Stopped at 04/16/17 2200    glucose chewable tablet 16 g, 4 Tab, Oral, PRN, Lc Clarke MD    dextrose (D50W) injection syrg 12.5-25 g, 12.5-25 g, IntraVENous, PRN, Lc Clarke MD    glucagon [facility]) injection 1 mg, 1 mg, IntraMUSCular, PRN, Lc Clarke MD    ALPRAZolam [facility] Emerson) tablet 0.25 mg, 0.25 mg, Oral, TID PRN, Lc Clarke MD, 0.25 mg at 04/14/17 0009    ascorbic acid (vitamin C) (VITAMIN C) tablet 1,000 mg, 1,000 mg, Oral, DAILY, Lc Clarke MD, 1,000 mg at 04/17/17 0912    cholecalciferol (VITAMIN D3) tablet 5,000 Units, 5,000 Units, Oral, DAILY, Lc Clarke MD, 5,000 Units at 04/17/17 0911    cloNIDine (CATAPRES) 0.2 mg/24 hr patch 1 Patch, 1 Patch, TransDERmal, Q7D, cL Clarke MD, 1 Patch at 04/16/17 2220    predniSONE (DELTASONE) tablet 50 mg, 50 mg, Oral, DAILY WITH BREAKFAST, Lc Clarke MD, 50 mg at 04/17/17 0912    pyridostigmine (MESTINON) tablet 60 mg, 60 mg, Oral, BID, Lc Clarke MD, 60 mg at 04/17/17 0911    traMADol (ULTRAM) tablet 50 mg, 50 mg, Oral, Q6H PRN, Lc Clarke MD, 50 mg at 04/10/17 1807    pantoprazole (PROTONIX) 40 mg in sodium chloride 0.9 % 10 mL injection, 40 mg, IntraVENous, Q12H, Lc Clarke MD, 40 mg at 04/17/17 0913    0.9% sodium chloride infusion 250 mL, 250 mL, IntraVENous, PRN, Stan Chandler MD    PHYSICAL EXAM  Patient Vitals for the past 12 hrs:   Temp Pulse Resp BP SpO2   04/17/17 0900 98.4 °F (36.9 °C) 98 23 (!) 161/98 97 %   04/17/17 0700 - 98 - - -   04/17/17 0412 98.3 °F (36.8 °C) 97 19 141/82 91 %   04/17/17 0145 - 96 - - -   04/17/17 0059 98.8 °F (37.1 °C) 96 20 154/81 99 %        General: Obese WM in NAD, providing no history    Psych: Affect is calm, cooperative, pleasant   Neck: supple, nontender,  No bruit   Heart: regular rhythm and rate    Lungs: clear BBS   Extremities: no edema   Skin: no rashes      Neurological Examination:    Mental Status:  Alert, oriented x 3, currently reduced insight    Commands: unable to follow well, but could follow few commands    Language:     Comprehension:  reduced post sedation/narcotic administration, non-verbal now     Cranial Nerves:            I: smell   Not tested    II: visual acuity    deferred    II: visual fields   *MERI    II: pupils   Equal, round, reactive to light    II: optic disc   Not examined    III,VII:   no ptosis of either eyelid    III,IV,VI: extraocular muscles    *MERI    V: mastication   *MERI    V: facial sensation:    *MERI    VII: facial muscle function     Symmetric, no facial droop    VIII: hearing   Equal bilaterally    IX: soft palate elevation    *MERI    XI: trapezius strength   *MERI    XI: sternocleidomastoid strength   *MERI    XI: neck flexion strength  *MERI    XII: tongue    *MERI      Strength/Motor   Drift:       None                    Noted on ____ arm  with       without pronation      Bulk:  appears symmetric            Tone:  normal      Deltoid Biceps Triceps Wrist Extension Finger Abduction   L 4 4 4 4 4   R 4 4 4 4 4      Hip Flexion Hip Extension Knee Flexion Knee Extension Ankle Dorsiflexion Ankle Plantarflexion   L 4 4 4 4 4 4   R 4 4 4 4 4 4      Reflexes:    BR Brachial Patellar Achilles Plantar  Startle Glabellar   L 1/4 1/4 NT NT  flexor NT NT   R 1/4 1/4 NT NT flexor NT NT      Sensory: withdrawls to nailbed stimuli in bilateral proximal nailbeds    Coordination: *MERI   Tremors:  no resting tremors    Gait: deferred     *MERI:  Unable to assess due to reduced comprehension, lethargy or reduced LOC.     Labs Reviewed  Recent Results (from the past 12 hour(s))   GLUCOSE, POC    Collection Time: 04/16/17  9:39 PM   Result Value Ref Range    Glucose (POC) 152 (H) 65 - 100 mg/dL    Performed by Constanza Yost (BRIAN)    CBC WITH AUTOMATED DIFF    Collection Time: 04/17/17 12:57 AM   Result Value Ref Range    WBC 8.4 4.1 - 11.1 K/uL    RBC 2.71 (L) 4.10 - 5.70 M/uL    HGB 8.3 (L) 12.1 - 17.0 g/dL    HCT 26.1 (L) 36.6 - 50.3 %    MCV 96.3 80.0 - 99.0 FL    MCH 30.6 26.0 - 34.0 PG    MCHC 31.8 30.0 - 36.5 g/dL    RDW 17.9 (H) 11.5 - 14.5 %    PLATELET 262 287 - 443 K/uL    NEUTROPHILS 86 (H) 32 - 75 %    LYMPHOCYTES 9 (L) 12 - 49 %    MONOCYTES 5 5 - 13 %    EOSINOPHILS 0 0 - 7 %    BASOPHILS 0 0 - 1 %    ABS. NEUTROPHILS 7.2 1.8 - 8.0 K/UL    ABS. LYMPHOCYTES 0.8 0.8 - 3.5 K/UL    ABS. MONOCYTES 0.4 0.0 - 1.0 K/UL    ABS. EOSINOPHILS 0.0 0.0 - 0.4 K/UL    ABS. BASOPHILS 0.0 0.0 - 0.1 K/UL   MAGNESIUM    Collection Time: 04/17/17 12:57 AM   Result Value Ref Range    Magnesium 1.8 1.6 - 2.4 mg/dL   PHOSPHORUS    Collection Time: 04/17/17 12:57 AM   Result Value Ref Range    Phosphorus 3.3 2.6 - 4.7 MG/DL   METABOLIC PANEL, BASIC    Collection Time: 04/17/17 12:57 AM   Result Value Ref Range    Sodium 145 136 - 145 mmol/L    Potassium 3.5 3.5 - 5.1 mmol/L    Chloride 105 97 - 108 mmol/L    CO2 33 (H) 21 - 32 mmol/L    Anion gap 7 5 - 15 mmol/L    Glucose 107 (H) 65 - 100 mg/dL    BUN 6 6 - 20 MG/DL    Creatinine 0.71 0.70 - 1.30 MG/DL    BUN/Creatinine ratio 8 (L) 12 - 20      GFR est AA >60 >60 ml/min/1.73m2    GFR est non-AA >60 >60 ml/min/1.73m2    Calcium 8.0 (L) 8.5 - 10.1 MG/DL   GLUCOSE, POC    Collection Time: 04/17/17  7:26 AM   Result Value Ref Range    Glucose (POC) 107 (H) 65 - 100 mg/dL    Performed by Mariano Jaramillo (PCT)      Imaging  Reviewed:   CT Results (recent):    Results from Hospital Encounter encounter on 04/10/17   CTA ABD PELV W WO CONT   Addendum Addendum:   There are multiple pulmonary nodules in the lung base. Two subpleural  nodules are in the medial basal segment of the right lower lobe on image 7-21. A perifissural nodule in the anterior basal segment of the right lower lobe  is on images 7-12 and 604-72. A right middle lobe nodule is on image [id number].  The largest of these is the anterior of the 2 nodules in the medial basal  segments; it measures 7 x 10 x 8 mm. It is possible that these nodules also  represent small developing infarctions, though follow-up is warranted. IMPRESSION: 1. Multiple right lung pulmonary nodules. 2. Follow-up chest CT is recommended in 3-6 months. If the nodules persist  and are stable, then a second follow-up is recommended at 18-24 months. Reference: Essie Woodruff DP, Yari JM, et al. Guidelines for  Management of Incidental Pulmonary Nodules Detected on CT images: From the [employer]  [employer] 2017. Online, ahead of print: March 13, 2017 (doi: http://dx.doi.org/10.1148/radiol. 5716222222). The findings were called to Dr. Mika Brooks on 4/12/2017 8:41 AM by Dr. Lena Oliveira. [address] [place] [address] Haresh Mayer MD 4/12/2017  8:41 AM             Narrative CT ABDOMEN AND PELVIS WITH AND WITHOUT CONTRAST. 4/10/2017 10:52 PM     INDICATION: Gastrointestinal hemorrhage. COMPARISON: 9/4/2016. CT chest 4/5/2017. TECHNIQUE: CT of the abdomen and pelvis was performed. Images of the abdomen  were obtained before contrast and in the arterial and venous phases. 95 cc IV  contrast (Isovue-370) was administered. CT dose reduction was achieved through  use of a standardized protocol tailored for this examination and automatic  exposure control for dose modulation. FINDINGS:  The study is limited by patient body habitus: redundant soft tissue causes  photon starvation, and the flank(s) touching the gantry cause(s) beam hardening  artifact. Inferior chest: Pulmonary emboli in the right arteries are stable or slightly  decreased from prior. Pulmonary emboli in the visualized left lower lobe  arteries have resolved. A new area of airspace consolidation in the right upper  lobe peripherally is probably a small developing pulmonary infarction. The heart  size is normal. The left hemidiaphragm is eventrated or elevated.     Abdomen: Incidental note is made of subcentimeter hypodense bilateral renal  lesions which are too small to characterize, but likely represent cysts. The  distal esophagus, stomach, duodenum, liver, gallbladder, pancreas, spleen,  adrenals, and kidneys are otherwise normal.    Pelvis: On arterial phase, there is intraluminal contrast extravasation in the  sigmoid colon (5-165, 5-166). On image [id number], a small soft tissue mass arises  from the superior aspect of the sigmoid colon and measures 16 x 24 mm. This is  likely the source of the hemorrhage. There is a small amount of inflammation  around it. This could represent either a diverticulitis or a small colon  carcinoma. Diverticulitis is slightly favored. Another potential source of GI  bleeding is a fistula tract from the left anterior aspect of the anorectal  junction at 2:00 on image 6-to 15. The tract measures nearly 8 mm in diameter. It extends to a small abscess measuring 34 x 21 x 26 mm. The abscess lies left  posterior inferior to the prostate and probably extends into the base of the  penis. Impression IMPRESSION:  1. Sigmoid colonic hemorrhage. 2. Small adjacent mass in the sigmoid. Diverticulitis slightly favored over a  small colon carcinoma. 3. 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 4. Resolved pulmonary emboli in the visualized portions of the left lower lobe. Stable or slightly decreased right lung emboli with small developing right upper  lobe pulmonary infarction. The findings were called to Dr. Juan Garza on 4/10/2017 11:34 PM by Dr. Ernesto Fitzpatrick. 1        MRI Results (recent):  No results found for this or any previous visit.    _____________________________________________________________________    Review of Systems: 10 point ROS was performed. Pertinent positives listed in HPI.    Denies:   angina, palpitations, paresthesias, vision loss, aphasia, confusion, fever, chills, falls, headache, diplopia, back pain, neck pain, prior episodes of vertigo, hallucinations, new medications or dosage changes. _____________________________________________________________________  Impression  73 y.o. male with onset of GIB and found to have PE/DVT with history of MG and recent exacerbation. Exam findings of lethargic Obese WM who cannot provide much history but felt better this AM since admission. Imaging reviewed:  Bilateral pulmonary emboli per CT chest.   Notable Labs include TSH low with hyperthyroidism. Feel patient can be weaned down slightly on steroids and will give 2 doses of IVIG as he was to receive OP. Refer to plan below. Assessment:  1. MG, recent exacerbation   2. PE        Plan  · Hold IVIG 60gm x 2 days (until cardiology works up the angina and troponins today---will consider giving IVIG tomorrow)   · Reduce Prednisone to 40mg daily before next appointment with Dr. Sanya Marr in early May  · PT/OT when able to be more mobile  ·   Continue great care by collaborating care team and nursing staff. ·  Testing results discussed with patient --- any questions were answered. My collaborating care team physician may have further recommendations.     On DVT Prophylaxis yes no   Continue SCD's  while inpatient x      Care Plan discussed with:  Patient x   Family-- sister x   RN x   Care Manager    Consultant/Specialist:     Patient will be discussed with Dr. Halie Pratt  ______________________________________________________________  Hospital Problems  Date Reviewed: 4/17/2017          Codes Class Noted POA    DVT (deep venous thrombosis) ([facility] [address] ICD-10-CM: I82.409  ICD-9-CM: 453.40  4/14/2017 Unknown        Malignant neoplasm of sigmoid colon ([facility] [address] ICD-10-CM: C18.7  ICD-9-CM: 153.3  4/14/2017 Unknown        Abscess of abdominal cavity ([facility] [address] ICD-10-CM: K65.1  ICD-9-CM: 567.22  4/14/2017 Unknown        Colonic mass ICD-10-CM: K63.9  ICD-9-CM: 569.89  4/12/2017 Yes        Lung nodules ICD-10-CM: R91.8  ICD-9-CM: 793.19  4/12/2017 Yes        Acute blood loss anemia ICD-10-CM: D62  ICD-9-CM: 285.1  4/11/2017 Yes        Acute pulmonary embolus (HCC) ICD-10-CM: I26.99  ICD-9-CM: 415.19  4/11/2017 Yes        * (Principal)GI bleed ICD-10-CM: K92.2  ICD-9-CM: 578.9  4/10/2017 Yes        Type II diabetes mellitus (HCC) (Chronic) ICD-10-CM: E11.9  ICD-9-CM: 250.00  4/10/2017 Yes        History of stroke (Chronic) ICD-10-CM: E63.18  ICD-9-CM: V12.54  4/5/2017 Yes        Morbid obesity with BMI of 40.0-44.9, adult (HCC) (Chronic) ICD-10-CM: E66.01, Z68.41  ICD-9-CM: 278.01, V85.41  4/5/2017 Yes        Myasthenia gravis with exacerbation, adult form ([facility] [address] (Chronic) ICD-10-CM: G70.01  ICD-9-CM: 358.01  4/4/2017 Yes        PAF (paroxysmal atrial fibrillation) (HCC) (Chronic) ICD-10-CM: I48.0  ICD-9-CM: 427.31  9/3/2016 Yes              *Thank you for allowing [facility], to participate in the care of your patient. ---CORBIN Eisenberg  ================================================    ADDENDUM--> Collaborating Care Team Physician:        NEUROLOGY NOTE ADDENDUM:    April 17, 2017    I have reviewed the documentation provided by the nurse practitioner, discussed her findings, clinical impression, and the proposed management plans with regards to this encounter. I have personally evaluated the patient and verified the history and confirmed the physical findings. Below are my additional findings:    Reviewed EMR notes, Dr. Juliano Pacheco last clinic note from 4-4-17 and notes since admission. Dr. Juliano Pacheco note indicates that they tried reducing his prednisone from 50 mg/ day to 40 mg/ day but pt complained about increased dyspnea so they increased it back, kept his cellcept at 1000 mg BID, kept his mestinon at 60 mg TID and planned on doing 2 days of IVIG. Pt went to PCP next day c/o dyspnea, had abnormal EKG so sent to ER for evaluation.   Admitted from 4-5 to 4-10 during which time they found him to have pulmonary emboli, in setting of hx of pAF, acute renal failure. He was started on Eliquis and discharged on 4-10. Came back to ER on 4-10-17 for bright red blood per rectum. Underwent urgent sigmoid colectomy to stop bleeding and IVC filter to prevent further PEs. Neurology asked to evaluate for possible reduction of his prednisone due to presence of other issues (PE) that may account for his dyspnea. Patient Vitals for the past 12 hrs:   Temp Pulse Resp BP SpO2   04/17/17 1800 - 85 17 95/43 (!) 79 %   04/17/17 1700 98 °F (36.7 °C) 91 25 123/79 100 %   04/17/17 1600 - 89 20 94/77 -   04/17/17 1452 - 79 - - -   04/17/17 1400 - 81 17 94/47 100 %   04/17/17 1300 - 83 16 112/53 95 %   04/17/17 1059 98.1 °F (36.7 °C) 94 24 134/76 96 %   04/17/17 0900 98.4 °F (36.9 °C) 98 23 (!) 161/98 97 %       Neurologic exam:   Pt resting in bed, lethargic, arouses to voice and shaking shoulders  Opens eyes, right eye appears medially rotated then straightens out  Face symmetric  Speech slurry but can repeat simple phrases (tip top, apple, fifty-fifty)  Cannot assess sensation due to patient confusion  Lifts both arms in air (4/ 5)  Moves both legs weakly (2/ 5)    A)  73 y.o. male with hx of paroxysmal A Fib, hx of Myasthenia Gravis, on multiple MG meds at baseline  Admitted since 4-5-17 with dyspnea due to bilateral PEs, had GI bleeding from anti-coagulation  Having dyspnea and lethargy, mild transient diplopia    From a MG standpoint, I'm going to increase his prednisone back to 50 mg/ day. Mestinon was decreased to BID for some reason, but I'm going to take it from his outpatient dose (60 mg TID) to 90 mg TID. Will hold off on restarting his Cellcept as Heme-Onc has dx'd him with stage I colon CA (very tiny with negative lymph nodes) and has plans for outpatient surveillance in conjunction with GI scopes. May need IVIG during admission if he remains lethargic. Will reassess daily.         Signed By: Carmen Barragan Jos Antoine MD     April 17, 2017

## 2017-04-17 NOTE — PROGRESS NOTES
Nutrition Assessment:    RECOMMENDATIONS/INTERVENTION(S):   Further diet advancement per surgery--when able to fully advance, recommend Diabetic (CCD)/Gluten Free diet    Start Ensure High Protein TID     Monitor BG, electrolytes  Honor food preferences as diet permits  Continue to monitor appetite/PO intake, diet/ONS tolerance    ASSESSMENT:   4/17: Oncology following for colon CA. Pt being evaluated for CP this morning - moving to 324. Talked with sister outside room. Improved appetite. Tolerating diet. Will start ONS to aid in meeting needs while remains on Full liquids. Pt will need CCD education prior to D/C - new DM dx. Continued prednisone. -[phone]. K & Phos low. Improving edema status. 4/13:  Discussed case in ICU rounds. Sleep apnea causing O2 desats, pt now on Bi-pap. POD#2 ex lap, sigmoidectomy, end colostomy for lower GI bleed. Surgery notes CLD until bowel function returns. Last BM 4/13, hypoactive BS, poor appetite. WOCN in room currently. Will start Ensure Clear while awaiting diet advancement. Off IVF. Labs/meds reviewed. -[phone]. On prednisone. 2+ pitting edema BLE, BUE, generalized 1+. Will attempt f/u visit w/ pt again to encourage PO of diet/ONS. 4/11:  Chart reviewed. This is a 74 yo male s/p exp lap with sigmoid colectomy, colostomy. Hx Afib, DM, PE, myasthenia gravis. Visited pt this afternoon. Lethargic from recent surgery. Dietary hx obtained from sister. This writer asked about patient's fructose allergy. She reported pt does not consume \"sugar\" and follows a gluten-free diet at home. ? sister understanding as to what foods contain fructose as she stated pt does consume fresh fruit daily. Diet at home typically consists of meat, fresh fruit, and vegetables--no breads, cereals, or pastas including those that are gluten-free. \"He doesn't eat any of that even if it is gluten free. \"  Labs/Meds reviewed.   -[phone], A1c elevated 10.5%--? newly dx DM. Mg repleted. LBM noted 4/22. Skin--no pressure injury; abd incision; 2+ pitting edema BUE, 3+ pitting edema BLE. Central line in place. Weight record reviewed. Pt with weight loss since January, however amount not significant for timeframe. SUBJECTIVE/OBJECTIVE:     Diet Order: Full liquids  % Eaten:    No data found. Pertinent Medications: [x] Reviewed    Chemistries:  Lab Results   Component Value Date/Time    Sodium 145 04/17/2017 12:57 AM    Potassium 3.5 04/17/2017 12:57 AM    Chloride 105 04/17/2017 12:57 AM    CO2 33 04/17/2017 12:57 AM    Anion gap 7 04/17/2017 12:57 AM    Glucose 107 04/17/2017 12:57 AM    BUN 6 04/17/2017 12:57 AM    Creatinine 0.71 04/17/2017 12:57 AM    BUN/Creatinine ratio 8 04/17/2017 12:57 AM    GFR est AA >60 04/17/2017 12:57 AM    GFR est non-AA >60 04/17/2017 12:57 AM    Calcium 8.0 04/17/2017 12:57 AM    AST (SGOT) 22 04/16/2017 01:56 AM    Alk. phosphatase 48 04/16/2017 01:56 AM    Protein, total 4.7 04/16/2017 01:56 AM    Albumin 2.2 04/16/2017 01:56 AM    Globulin 2.5 04/16/2017 01:56 AM    A-G Ratio 0.9 04/16/2017 01:56 AM    ALT (SGPT) 16 04/16/2017 01:56 AM      Anthropometrics: Height: 5' 11\" (180.3 cm) Weight: 149.9 kg (330 lb 7.5 oz)    IBW (%IBW): 75.5 kg (166 lb 7.2 oz) ( ) UBW (%UBW):   (  %)    BMI: Body mass index is 46.09 kg/(m^2). This BMI is indicative of:   [] Underweight    [] Normal    [] Overweight    []  Obesity    [x]  Extreme Obesity (BMI>40)  Estimated Nutrition Needs (Based on): 2287 Kcals/day (BMR (2144) x 1.3 AF - 500) , 113 g (-136 g/d (1.5-1.8 g/Kg IBW)) Protein  Carbohydrate: At Least 130 g/day  Fluids: 2300 mL/day    Last BM: 4/16   [x]Active     []Hyperactive  []Hypoactive       [] Absent   BS  Skin:    [] Intact   [x] Incision--abd  [] Breakdown   [] DTI   [] Tears/Excoriation/Abrasion  [x]Edema--2+ pitting edema BLE [] Other:    Wt Readings from Last 30 Encounters:   04/13/17 149.9 kg (330 lb 7.5 oz) 04/09/17 140 kg (308 lb 9.6 oz)   04/04/17 137.7 kg (303 lb 9.6 oz)   02/24/17 143.5 kg (316 lb 6.4 oz)   01/23/17 143.3 kg (316 lb)   11/21/16 141.6 kg (312 lb 3.2 oz)   10/21/16 142.9 kg (315 lb 1.6 oz)   10/01/16 146.5 kg (323 lb)   09/15/16 (!) 161.7 kg (356 lb 7.7 oz)      NUTRITION DIAGNOSES:   Problem:  Altered GI function Increased protein needs   Etiology: related to GIB, surgical wound healing     Signs/Symptoms: as evidenced by s/p exp lap with sigmoid colectomy and colostomy      NUTRITION INTERVENTIONS:  General, healthful diet; commercial supplement                  GOAL:   Advanced diet w/ no s/s of intolerance in the next 2-4 days    Cultural, Religion, or Ethnic Dietary Needs: None     LEARNING NEEDS (Diet, Food/Nutrient-Drug Interaction):    [] None Identified   [] Identified and Education Provided/Documented   [x] Identified and Pt declined/was not appropriate - need CCD before D/C      [] Interdisciplinary Care Plan Reviewed/Documented    [] Discharge Needs:    tbd   [] No Nutrition Related Discharge Needs    NUTRITION RISK:   Pt Is At Nutrition Risk  [x]     No Nutrition Risk Identified  []       PT SEEN FOR:    []  MD Consult: []Calorie Count      []Diabetic Diet Education        []Diet Education     []Electrolyte Management     []General Nutrition Management and Supplements     []Management of Tube Feeding     []TPN Recommendations    []  RN Referral:  []MST score >=2     []Enteral/Parenteral Nutrition PTA     []Pregnant: Gestational DM or Multigestation                 [] Pressure Ulcer       []  Low BMI      []  Length of Stay       [] Dysphagia Diet         [x] Ventilator--s/p extubation post-op      []  [address] [facility], [address]   Pager [phone]

## 2017-04-17 NOTE — PROGRESS NOTES
Primary Nurse Ingrid Tang RN and Karina Queen RN performed a dual skin assessment on this patient Impairment noted- see wound doc flow sheet  Srinivas score is 11      Bedside and Verbal shift change report given to Moreno Yang RN (oncoming nurse) by Geovanna Hernandez RN (offgoing nurse). Report included the following information SBAR, Procedure Summary, Intake/Output, MAR and Cardiac Rhythm Afib.

## 2017-04-17 NOTE — PROGRESS NOTES
Alvino Keita [facility]  [address] [facility] [place], [place], [address] [place] [address]  [phone]      Medical Progress Note      NAME: Sera Simental. :  1945  MRM:  [id number]    Date/Time: 2017          Subjective:     Chief Complaint:  F/u PE / DVT, bowel resection    Chart/notes/labs/studies reviewed, patient examined at bedside. Had CP this morning. See earlier note. Then became obtunded. Given narcan x 2. Finally improving slowly after 2nd dose. ABG repeated. No hypercapnea. +CP, +SOB. No abd pain             Objective:       Vitals:          Last 24hrs VS reviewed since prior progress note. Most recent are:    Visit Vitals    /79    Pulse 91    Temp 98 °F (36.7 °C)    Resp 25    Ht 5' 11\" (1.803 m)    Wt 149.9 kg (330 lb 7.5 oz)    SpO2 100%    BMI 46.09 kg/m2     SpO2 Readings from Last 6 Encounters:   17 100%   17 94%   17 96%   17 96%   16 97%   10/21/16 98%    O2 Flow Rate (L/min): 6 l/min       Intake/Output Summary (Last 24 hours) at 17 1724  Last data filed at 17 7730   Gross per 24 hour   Intake                0 ml   Output             1725 ml   Net            -1725 ml          Exam:     Physical Exam:    Gen: obese, frail, elderly, chronically ill-appearing, in no acute distress. HEENT: Pink conjunctivae, hearing intact to voice, moist mucous membranes  Neck: Supple, without masses, thyroid non-tender  Resp: No accessory muscle use, clear breath sounds without wheezes rales or rhonchi  Card: No murmurs, tachycardic, irregularly irregular. No bruits or peripheral edema  Abd: Soft, NTTP. Obese. Stoma pink. Dark liquid output in ostomy. Musc: No cyanosis or clubbing  Skin: No rashes or ulcers, skin turgor is good  Neuro: Cranial nerves are grossly intact, no focal motor weakness moving all extremities, follows commands appropriately.  Was obtunded finally improving slowly after 2 doses of narcan  Psych: limited insight, oriented to person and place        Medications Reviewed: (see below)    Lab Data Reviewed: (see below)    ______________________________________________________________________    Medications:     Current Facility-Administered Medications   Medication Dose Route Frequency    labetalol (NORMODYNE;TRANDATE) injection 10 mg  10 mg IntraVENous Q6H PRN    [START ON 4/18/2017] predniSONE (DELTASONE) tablet 40 mg  40 mg Oral DAILY WITH BREAKFAST    dilTIAZem CD (CARDIZEM CD) capsule 300 mg  300 mg Oral DAILY    enoxaparin (LOVENOX) injection 40 mg  40 mg SubCUTAneous Q12H    And    enoxaparin (LOVENOX) injection 100 mg  100 mg SubCUTAneous Q12H    oxyCODONE-acetaminophen (PERCOCET) 5-325 mg per tablet 1-2 Tab  1-2 Tab Oral Q4H PRN    HYDROmorphone (PF) (DILAUDID) injection 0.5 mg  0.5 mg IntraVENous Q3H PRN    nitroglycerin (NITROBID) 2 % ointment 0.5 Inch  0.5 Inch Topical Q6H    carvedilol (COREG) tablet 25 mg  25 mg Oral BID WITH MEALS    diphenhydrAMINE (BENADRYL) injection 25 mg  25 mg IntraVENous Q6H PRN    piperacillin-tazobactam (ZOSYN) 3.375 g in 0.9% sodium chloride (MBP/ADV) 100 mL  3.375 g IntraVENous Q8H    0.9% sodium chloride infusion 250 mL  250 mL IntraVENous PRN    sodium chloride (NS) flush 5-10 mL  5-10 mL IntraVENous Q8H    sodium chloride (NS) flush 5-10 mL  5-10 mL IntraVENous PRN    acetaminophen (TYLENOL) tablet 650 mg  650 mg Oral Q4H PRN    ondansetron (ZOFRAN) injection 4 mg  4 mg IntraVENous Q4H PRN    insulin lispro (HUMALOG) injection   SubCUTAneous AC&HS    glucose chewable tablet 16 g  4 Tab Oral PRN    dextrose (D50W) injection syrg 12.5-25 g  12.5-25 g IntraVENous PRN    glucagon (GLUCAGEN) injection 1 mg  1 mg IntraMUSCular PRN    ALPRAZolam (XANAX) tablet 0.25 mg  0.25 mg Oral TID PRN    ascorbic acid (vitamin C) (VITAMIN C) tablet 1,000 mg  1,000 mg Oral DAILY    cholecalciferol (VITAMIN D3) tablet 5,000 Units  5,000 Units Oral DAILY    pyridostigmine (MESTINON) tablet 60 mg  60 mg Oral BID    traMADol (ULTRAM) tablet 50 mg  50 mg Oral Q6H PRN    pantoprazole (PROTONIX) 40 mg in sodium chloride 0.9 % 10 mL injection  40 mg IntraVENous Q12H    0.9% sodium chloride infusion 250 mL  250 mL IntraVENous PRN            Lab Review:     Recent Labs      04/17/17   0057  04/16/17   0156  04/15/17   0701   WBC  8.4  9.1  11.6*   HGB  8.3*  8.4*  8.7*   HCT  26.1*  25.2*  26.5*   PLT  251  200  201     Recent Labs      04/17/17   0057  04/16/17   0156  04/15/17   0701   NA  145  142  142   K  3.5  3.2*  3.6   CL  105  105  104   CO2  33*  31  32   GLU  107*  111*  101*   BUN  6  5*  7   CREA  0.71  0.70  0.62*   CA  8.0*  8.1*  8.1*   MG  1.8  1.8  1.9   PHOS  3.3  2.2*  2.6   ALB   --   2.2*   --    SGOT   --   22   --    ALT   --   16   --      No components found for: GLPOC  Recent Labs      04/17/17   1414  04/17/17   1147   PH  7.48*  7.47*   PCO2  42  44   PO2  94  71*   HCO3  31*  31*     No results for input(s): INR in the last 72 hours. No lab exists for component: INREXT, INREXT  No results found for: SDES  Lab Results   Component Value Date/Time    Culture result: MRSA NOT PRESENT 04/12/2017 05:49 PM    Culture result:  04/12/2017 05:49 PM         Screening of patient nares for MRSA is for surveillance purposes and, if positive, to facilitate isolation considerations in high risk settings. It is not intended for automatic decolonization interventions per se as regimens are not sufficiently effective to warrant routine use. Culture result: ENTEROBACTER CLOACAE 09/16/2016 10:10 PM    Culture result: KLEBSIELLA OXYTOCA 09/16/2016 10:10 PM            Assessment / Plan:     74 yo WM w/ hx of pAF on Elquis, DVT/PE, MG, DM p/w GIB, found to have colon CA. Hospitalization c/b issues as below:    Chest pain: concerning for angina. EKG with ischemic changes.  Initial trop 0.06  -- trend Gildardo  -- cont therapeutic Lovenox  -- added ASA  -- cardiology following  -- decreased nitropaste, added holding parameters(BP decreased to 20F systolic)  -- cont BB but watch HR and BP  -- check FLP, may need statin    AMS: became obtunded after nurse gave IV Dilaudid. Improved/resolved after 2 doses of Narcan. STAT head CT showed no intracranial bleed. Checked on pt again later and he was much improved. -- hold opioids for sedation or respiratory depression  -- CPAP QHS and during naps    GI bleed (4/10/2017) / Acute blood loss anemia: due to apparent diverticular mass / colon cancer. s/p sigmoidectomy and colostomy  -- cont Lovenox  -- cont IV Zosyn due to findings of diverticulitis  -- follow up with surveillance c-scope and with oncology outpatient      Acute bilateral pulmonary embolus / LLE DVT: diagnosed last admission, s/p IVC filter  -- Lovenox as above    PAF (paroxysmal atrial fibrillation) ([facility] (9/3/2016) / accelerated HTN: Cardiology following. Off digoxin  -- cont diltiazem  -- cont Coreg     Myasthenia gravis with exacerbation, adult form ([facility] (4/4/2017)  -- cont pyridostigmine  -- appreciate neurology input.  Weaning steroids     History of stroke (4/5/2017): was off anticoagulation due to bleed as above  -- on Lovenox     Morbid obesity with BMI of 40.0-44.9, adult ([facility] (4/5/2017)  -- counseled on weight loss     Type II diabetes mellitus ([facility] (4/10/2017):   -- follow BS on SSI      Possible KWADWO: desats at night pretty consistently  -- CPAP QHS    Lung nodule:   -- will need repeat CT imaging per guidelines      Total time spent with patient:  35 minutes, critical care                 Care Plan discussed with: Patient, Nursing Staff and >50% of time spent in counseling and coordination of care    Discussed:  Care Plan    Prophylaxis:  Lovenox    Disposition:  SNF/LTC           ___________________________________________________    Attending Physician: Anabella Gutierrez MD

## 2017-04-17 NOTE — PROGRESS NOTES
4- CASE MANAGEMENT NOTE:  I have reviewed the pt's EMR and will assist with discharge planning as indicated. I spoke with the liaison from [address] [place] [address] and they are following the pt for possible admission there. There have been no therapy notes since 4- and they will require updated PT/OT notes.  REBECCA Knox, CM

## 2017-04-17 NOTE — PROGRESS NOTES
General Surgery Daily Progress Note    Patient: Yancy Paz. MRN: [id number]  SSN: [id number]    YOB: 1945  Age: 73 y.o. Sex: male      Admit Date: 4/10/2017    Subjective:   Pain controlled with PCA, no N/V, tolerating full liquids. No ostomy output.      Current Facility-Administered Medications   Medication Dose Route Frequency    labetalol (NORMODYNE;TRANDATE) injection 10 mg  10 mg IntraVENous Q6H PRN    [START ON 4/18/2017] predniSONE (DELTASONE) tablet 40 mg  40 mg Oral DAILY WITH BREAKFAST    dilTIAZem CD (CARDIZEM CD) capsule 300 mg  300 mg Oral DAILY    enoxaparin (LOVENOX) injection 40 mg  40 mg SubCUTAneous Q12H    And    enoxaparin (LOVENOX) injection 100 mg  100 mg SubCUTAneous Q12H    nitroglycerin (NITROBID) 2 % ointment 1 Inch  1 Inch Topical Q6H    carvedilol (COREG) tablet 25 mg  25 mg Oral BID WITH MEALS    diphenhydrAMINE (BENADRYL) injection 25 mg  25 mg IntraVENous Q6H PRN    piperacillin-tazobactam (ZOSYN) 3.375 g in 0.9% sodium chloride (MBP/ADV) 100 mL  3.375 g IntraVENous Q8H    0.9% sodium chloride infusion 250 mL  250 mL IntraVENous PRN    HYDROmorphone (PF) 15 mg/30 ml (DILAUDID) PCA   IntraVENous TITRATE    sodium chloride (NS) flush 5-10 mL  5-10 mL IntraVENous Q8H    sodium chloride (NS) flush 5-10 mL  5-10 mL IntraVENous PRN    acetaminophen (TYLENOL) tablet 650 mg  650 mg Oral Q4H PRN    oxyCODONE-acetaminophen (PERCOCET) 5-325 mg per tablet 1 Tab  1 Tab Oral Q4H PRN    naloxone (NARCAN) injection 0.4 mg  0.4 mg IntraVENous PRN    ondansetron (ZOFRAN) injection 4 mg  4 mg IntraVENous Q4H PRN    insulin lispro (HUMALOG) injection   SubCUTAneous AC&HS    glucose chewable tablet 16 g  4 Tab Oral PRN    dextrose (D50W) injection syrg 12.5-25 g  12.5-25 g IntraVENous PRN    glucagon (GLUCAGEN) injection 1 mg  1 mg IntraMUSCular PRN    ALPRAZolam (XANAX) tablet 0.25 mg  0.25 mg Oral TID PRN    ascorbic acid (vitamin C) (VITAMIN C) tablet 1,000 mg  1,000 mg Oral DAILY    cholecalciferol (VITAMIN D3) tablet 5,000 Units  5,000 Units Oral DAILY    pyridostigmine (MESTINON) tablet 60 mg  60 mg Oral BID    traMADol (ULTRAM) tablet 50 mg  50 mg Oral Q6H PRN    pantoprazole (PROTONIX) 40 mg in sodium chloride 0.9 % 10 mL injection  40 mg IntraVENous Q12H    0.9% sodium chloride infusion 250 mL  250 mL IntraVENous PRN        Objective:      04/15 1901 - 04/17 0700  [facility]: -   [facility] [id number] [id number]  Patient Vitals for the past 8 hrs:   BP Temp Pulse Resp SpO2   04/17/17 1059 134/76 98.1 °F (36.7 °C) 94 24 96 %   04/17/17 0900 (!) 161/98 98.4 °F (36.9 °C) 98 23 97 %   04/17/17 0700 - - 98 - -       Physical Exam:  General: Sleepy, cooperative, NAD  Lungs: Unlabored  Heart:  Tachycardic  Abdomen: Soft, ATTP, distended. + bowel sounds. Incision c/d/i, ostomy pink, SS drainage in bag. Extremities: Warm, moves all  Skin:  Warm and dry, no rash    Labs:   Recent Labs      04/17/17   0057   WBC  8.4   HGB  8.3*   HCT  26.1*   PLT  251     Recent Labs      04/17/17   0057  04/16/17   0156   NA  145  142   K  3.5  3.2*   CL  105  105   CO2  33*  31   GLU  107*  111*   BUN  6  5*   CREA  0.71  0.70   CA  8.0*  8.1*   MG  1.8  1.8   PHOS  3.3  2.2*   ALB   --   2.2*   TBILI   --   0.5   SGOT   --   22   ALT   --   16       Assessment / Plan:   · POD#5 ex lap, sigmoidectomy, end colostomy for lower GI bleed  · Hgb stable on Eliquis  · On full liquids, will hold on advancing until there is ostomy output   · Transition to PO pain medication   · Daily dressing changes.    · Cardiology managing AF w/ RVR, on Cardizem PO  · Leukocytosis- resolved  · D/c briggs   · PT/OT, encourage IS

## 2017-04-17 NOTE — PROGRESS NOTES
Bedside shift change report given to Arlene Taylor (oncoming nurse) by Young Garvey (offgoing nurse). Report included the following information SBAR, Kardex, Intake/Output and MAR.

## 2017-04-17 NOTE — PROGRESS NOTES
Responded to bedside for CP. Left sided, with associated dyspnea. EKG obtained, showing chronic AF, dynamic ST-T changes V3-V6. Send cardiac enzymes. Change Eliquis back to therapeutic Lovenox. Give full dose ASA now. Transfer to tele. Discussed with cardiology Brendon Dejesus DNP.

## 2017-04-17 NOTE — PROGRESS NOTES
4/17/2017  10:39 AM  CM called At [facility] to notify pt will be going to rehab at D/C, awaiting placement,. CM will follow.     Khushbu Degroot  Case Management

## 2017-04-17 NOTE — PROGRESS NOTES
Occupational Therapy Note: Pt transferred from 4th floor to [address] [place] [address] and now on tele. RN requesting therapies defer treatment at this time, multiple disciplines in patient's room. Will continue to follow.

## 2017-04-17 NOTE — PROGRESS NOTES
Progress Note                                        Alton Sosa MD, [address] [facility] [address], [place], [address] [place] [address] N[place]                                                 Phone [phone]; Fax [phone]        2017 7:03 AM     Admit Date:           4/10/2017  Admit Diagnosis:  GI bleed;GI Bleeding  :          1945   MRN:          [id number]   ASSESSMENT/RECOMMENDATION:   Persistent afib   -coreg at 25 mg  - change Cardizem to 300 mg  - can use dig prn   - on eliquid  anticoagulation   HTN-  - coreg, cardizem dosages increased since BP significantly elevated with elevated HR  - continue catapress patch  Anemia- hgb 8.3  Lower GI bleed/inferior mesenteric artery bleed s/p coils/s/p exp lap w/ sigmoid colon resection, colostomy, ? Mass adjacent to sigmoid colon + CA  Hx PE/DVT - s/p IVC filter  Myasthenia gravis : PT/OT                  Last 3 Recorded Weights in this Encounter    17 0306 17 0500 17 0300   Weight: 331 lb 2.1 oz (150.2 kg) 323 lb 13.7 oz (146.9 kg) 330 lb 7.5 oz (149.9 kg)         04/15 1901 -  0700  In: -   Out: Jennifer Reyes [Urine:7525]    SUBJECTIVE           Anette Chin. is a 73y.o. year old male w/ hx: AFib cx by T2DM, Myasthenia gravis, morbid obesity, recent PE discharged 17 from [facility] for PE/a fib. He presented today with chief complain of rectal bleeding. Per sister, the pt this morning began experiencing several episodes of bright red rectal bleeding, prompting the pt to be brought to the ED. Became weak and dizzy at home. Anette Chin. reports feeling better. Is not sitting up right. .      Current Facility-Administered Medications   Medication Dose Route Frequency    dilTIAZem CD (CARDIZEM CD) capsule 240 mg  240 mg Oral DAILY    carvedilol (COREG) tablet 25 mg  25 mg Oral BID WITH MEALS    enoxaparin (LOVENOX) injection 40 mg  40 mg SubCUTAneous Q12H    And    enoxaparin (LOVENOX) injection 100 mg  100 mg SubCUTAneous Q12H    diphenhydrAMINE (BENADRYL) injection 25 mg  25 mg IntraVENous Q6H PRN    piperacillin-tazobactam (ZOSYN) 3.375 g in 0.9% sodium chloride (MBP/ADV) 100 mL  3.375 g IntraVENous Q8H    0.9% sodium chloride infusion 250 mL  250 mL IntraVENous PRN    HYDROmorphone (PF) 15 mg/30 ml (DILAUDID) PCA   IntraVENous TITRATE    labetalol (NORMODYNE;TRANDATE) injection 10 mg  10 mg IntraVENous Q6H PRN    sodium chloride (NS) flush 5-10 mL  5-10 mL IntraVENous Q8H    sodium chloride (NS) flush 5-10 mL  5-10 mL IntraVENous PRN    acetaminophen (TYLENOL) tablet 650 mg  650 mg Oral Q4H PRN    oxyCODONE-acetaminophen (PERCOCET) 5-325 mg per tablet 1 Tab  1 Tab Oral Q4H PRN    naloxone (NARCAN) injection 0.4 mg  0.4 mg IntraVENous PRN    ondansetron (ZOFRAN) injection 4 mg  4 mg IntraVENous Q4H PRN    insulin lispro (HUMALOG) injection   SubCUTAneous AC&HS    glucose chewable tablet 16 g  4 Tab Oral PRN    dextrose (D50W) injection syrg 12.5-25 g  12.5-25 g IntraVENous PRN    glucagon (GLUCAGEN) injection 1 mg  1 mg IntraMUSCular PRN    ALPRAZolam (XANAX) tablet 0.25 mg  0.25 mg Oral TID PRN    ascorbic acid (vitamin C) (VITAMIN C) tablet 1,000 mg  1,000 mg Oral DAILY    cholecalciferol (VITAMIN D3) tablet 5,000 Units  5,000 Units Oral DAILY    cloNIDine (CATAPRES) 0.2 mg/24 hr patch 1 Patch  1 Patch TransDERmal Q7D    predniSONE (DELTASONE) tablet 50 mg  50 mg Oral DAILY WITH BREAKFAST    pyridostigmine (MESTINON) tablet 60 mg  60 mg Oral BID    traMADol (ULTRAM) tablet 50 mg  50 mg Oral Q6H PRN    pantoprazole (PROTONIX) 40 mg in sodium chloride 0.9 % 10 mL injection  40 mg IntraVENous Q12H    0.9% sodium chloride infusion 250 mL  250 mL IntraVENous PRN      OBJECTIVE               Intake/Output Summary (Last 24 hours) at 04/17/17 0703  Last data filed at 04/17/17 0619   Gross per 24 hour   Intake                0 ml   Output             4825 ml   Net            -4825 ml       Review of Systems - History obtained from the patient AS PER  HPI        PHYSICAL EXAM        Visit Vitals    /82 (BP 1 Location: Right arm)    Pulse 97    Temp 98.3 °F (36.8 °C)    Resp 19    Ht 5' 11\" (1.803 m)    Wt 330 lb 7.5 oz (149.9 kg)    SpO2 91%    BMI 46.09 kg/m2       Gen: Obese cooperative   HEENT:  Pink conjunctivae, Hearing grossly normal.No scleral icterus or conjunctival, moist mucous membranes  Neck: Supple,No JVD, No Carotid Bruit, Thyroid- non tender No cervical lymphadenopathy  Resp: No accessory muscle use, Clear breath sounds, No rales or rhonchi (patient rolled over to listen)  Card: irregular   MSK: No cyanosis or clubbing, good capillary refill  Skin: Pale  Neuro:  Cranial nerves are grossly intact, moving all four extremities, no focal deficit, follows commands appropriately  Psych:  Good insight, oriented to person, place and time, alert, Nml Affect  LE: + edema       DATA REVIEW            Laboratory and Imaging have been reviewed by me and are notable for  No results for input(s): CPK, CKMB, TROIQ in the last 72 hours.   Recent Labs      04/17/17   0057  04/16/17   0156  04/15/17   0701   NA  145  142  142   K  3.5  3.2*  3.6   CO2  33*  31  32   BUN  6  5*  7   CREA  0.71  0.70  0.62*   GLU  107*  111*  101*   PHOS  3.3  2.2*  2.6   MG  1.8  1.8  1.9   WBC  8.4  9.1  11.6*   HGB  8.3*  8.4*  8.7*   HCT  26.1*  25.2*  26.5*   PLT  251  200  201             Carmen Paige MD

## 2017-04-17 NOTE — PROGRESS NOTES
Bedside and Verbal shift change report given to Kerry Neal RN (oncoming nurse) by Rosina Auguste RN (offgoing nurse). Report included the following information SBAR, Kardex, ED Summary, Intake/Output and Recent Results.

## 2017-04-17 NOTE — ADVANCED PRACTICE NURSE
Called to resee this am for chest pain ( see note from Dr Sandra Luong earlier today)  Onset ~ 10:30 left sided, described as dull, off and on, no radiation, mild diaphoresis. Currently pain free   New anterio lateral St- T wave changes noted.    will transfer to tele   Eliquis changed to  lovenox full dose ( was taking eliquis for hx DVT/PE  Full dose  ASA given  Cont  coreg  Add topical nitrates   Serial troponins   Dr Scott Overall to see today   D/w Dr Odette Malave

## 2017-04-17 NOTE — WOUND CARE
OSTOMY CONSULT:  Patient assessed and chart reviewed. Patient is POD #6, from a Exploratory laparotomy, sigmoid   colectomy, and Marin's. .  Patient is drowsy but sister in room and receptive to teaching. Supplies gathered at bedside. Pouch removed, peristomal skin cleansed with warm water, stoma is flat, moist, pale sloughing, periwound skin is mildly excoriated. Stoma draining scant amount of serosanguineous and neg flatus. Skin prep applied to periwound skin, a two piece cut to fit 2 3/4 appliance applied with stoma adhesive paste around appliance inner Sun'aq. Will continue to follow patient for stoma management and education.      Vandana Marie RN

## 2017-04-17 NOTE — PROGRESS NOTES
1858.   Call to Dr. Sarah Chapman to clarify patient's PRN order for lebetalol. 1111. Patient complained of chest pain with inspiration. Dr. Sarah Chapman notified- order for EKG obtained. VSS.      1126. EKG results read to Dr. Sarah Chapman via telephone. Orders for chest xray & ABG stat received. 1215. Report called to Brandie King on [facility].      8036. Patient transported to [facility].   VSS upon arrival.

## 2017-04-18 NOTE — PROGRESS NOTES
PHYSICAL THERAPY:Pt on Hold for PT yesterday per RN after transfer to [facility]. PT will attempt to work with pt today.

## 2017-04-18 NOTE — PROGRESS NOTES
Shift Summary  4/18/2017  [id number]     Pt asleep at time of bedside shift report received from [facility]. Afternoon meds given without difficulty. Pt c/o pain in left hand which is swollen (see physical assessment), warm compress applied for comfort. Bedside and Verbal shift change report given to Marino Bates (oncoming nurse) by Kristin Butt (offgoing nurse). Report included the following information SBAR, Kardex, Procedure Summary, Intake/Output, MAR, Recent Results and Cardiac Rhythm Afib.

## 2017-04-18 NOTE — PROGRESS NOTES
Progress Note                                        Alton Pelletier MD, [address] [facility] [address], [place], [address] [place] [address] [place]                                                 Phone [phone]; Fax [phone]        2017 7:03 AM     Admit Date:           4/10/2017  Admit Diagnosis:  GI bleed  GI Bleeding  :          1945   MRN:          [id number]   ASSESSMENT/RECOMMENDATION:   Persistent afib : HR 90's  -coreg at 25 mg  - cardizem 300 mg every day   - lovenox  Chest pain/TWI ant leads:  - will need stress at some point when his overall condition improves. - ck lipids  - BB, asa. 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 PATIENT was  Personally examined and chart reviewed. All the elements of history and examination were personally performed and I agree with the plan as listed above. Treatment plan was addressed with the patient. Mario Guo MD             Last 3 Recorded Weights in this Encounter    17 0306 17 0500 17 0300   Weight: 331 lb 2.1 oz (150.2 kg) 323 lb 13.7 oz (146.9 kg) 330 lb 7.5 oz (149.9 kg)          1901 -  0700  In: 340 [P.O.:240; I.V.:100]  Out: 3725 [Urine:3275]    SUBJECTIVE           [facility] Romelia. is a 73y.o. year old male w/ hx: AFib cx by T2DM, Myasthenia gravis, morbid obesity, recent PE discharged 17 from [facility] for PE/a fib. He presented today with chief complain of rectal bleeding.  Per sister, the pt this morning began experiencing several episodes of bright red rectal bleeding, prompting the pt to be brought to the ED. Became weak and dizzy at home. Anette Cortes. reports pain in chest with deep inspiration only, at times radiates to abdomen. No SOB. Episode of chest pain lasting few minutes yesterday. EKG done showed a fob with Nonspecific T wave abnormality, worse in Inferior leads  T wave inversion now evident in Anterior leads. Trops flat. Started on NTP/lovenox full dose.        Current Facility-Administered Medications   Medication Dose Route Frequency    labetalol (NORMODYNE;TRANDATE) injection 10 mg  10 mg IntraVENous Q6H PRN    dilTIAZem CD (CARDIZEM CD) capsule 300 mg  300 mg Oral DAILY    enoxaparin (LOVENOX) injection 40 mg  40 mg SubCUTAneous Q12H    And    enoxaparin (LOVENOX) injection 100 mg  100 mg SubCUTAneous Q12H    oxyCODONE-acetaminophen (PERCOCET) 5-325 mg per tablet 1-2 Tab  1-2 Tab Oral Q4H PRN    HYDROmorphone (PF) (DILAUDID) injection 0.5 mg  0.5 mg IntraVENous Q3H PRN    nitroglycerin (NITROBID) 2 % ointment 0.5 Inch  0.5 Inch Topical Q6H    aspirin chewable tablet 81 mg  81 mg Oral DAILY    predniSONE (DELTASONE) tablet 50 mg  50 mg Oral DAILY WITH BREAKFAST    pyridostigmine (MESTINON) tablet 90 mg  90 mg Oral TID    carvedilol (COREG) tablet 25 mg  25 mg Oral BID WITH MEALS    diphenhydrAMINE (BENADRYL) injection 25 mg  25 mg IntraVENous Q6H PRN    0.9% sodium chloride infusion 250 mL  250 mL IntraVENous PRN    sodium chloride (NS) flush 5-10 mL  5-10 mL IntraVENous Q8H    sodium chloride (NS) flush 5-10 mL  5-10 mL IntraVENous PRN    acetaminophen (TYLENOL) tablet 650 mg  650 mg Oral Q4H PRN    ondansetron (ZOFRAN) injection 4 mg  4 mg IntraVENous Q4H PRN    insulin lispro (HUMALOG) injection   SubCUTAneous AC&HS    glucose chewable tablet 16 g  4 Tab Oral PRN    dextrose (D50W) injection syrg 12.5-25 g  12.5-25 g IntraVENous PRN    glucagon (GLUCAGEN) injection 1 mg  1 mg IntraMUSCular PRN    ALPRAZolam (XANAX) tablet 0.25 mg  0.25 mg Oral TID PRN    ascorbic acid (vitamin C) (VITAMIN C) tablet 1,000 mg  1,000 mg Oral DAILY    cholecalciferol (VITAMIN D3) tablet 5,000 Units  5,000 Units Oral DAILY    traMADol (ULTRAM) tablet 50 mg  50 mg Oral Q6H PRN    pantoprazole (PROTONIX) 40 mg in sodium chloride 0.9 % 10 mL injection  40 mg IntraVENous Q12H    0.9% sodium chloride infusion 250 mL  250 mL IntraVENous PRN      OBJECTIVE               Intake/Output Summary (Last 24 hours) at 04/18/17 7818  Last data filed at 04/18/17 0557   Gross per 24 hour   Intake              340 ml   Output             2000 ml   Net            -1660 ml       Review of Systems - History obtained from the patient AS PER  HPI        PHYSICAL EXAM        Visit Vitals    /72    Pulse 90    Temp 97.8 °F (36.6 °C)    Resp 20    Ht 5' 11\" (1.803 m)    Wt 330 lb 7.5 oz (149.9 kg)    SpO2 96%    BMI 46.09 kg/m2       Gen: Alert/oriented  HEENT:  Pink conjunctivae, Hearing grossly normal. No scleral icterus or conjunctival, moist mucous membranes  Neck: Supple,No JVD, No Carotid Bruit, Thyroid- non tender No cervical lymphadenopathy  Resp: No accessory muscle use, Clear breath sounds, No rales or rhonchi (patient rolled over to listen)  Card: irregular   MSK: No cyanosis or clubbing, good capillary refill  Skin: Pale  Neuro:  Cranial nerves are grossly intact, moving all four extremities, no focal deficit, follows commands appropriately  Psych:  Fair insight, oriented to person, place and time, alert, Nml Affect  LE: + edema, SCD' s on.         DATA REVIEW            Laboratory and Imaging have been reviewed by me and are notable for  Recent Labs      04/17/17   1823  04/17/17   1140   TROIQ  <0.04  0.06*     Recent Labs      04/17/17   0057  04/16/17   0156   NA  145  142   K  3.5  3.2*   CO2  33*  31   BUN  6  5*   CREA  0.71  0.70   GLU  107*  111*   PHOS  3.3  2.2* MG  1.8  1.8   WBC  8.4  9.1   HGB  8.3*  8.4*   HCT  26.1*  25.2*   PLT  251  200             Ruth Ahumada, NP

## 2017-04-18 NOTE — PROGRESS NOTES
5550 Abdominal site dressing changed, noted part of incision site not approximated, staples intact,  to update surgeon, afebrile, site without redness, warmth or swelling. 9208 Unable to draw labs this morning due to poor access. Phlebotomist attempted about 4times, and did not have success. Will attempt again. 0715 Labs drawn and sent to the lab   Bedside and Verbal shift change report given to [facility] RN (oncoming nurse) by Spencer Pearson (offgoing nurse).  Report included the following information SBAR, Kardex and [facility].   2163 Paged General Surgery, no response

## 2017-04-18 NOTE — PROGRESS NOTES
[address] [facility]  [phone]    Hematology / Oncology Follow-up    Reason for Visit:   George Kaur is a 73 y.o. male who is seen for follow-up of colon cancer, PE. Interval History:   Drowsy but awakens easily; denies any pain at present; tired;  no further complaints but quickly falls back to sleep. Sister at bedside; states able to drink Ensure and had popsicle at lunch but wasn't interested  in the roast beef. Medications reviewed in the EMR. Allergies   Allergen Reactions    Lactose Itching    Fructose Other (comments)     States added sugar to foods causes sore throat/ear ache.  Inderal [Propranolol] Unknown (comments)    Lisinopril Angioedema    Gluten Nausea Only        Review of Systems: A 6-point review of systems was obtained, negative except as reviewed in the HPI. Physical Exam:     Visit Vitals    BP (!) 141/102 (BP 1 Location: Left arm, BP Patient Position: At rest)    Pulse 93    Temp 98.9 °F (37.2 °C)    Resp 22    Ht 5' 11\" (1.803 m)    Wt 149.9 kg (330 lb 7.5 oz)    SpO2 91%    BMI 46.09 kg/m2     General: No distress  Eyes: Anicteric sclerae  HENT: Atraumatic  Neck: Supple  Respiratory: Normal respiratory effort  CV: 2+ edema noted to  Upper and lower extremities. GI: Colostomy noted; Soft, nontender, nondistended, no masses, no hepatomegaly, no splenomegaly  Skin: No rashes, ecchymoses, or petechiae  Psych: Alert, but lethargic. Oriented, appropriate affect, normal judgment/insight        Results:     Lab Results   Component Value Date/Time    WBC 9.3 04/18/2017 07:15 AM    HGB 8.5 04/18/2017 07:15 AM    HCT 28.0 04/18/2017 07:15 AM    PLATELET 681 34/82/6786 07:15 AM    MCV 99.6 04/18/2017 07:15 AM    ABS.  NEUTROPHILS 7.7 04/18/2017 07:15 AM    Hemoglobin (POC) 10.5 04/11/2017 12:50 PM     Lab Results   Component Value Date/Time    Sodium 142 04/18/2017 07:15 AM    Potassium 3.5 04/18/2017 07:15 AM Chloride 103 04/18/2017 07:15 AM    CO2 32 04/18/2017 07:15 AM    Glucose 93 04/18/2017 07:15 AM    BUN 9 04/18/2017 07:15 AM    Creatinine 0.77 04/18/2017 07:15 AM    GFR est AA >60 04/18/2017 07:15 AM    GFR est non-AA >60 04/18/2017 07:15 AM    Calcium 8.5 04/18/2017 07:15 AM    Glucose (POC) 179 04/18/2017 11:14 AM     Lab Results   Component Value Date/Time    Bilirubin, total 0.6 04/18/2017 07:15 AM    ALT (SGPT) 29 04/18/2017 07:15 AM    AST (SGOT) 21 04/18/2017 07:15 AM    Alk. phosphatase 54 04/18/2017 07:15 AM    Protein, total 4.7 04/18/2017 07:15 AM    Albumin 2.3 04/18/2017 07:15 AM    Globulin 2.4 04/18/2017 07:15 AM     Lab Results   Component Value Date/Time    Iron % saturation 17 04/16/2017 01:56 AM    Iron 34 04/16/2017 01:56 AM    TIBC 202 04/16/2017 01:56 AM    Ferritin 235 04/16/2017 01:56 AM    Vitamin B12 1318 09/14/2016 06:02 AM    Homocysteine, plasma 10.8 09/02/2016 08:50 PM    Sed rate, automated 7 10/04/2016 04:30 AM    TSH 0.32 04/05/2017 04:39 PM    JULIA Direct NEGATIVE  09/08/2016 10:03 AM    Lipase 121 04/05/2017 03:49 PM    Hep C  virus Ab Interp. 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 Bilateral pulmonary emboli.   2. RUL airspace disease may represent a pulmonary infarct     4/06/2017 DUPLEX LE  CONCLUSION: Bilateral lower extremity venous duplex positive for deep  venous thrombosis. Negative for thrombophlebitis. 4/10/2017 CTA ABD PELV  IMPRESSION:  1. Sigmoid colonic hemorrhage. 2. Small adjacent mass in the sigmoid. Diverticulitis slightly favored over a  small colon carcinoma. 3. 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 4. Resolved pulmonary emboli in the visualized portions of the left lower lobe. Stable or slightly decreased right lung emboli with small developing right upper  lobe pulmonary infarction. 4/12/2017 XR CHEST   IMPRESSION: Unchanged peripheral right upper lobe airspace disease. Mild  bibasilar atelectasis. 4/17/2017 CT HEAD WO CONT  IMPRESSION: No evidence of acute intracranial process. No change from the prior  study. Assessment and Recommendations:   1. Colon cancer  Stage I ([id number])  4/11/2017 S/p Exp lap; sigmoidectomy and colostomy  Very small focus of tumor (0.2mm), with negative lymph nodes. No role for adjuvant therapy. Recommend proceeding with surveillance as outpatient. Follow up with Dr Penny Lamb established and placed in discharge summary  Follow up with Dr. Andrew Tomlinson for surveillance colonoscopy. 2. Bilateral PE/ LLE DVT (4/05/2017)  Initially anticoagulated, but stopped due to GI bleed. IVC filter placed 4/11/2017. Anticoagulation has been resumed with a trial of lovenox (4/15). Continues to do well without evidence of bleeding, HGB stable. Consider switching back to a DOAC if no further plans for procedures. Consider apixaban 5mg PO BID ( would skip the initial 10mg bolus dosing). Will follow patient in out patient setting in 1 month    3. GI Bleed  Source was sigmoid colon, based on CTA and mesenteric arteriogram.  Now s/p sigmoidectomy, no further bleeding noted. 4. Anemia, normocytic  Secondary to GI bleed and recent surgery. Consider oral Fe once bowels are moving adequately. 5. Abscesses  Noted on pathology and on imaging. Currently on abx.     6. Pulmonary nodules  Uncertain etiology, possibly inflammatory. Unlikely to represent metastases, given the 0.2cm primary tumor. Repeat CT chest scan in 3 months    7. Afib /Chest pain  Cardio following. Anticoagulation resumed, as above.   ASA added          Plan reviewed with Dr Elizabeth Courser By: Srinivas Dallas NP     April 18, 2017

## 2017-04-18 NOTE — PROGRESS NOTES
Alvino Keita [facility] 79  566 [facility], [address] [place] [address]  [phone]      Medical Progress Note      NAME: Sailaja Ahmadi. :  1945  MRM:  [id number]    Date/Time: 2017          Subjective:     Chief Complaint:  F/u PE / DVT, bowel resection    Chart/notes/labs/studies reviewed, patient examined at bedside. No further chest pain. More alert today. Denies abdominal pain       Objective:       Vitals:          Last 24hrs VS reviewed since prior progress note. Most recent are:    Visit Vitals    /78    Pulse (!) 103    Temp 98.6 °F (37 °C)    Resp 23    Ht 5' 11\" (1.803 m)    Wt 149.9 kg (330 lb 7.5 oz)    SpO2 94%    BMI 46.09 kg/m2     SpO2 Readings from Last 6 Encounters:   17 94%   17 94%   17 96%   17 96%   16 97%   10/21/16 98%    O2 Flow Rate (L/min): 3 l/min       Intake/Output Summary (Last 24 hours) at 17 1646  Last data filed at 17 1304   Gross per 24 hour   Intake              340 ml   Output             2750 ml   Net            -2410 ml          Exam:     Physical Exam:    Gen: obese, frail, elderly, chronically ill-appearing, in no acute distress. HEENT: Pink conjunctivae, hearing intact to voice, moist mucous membranes  Neck: Supple, without masses, thyroid non-tender  Resp: No accessory muscle use, clear breath sounds without wheezes rales or rhonchi  Card: No murmurs, tachycardic, irregularly irregular. Trace peripheral edema  Abd: Soft, NTTP. Obese. Stoma pink. Dark liquid output in ostomy. Musc: No cyanosis or clubbing  Skin: No rashes or ulcers, skin turgor is good  Neuro: Cranial nerves are grossly intact, no focal motor weakness moving all extremities, follows commands appropriately.    Psych: limited insight, oriented to person and place        Medications Reviewed: (see below)    Lab Data Reviewed: (see below)    ______________________________________________________________________    Medications:     Current Facility-Administered Medications   Medication Dose Route Frequency    labetalol (NORMODYNE;TRANDATE) injection 10 mg  10 mg IntraVENous Q6H PRN    dilTIAZem CD (CARDIZEM CD) capsule 300 mg  300 mg Oral DAILY    enoxaparin (LOVENOX) injection 40 mg  40 mg SubCUTAneous Q12H    And    enoxaparin (LOVENOX) injection 100 mg  100 mg SubCUTAneous Q12H    oxyCODONE-acetaminophen (PERCOCET) 5-325 mg per tablet 1-2 Tab  1-2 Tab Oral Q4H PRN    HYDROmorphone (PF) (DILAUDID) injection 0.5 mg  0.5 mg IntraVENous Q3H PRN    nitroglycerin (NITROBID) 2 % ointment 0.5 Inch  0.5 Inch Topical Q6H    aspirin chewable tablet 81 mg  81 mg Oral DAILY    predniSONE (DELTASONE) tablet 50 mg  50 mg Oral DAILY WITH BREAKFAST    pyridostigmine (MESTINON) tablet 90 mg  90 mg Oral TID    carvedilol (COREG) tablet 25 mg  25 mg Oral BID WITH MEALS    diphenhydrAMINE (BENADRYL) injection 25 mg  25 mg IntraVENous Q6H PRN    0.9% sodium chloride infusion 250 mL  250 mL IntraVENous PRN    sodium chloride (NS) flush 5-10 mL  5-10 mL IntraVENous Q8H    sodium chloride (NS) flush 5-10 mL  5-10 mL IntraVENous PRN    acetaminophen (TYLENOL) tablet 650 mg  650 mg Oral Q4H PRN    ondansetron (ZOFRAN) injection 4 mg  4 mg IntraVENous Q4H PRN    insulin lispro (HUMALOG) injection   SubCUTAneous AC&HS    glucose chewable tablet 16 g  4 Tab Oral PRN    dextrose (D50W) injection syrg 12.5-25 g  12.5-25 g IntraVENous PRN    glucagon (GLUCAGEN) injection 1 mg  1 mg IntraMUSCular PRN    ALPRAZolam (XANAX) tablet 0.25 mg  0.25 mg Oral TID PRN    ascorbic acid (vitamin C) (VITAMIN C) tablet 1,000 mg  1,000 mg Oral DAILY    cholecalciferol (VITAMIN D3) tablet 5,000 Units  5,000 Units Oral DAILY    traMADol (ULTRAM) tablet 50 mg  50 mg Oral Q6H PRN    pantoprazole (PROTONIX) 40 mg in sodium chloride 0.9 % 10 mL injection  40 mg IntraVENous Q12H    0.9% sodium chloride infusion 250 mL  250 mL IntraVENous PRN            Lab Review:     Recent Labs      04/18/17   0715  04/17/17   0057  04/16/17   0156   WBC  9.3  8.4  9.1   HGB  8.5*  8.3*  8.4*   HCT  28.0*  26.1*  25.2*   PLT  285  251  200     Recent Labs      04/18/17   0715  04/17/17   0057  04/16/17   0156   NA  142  145  142   K  3.5  3.5  3.2*   CL  103  105  105   CO2  32  33*  31   GLU  93  107*  111*   BUN  9  6  5*   CREA  0.77  0.71  0.70   CA  8.5  8.0*  8.1*   MG  2.0  1.8  1.8   PHOS  3.4  3.3  2.2*   ALB  2.3*   --   2.2*   SGOT  21   --   22   ALT  29   --   16     No components found for: GLPOC  Recent Labs      04/17/17   1414  04/17/17   1147   PH  7.48*  7.47*   PCO2  42  44   PO2  94  71*   HCO3  31*  31*     No results for input(s): INR in the last 72 hours. No lab exists for component: INREXT, INREXT  No results found for: SDES  Lab Results   Component Value Date/Time    Culture result: MRSA NOT PRESENT 04/12/2017 05:49 PM    Culture result:  04/12/2017 05:49 PM         Screening of patient nares for MRSA is for surveillance purposes and, if positive, to facilitate isolation considerations in high risk settings. It is not intended for automatic decolonization interventions per se as regimens are not sufficiently effective to warrant routine use. Culture result: ENTEROBACTER CLOACAE 09/16/2016 10:10 PM    Culture result: KLEBSIELLA OXYTOCA 09/16/2016 10:10 PM            Assessment / Plan:     74 yo WM w/ hx of pAF on Elquis, DVT/PE, MG, DM p/w GIB, found to have colon CA. Hospitalization c/b issues as below:    Chest pain: resolved. Troponin essentially negative. EKG with TWI  -- cont therapeutic Lovenox; change to Eliquis tomorrow  -- cont ASA  -- cardiology following  -- d/C nitropaste  -- cont BB   -- start statin, in light of DM. LDL 85    AMS: improved/resolved.  Likely from opioids  -- hold opioids for sedation or respiratory depression  -- CPAP QHS and during naps    GI bleed (4/10/2017) / Acute blood loss anemia: due to apparent diverticular mass / colon cancer. s/p sigmoidectomy and colostomy  -- stop IV Zosyn (diverticulitis); has been on it since 4/10  -- follow up with surveillance c-scope and with oncology outpatient      Acute bilateral pulmonary embolus / LLE DVT: diagnosed last admission, s/p IVC filter  -- Lovenox as above    PAF (paroxysmal atrial fibrillation) ([facility] (9/3/2016) / accelerated HTN: Cardiology following. Off digoxin  -- cont diltiazem  -- cont Coreg     Myasthenia gravis with exacerbation, adult form ([facility].) (4/4/2017)  -- cont pyridostigmine  -- appreciate neurology input.  Cont steroids at previous dose     History of stroke (4/5/2017): was off anticoagulation due to bleed as above  -- on Lovenox     Morbid obesity with BMI of 40.0-44.9, adult ([facility] (4/5/2017)  -- counseled on weight loss     Type II diabetes mellitus ([facility] (4/10/2017):   -- follow BS on SSI      Possible KWADWO: desats at night pretty consistently  -- CPAP QHS    Lung nodule:   -- will need repeat CT imaging per guidelines      Total time spent with patient:  25 minutes                 Care Plan discussed with: Patient, Nursing Staff and >50% of time spent in counseling and coordination of care    Discussed:  Care Plan    Prophylaxis:  Lovenox    Disposition:  SNF/LTC           ___________________________________________________    Attending Physician: Tori Huang MD

## 2017-04-18 NOTE — PROGRESS NOTES
[facility]  [address] [place] [address]  [phone]     Inpatient Neurology Progress Note  Diandra Newman, [facility]    Name:   Danyelle Soni. Medical record #: [id number]  Admission Date: 4/10/2017  Date:   04/18/17          NEUROLOGY NOTE ADDENDUM:    April 18, 2017    Follow up: Myasthenia Gravis, ongoing dyspnea due to bilateral PEs, GI bleed, s/p colectomy    Pt resting in bed, sister at bedside  Increased his Mestinon to 90 mg TID and Prednisone back to 50 mg/ day yesterday   He awakens to voice. Feels a little short of breath but not severely short of breath  EOMI, face symmetric, upper extremities; 4+/ 5 bilaterally and without drift. Lower extremities; 2-3/ 5 bilaterally  No ptosis or diplopia on sustained upgaze    Impression:  Myasthenia gravis, difficult to control at baseline (Mestinon 60 mg TID, Prednisone 50 mg/ day, Cellcept 1000 mg BID)  Recent Bilateral Pulmonary Embolisms causing dyspnea  No significant increased work of breathing  Will continue to follow and consider IVIG treatment while admitted     Signed By: Toni Muhammad MD     April 18, 2017          _____________________________________________________________________________    Subjective:  CC:  I don't know when I will have more energy  ·  was more awake this AM per sister, ate well  · Remains in A-fib  · No overnight events  Denies:   NV, HA, palpitations, vision changes or difficulty swallowing  _____________________________________________________________________________  Objective  Patient Vitals for the past 12 hrs:   Temp Pulse Resp BP SpO2   04/18/17 0720 98.9 °F (37.2 °C) 93 22 (!) 141/102 -   04/18/17 0700 - (!) 110 - - -   04/18/17 0200 97.7 °F (36.5 °C) 96 - 127/80 91 %     Allergies: Allergies   Allergen Reactions    Lactose Itching    Fructose Other (comments)     States added sugar to foods causes sore throat/ear ache.     Inderal [Propranolol] Unknown (comments)    Lisinopril Angioedema  Gluten Nausea Only     Inpatient Meds    Current Facility-Administered Medications:     labetalol (NORMODYNE;TRANDATE) injection 10 mg, 10 mg, IntraVENous, Q6H PRN, Kenny Manuel MD    dilTIAZem CD (CARDIZEM CD) capsule 300 mg, 300 mg, Oral, DAILY, Zonia Monson MD, 300 mg at 04/18/17 0850    enoxaparin (LOVENOX) injection 40 mg, 40 mg, SubCUTAneous, Q12H, 40 mg at 04/18/17 1200 **AND** enoxaparin (LOVENOX) injection 100 mg, 100 mg, SubCUTAneous, Q12H, Kenny Manuel MD, 100 mg at 04/18/17 1200    oxyCODONE-acetaminophen (PERCOCET) 5-325 mg per tablet 1-2 Tab, 1-2 Tab, Oral, Q4H PRN, WALKER Carrera, 2 Tab at 04/17/17 1718    HYDROmorphone (PF) (DILAUDID) injection 0.5 mg, 0.5 mg, IntraVENous, Q3H PRN, WALKER Carrera, 0.5 mg at 04/17/17 1241    nitroglycerin (NITROBID) 2 % ointment 0.5 Inch, 0.5 Inch, Topical, Q6H, Kenny Manuel MD, 0.5 Inch at 04/18/17 1200    aspirin chewable tablet 81 mg, 81 mg, Oral, DAILY, Kenny Manuel MD, 81 mg at 04/18/17 0850    predniSONE (DELTASONE) tablet 50 mg, 50 mg, Oral, DAILY WITH BREAKFAST, Srinath Rodriguez MD, 50 mg at 04/18/17 0850    pyridostigmine (MESTINON) tablet 90 mg, 90 mg, Oral, TID, Srinath Rodriguez MD, 90 mg at 04/18/17 0850    carvedilol (COREG) tablet 25 mg, 25 mg, Oral, BID WITH MEALS, Zonia Monson MD, 25 mg at 04/18/17 0850    diphenhydrAMINE (BENADRYL) injection 25 mg, 25 mg, IntraVENous, Q6H PRN, Sherin Harris MD, 25 mg at 04/15/17 2245    0.9% sodium chloride infusion 250 mL, 250 mL, IntraVENous, PRN, Nolvia Felder MD    sodium chloride (NS) flush 5-10 mL, 5-10 mL, IntraVENous, Q8H, Teo June MD, 10 mL at 04/18/17 0510    sodium chloride (NS) flush 5-10 mL, 5-10 mL, IntraVENous, PRN, Teo June MD    acetaminophen (TYLENOL) tablet 650 mg, 650 mg, Oral, Q4H PRN, Teo June MD, 650 mg at 04/17/17 0911    ondansetron (ZOFRAN) injection 4 mg, 4 mg, IntraVENous, Q4H PRN, Teo June MD, 4 mg at 04/11/17 0838    insulin lispro (HUMALOG) injection, , SubCUTAneous, AC&HS, Olinda Chavez MD, 2 Units at 04/18/17 1156    glucose chewable tablet 16 g, 4 Tab, Oral, PRN, Olinda Chavez MD    dextrose (D50W) injection syrg 12.5-25 g, 12.5-25 g, IntraVENous, PRN, Olinda Chavez MD    glucagon [facility]) injection 1 mg, 1 mg, IntraMUSCular, PRN, Olinda Chavez MD    ALPRAZolam Euell Shad) tablet 0.25 mg, 0.25 mg, Oral, TID PRN, Olinda Chavez MD, 0.25 mg at 04/18/17 0001    ascorbic acid (vitamin C) (VITAMIN C) tablet 1,000 mg, 1,000 mg, Oral, DAILY, Olinda Chavez MD, 1,000 mg at 04/18/17 0850    cholecalciferol (VITAMIN D3) tablet 5,000 Units, 5,000 Units, Oral, DAILY, Olinda Chavez MD, 5,000 Units at 04/18/17 0849    traMADol (ULTRAM) tablet 50 mg, 50 mg, Oral, Q6H PRN, Olinda Chavez MD, 50 mg at 04/18/17 0001    pantoprazole (PROTONIX) 40 mg in sodium chloride 0.9 % 10 mL injection, 40 mg, IntraVENous, Q12H, Olinda Chavez MD, 40 mg at 04/18/17 0852    0.9% sodium chloride infusion 250 mL, 250 mL, IntraVENous, PRN, Stan Chandler MD  Labs Reviewed  Recent Results (from the past 12 hour(s))   TROPONIN I    Collection Time: 04/18/17  7:15 AM   Result Value Ref Range    Troponin-I, Qt. <0.04 <0.05 ng/mL   CBC WITH AUTOMATED DIFF    Collection Time: 04/18/17  7:15 AM   Result Value Ref Range    WBC 9.3 4.1 - 11.1 K/uL    RBC 2.81 (L) 4.10 - 5.70 M/uL    HGB 8.5 (L) 12.1 - 17.0 g/dL    HCT 28.0 (L) 36.6 - 50.3 %    MCV 99.6 (H) 80.0 - 99.0 FL    MCH 30.2 26.0 - 34.0 PG    MCHC 30.4 30.0 - 36.5 g/dL    RDW 18.0 (H) 11.5 - 14.5 %    PLATELET 778 374 - 932 K/uL    NEUTROPHILS 83 (H) 32 - 75 %    LYMPHOCYTES 6 (L) 12 - 49 %    MONOCYTES 10 5 - 13 %    EOSINOPHILS 1 0 - 7 %    BASOPHILS 0 0 - 1 %    ABS. NEUTROPHILS 7.7 1.8 - 8.0 K/UL    ABS. LYMPHOCYTES 0.6 (L) 0.8 - 3.5 K/UL    ABS. MONOCYTES 0.9 0.0 - 1.0 K/UL    ABS. EOSINOPHILS 0.1 0.0 - 0.4 K/UL    ABS. BASOPHILS 0.0 0.0 - 0.1 K/UL    DF SMEAR SCANNED      RBC COMMENTS POLYCHROMASIA  1+        RBC COMMENTS ANISOCYTOSIS  1+        RBC COMMENTS MACROCYTOSIS  1+       METABOLIC PANEL, COMPREHENSIVE    Collection Time: 04/18/17  7:15 AM   Result Value Ref Range    Sodium 142 136 - 145 mmol/L    Potassium 3.5 3.5 - 5.1 mmol/L    Chloride 103 97 - 108 mmol/L    CO2 32 21 - 32 mmol/L    Anion gap 7 5 - 15 mmol/L    Glucose 93 65 - 100 mg/dL    BUN 9 6 - 20 MG/DL    Creatinine 0.77 0.70 - 1.30 MG/DL    BUN/Creatinine ratio 12 12 - 20      GFR est AA >60 >60 ml/min/1.73m2    GFR est non-AA >60 >60 ml/min/1.73m2    Calcium 8.5 8.5 - 10.1 MG/DL    Bilirubin, total 0.6 0.2 - 1.0 MG/DL    ALT (SGPT) 29 12 - 78 U/L    AST (SGOT) 21 15 - 37 U/L    Alk. phosphatase 54 45 - 117 U/L    Protein, total 4.7 (L) 6.4 - 8.2 g/dL    Albumin 2.3 (L) 3.5 - 5.0 g/dL    Globulin 2.4 2.0 - 4.0 g/dL    A-G Ratio 1.0 (L) 1.1 - 2.2     MAGNESIUM    Collection Time: 04/18/17  7:15 AM   Result Value Ref Range    Magnesium 2.0 1.6 - 2.4 mg/dL   PHOSPHORUS    Collection Time: 04/18/17  7:15 AM   Result Value Ref Range    Phosphorus 3.4 2.6 - 4.7 MG/DL   GLUCOSE, POC    Collection Time: 04/18/17  8:38 AM   Result Value Ref Range    Glucose (POC) 108 (H) 65 - 100 mg/dL    Performed by [address] POC    Collection Time: 04/18/17 11:14 AM   Result Value Ref Range    Glucose (POC) 179 (H) 65 - 100 mg/dL    Performed by [employer] (PCT)        Imaging  Reviewed:   CT Results (recent):    Results from Hospital Encounter encounter on 04/10/17   CT HEAD WO CONT   Narrative EXAM:  CT HEAD WO CONT    INDICATION:   Altered mental status, slurring of speech, on Lovenox    COMPARISON: September 13, 2006. TECHNIQUE: Unenhanced CT of the head was performed using 5 mm images. Brain and  bone windows were generated.   CT dose reduction was achieved through use of a  standardized protocol tailored for this examination and automatic exposure  control for dose modulation. FINDINGS:  The ventricles and sulci are stable in size, shape and configuration and  midline. There is unchanged mild periventricular white matter disease. There is  no intracranial hemorrhage, extra-axial collection, mass, mass effect or midline  shift. The basilar cisterns are open. No acute infarct is identified. The bone  windows demonstrate no abnormalities. The visualized portions of the paranasal  sinuses and mastoid air cells are clear. Impression IMPRESSION: No evidence of acute intracranial process. No change from the prior  study. MRI Results (recent):  No results found for this or any previous visit. Physical Exam  General:  Obese WM in NAD  Chest:  Regular rhythm and rate, clear BBS  Neurologic:  Reduced conversation  Eyes:  Tracking appropriately, making eye contact, no ptosis  Speech:   Slight dysarthria, fatigue induced  Mentation:   Drowsy but will arouse    Orientation:  x2  Commands:  following  Strength: Equal bilaterally,  proximally & distally  5/5   Sensation:  Equal bilaterally, proximally and distally with LT and temp     _____________________________________________________________________________  Assessment:   1. MG- exacerbation    Plan  · Continue 50mg TID and Mestinon higher dose of 90mg TID  · PT/OT to continue therapy---will likely need rehab    My collaborating care team physician may have further recommendations.     On DVT Prophylaxis yes no   Continue  eliquis while inpatient x      Care Plan discussed with:  Patient x   Family x   RN x   Care Manager    Consultant/Specialist:     Patient will be discussed with Dr. Shabnam Pennington Problems  Date Reviewed: 4/17/2017          Codes Class Noted POA    DVT (deep venous thrombosis) ([facility] ICD-10-CM: I82.409  ICD-9-CM: 453.40  4/14/2017 Unknown        Malignant neoplasm of sigmoid colon ([facility] ICD-10-CM: C18.7  ICD-9-CM: 153.3  4/14/2017 Unknown        Abscess of abdominal cavity [facility]) ICD-10-CM: K65.1  ICD-9-CM: 567.22  4/14/2017 Unknown        Colonic mass ICD-10-CM: K63.9  ICD-9-CM: 569.89  4/12/2017 Yes        Lung nodules ICD-10-CM: R91.8  ICD-9-CM: 793.19  4/12/2017 Yes        Acute blood loss anemia ICD-10-CM: D62  ICD-9-CM: 285.1  4/11/2017 Yes        Acute pulmonary embolus ([facility] ICD-10-CM: I26.99  ICD-9-CM: 415.19  4/11/2017 Yes        * (Principal)GI bleed ICD-10-CM: K92.2  ICD-9-CM: 578.9  4/10/2017 Yes        Type II diabetes mellitus (HCC) (Chronic) ICD-10-CM: E11.9  ICD-9-CM: 250.00  4/10/2017 Yes        History of stroke (Chronic) ICD-10-CM: Z86.73  ICD-9-CM: V12.54  4/5/2017 Yes        Morbid obesity with BMI of 40.0-44.9, adult ([facility] (Chronic) ICD-10-CM: E66.01, Z68.41  ICD-9-CM: 278.01, V85.41  4/5/2017 Yes        Myasthenia gravis with exacerbation, adult form ([facility] (Chronic) ICD-10-CM: G70.01  ICD-9-CM: 358.01  4/4/2017 Yes        PAF (paroxysmal atrial fibrillation) (HCC) (Chronic) ICD-10-CM: I48.0  ICD-9-CM: 427.31  9/3/2016 Yes            ________________________________________________  - Amy Maldonado [facility]  04/18/17  ================================================    ADDENDUM--> Collaborating Care Team Physician:

## 2017-04-18 NOTE — PROGRESS NOTES
Problem: Self Care Deficits Care Plan (Adult)  Goal: *Acute Goals and Plan of Care (Insert Text)  Occupational Therapy Goals  Initiated 4/13/2017  1. Patient will perform grooming with supervision/set-up within 7 day(s). 2. Patient will perform upper body dressing and bathing with minimum assistance within 7 day(s). 3. Patient will perform lower body dressing and bathing with moderate assistance using adaptive aides PRN within 7 day(s). 4. Patient will tolerate sitting EOB >5 minutes with moderate assist x1 while completing functional tasks in preparation for ADL transfers within 7 days. 5. Patient will participate in upper extremity therapeutic exercise/activities with supervision/set-up for 10 minutes within 7 day(s). 6. Patient will utilize energy conservation techniques during functional activities with verbal cues within 7 day(s). OCCUPATIONAL THERAPY TREATMENT  Patient: Yancy Guidry (77 y.o. male)  Date: 4/18/2017  Diagnosis: GI bleed  GI Bleeding GI bleed  Procedure(s) (LRB):  LAPAROTOMY EXPLORATORY, SIGMOID COLECTOMY, COLOSTOMY (N/A) 7 Days Post-Op  Precautions:    Chart, occupational therapy assessment, plan of care, and goals were reviewed. ASSESSMENT:  Chart reviewed, and RN cleared pt for OT today. Pt needed max assist x 2 to total assist for supine to sit. Pt with poor sitting balance edge of bed for ADL tasks and only able to sit a few minutes before indicating he wanted to return to lying down. Vitals stable with pt nodding \"yes\" to feeling dizziness in sitting. Returned pt to supine and pts sister present for treatment. Pt dependent for ADL's at this time. Recommend SNF.    Progression toward goals:  [ ]          Improving appropriately and progressing toward goals  [X]          Improving slowly and progressing toward goals  [ ]          Not making progress toward goals and plan of care will be adjusted       PLAN:  Patient continues to benefit from skilled intervention to address the above impairments. Continue treatment per established plan of care. Discharge Recommendations:  SNF  Further Equipment Recommendations for Discharge: None       SUBJECTIVE:   Patient stated .      OBJECTIVE DATA SUMMARY:   Cognitive/Behavioral Status:  Neurologic State: Drowsy  Orientation Level: Oriented to person  Cognition: Unable to assess (comment) (pt not verbalizing loudly)           Functional Mobility and Transfers for ADLs:              Bed Mobility:     Supine to Sit: Maximum assistance; Total assistance;Assist x2  Sit to Supine: Total assistance                   Transfers: Not attempted, not safe at this time. Balance: Poor sitting balance. ADL Intervention:     Dependent for self care. Pain:  Pain Scale 1: Numeric (0 - 10)  Pain Intensity 1: 0                 Activity Tolerance:    Poor  Please refer to the flowsheet for vital signs taken during this treatment.   After treatment:   [ ]  Patient left in no apparent distress sitting up in chair  [X]  Patient left in no apparent distress in bed  [X]  Call bell left within reach  [X]  Nursing notified  [X]  Caregiver present  [ ]  Bed alarm activated      COMMUNICATION/COLLABORATION:   The patients plan of care was discussed with: Physical Therapy Assistant, Occupational Therapist and Registered Nurse     Florin Herrlich James, RAMOS/L  Time Calculation: 20 mins

## 2017-04-18 NOTE — PROGRESS NOTES
Shift Summary    1900  Bedside and Verbal shift change report given to [address] [place], [place] [address]y (oncoming nurse) by Tonya Harper RN (offgoing nurse). Report included the following information SBAR, Kardex, MAR, Recent Results and Cardiac Rhythm afib. S/p colectomy. Colostomy in place without drainage. Midline abdominal incision dehiscing with surgery and MD aware. Dressing intact with minimal drainage. No complaints of pain at this time. PT worked with therapy today and was able to assist patient to edge of bed with max assist. Patient very lethargic with flat affect but alert and oriented upon assessment. Generalized edema all over +3 to RUE, RLE, and LLE. Non-pitting. Pedal pulses palpable. 0530  Dressing loose to abdomen. New dressing applied. No drainage. No changes to incision. Yumiko wound skin clean dry and without redness. 0700  Bedside and Verbal shift change report given to [facility] (oncoming nurse) by Hilario Mccoy RN (offgoing nurse). Report included the following information SBAR, Kardex, MAR, Recent Results and Cardiac Rhythm AFib 90-110s. Emily Cordero

## 2017-04-18 NOTE — PROGRESS NOTES
General Surgery Daily Progress Note    Patient: Kalen Vaughn MRN: [id number]  SSN: [id number]    YOB: 1945  Age: 73 y.o. Sex: male      Admit Date: 4/10/2017    Subjective:   Pain controlled w/ PO meds. Denies N/V, tolerating fulls.      Current Facility-Administered Medications   Medication Dose Route Frequency    labetalol (NORMODYNE;TRANDATE) injection 10 mg  10 mg IntraVENous Q6H PRN    dilTIAZem CD (CARDIZEM CD) capsule 300 mg  300 mg Oral DAILY    enoxaparin (LOVENOX) injection 40 mg  40 mg SubCUTAneous Q12H    And    enoxaparin (LOVENOX) injection 100 mg  100 mg SubCUTAneous Q12H    oxyCODONE-acetaminophen (PERCOCET) 5-325 mg per tablet 1-2 Tab  1-2 Tab Oral Q4H PRN    HYDROmorphone (PF) (DILAUDID) injection 0.5 mg  0.5 mg IntraVENous Q3H PRN    nitroglycerin (NITROBID) 2 % ointment 0.5 Inch  0.5 Inch Topical Q6H    aspirin chewable tablet 81 mg  81 mg Oral DAILY    predniSONE (DELTASONE) tablet 50 mg  50 mg Oral DAILY WITH BREAKFAST    pyridostigmine (MESTINON) tablet 90 mg  90 mg Oral TID    carvedilol (COREG) tablet 25 mg  25 mg Oral BID WITH MEALS    diphenhydrAMINE (BENADRYL) injection 25 mg  25 mg IntraVENous Q6H PRN    0.9% sodium chloride infusion 250 mL  250 mL IntraVENous PRN    sodium chloride (NS) flush 5-10 mL  5-10 mL IntraVENous Q8H    sodium chloride (NS) flush 5-10 mL  5-10 mL IntraVENous PRN    acetaminophen (TYLENOL) tablet 650 mg  650 mg Oral Q4H PRN    ondansetron (ZOFRAN) injection 4 mg  4 mg IntraVENous Q4H PRN    insulin lispro (HUMALOG) injection   SubCUTAneous AC&HS    glucose chewable tablet 16 g  4 Tab Oral PRN    dextrose (D50W) injection syrg 12.5-25 g  12.5-25 g IntraVENous PRN    glucagon (GLUCAGEN) injection 1 mg  1 mg IntraMUSCular PRN    ALPRAZolam (XANAX) tablet 0.25 mg  0.25 mg Oral TID PRN    ascorbic acid (vitamin C) (VITAMIN C) tablet 1,000 mg  1,000 mg Oral DAILY    cholecalciferol (VITAMIN D3) tablet 5,000 Units  5,000 Units Oral DAILY    traMADol (ULTRAM) tablet 50 mg  50 mg Oral Q6H PRN    pantoprazole (PROTONIX) 40 mg in sodium chloride 0.9 % 10 mL injection  40 mg IntraVENous Q12H    0.9% sodium chloride infusion 250 mL  250 mL IntraVENous PRN        Objective:      04/16 1901 - 04/18 0700  In: 340 [P.O.:240; I.V.:100]  Out: 3725 [Urine:3275]  Patient Vitals for the past 8 hrs:   BP Temp Pulse Resp   04/18/17 0720 (!) 141/102 98.9 °F (37.2 °C) 93 22   04/18/17 0700 - - (!) 110 -       Physical Exam:  General: Sleepy, cooperative, NAD  Lungs: Unlabored  Heart:  Tachycardic  Abdomen: Soft, ATTP, distended. + bowel sounds. Incision c/d/i, ostomy pink, stool from ostomy. Extremities: Warm, moves all  Skin:  Warm and dry, no rash    Labs:   Recent Labs      04/18/17   0715   WBC  9.3   HGB  8.5*   HCT  28.0*   PLT  285     Recent Labs      04/18/17   0715   NA  142   K  3.5   CL  103   CO2  32   GLU  93   BUN  9   CREA  0.77   CA  8.5   MG  2.0   PHOS  3.4   ALB  2.3*   TBILI  0.6   SGOT  21   ALT  29       Assessment / Plan:   · POD#6 ex lap, sigmoidectomy, end colostomy for lower GI bleed  · Hgb stable   · Advance diet  · Pain controlled  · Daily dressing changes.    · Cardiology managing AF w/ RVR, transferred to [facility] for CP yesterday   · D/c briggs   · PT/OT, encourage IS

## 2017-04-18 NOTE — PROGRESS NOTES
Problem: Mobility Impaired (Adult and Pediatric)  Goal: *Acute Goals and Plan of Care (Insert Text)  Physical Therapy Goals  Initiated 4/12/2017  1. Patient will move from supine to sit and sit to supine , scoot up and down and roll side to side in bed with moderate assistance within 7 day(s). 2. Patient will transfer from bed to chair and chair to bed with moderate assistance using the least restrictive device within 7 day(s). 3. Patient will perform sit to stand with moderate assistance within 7 day(s). 4. Patient will ambulate with moderate assistance for 20 feet with the least restrictive device within 7 day(s). 5. Patient will ascend/descend 5 stairs with 2 handrail(s) with moderate assistance within 7 day(s). PHYSICAL THERAPY TREATMENT  Patient: Sera Kwong (77 y.o. male)  Date: 4/18/2017  Diagnosis: GI bleed  GI Bleeding GI bleed  Procedure(s) (LRB):  LAPAROTOMY EXPLORATORY, SIGMOID COLECTOMY, COLOSTOMY (N/A) 7 Days Post-Op  Precautions:    Chart, physical therapy assessment, plan of care and goals were reviewed. ASSESSMENT:  Pt was groggy but agreed to work with PT. Pt performed heel slides and hip abd/add to LE with min to mod assist and cues. Pt comes to sit with max to total assist of 2. Pt was able to reach for bed rail. Pt was able to sit and maintain balance briefly sitting on EOB with min assist.Pt Pt tends to lean to the left. Pt struggled to stay awake and after 2 minutes sitting pt requested back to supine. Pt max to total assist sit to supine. Pt progressing. Continue goals. Progression toward goals:  [ ]      Improving appropriately and progressing toward goals  [X]      Improving slowly and progressing toward goals  [ ]      Not making progress toward goals and plan of care will be adjusted       PLAN:  Patient continues to benefit from skilled intervention to address the above impairments. Continue treatment per established plan of care.   Discharge Recommendations:  Inpatient Rehab vs Skilled Nursing Facility  Further Equipment Recommendations for Discharge:         SUBJECTIVE:          OBJECTIVE DATA SUMMARY:   Critical Behavior:  Neurologic State: Drowsy  Orientation Level: Oriented to person  Cognition: Unable to assess (comment) (pt not verbalizing loudly)  Safety/Judgement: Awareness of environment  Functional Mobility Training:  Bed Mobility:     Supine to Sit: Maximum assistance; Total assistance;Assist x2  Sit to Supine: Maximum assistance; Total assistance;Assist x2  Balance:  Sitting: Impaired; With support  Sitting - Static: Fair (occasional); Poor (constant support)        Pain:  Pain Scale 1: Numeric (0 - 10)  Pain Intensity 1: 0              Activity Tolerance:   Pt tolerated treatment well. Please refer to the flowsheet for vital signs taken during this treatment.   After treatment:   [ ] Patient left in no apparent distress sitting up in chair  [X] Patient left in no apparent distress in bed  [ ] Call bell left within reach  [ ] Nursing notified  [ ] Caregiver present  [ ] Bed alarm activated      COMMUNICATION/COLLABORATION:   The patients plan of care was discussed with: Physical Therapist     Katlyn Salazar PTA   Time Calculation: 23 mins

## 2017-04-18 NOTE — PROGRESS NOTES
0700: Shift change report received from [address]. SBAR, Kardex, Intake/Output, MAR and Recent Results reviewed. Bedside and Verbal shift change report given to Kim Villa (oncoming nurse) by Marshall Muller (offgoing nurse). Report included the following information SBAR, Kardex, Procedure Summary, Intake/Output, MAR, Recent Results and Cardiac Rhythm AFIB.

## 2017-04-19 PROBLEM — N40.0 BPH (BENIGN PROSTATIC HYPERTROPHY): Chronic | Status: ACTIVE | Noted: 2017-01-01

## 2017-04-19 NOTE — PROGRESS NOTES
Occupational therapy note: Attempted to see patient for OT. Chart reviewed. Spoke with PT who reports patient more tachycardic and advised to defer therapy today per nursing. Will continue to follow up with patient. Thank you. Breanna Crook MS OTR/L

## 2017-04-19 NOTE — PROGRESS NOTES
Full note to follow. With rectal bleed. More tachycardic. Stop Eliquis (did not receive dose this morning yet). Check Hg. Gen surgery to see. May need repeat CT abd/pelvis. Resume Zosyn for now.

## 2017-04-19 NOTE — PROGRESS NOTES
Alvino Keita nae Anderson 81  [address] [facility], [address] [place] [address]  [phone]      Medical Progress Note      NAME: Harley Ball. :  1945  MRM:  [id number]    Date/Time: 2017          Subjective:     Chief Complaint:  F/u PE / DVT, bowel resection    Chart/notes/labs/studies reviewed, patient examined at bedside. BRBPR earlier today. A little more confused. HR elevated. No CP. Objective:       Vitals:          Last 24hrs VS reviewed since prior progress note. Most recent are:    Visit Vitals    /65    Pulse 82    Temp 97.8 °F (36.6 °C)    Resp 22    Ht 5' 11\" (1.803 m)    Wt 149.9 kg (330 lb 7.5 oz)    SpO2 94%    BMI 46.09 kg/m2     SpO2 Readings from Last 6 Encounters:   17 94%   17 94%   17 96%   17 96%   16 97%   10/21/16 98%    O2 Flow Rate (L/min): 3 l/min       Intake/Output Summary (Last 24 hours) at 17 1655  Last data filed at 17 0548   Gross per 24 hour   Intake              120 ml   Output              700 ml   Net             -580 ml          Exam:     Physical Exam:    Gen: obese, frail, elderly, chronically ill-appearing, in no acute distress. HEENT: Pink conjunctivae, hearing intact to voice, moist mucous membranes  Neck: Supple, without masses, thyroid non-tender  Resp: No accessory muscle use, clear breath sounds without wheezes rales or rhonchi  Card: No murmurs, tachycardic, irregularly irregular. Trace peripheral edema  Abd: Soft, NTTP. Obese. Stoma pink. Dark blood output in ostomy. BRBPR and hematochezia  Musc: No cyanosis or clubbing  Skin: No rashes or ulcers, skin turgor is good  Neuro: Cranial nerves are grossly intact, no focal motor weakness moving all extremities, follows commands appropriately.    Psych: limited insight, oriented to person and place        Medications Reviewed: (see below)    Lab Data Reviewed: (see below)    ______________________________________________________________________    Medications:     Current Facility-Administered Medications   Medication Dose Route Frequency    dilTIAZem CD (CARDIZEM CD) capsule 360 mg  360 mg Oral DAILY    digoxin (LANOXIN) tablet 0.25 mg  0.25 mg Oral DAILY    piperacillin-tazobactam (ZOSYN) 3.375 g in 0.9% sodium chloride (MBP/ADV) 100 mL  3.375 g IntraVENous Q8H    pravastatin (PRAVACHOL) tablet 40 mg  40 mg Oral QHS    labetalol (NORMODYNE;TRANDATE) injection 10 mg  10 mg IntraVENous Q6H PRN    oxyCODONE-acetaminophen (PERCOCET) 5-325 mg per tablet 1-2 Tab  1-2 Tab Oral Q4H PRN    HYDROmorphone (PF) (DILAUDID) injection 0.5 mg  0.5 mg IntraVENous Q3H PRN    aspirin chewable tablet 81 mg  81 mg Oral DAILY    predniSONE (DELTASONE) tablet 50 mg  50 mg Oral DAILY WITH BREAKFAST    pyridostigmine (MESTINON) tablet 90 mg  90 mg Oral TID    carvedilol (COREG) tablet 25 mg  25 mg Oral BID WITH MEALS    diphenhydrAMINE (BENADRYL) injection 25 mg  25 mg IntraVENous Q6H PRN    0.9% sodium chloride infusion 250 mL  250 mL IntraVENous PRN    sodium chloride (NS) flush 5-10 mL  5-10 mL IntraVENous Q8H    sodium chloride (NS) flush 5-10 mL  5-10 mL IntraVENous PRN    acetaminophen (TYLENOL) tablet 650 mg  650 mg Oral Q4H PRN    ondansetron (ZOFRAN) injection 4 mg  4 mg IntraVENous Q4H PRN    insulin lispro (HUMALOG) injection   SubCUTAneous AC&HS    glucose chewable tablet 16 g  4 Tab Oral PRN    dextrose (D50W) injection syrg 12.5-25 g  12.5-25 g IntraVENous PRN    glucagon (GLUCAGEN) injection 1 mg  1 mg IntraMUSCular PRN    ALPRAZolam (XANAX) tablet 0.25 mg  0.25 mg Oral TID PRN    ascorbic acid (vitamin C) (VITAMIN C) tablet 1,000 mg  1,000 mg Oral DAILY    cholecalciferol (VITAMIN D3) tablet 5,000 Units  5,000 Units Oral DAILY    traMADol (ULTRAM) tablet 50 mg  50 mg Oral Q6H PRN    pantoprazole (PROTONIX) 40 mg in sodium chloride 0.9 % 10 mL injection  40 mg IntraVENous Q12H    0.9% sodium chloride infusion 250 mL  250 mL IntraVENous PRN            Lab Review:     Recent Labs      04/19/17   1157  04/19/17   0350  04/18/17   0715  04/17/17   0057   WBC   --   9.1  9.3  8.4   HGB  9.5*  8.9*  8.5*  8.3*   HCT   --   28.7*  28.0*  26.1*   PLT   --   327  285  251     Recent Labs      04/19/17   0350  04/18/17   0715  04/17/17   0057   NA  142  142  145   K  3.5  3.5  3.5   CL  103  103  105   CO2  33*  32  33*   GLU  101*  93  107*   BUN  8  9  6   CREA  0.74  0.77  0.71   CA  8.4*  8.5  8.0*   MG  1.9  2.0  1.8   PHOS   --   3.4  3.3   ALB   --   2.3*   --    SGOT   --   21   --    ALT   --   29   --      No components found for: GLPOC  Recent Labs      04/17/17   1414  04/17/17   1147   PH  7.48*  7.47*   PCO2  42  44   PO2  94  71*   HCO3  31*  31*     No results for input(s): INR in the last 72 hours. No lab exists for component: INREXT, INREXT  No results found for: [facility]  Lab Results   Component Value Date/Time    Culture result: MRSA NOT PRESENT 04/12/2017 05:49 PM    Culture result:  04/12/2017 05:49 PM         Screening of patient nares for MRSA is for surveillance purposes and, if positive, to facilitate isolation considerations in high risk settings. It is not intended for automatic decolonization interventions per se as regimens are not sufficiently effective to warrant routine use. Culture result: ENTEROBACTER CLOACAE 09/16/2016 10:10 PM    Culture result: KLEBSIELLA OXYTOCA 09/16/2016 10:10 PM            Assessment / Plan:     72 yo WM w/ hx of pAF on Elquis, DVT/PE, MG, DM p/w GIB, found to have colon CA. Hospitalization c/b issues as below:    AMS: waxes and wanes, likely from delirium. Became obtunded on opioids 2 days ago  -- hold opioids for sedation or respiratory depression  -- CPAP QHS and during naps    GI bleed (4/10/2017) / Acute blood loss anemia: due to apparent diverticular mass / colon cancer.  s/p sigmoidectomy and colostomy  -- occurred again this morning. Holding Lovenox  -- general surgery evaluation  -- serial Hg (stable)  -- resume IV Zosyn (diverticulitis)  -- follow up with surveillance c-scope and with oncology outpatient    Chest pain: resolved. Troponin essentially negative. EKG with TWI. Evaluated by cardiology  -- stress test when more stable      Acute bilateral pulmonary embolus / LLE DVT: diagnosed last admission, s/p IVC filter  -- Lovenox being held  -- hem/onc repeating LE dopplers    PAF (paroxysmal atrial fibrillation) ([facility] (9/3/2016) / accelerated HTN: Cardiology following.   -- added digoxin again  -- cont diltiazem  -- cont Coreg  -- now off anticoagulation     Myasthenia gravis with exacerbation, adult form ([facility] (4/4/2017)  -- cont pyridostigmine  -- appreciate neurology input.  Had considered weaning steroids but will hold off for now to not cloud the picture if his respiratory status deteriorates     History of stroke (4/5/2017):   -- off Lovenox  -- on statin     Morbid obesity with BMI of 40.0-44.9, adult ([facility] (4/5/2017)  -- counseled on weight loss     Type II diabetes mellitus ([facility] (4/10/2017):   -- follow BS on SSI      Possible KWADWO: desats at night pretty consistently  -- CPAP QHS    Lung nodule:   -- will need repeat CT imaging per guidelines      Total time spent with patient:  35 minutes                 Care Plan discussed with: Patient, Nursing Staff and >50% of time spent in counseling and coordination of care    Discussed:  Care Plan    Prophylaxis:  Lovenox    Disposition:  SNF/LTC           ___________________________________________________    Attending Physician: Leopoldo Hurst, MD

## 2017-04-19 NOTE — PROGRESS NOTES
VAT    Order acknowledged for PICC line placement. Pt would be better suited with a triple lumen IJ at this point in his stay as he is unstable and has a very high risk for clotting. A PICC line would greatly increase his risk of developing a clot in his upper extremity given his history as well as upper extremity edema. Spoke to Esteban suazo RN and made her aware that the recommendation is currently for a triple lumen IJ. She will speak to Dr Maykel Myers. PICC team will follow along as necessary and can reevaluate at a later time if necessary.

## 2017-04-19 NOTE — PROGRESS NOTES
Progress Note                                        Alton Murillo MD, [address] [facility] [address], [place], [address] [place] [address] [place]                                                 Phone [phone]; Fax [phone]        2017 7:03 AM     Admit Date:           4/10/2017  Admit Diagnosis:  GI bleed  GI Bleeding  :          1945   MRN:          [id number]   ASSESSMENT/RECOMMENDATION:   Persistent afib : HR 90's  -coreg at 25 mg  - cardizem 360 mg every day   -will add digoxin 0.25 mg daily  - Eliquis   Chest pain/TWI ant leads:  - will need stress at some point when his overall condition improves. Will do this as OP  - BB, asa. 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 ATTENDING CARDIOLOGIST  1) rate up this am and states he is having rectal pain  2) will add digoxin to regimen  3) may need to change beta blocker to metoprolol    PATIENT was  Personally examined and chart reviewed. All the elements of history and examination were personally performed and I agree with the plan as listed above. Treatment plan was addressed with the patient.     Mary Edgar MD               Last 3 Recorded Weights in this Encounter    17 0306 17 0500 17 0300   Weight: 331 lb 2.1 oz (150.2 kg) 323 lb 13.7 oz (146.9 kg) 330 lb 7.5 oz (149.9 kg)         1901 -  0700  In: 80 [P.O.:120]  Out: 1900 [Urine:500]    SUBJECTIVE           [place]eston ABRAM Choudhary Zoraida. is a 73y.o. year old male w/ hx: AFib cx by T2DM, Myasthenia gravis, morbid obesity, recent PE discharged [facility] from [facility] for PE/a fib. He presented today with chief complain of rectal bleeding. Per sister, the pt this morning began experiencing several episodes of bright red rectal bleeding, prompting the pt to be brought to the ED. Became weak and dizzy at home. Anette Velazco. reports pain in chest with deep inspiration only, at times radiates to abdomen. No SOB. Episode of chest pain lasting few minutes yesterday. EKG done showed a fob with Nonspecific T wave abnormality, worse in Inferior leads  T wave inversion now evident in Anterior leads. Trops flat. Started on NTP/lovenox full dose.        Current Facility-Administered Medications   Medication Dose Route Frequency    apixaban (ELIQUIS) tablet 5 mg  5 mg Oral BID    dilTIAZem CD (CARDIZEM CD) capsule 360 mg  360 mg Oral DAILY    pravastatin (PRAVACHOL) tablet 40 mg  40 mg Oral QHS    labetalol (NORMODYNE;TRANDATE) injection 10 mg  10 mg IntraVENous Q6H PRN    oxyCODONE-acetaminophen (PERCOCET) 5-325 mg per tablet 1-2 Tab  1-2 Tab Oral Q4H PRN    HYDROmorphone (PF) (DILAUDID) injection 0.5 mg  0.5 mg IntraVENous Q3H PRN    aspirin chewable tablet 81 mg  81 mg Oral DAILY    predniSONE (DELTASONE) tablet 50 mg  50 mg Oral DAILY WITH BREAKFAST    pyridostigmine (MESTINON) tablet 90 mg  90 mg Oral TID    carvedilol (COREG) tablet 25 mg  25 mg Oral BID WITH MEALS    diphenhydrAMINE (BENADRYL) injection 25 mg  25 mg IntraVENous Q6H PRN    0.9% sodium chloride infusion 250 mL  250 mL IntraVENous PRN    sodium chloride (NS) flush 5-10 mL  5-10 mL IntraVENous Q8H    sodium chloride (NS) flush 5-10 mL  5-10 mL IntraVENous PRN    acetaminophen (TYLENOL) tablet 650 mg  650 mg Oral Q4H PRN    ondansetron (ZOFRAN) injection 4 mg  4 mg IntraVENous Q4H PRN    insulin lispro (HUMALOG) injection   SubCUTAneous AC&HS    glucose chewable tablet 16 g  4 Tab Oral PRN    dextrose (D50W) injection syrg 12.5-25 g  12.5-25 g IntraVENous PRN    glucagon (GLUCAGEN) injection 1 mg  1 mg IntraMUSCular PRN    ALPRAZolam (XANAX) tablet 0.25 mg  0.25 mg Oral TID PRN    ascorbic acid (vitamin C) (VITAMIN C) tablet 1,000 mg  1,000 mg Oral DAILY    cholecalciferol (VITAMIN D3) tablet 5,000 Units  5,000 Units Oral DAILY    traMADol (ULTRAM) tablet 50 mg  50 mg Oral Q6H PRN    pantoprazole (PROTONIX) 40 mg in sodium chloride 0.9 % 10 mL injection  40 mg IntraVENous Q12H    0.9% sodium chloride infusion 250 mL  250 mL IntraVENous PRN      OBJECTIVE               Intake/Output Summary (Last 24 hours) at 04/19/17 0813  Last data filed at 04/19/17 0548   Gross per 24 hour   Intake              120 ml   Output             1450 ml   Net            -1330 ml       Review of Systems - History obtained from the patient AS PER  HPI        PHYSICAL EXAM        Visit Vitals    /87 (BP 1 Location: Left arm, BP Patient Position: At rest)    Pulse 96    Temp 98.2 °F (36.8 °C)    Resp 15    Ht 5' 11\" (1.803 m)    Wt 330 lb 7.5 oz (149.9 kg)    SpO2 92%    BMI 46.09 kg/m2       Gen: Alert/oriented  HEENT:  Pink conjunctivae, Hearing grossly normal. No scleral icterus or conjunctival, moist mucous membranes  Neck: Supple,No JVD, No Carotid Bruit, Thyroid- non tender No cervical lymphadenopathy  Resp: No accessory muscle use, Clear breath sounds, No rales or rhonchi (patient rolled over to listen)  Card: irregular   MSK: No cyanosis or clubbing, good capillary refill  Skin: Pale  Neuro:  Cranial nerves are grossly intact, moving all four extremities, no focal deficit, follows commands appropriately  Psych:  Fair insight, oriented to person, place and time, alert, Nml Affect  LE: + edema, SCD' s on.         DATA REVIEW            Laboratory and Imaging have been reviewed by me and are notable for  Recent Labs      04/18/17   0715  04/17/17   1823  04/17/17   1140   TROIQ  <0.04  <0.04  0.06*     Recent Labs      04/19/17   0350  04/18/17   0715  04/17/17   0057   NA  142  142  145   K 3.5  3.5  3.5   CO2  33*  32  33*   BUN  8  9  6   CREA  0.74  0.77  0.71   GLU  101*  93  107*   PHOS   --   3.4  3.3   MG  1.9  2.0  1.8   WBC  9.1  9.3  8.4   HGB  8.9*  8.5*  8.3*   HCT  28.7*  28.0*  26.1*   PLT  327  285  251             Yamini Mart, NP

## 2017-04-19 NOTE — PROGRESS NOTES
4/19/2017   CM ADDENDUM:  Sana Zaldivar has evaluated pt and denied him for admission stating that he is too low level at this time. CM discussed SNF option with pt's sister (pt not coherent to do so) and she is leaning towards [facility] but would like to tour there first.  CM will f/u with pt's sister tomorrow for preferred choices. Alissa        4/19/2017   CARE MANAGEMENT NOTE:  (cross coverage)  CM continues to follow pt's hospital course. EMR reviewed and handoff received from previous . Noted that [facility] is following pt for possible admission. Latest PT and OT notes from 4/18 are in chart for their review.   Alissa

## 2017-04-19 NOTE — PROGRESS NOTES
Attempted to work with the patient for Physical Therapy, chart reviewed and discussed with nurse patient more tachycardic now and advised to defer therapy today. We will continue to follow up with the patient for therapy.  Thank you

## 2017-04-19 NOTE — PROGRESS NOTES
Bedside and Verbal shift change report given to [address] [place] [address] (oncoming nurse) by Deepak Andujar (offgoing nurse). Report included the following information SBAR, Kardex, MAR and Recent Results. 200 Pt had c/o rectal pain. Noted blood on pt's gown. Pt has rectal bleed present with large clot. Colostomy bag remains intact to LLQ abdomen with brown liquid stool noted. Notified Dr. Sarah Chapman. Received verbal order to draw cbc stat. 1341 incontinence care from voiding provided. When pt's rectum was wiped, no active bleeding noted but scan blood noted on bath cloth. Reinforced tape on abdominal dressing. Pt is confused, alert to person and place only. Pt is moaning. When asked if he was in pain, pt denied. 0 notified Dr. Pablo Woods of rectal bleeding and blood noted in colostomy bag. Received order to continue to monitor pt's hgb level. Nehemias Rivas notified Dr. Sarah Chapman of pt not being a candidate for picc line insertion. Bedside and Verbal shift change report given to Gregoria Paul (oncoming nurse) by Kiera Zhang RN (offgoing nurse). Report included the following information SBAR, Kardex, Intake/Output, MAR and Recent Results.

## 2017-04-19 NOTE — PROGRESS NOTES
[facility]  [address] [place], [place] [address]  [phone]     Inpatient Neurology Progress Note  James Sow, [facility]    Name:   Shalini Eagle. Medical record #: [id number]  Admission Date: 4/10/2017  Date:   04/19/17        NEUROLOGY NOTE ADDENDUM:    April 19, 2017    Reviewed today's EMR notes, noted that patient had more GI bleed/ BRBPR and anticoagulation held  Agree with NP Carlton Franklin that once pt more medically stable we'll assess whether IVIG is needed from a MG standpoint  Continue current MG meds  Will follow up tomorrow      Signed By: Rigo Figueredo MD     April 19, 2017        _____________________________________________________________________________    Subjective:  CC:  I am having burning, the urge to void is great  · Sister states hx of BPH and Flomax wasn't helpful OP  · Remains in A-fib  · HA this AM, Tylenol hasn't helped  Denies:  NV, ABD pain, visual changes, tingling or angina  _____________________________________________________________________________  Objective  Patient Vitals for the past 12 hrs:   Temp Pulse Resp BP SpO2   04/19/17 0853 99.9 °F (37.7 °C) (!) 108 22 (!) 181/99 100 %   04/19/17 0756 - 96 - - -   04/19/17 0400 98.2 °F (36.8 °C) 93 15 157/87 92 %   04/19/17 0052 - 98 - - -   04/19/17 0026 - - - - 94 %   04/18/17 2345 97.9 °F (36.6 °C) 97 19 122/62 94 %     Allergies: Allergies   Allergen Reactions    Lactose Itching    Fructose Other (comments)     States added sugar to foods causes sore throat/ear ache.     Inderal [Propranolol] Unknown (comments)    Lisinopril Angioedema    Gluten Nausea Only     Inpatient Meds    Current Facility-Administered Medications:     apixaban (ELIQUIS) tablet 5 mg, 5 mg, Oral, BID, Tori Huang MD    dilTIAZem CD (CARDIZEM CD) capsule 360 mg, 360 mg, Oral, DAILY, Yamini Mart NP, 360 mg at 04/19/17 0945    pravastatin (PRAVACHOL) tablet 40 mg, 40 mg, Oral, QHS, Tori Huang MD, 40 mg at 04/18/17 2123   labetalol (NORMODYNE;TRANDATE) injection 10 mg, 10 mg, IntraVENous, Q6H PRN, Arlet Vu MD    oxyCODONE-acetaminophen (PERCOCET) 5-325 mg per tablet 1-2 Tab, 1-2 Tab, Oral, Q4H PRN, Delphisheila Pipe, PA, 2 Tab at 04/17/17 1718    HYDROmorphone (PF) (DILAUDID) injection 0.5 mg, 0.5 mg, IntraVENous, Q3H PRN, Charity Pipe, PA, 0.5 mg at 04/17/17 1241    aspirin chewable tablet 81 mg, 81 mg, Oral, DAILY, Arlet Vu MD, 81 mg at 04/19/17 0945    predniSONE (DELTASONE) tablet 50 mg, 50 mg, Oral, DAILY WITH BREAKFAST, Bree Ho MD, 50 mg at 04/19/17 0945    pyridostigmine (MESTINON) tablet 90 mg, 90 mg, Oral, TID, Bree Ho MD, 90 mg at 04/19/17 0945    carvedilol (COREG) tablet 25 mg, 25 mg, Oral, BID WITH MEALS, Kaitlin Cruz MD, 25 mg at 04/19/17 0943    diphenhydrAMINE (BENADRYL) injection 25 mg, 25 mg, IntraVENous, Q6H PRN, Mary Govea MD, 25 mg at 04/15/17 2245    0.9% sodium chloride infusion 250 mL, 250 mL, IntraVENous, PRN, Areli Marino MD    sodium chloride (NS) flush 5-10 mL, 5-10 mL, IntraVENous, Q8H, Tiera Kumar MD, 10 mL at 04/19/17 0510    sodium chloride (NS) flush 5-10 mL, 5-10 mL, IntraVENous, PRN, Tiera Kumar MD    acetaminophen (TYLENOL) tablet 650 mg, 650 mg, Oral, Q4H PRN, Tiera Kumar MD, 650 mg at 04/19/17 0941    ondansetron (ZOFRAN) injection 4 mg, 4 mg, IntraVENous, Q4H PRN, Tiera Kumar MD, 4 mg at 04/11/17 0838    insulin lispro (HUMALOG) injection, , SubCUTAneous, AC&HS, Tiera Kumar MD, Stopped at 04/18/17 2200    glucose chewable tablet 16 g, 4 Tab, Oral, PRN, Tiera Kumar MD    dextrose (D50W) injection syrg 12.5-25 g, 12.5-25 g, IntraVENous, PRN, Tiera Kumar MD    glucagon [facility]) injection 1 mg, 1 mg, IntraMUSCular, PRN, Tiera Kumar MD    ALPRAZolam Lynelle Sandifer) tablet 0.25 mg, 0.25 mg, Oral, TID PRN, Tiera Kumar MD, 0.25 mg at 04/18/17 2124    ascorbic acid (vitamin C) (VITAMIN C) tablet 1,000 mg, 1,000 mg, Oral, DAILY, Beryle Douse, MD, 1,000 mg at 04/18/17 0850    cholecalciferol (VITAMIN D3) tablet 5,000 Units, 5,000 Units, Oral, DAILY, Beryle Douse, MD, 5,000 Units at 04/18/17 0849    traMADol (ULTRAM) tablet 50 mg, 50 mg, Oral, Q6H PRN, Beryle Douse, MD, 50 mg at 04/18/17 2124    pantoprazole (PROTONIX) 40 mg in sodium chloride 0.9 % 10 mL injection, 40 mg, IntraVENous, Q12H, Beryle Douse, MD, 40 mg at 04/19/17 0946    0.9% sodium chloride infusion 250 mL, 250 mL, IntraVENous, PRN, Stan Chandler MD  Labs Reviewed  Recent Results (from the past 12 hour(s))   CBC W/O DIFF    Collection Time: 04/19/17  3:50 AM   Result Value Ref Range    WBC 9.1 4.1 - 11.1 K/uL    RBC 2.96 (L) 4.10 - 5.70 M/uL    HGB 8.9 (L) 12.1 - 17.0 g/dL    HCT 28.7 (L) 36.6 - 50.3 %    MCV 97.0 80.0 - 99.0 FL    MCH 30.1 26.0 - 34.0 PG    MCHC 31.0 30.0 - 36.5 g/dL    RDW 17.6 (H) 11.5 - 14.5 %    PLATELET 610 676 - 215 K/uL   MAGNESIUM    Collection Time: 04/19/17  3:50 AM   Result Value Ref Range    Magnesium 1.9 1.6 - 2.4 mg/dL   METABOLIC PANEL, BASIC    Collection Time: 04/19/17  3:50 AM   Result Value Ref Range    Sodium 142 136 - 145 mmol/L    Potassium 3.5 3.5 - 5.1 mmol/L    Chloride 103 97 - 108 mmol/L    CO2 33 (H) 21 - 32 mmol/L    Anion gap 6 5 - 15 mmol/L    Glucose 101 (H) 65 - 100 mg/dL    BUN 8 6 - 20 MG/DL    Creatinine 0.74 0.70 - 1.30 MG/DL    BUN/Creatinine ratio 11 (L) 12 - 20      GFR est AA >60 >60 ml/min/1.73m2    GFR est non-AA >60 >60 ml/min/1.73m2    Calcium 8.4 (L) 8.5 - 10.1 MG/DL   GLUCOSE, POC    Collection Time: 04/19/17  7:45 AM   Result Value Ref Range    Glucose (POC) 91 65 - 100 mg/dL    Performed by Gabriele Villegas (PCT)        Imaging  Reviewed:   CT Results (recent):    Results from Hospital Encounter encounter on 04/10/17   CT HEAD WO CONT   Narrative EXAM:  CT HEAD WO CONT    INDICATION:   Altered mental status, slurring of speech, on Lovenox    COMPARISON: September 13, 2006. TECHNIQUE: Unenhanced CT of the head was performed using 5 mm images. Brain and  bone windows were generated. CT dose reduction was achieved through use of a  standardized protocol tailored for this examination and automatic exposure  control for dose modulation. FINDINGS:  The ventricles and sulci are stable in size, shape and configuration and  midline. There is unchanged mild periventricular white matter disease. There is  no intracranial hemorrhage, extra-axial collection, mass, mass effect or midline  shift. The basilar cisterns are open. No acute infarct is identified. The bone  windows demonstrate no abnormalities. The visualized portions of the paranasal  sinuses and mastoid air cells are clear. Impression IMPRESSION: No evidence of acute intracranial process. No change from the prior  study. MRI Results (recent):  No results found for this or any previous visit. Physical Exam  General:  Obese WM in NAD  Chest:  Irregular rhythm and rate, shallow clear BBS  Neurologic:  Reduced conversation, short responses to questions  Eyes:  Tracking and making eye contact, no ptosis  Speech:   Slight dysarthria, fatigue induced  Mentation:  Awake, alert, oriented x3  Strength: Equal bilaterally,  Proximally 4/5 & distally -4/5   Sensation:  Equal throughout, proximally /distally with LT    _____________________________________________________________________________  Assessment:   1.  MG   2. HA  3. PE:  Bilateral   4. A-fib  5. DVT, LLE  6. Colectomy  7. GIB    Plan  · Continue 50mg Prednisone, Cellcept 1gm BID and Mestinon 90mg TID  · Will consider IVIG 60gm IV x 2 days once pt is more stable and HR with A-fib is more stable---cardiology increased diltiazem and starting digoxin   · Stable currently  · Continue PT/OT ---likely needs rehab    My collaborating care team physician may have further recommendations.     On DVT Prophylaxis yes no Continue  eliquis while inpatient x      Care Plan discussed with:  Patient x   Family x   RN x   Care Manager    Consultant/Specialist:     Patient will be discussed with Dr. Augusta Schlatter Problems  Date Reviewed: 4/19/2017          Codes Class Noted POA    BPH (benign prostatic hypertrophy) (Chronic) ICD-10-CM: N40.0  ICD-9-CM: 600.00  4/19/2017 Yes    Overview Signed 4/19/2017 10:52 AM by Gabriela Murillo, NP     Has seen Urology in past and tried Flowmax without success             DVT (deep venous thrombosis) ([facility] ICD-10-CM: I82.409  ICD-9-CM: 453.40  4/14/2017 Unknown        Malignant neoplasm of sigmoid colon ([facility] ICD-10-CM: C18.7  ICD-9-CM: 153.3  4/14/2017 Unknown        Abscess of abdominal cavity ([facility] ICD-10-CM: K65.1  ICD-9-CM: 567.22  4/14/2017 Unknown        Colonic mass ICD-10-CM: K63.9  ICD-9-CM: 569.89  4/12/2017 Yes        Lung nodules ICD-10-CM: R91.8  ICD-9-CM: 793.19  4/12/2017 Yes        Acute blood loss anemia ICD-10-CM: D62  ICD-9-CM: 285.1  4/11/2017 Yes        Acute pulmonary embolus ([facility] ICD-10-CM: I26.99  ICD-9-CM: 415.19  4/11/2017 Yes        * (Principal)GI bleed ICD-10-CM: K92.2  ICD-9-CM: 578.9  4/10/2017 Yes        Type II diabetes mellitus (HCC) (Chronic) ICD-10-CM: E11.9  ICD-9-CM: 250.00  4/10/2017 Yes        History of stroke (Chronic) ICD-10-CM: Z86.73  ICD-9-CM: V12.54  4/5/2017 Yes        Morbid obesity with BMI of 40.0-44.9, adult ([facility] (Chronic) ICD-10-CM: E66.01, Z68.41  ICD-9-CM: 278.01, V85.41  4/5/2017 Yes        Myasthenia gravis with exacerbation, adult form ([address] (Chronic) ICD-10-CM: G70.01  ICD-9-CM: 358.01  4/4/2017 Yes        PAF (paroxysmal atrial fibrillation) (HCC) (Chronic) ICD-10-CM: I48.0  ICD-9-CM: 427.31  9/3/2016 Yes            ________________________________________________  - [facility]  04/19/17  ================================================    ADDENDUM--> Collaborating Care Team Physician:

## 2017-04-19 NOTE — PROGRESS NOTES
[id number] [facility] at [facility]  [phone]    Hematology / Oncology Follow-up    Reason for Visit:   Suzanne Jaffe is a 73 y.o. male who is seen for follow-up of colon cancer, PE. Interval History:   Pt opens eyes; unable to obtain any hx at present; just received pain medication    No family at bedside. Nursing reports began this am with c/o abd pain; then had BRBPR; also noted blood in colostomy. Medications reviewed in the EMR. Allergies   Allergen Reactions    Lactose Itching    Fructose Other (comments)     States added sugar to foods causes sore throat/ear ache.  Inderal [Propranolol] Unknown (comments)    Lisinopril Angioedema    Gluten Nausea Only        Review of Systems: A 6-point review of systems was obtained, negative except as reviewed in the HPI. Physical Exam:     Visit Vitals    /65    Pulse (!) 105    Temp 97.8 °F (36.6 °C)    Resp 22    Ht 5' 11\" (1.803 m)    Wt 149.9 kg (330 lb 7.5 oz)    SpO2 94%    BMI 46.09 kg/m2     General: No acute distress  Eyes: Anicteric sclerae  HENT: Atraumatic  Neck: Supple  Respiratory: Normal respiratory effort  CV: Tachy rate;2+ edema noted to  Upper and lower extremities. GI: Colostomy with bloody appearing drainage; Soft, nontender, nondistended, no masses, no hepatomegaly, no splenomegaly  Skin: No rashes, ecchymoses, or petechiae  Psych: Alert, but lethargic. appropriate affect, poor judgment/insight        Results:     Lab Results   Component Value Date/Time    WBC 9.1 04/19/2017 03:50 AM    HGB 9.5 04/19/2017 11:57 AM    HCT 28.7 04/19/2017 03:50 AM    PLATELET 463 20/24/4167 03:50 AM    MCV 97.0 04/19/2017 03:50 AM    ABS.  NEUTROPHILS 7.7 04/18/2017 07:15 AM    Hemoglobin (POC) 10.5 04/11/2017 12:50 PM     Lab Results   Component Value Date/Time    Sodium 142 04/19/2017 03:50 AM    Potassium 3.5 04/19/2017 03:50 AM    Chloride 103 04/19/2017 03:50 AM    CO2 33 04/19/2017 03:50 AM Glucose 101 04/19/2017 03:50 AM    BUN 8 04/19/2017 03:50 AM    Creatinine 0.74 04/19/2017 03:50 AM    GFR est AA >60 04/19/2017 03:50 AM    GFR est non-AA >60 04/19/2017 03:50 AM    Calcium 8.4 04/19/2017 03:50 AM    Glucose (POC) 124 04/19/2017 11:33 AM     Lab Results   Component Value Date/Time    Bilirubin, total 0.6 04/18/2017 07:15 AM    ALT (SGPT) 29 04/18/2017 07:15 AM    AST (SGOT) 21 04/18/2017 07:15 AM    Alk. phosphatase 54 04/18/2017 07:15 AM    Protein, total 4.7 04/18/2017 07:15 AM    Albumin 2.3 04/18/2017 07:15 AM    Globulin 2.4 04/18/2017 07:15 AM     Lab Results   Component Value Date/Time    Iron % saturation 17 04/16/2017 01:56 AM    Iron 34 04/16/2017 01:56 AM    TIBC 202 04/16/2017 01:56 AM    Ferritin 235 04/16/2017 01:56 AM    Vitamin B12 1318 09/14/2016 06:02 AM    Homocysteine, plasma 10.8 09/02/2016 08:50 PM    Sed rate, automated 7 10/04/2016 04:30 AM    TSH 0.32 04/05/2017 04:39 PM    JULIA Direct NEGATIVE  09/08/2016 10:03 AM    Lipase 121 04/05/2017 03:49 PM    Hep C  virus Ab Interp. 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 Bilateral pulmonary emboli. 2. RUL airspace disease may represent a pulmonary infarct     4/06/2017 DUPLEX LE  CONCLUSION: Bilateral lower extremity venous duplex positive for deep  venous thrombosis. Negative for thrombophlebitis.     4/10/2017 CTA ABD PELV  IMPRESSION:  1. Sigmoid colonic hemorrhage. 2. Small adjacent mass in the sigmoid. Diverticulitis slightly favored over a  small colon carcinoma. 3. 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 4. Resolved pulmonary emboli in the visualized portions of the left lower lobe. Stable or slightly decreased right lung emboli with small developing right upper  lobe pulmonary infarction. 4/12/2017 XR CHEST   IMPRESSION: Unchanged peripheral right upper lobe airspace disease. Mild  bibasilar atelectasis. 4/17/2017 CT HEAD WO CONT  IMPRESSION: No evidence of acute intracranial process. No change from the prior  Study. 4/20/2017 Bilateral LE doppler  pending    Assessment and Recommendations:   1. Colon cancer  Stage I (dD9mN2T4)  4/11/2017 S/p Exp lap; sigmoidectomy and colostomy  Very small focus of tumor (0.2mm), with negative lymph nodes. No role for adjuvant therapy. Recommend proceeding with surveillance as outpatient. Follow up with Dr Jared Arthur established and placed in discharge summary  Follow up with Dr. Ashlee Tapia for surveillance colonoscopy. 2. Bilateral PE/ LLE DVT (4/05/2017)  Initially anticoagulated, but stopped due to GI bleed. IVC filter placed 4/11/2017. Anticoagulation had been resumed with a trial of lovenox (4/15); Now with rectal bleeding this am;HGB stable. Will repeat dopplers for am to serve as base line now that he will be off anticoagulaltion due to recurrent GI bleed. 3. GI Bleed  Original source was sigmoid colon, based on CTA and mesenteric arteriogram.  Now s/p sigmoidectomy. 4/19/2017 rectal bleeding this am and blood noted in colostomy bag; Hgb stable; now considered failure of anticoagulation due to GI bleed  Gen surgery following. 4. Anemia, normocytic  Secondary to GI bleed and recent surgery. Consider oral Fe once bowels are moving adequately. 5. Abscesses  Noted on pathology and on imaging.   Currently on abx.    6. Pulmonary nodules  Uncertain etiology, possibly inflammatory. Unlikely to represent metastases, given the 0.2cm primary tumor. Repeat CT chest scan in 3 months    7. Afib /Chest pain  Cardio following.        Plan reviewed with Dr Sudha Ruiz By: Pilo Bowling NP     April 19, 2017

## 2017-04-19 NOTE — PROGRESS NOTES
Patient has good output from his stoma.    He is tolerating diet  abd is soft and incision is clean and dry  Patient does not require any active surgical input and we will keep an avuncular eye on him while he is in the hospital  He should have his staples removed three weeks from the time of surgery

## 2017-04-20 NOTE — PROGRESS NOTES
Bedside and Verbal shift change report given to [address] [place], [place] [address]yoandy  (oncoming nurse) by Nikko Wong (offgoing nurse). Report included the following information SBAR, Recent Results, Med Rec Status, Cardiac Rhythm Afib and Alarm Parameters . Primary Nurse Cornelius Navas RN and SARAH Vargas performed a dual skin assessment on this patient Impairment noted- see wound doc flow sheet  Srinivas score is 11    Bruising noted to Abd, bilaterally back near the flank. Incisional wound with dehiscence noted to the middle to the abd, colostomy prolapsed and retracted into abd wall with with bleeding. Generalized rash noted to upper chest and back. Bilateral elbows reddened. Right thigh laterally scratch noted  Left thigh laterally skin tear noted. Pt bleeding from abd incision site, both top and bottom of dehiscence, and also from his colostomy, stoma is prolapsed into the abd. 2 abd pads completely saturated, 1 full bed pad full of blood, and colostomy bag has 200 cc of dark maroon blood. Colostomy bag removed, abd pads removed, site cleaned with normal saline. 3 4X4 placed laterally down staple site. 1 abd pad on top of the 4x4, Adhesive pressure tape placed of top. New colostomy bag placed. MD called regarding bleeding. Spoke with MD Boateng general surgery X2, HH originally ordered, hemoglobin came back at 7.2, 1 unit of RBC's ordered on second phone call to MD Boateng. On first phone call to Dr. Shira Skaggs, notified of patient large hematoma just about the incisional site, and second large hematoma noted to be on the left rib cage area. Pt is noted and MD aware of deep purple bruising and discoloration across patient's abdomen and back. Also aware that stoma has prolapsed. MD doctor Maira iWlliamson also contacted nurse X1 and requested that nurse contact surgeon (phone call #1) and nurse contacted Dr. Guillaume Rainey, requesting restraints as pt was attempting to pull at incisional site.  Patten placed as patient has not produced urine. Breezy Cantu and Tasneem both aware of pt lactic acid level. MD Breezy Cantu aware of patient BNP. Restraints intact and applied. Patten placed. Patient currently has put out only 120 ml of urine. Patient has received 2 FFP and currently receiving one unit of RBC's. Next HH is at 545. MD Boateng request to be called regarding HH results. 9100- RBC's transfused. HH will be drawn in 20 minutes. Pt VS stable at this time. Small amount of breakthrough drainage noted. 0621- HH sent, Spoke with MD, Spoke with family. Bedside and Verbal shift change report given to Carly Bennett  (oncoming nurse) by Dahiana Block RN (offgoing nurse). Report included tSR/Afibfollowing information SBAR, Kardex, Recent Results, Med Rec Status, Cardiac Rhythm SR/Afib and Alarm Parameters .

## 2017-04-20 NOTE — PROGRESS NOTES
2320 TRANSFER - IN REPORT:    Verbal report received from Farmer on [employer].  being received from [facility] for change in patient condition(abdominal incision bleeding.)      Report consisted of patients Situation, Background, Assessment and   Recommendations(SBAR). Information from the following report(s) SBAR, Kardex, Intake/Output, MAR, Accordion, Recent Results, Med Rec Status, Cardiac Rhythm at fib and Alarm Parameters  was reviewed with the receiving nurse. Opportunity for questions and clarification was provided. Assessment completed upon patients arrival to unit and care assumed. 2338 Informed Dr Ulices Hussein patient status and transfer to ICU.  0020 Primary Nurse Aleks Condon, SARAH and Tasia Cerrato RN performed a dual skin assessment on this patient Impairment noted- see wound doc flow sheet , abdomen incision with dressing , eccyhmosis present on abdomen and back, skin tear present on legs. Srinivas score is 13  0030 Bedside shift change report given to Tasia Cerrato RN. Report included the following information SBAR, Kardex, Intake/Output, MAR, Accordion, Recent Results, Med Rec Status, Cardiac Rhythm at fib and Alarm Parameters .

## 2017-04-20 NOTE — PROGRESS NOTES
Into to check on pt; pt resting comfortably but noted blue pad saturated with blood including blue pad directly under pt. VSS. Phone call placed to Dr. Padmini Bhakta and reviewed VS and observation of bleeding including using 4 intial ABD to try to control bleeding. Told to call Surgery per Dr. Padmini Bhakta. Placed page to Dr Johnie Sosa on call for Dr Reyes Griffiths.

## 2017-04-20 NOTE — PROGRESS NOTES
Problem: Ostomy Care  Goal: *Patient pouching appliance will fit properly and maintain integrity at least three to five days  Infection control procedures (eg, clean dressings, clean gloves, hand washing, precautions to isolate wound from contamination, sterile instruments used for wound debridement) should be implemented.    Outcome: Not Progressing Towards Goal  Patient bleeding from ostomy, no bowel movement noted, stoma inverted MD aware    Problem: Patient Education: Go to Patient Education Activity  Goal: Patient/Family Education  Outcome: Not Progressing Towards Goal  Patient weak and bleeding from stoma and incisional site

## 2017-04-20 NOTE — PROGRESS NOTES
Alvino Keita [facility]  [address] [facility], [place], [address] [place] [address]  [phone]      Medical Progress Note      NAME: Suzan Mendoza. :  1945  MRM:  [id number]    Date/Time: 2017          Subjective:     Chief Complaint:  F/u PE / DVT, bowel resection    Chart/notes/labs/studies reviewed, patient examined at bedside. BRBPR earlier today. A little more confused. HR elevated. No CP. Objective:       Vitals:          Last 24hrs VS reviewed since prior progress note. Most recent are:    Visit Vitals    /74    Pulse 81    Temp 98.2 °F (36.8 °C)    Resp 25    Ht 5' 11\" (1.803 m)    Wt 149.9 kg (330 lb 7.5 oz)    SpO2 98%    BMI 46.09 kg/m2     SpO2 Readings from Last 6 Encounters:   17 98%   17 94%   17 96%   17 96%   16 97%   10/21/16 98%    O2 Flow Rate (L/min): 4 l/min       Intake/Output Summary (Last 24 hours) at 17 1620  Last data filed at 17 1512   Gross per 24 hour   Intake           1941.3 ml   Output             2350 ml   Net           -408.7 ml          Exam:     Physical Exam:    Gen: obese, frail, elderly, chronically ill-appearing, in no acute distress. HEENT: Pink conjunctivae, hearing intact to voice, moist mucous membranes  Neck: Supple, without masses, thyroid non-tender  Resp: No accessory muscle use, clear breath sounds without wheezes rales or rhonchi  Card: No murmurs, tachycardic, irregularly irregular. Trace peripheral edema  Abd: Soft, NTTP. Obese. Stoma pink. Dark blood output in ostomy. BRBPR and hematochezia  Musc: No cyanosis or clubbing  Skin: No rashes or ulcers, skin turgor is good  Neuro: Cranial nerves are grossly intact, no focal motor weakness moving all extremities, follows commands appropriately.    Psych: limited insight, oriented to person and place        Medications Reviewed: (see below)    Lab Data Reviewed: (see below)    ______________________________________________________________________    Medications:     Current Facility-Administered Medications   Medication Dose Route Frequency    0.9% sodium chloride infusion 250 mL  250 mL IntraVENous PRN    peg 3350-electrolytes (COLYTE) 4000 mL  2,000 mL Oral ONCE    insulin lispro (HUMALOG) injection   SubCUTAneous Q6H    sodium chloride (NS) flush 10 mL  10 mL InterCATHeter Q8H    sodium chloride (NS) flush 10 mL  10 mL InterCATHeter PRN    dilTIAZem CD (CARDIZEM CD) capsule 360 mg  360 mg Oral DAILY    digoxin (LANOXIN) tablet 0.25 mg  0.25 mg Oral DAILY    piperacillin-tazobactam (ZOSYN) 3.375 g in 0.9% sodium chloride (MBP/ADV) 100 mL  3.375 g IntraVENous Q8H    0.9% sodium chloride infusion 250 mL  250 mL IntraVENous PRN    pravastatin (PRAVACHOL) tablet 40 mg  40 mg Oral QHS    labetalol (NORMODYNE;TRANDATE) injection 10 mg  10 mg IntraVENous Q6H PRN    oxyCODONE-acetaminophen (PERCOCET) 5-325 mg per tablet 1-2 Tab  1-2 Tab Oral Q4H PRN    HYDROmorphone (PF) (DILAUDID) injection 0.5 mg  0.5 mg IntraVENous Q3H PRN    predniSONE (DELTASONE) tablet 50 mg  50 mg Oral DAILY WITH BREAKFAST    pyridostigmine (MESTINON) tablet 90 mg  90 mg Oral TID    carvedilol (COREG) tablet 25 mg  25 mg Oral BID WITH MEALS    diphenhydrAMINE (BENADRYL) injection 25 mg  25 mg IntraVENous Q6H PRN    0.9% sodium chloride infusion 250 mL  250 mL IntraVENous PRN    sodium chloride (NS) flush 5-10 mL  5-10 mL IntraVENous Q8H    sodium chloride (NS) flush 5-10 mL  5-10 mL IntraVENous PRN    acetaminophen (TYLENOL) tablet 650 mg  650 mg Oral Q4H PRN    ondansetron (ZOFRAN) injection 4 mg  4 mg IntraVENous Q4H PRN    glucose chewable tablet 16 g  4 Tab Oral PRN    dextrose (D50W) injection syrg 12.5-25 g  12.5-25 g IntraVENous PRN    glucagon (GLUCAGEN) injection 1 mg  1 mg IntraMUSCular PRN    ALPRAZolam (XANAX) tablet 0.25 mg  0.25 mg Oral TID PRN    ascorbic acid (vitamin C) (VITAMIN C) tablet 1,000 mg  1,000 mg Oral DAILY    cholecalciferol (VITAMIN D3) tablet 5,000 Units  5,000 Units Oral DAILY    traMADol (ULTRAM) tablet 50 mg  50 mg Oral Q6H PRN    pantoprazole (PROTONIX) 40 mg in sodium chloride 0.9 % 10 mL injection  40 mg IntraVENous Q12H    0.9% sodium chloride infusion 250 mL  250 mL IntraVENous PRN            Lab Review:     Recent Labs      04/20/17   1448  04/20/17   0624  04/20/17 0144 04/19/17 2245 04/19/17 0350   WBC   --    --   11.1  10.5   --   9.1   HGB  8.0*  8.0*  7.2*  9.8*   < >  8.9*   HCT   --   24.6*  22.4*  29.6*   --   28.7*   PLT   --    --   323  421*   --   327    < > = values in this interval not displayed. Recent Labs      04/20/17 0144 04/19/17 2245 04/19/17 0350  04/18/17   0715   NA  141  141  142  142   K  2.8*  3.5  3.5  3.5   CL  102  101  103  103   CO2  32  35*  33*  32   GLU  123*  137*  101*  93   BUN  10  9  8  9   CREA  0.79  0.89  0.74  0.77   CA  8.2*  8.6  8.4*  8.5   MG  1.7   --   1.9  2.0   PHOS  3.1   --    --   3.4   ALB  2.2*   --    --   2.3*   SGOT  19   --    --   21   ALT  31   --    --   29     No components found for: [place]  Recent Labs      04/20/17   0942   PH  7.45   PCO2  44   PO2  82   HCO3  29*     No results for input(s): INR in the last 72 hours. No lab exists for component: INREXT, INREXT  No results found for: SDES  Lab Results   Component Value Date/Time    Culture result: MRSA NOT PRESENT 04/12/2017 05:49 PM    Culture result:  04/12/2017 05:49 PM         Screening of patient nares for MRSA is for surveillance purposes and, if positive, to facilitate isolation considerations in high risk settings. It is not intended for automatic decolonization interventions per se as regimens are not sufficiently effective to warrant routine use.     Culture result: ENTEROBACTER CLOACAE 09/16/2016 10:10 PM    Culture result: KLEBSIELLA OXYTOCA 09/16/2016 10:10 PM            Assessment / Plan:     72 yo WM w/ hx of pAF on [place], DVT/PE, MG, DM p/w GIB, found to have colon CA. Hospitalization c/b issues as below:    AMS: waxes and wanes, likely from delirium. Became obtunded on opioids since Monday and remains somewhat somnolent  -- hold opioids for sedation or respiratory depression  -- CPAP QHS and during naps    GI bleed (4/10/2017) / Acute blood loss anemia: due to apparent diverticular mass / colon cancer. s/p sigmoidectomy and colostomy. Worse overnight and transferred to ICU  -- Holding Lovenox indefinitely  -- general surgery evaluation appreciated  -- GI consulted, plan for bi-directional endo in AM?  -- serial Hg (stable)  -- Cont IV Zosyn (diverticulitis)    Chest pain: resolved. Troponin essentially negative. EKG with TWI. Evaluated by cardiology  -- stress test when more stable      Acute bilateral pulmonary embolus / LLE DVT: diagnosed last admission, s/p IVC filter  -- Per hem/onc, hold lovenox indefinitely    PAF (paroxysmal atrial fibrillation) ([facility] (9/3/2016) / accelerated HTN: Cardiology following.   -- added digoxin   -- cont diltiazem  -- cont Coreg  -- now off anticoagulation     Myasthenia gravis with exacerbation, adult form ([facility] (4/4/2017)  -- cont pyridostigmine  -- appreciate neurology input.  Had considered weaning steroids but will hold off for now to not cloud the picture if his respiratory status deteriorates     History of stroke (4/5/2017):   -- off Lovenox  -- on statin     Morbid obesity with BMI of 40.0-44.9, adult ([facility] (4/5/2017)  -- counseled on weight loss     Type II diabetes mellitus ([facility] (4/10/2017):   -- follow BS on SSI      Possible KWADWO: desats at night pretty consistently  -- CPAP QHS    Lung nodule:   -- will need repeat CT imaging per guidelines      Total time spent with patient:  35 minutes                 Care Plan discussed with: Patient, Family, Nursing Staff and >50% of time spent in counseling and coordination of care    Discussed:  Care Plan    Prophylaxis: Lovenox    Disposition:  SNF/LTC           ___________________________________________________    Attending Physician: Felicity Ricci DO

## 2017-04-20 NOTE — PROGRESS NOTES
Called by nurse concerned about patient bleeding from his surgical incision. I instructed the nurse to notify Surgery if the patient is bleeding from his incision. I also notified the Nursing Supervisor to oversee the patient's care. I was called to the bedside due to continued bleeding and tachycardia. Visit Vitals    BP (!) 169/96    Pulse (!) 103    Temp 98.6 °F (37 °C)    Resp 19    Ht 5' 11\" (1.803 m)    Wt 149.9 kg (330 lb 7.5 oz)    SpO2 97%    BMI 46.09 kg/m2      He is actively bleeding from his midline incision. He received Lovenox >8 hours ago. Will give 50 mg protamine now and have ordered FFP as well. Surgery has been contacted and we are awaiting a call back. Will move patient to the ICU in the mean time. Nursing holding pressure.     Mika Brooks MD   4/19/2017  11:00 PM

## 2017-04-20 NOTE — PROGRESS NOTES
Bilateral LE venous duplex completed. Positive results given to SARAH Henao Record at 7800. Final results to follow.

## 2017-04-20 NOTE — PROGRESS NOTES
[facility]     Name: Suzanne Fitzpatrick. MRN: [id number]   : 1945 Hospital: [facility]   Date: 2017        Impression Plan   1. Acute bleed  2. Encephalopathy  3. Hx of rectal bleed  4. Hx of KWADWO  5. Abnormal CXR- pt has known pulmonary infarct               · ABG wnl  · CTA abdomen/pelvis to look for retroperitoneal hematoma  · Serial hemoglobins  · Hold any anticoagulation  · Colonoscopy scheduled for tomorrow morning  · NPO  · PPI       Pt is critically ill with active bleeding. cctime 35 min    Radiology  ( personally reviewed) - RUL infiltrate-    ABG No results for input(s): PHI, PO2I, PCO2I in the last 72 hours. Subjective     Pt known to our service and seen earlier in the hospital stay due to hx of PE and GI bleed. Since then had sigmoid colectomy. Therapeutic anticoagulation restarted. Yesterday was noted to have elizabeth bleeding from ostomy and incision. Per mother pt has been sleepy and somnolent for 2 days. Pt has not been wearing CPAP, but abg wnl this AM    Review of Systems:  Review of systems not obtained due to patient factors. Past Medical History:   Diagnosis Date    Anxiety     Arthritis     Asthma     Atrial fibrillation (HCC)     BPH (benign prostatic hypertrophy) 2017    Has seen Urology in past and tried Flowmax without success    Diabetes ([address]     HTN (hypertension) with goal to be determined     Morbid obesity with BMI of 40.0-44.9, adult ([address] 2017    Myasthenia gravis ([address]     Pulmonary emboli (HCC)     with bilateral DVT's       Past Surgical History:   Procedure Laterality Date    HX ORTHOPAEDIC      HX OTHER SURGICAL      L sided neck suregry \" infection\"      Prior to Admission medications    Medication Sig Start Date End Date Taking?  Authorizing Provider   apixaban (ELIQUIS) 5 mg tablet Please take 10mg twice daily for 5 days then decrease to 5mg twice daily thereafter  Indications: pulmonary thromboembolism 4/9/17  Yes Lc Clarke MD   carvedilol (COREG) 12.5 mg tablet Take 1 Tab by mouth two (2) times daily (with meals). 4/9/17  Yes Lc Clarke MD   dilTIAZem CD (CARDIZEM CD) 300 mg ER capsule Take 1 Cap by mouth daily. 4/9/17  Yes Lc Clarke MD   metFORMIN (GLUCOPHAGE) 500 mg tablet Take 1 Tab by mouth daily (with breakfast). 4/9/17  Yes Lc Clarke MD   traMADol (ULTRAM) 50 mg tablet Take 50 mg by mouth every six (6) hours as needed for Pain. Yes Amalia Velazquez MD   pyridostigmine (MESTINON) 60 mg tablet Take 60 mg by mouth two (2) times a day. Yes Historical Provider   b complex vitamins (B COMPLEX 1) tablet Take 3 Tabs by mouth daily. Yes Historical Provider   cholecalciferol, VITAMIN D3, (VITAMIN D3) 5,000 unit tab tablet Take 5,000 Units by mouth daily. Yes Historical Provider   ascorbic acid, vitamin C, (VITAMIN C) 500 mg tablet Take 1,000-1,500 mg by mouth daily. Yes Historical Provider   predniSONE (DELTASONE) 10 mg tablet Take 5 Tabs by mouth daily (with breakfast). 11/21/16  Yes Peña Livingston MD   potassium chloride SA (MICRO-K) 10 mEq capsule Take 10 mEq by mouth two (2) times a day. Yes Historical Provider   ALPRAZolam (XANAX) 0.25 mg tablet Take 0.25 mg by mouth three (3) times daily as needed for Anxiety. Yes Historical Provider   acetaminophen (TYLENOL) 500 mg tablet Take 1,000 mg by mouth every eight (8) hours as needed for Pain. Historical Provider   cloNIDine (CATAPRES) 0.2 mg/24 hr patch 1 Patch by TransDERmal route every seven (7) days.  9/16/16   Vasiliy Stallings MD     Current Facility-Administered Medications   Medication Dose Route Frequency    potassium chloride 10 mEq in 100 ml IVPB  10 mEq IntraVENous Q1H    peg 3350-electrolytes (COLYTE) 4000 mL  2,000 mL Oral ONCE    peg 3350-electrolytes (COLYTE) 4000 mL  2,000 mL Oral ONCE    dilTIAZem CD (CARDIZEM CD) capsule 360 mg  360 mg Oral DAILY    digoxin (LANOXIN) tablet 0.25 mg  0.25 mg Oral DAILY    piperacillin-tazobactam (ZOSYN) 3.375 g in 0.9% sodium chloride (MBP/ADV) 100 mL  3.375 g IntraVENous Q8H    pravastatin (PRAVACHOL) tablet 40 mg  40 mg Oral QHS    predniSONE (DELTASONE) tablet 50 mg  50 mg Oral DAILY WITH BREAKFAST    pyridostigmine (MESTINON) tablet 90 mg  90 mg Oral TID    carvedilol (COREG) tablet 25 mg  25 mg Oral BID WITH MEALS    sodium chloride (NS) flush 5-10 mL  5-10 mL IntraVENous Q8H    insulin lispro (HUMALOG) injection   SubCUTAneous AC&HS    ascorbic acid (vitamin C) (VITAMIN C) tablet 1,000 mg  1,000 mg Oral DAILY    cholecalciferol (VITAMIN D3) tablet 5,000 Units  5,000 Units Oral DAILY    pantoprazole (PROTONIX) 40 mg in sodium chloride 0.9 % 10 mL injection  40 mg IntraVENous Q12H     Allergies   Allergen Reactions    Lactose Itching    Fructose Other (comments)     States added sugar to foods causes sore throat/ear ache.  Inderal [Propranolol] Unknown (comments)    Lisinopril Angioedema    Gluten Nausea Only      Social History   Substance Use Topics    Smoking status: Never Smoker    Smokeless tobacco: Not on file    Alcohol use No      Family History   Problem Relation Age of Onset    Diabetes Mother     Hypertension Mother     Deep Vein Thrombosis Mother     Deep Vein Thrombosis Sister           Laboratory: I have personally reviewed the critical care flowsheet and labs. Recent Labs      04/20/17   0624  04/20/17 0144 04/19/17 2245 04/19/17   0350   WBC   --   11.1  10.5   --   9.1   HGB  8.0*  7.2*  9.8*   < >  8.9*   HCT  24.6*  22.4*  29.6*   --   28.7*   PLT   --   323  421*   --   327    < > = values in this interval not displayed.      Recent Labs      04/20/17   0144  04/19/17   2245  04/19/17   0350  04/18/17   0715   NA  141  141  142  142   K  2.8*  3.5  3.5  3.5   CL  102  101  103  103   CO2  32  35*  33*  32   GLU  123*  137*  101*  93   BUN  10  9  8  9   CREA  0.79  0.89  0.74  0.77   CA  8.2* 8.6  8.4*  8.5   MG  1.7   --   1.9  2.0   PHOS  3.1   --    --   3.4   ALB  2.2*   --    --   2.3*   SGOT  19   --    --   21   ALT  31   --    --   29       Objective:     Mode Rate Tidal Volume Pressure FiO2 PEEP   Spontaneous      12 cm H2O 40 % 5 cm H20     Vital Signs:    Ventilator Pressures  Pressure Support (cm H2O): 12 cm H2O  PIP Observed (cm H2O): 17 cm H2O  MAP (cm H2O): 8  PEEP/VENT (cm H2O): 5 cm [id number](24)Ventilator Pressures  Pressure Support (cm H2O): 12 cm H2O  PIP Observed (cm H2O): 17 cm H2O  MAP (cm H2O): 8  PEEP/VENT (cm H2O): 5 cm H20  Safety & Alarms  Circuit Temperature: 98.6 °F (37 °C)  Backup Mode Checked/Apnea: Yes  Pressure Max: 40 cm H2O  Ve Min: 3.5  Ve Max: 25  Vt Min: 355 ml  Vt Max: 1000 ml  RR Min: 10  RR Max: 40  Intake/Output:   Last shift:      Ventilator Volumes  Vt Spont (ml): 905 ml  Ve Observed (l/min): 15 l/min  Last 3 shifts: 04/20 0701 - 04/20 1900  In: 300 [I.V.:300]  Out: 645 [Urine:645]RRIOLAST3  Intake/Output Summary (Last 24 hours) at 04/20/17 1058  Last data filed at 04/20/17 1000   Gross per 24 hour   Intake           1761.3 ml   Output             1345 ml   Net            416.3 ml     EXAM:   GENERAL: sleepy, pale, opens eyes to stimuli but does not respond to questions HEENT:  PERRL, EOMI, no alar flaring or epistaxis, oral mucosa moist without cyanosis, NECK:  no jugular vein distention, no retractions, no thyromegaly or masses, LUNGS: CTA, no w/r/r, HEART:  Regular rate and rhythm with no MGR; no edema is present, ABDOMEN:  soft with no tenderness, bowel sounds present, EXTREMITIES:  warm with no cyanosis, SKIN:  no jaundice or ecchymosis and NEUROLOGIC:  lethargic    Deloris Ghotra MD  [facility]

## 2017-04-20 NOTE — PROGRESS NOTES
Cardiology Progress Note  ICU    NAME:  [address]. :   1945   MRN:   [id number]     Assessment/Plan: Critically ill    Persistent afib       - coreg 25 mg BID, Cardizem 360 mg, dig o.25 mg every day . Keep K >4       - no anticoagulation risk >>> benefit give bleeding  HTN-      - coreg, cardizem   Anemia/ recurrent bleed   Lower GI bleed/inferior mesenteric artery bleed s/p coils/s/p exp lap w/ sigmoid colon resection, colostomy, ? Mass adjacent to sigmoid colon  Hx PE/DVT - s/p IVC filter  Chest pain/TWI ant leads       - will need stress at some point when condition improves- can do as OP w/ the understanding that it would be safe to use DAPT if needed  Myasthenia gravis : PT/OT   We will sign off, please call again if needed      Pt personally seen and examined. Chart reviewed. Agree with advanced NP's history, exam and  A/P with changes/additons  Pt having recurrent GI bleed - Anticoagulation DC'd  D/w wife about reason for discontinuing anticoagulation    Willi Natarajan MD, [facility]        Subjective:   Leland Bello is a 73y.o. year old male w/ hx:  AFib  cx by T2DM, Myasthenia gravis, morbid obesity, recent PE  discharged 17  from [facility] for PE/a fib. He presented today with chief complain of rectal bleeding. Per sister, the pt this morning began experiencing several episodes of bright red rectal bleeding, prompting the pt to be brought to the ED.  Became weak and dizzy at home.        LABS:  hgb 13.1 , trop .04     : s/p expl lap, sigmoid colectomy, colostomy, IVC filter     4/10 LED (+) DVT     Overnight activities reviewed:    - transferred to ICU for recurrent bleeding- ostomy, rectum, incision   - -140/80 range    - tele- afib    - Hgb 7.2- s/p transfusion -> 8    - K 2.8- being replaced Mg 1.7          Cardiac ROS: general edema,  No chest pain, SOB,  palpitations      Review of Systems: poor historian this am - CARDIAC EVALUATION   17: ECHO- LVEF 60% No WMA. Mild MR, Mild PaHTN  2016: ECHO- EF 65%, no WMA, mild TR, RVSP 41 mmHg        Objective:     Visit Vitals    BP (!) 143/93    Pulse (!) 113    Temp 99.3 °F (37.4 °C)    Resp 23    Ht 5' 11\" (1.803 m)    Wt 330 lb 7.5 oz (149.9 kg)    SpO2 95%    BMI 46.09 kg/m2      O2 Flow Rate (L/min): 4 l/min O2 Device: Nasal cannula    Temp (24hrs), Av.4 °F (36.9 °C), Min:97.5 °F (36.4 °C), Max:99.3 °F (37.4 °C)        Intake/Output Summary (Last 24 hours) at 17 0914  Last data filed at 17 0900   Gross per 24 hour   Intake           1941.3 ml   Output             1170 ml   Net            771.3 ml       TELE: afib rate 100-120     General: awake and alert   HEENT: Atraumatic. Pale  and moist.  Anicteric sclerae. Neck: Supple  Lungs: decreased bibasilar - , No wheezing/rhonchi. Heart: PMI:diminished, Irregular   rhythm, no murmur, no S3, no rubs, no gallops. Difficult to assess  JVD. No carotid bruits. Abdomen: Soft,  Distended, large abd dressing, colostomy stoma pink, scant bloody drainage,  Few  bowel sounds. : briggs   Extremities: extensive general edema,   no clubbing, no cyanosis. No calf tenderness  Neurologic:  Drowsy   No neurological distress. Psych:   Not anxious or agitated.       Care Plan discussed with:    Comments   Patient x    Family  y    RN x    Care Manager                    Consultant:  x pulm CC        Data Review:   CMP:   Lab Results   Component Value Date/Time     2017 01:44 AM    K 2.8 (L) 2017 01:44 AM     2017 01:44 AM    CO2 32 2017 01:44 AM    AGAP 7 2017 01:44 AM     (H) 2017 01:44 AM    BUN 10 2017 01:44 AM    CREA 0.79 2017 01:44 AM    GFRAA >60 2017 01:44 AM    GFRNA >60 2017 01:44 AM    CA 8.2 (L) 2017 01:44 AM    MG 1.7 2017 01:44 AM    PHOS 3.1 2017 01:44 AM    ALB 2.2 (L) 2017 01:44 AM    TBILI 0.5 04/20/2017 01:44 AM    TP 4.9 (L) 04/20/2017 01:44 AM    GLOB 2.7 04/20/2017 01:44 AM    AGRAT 0.8 (L) 04/20/2017 01:44 AM    SGOT 19 04/20/2017 01:44 AM    ALT 31 04/20/2017 01:44 AM     CBC:   Lab Results   Component Value Date/Time    WBC 11.1 04/20/2017 01:44 AM    HGB 8.0 (L) 04/20/2017 06:24 AM    HCT 24.6 (L) 04/20/2017 06:24 AM     04/20/2017 01:44 AM     All Cardiac Markers in the last 24 hours: No results found for: CPK, CKMMB, CKMB, RCK3, CKMBT, CKNDX, CKND1, SUJEY, TROPT, TROIQ, GALLITO, TROPT, TNIPOC, BNP, BNPP  Recent Glucose Results:   Lab Results   Component Value Date/Time    GLUCPOC 123 (H) 04/20/2017 07:54 AM    GLUCPOC 159 (H) 04/19/2017 08:54 PM    GLUCPOC 184 (H) 04/19/2017 04:08 PM     (H) 04/20/2017 01:44 AM     (H) 04/19/2017 10:45 PM     ABG: No results found for: PH, PHI, PCO2, PCO2I, PO2, PO2I, HCO3, HCO3I, FIO2, FIO2I  LIPIDS:  Cholesterol, total   Date Value Ref Range Status   04/18/2017 151 <200 MG/DL Final     Triglyceride   Date Value Ref Range Status   04/18/2017 105 <150 MG/DL Final     Comment:     Based on NCEP-ATP III:  Triglycerides <150 mg/dL  is considered normal, 150-199 mg/dL  borderline high,  200-499 mg/dL high and  greater than or equal to 500 mg/dL very high. HDL Cholesterol   Date Value Ref Range Status   04/18/2017 45 MG/DL Final     Comment:     Based on NCEP ATP III, HDL Cholesterol <40 mg/dL is considered low and >60 mg/dL is elevated.      LDL, calculated   Date Value Ref Range Status   04/18/2017 85 0 - 100 MG/DL Final     Comment:     Based on the NCEP-ATP: LDL-C concentrations are considered  optimal <100 mg/dL,  near optimal/above Normal 100-129 mg/dL  Borderline High: 130-159, High: 160-189 mg/dL  Very High: Greater than or equal to 190 mg/dL       VLDL, calculated   Date Value Ref Range Status   04/18/2017 21 MG/DL Final     CHOL/HDL Ratio   Date Value Ref Range Status   04/18/2017 3.4 0 - 5.0   Final       Medications reviewed  Current Facility-Administered Medications   Medication Dose Route Frequency    0.9% sodium chloride infusion 250 mL  250 mL IntraVENous PRN    potassium chloride 10 mEq in 100 ml IVPB  10 mEq IntraVENous Q1H    dilTIAZem CD (CARDIZEM CD) capsule 360 mg  360 mg Oral DAILY    digoxin (LANOXIN) tablet 0.25 mg  0.25 mg Oral DAILY    piperacillin-tazobactam (ZOSYN) 3.375 g in 0.9% sodium chloride (MBP/ADV) 100 mL  3.375 g IntraVENous Q8H    0.9% sodium chloride infusion 250 mL  250 mL IntraVENous PRN    pravastatin (PRAVACHOL) tablet 40 mg  40 mg Oral QHS    labetalol (NORMODYNE;TRANDATE) injection 10 mg  10 mg IntraVENous Q6H PRN    oxyCODONE-acetaminophen (PERCOCET) 5-325 mg per tablet 1-2 Tab  1-2 Tab Oral Q4H PRN    HYDROmorphone (PF) (DILAUDID) injection 0.5 mg  0.5 mg IntraVENous Q3H PRN    predniSONE (DELTASONE) tablet 50 mg  50 mg Oral DAILY WITH BREAKFAST    pyridostigmine (MESTINON) tablet 90 mg  90 mg Oral TID    carvedilol (COREG) tablet 25 mg  25 mg Oral BID WITH MEALS    diphenhydrAMINE (BENADRYL) injection 25 mg  25 mg IntraVENous Q6H PRN    0.9% sodium chloride infusion 250 mL  250 mL IntraVENous PRN    sodium chloride (NS) flush 5-10 mL  5-10 mL IntraVENous Q8H    sodium chloride (NS) flush 5-10 mL  5-10 mL IntraVENous PRN    acetaminophen (TYLENOL) tablet 650 mg  650 mg Oral Q4H PRN    ondansetron (ZOFRAN) injection 4 mg  4 mg IntraVENous Q4H PRN    insulin lispro (HUMALOG) injection   SubCUTAneous AC&HS    glucose chewable tablet 16 g  4 Tab Oral PRN    dextrose (D50W) injection syrg 12.5-25 g  12.5-25 g IntraVENous PRN    glucagon (GLUCAGEN) injection 1 mg  1 mg IntraMUSCular PRN    ALPRAZolam (XANAX) tablet 0.25 mg  0.25 mg Oral TID PRN    ascorbic acid (vitamin C) (VITAMIN C) tablet 1,000 mg  1,000 mg Oral DAILY    cholecalciferol (VITAMIN D3) tablet 5,000 Units  5,000 Units Oral DAILY    traMADol (ULTRAM) tablet 50 mg  50 mg Oral Q6H PRN    pantoprazole (PROTONIX) 40 mg in sodium chloride 0.9 % 10 mL injection  40 mg IntraVENous Q12H    0.9% sodium chloride infusion 250 mL  250 mL IntraVENous PRN       CORBIN Sotomayor

## 2017-04-20 NOTE — PROCEDURES
[facility] [address]  *** FINAL REPORT ***    Name: Doug Thomson  MRN: [id number]    Inpatient  : 1945  HIS Order #: [id number]  [address] [facility] Visit #: [id number]  Date: 2017    TYPE OF TEST: Peripheral Venous Testing    REASON FOR TEST  F/O from previous doppler. Stopped anticoagulation for G.I Bleed    Right Leg:-  Deep venous thrombosis:           No  Superficial venous thrombosis:    No  Deep venous insufficiency:        No  Superficial venous insufficiency: No    Left Leg:-  Deep venous thrombosis:           Yes  Proximal extent of thrombus:      Popliteal Above The Knee  Superficial venous thrombosis:    No  Deep venous insufficiency:        No  Superficial venous insufficiency: No      INTERPRETATION/FINDINGS  PROCEDURE:  BILATERAL LE VENOUS DUPLEX. Evaluation of lower extremity veins with ultrasound (B-mode imaging,  pulsed Doppler, color Doppler). Includes the common femoral, deep  femoral, femoral, popliteal, posterior tibial, peroneal, and great  saphenous veins. FINDINGS: Technically difficult exam due to patient inability to lie  still secondary to patient edema. Gray scale imaging suboptimal. In  the left lower extremity, the proximal through distal popliteal was  seen with partial compression and abnormal color fill. Gray scale and  color flow duplex images of the remaining visualixed veins in both  lower extremities demonstrate normal compressibility, spontaneous and  augmented flow profiles, and absence of filling defects throughout the   deep and superficial veins in both lower extremities. NOTE: Bilateral   peroneal veins were not adequately visualized. CONCLUSION:  Left lower extremity venous duplex positive for deep  venous thrombosis of indeterminate age involving the popliteal vein. Right lower extremity is thrombus free. NOTE: Bilateral peroneal veins   not visualized.  Avascular structure consistent with a baker's cyst  noted in the right popliteal fossa measuring .3.33 x 3.76 cm. ADDITIONAL COMMENTS    I have personally reviewed the data relevant to the interpretation of  this  study. TECHNOLOGIST: Leonarda Gonsales  Signed: 04/20/2017 05:39 PM    PHYSICIAN: Jessica Mukherjee.  Damien Jimenez MD  Signed: 04/20/2017 06:04 PM

## 2017-04-20 NOTE — PROGRESS NOTES
Had placed total of 3 pages for Dr. Gisel Ochoa. Another 4 ABD have been used to control bleeding. Rapid response called. Dr. Adam Sams to bedside along with nursing supervisor Charan Lynch. Order to transfer pt to ICU received. Lab work sent per protocol per [address], RN.

## 2017-04-20 NOTE — PROGRESS NOTES
0700- Bedside shift change report given to [facility] (oncoming nurse) by Ranjit Brewer RN (offgoing nurse). Report included the following information SBAR, Kardex, Intake/Output, MAR and Recent Results. 0830Corenelida Waldrop, surgery PA, at bedside. Abdominal incision and ostomy site examined. New orders for GI consult. 0900- GI NP at bedside. Family present. Plan for possible colonoscopy tomorrow. 1022- Pt taken to radiology for CT abdomen. 1057- Pt returned to room from radiology. Pt connected to room monitors. Linen changed and patient repositioned. 1300- Spoke with PICC team.  Feel patient it not a good candidate for PICC. Unable to place peripheral IV per nursing. Dr. Shelby Hudson notified. Order received to have IR place central line. [address] with Iris Hinojosa, GI NP, ok to place NGT to give colonoscopy prep. 1600- IR at bedside for central line placement. 1713- Bedside shift change report given to Ranjit Brewer RN (oncoming nurse) by Ahsan Barrera RN (offgoing nurse). Report included the following information SBAR, Kardex, Intake/Output, MAR and Recent Results.

## 2017-04-20 NOTE — PROGRESS NOTES
Physical Therapy Note:  Chart reviewed and spoke with team at [address] [place] [address]. Patient transferred back to ICU yesterday due to increased bleeding at surgical incision. Patient undergoing further testing and too lethargic to engage in activity at this time. Will hold OT/PT per MD recommendation and follow-up next tx day. Thank you.

## 2017-04-20 NOTE — PROGRESS NOTES
Call received for pt's pending order on PICC line. Reiterated to ICU RN that patient is contraindicated for a PICC at this time, and that he is best served with a triple lumen IJ d/t increased edema in arms and high risk for more clotting at this point if a line is placed in the arm.

## 2017-04-20 NOTE — CONSULTS
Gastroenterology Consultation Note      Admit Date: 4/10/2017  Consult Date: 4/20/2017   I greatly appreciate your asking me to see Seb Stapleton, thank you very much for the opportunity to participate in his care. Narrative Assessment and Plan   · Patient with recent sigmoid resection and ostomy for diverticular bleed/colon cancer. Colonoscopy not able to be performed pre-operatively as he required urgent surgical intervention due to continued bleeding. Patient now has recurrent bleeding with resumption of anticoagulation. Plan  - colonoscopy through stoma to further assess for alternative source of bleeding tomorrow AM (discussed with patient sister and niece at bedside)  - colon prep today, NPO after midnight  - continue serial H/H and transfuse for Hgb <7  - hold anticoagulation    Subjective:     Chief Complaint: bleeding from ostomy    History of Present Illness: Seb Stapleton is a 73 y.o. male known to our service. Initially consulted 4/10/17 for lower GI bleed. Plan was for colonoscopy. However, patient continued to have rectal bleeding and became hemodynamically unstable despite IR embolization. He was taken urgently to OR for sigmoid resection and colostomy. Patient was recovering well postop. Lovenox looks like it was resumed 4/17/17. Yesterday patient was noted to have a large amount of bleeding from incision, ostomy, and possible rectal bleeding. Hgb dropped to 7.2. He has received 2 units FFP and 1 unit PRBC. Hgb has responded appropriately and currently 8. No active signs of bleeding at this time. Patient drowsy and lethargic. No able to give history. Report obtained from RN.     PCP:  Darylene Olmstead, MD    Past Medical History:   Diagnosis Date    Anxiety     Arthritis     Asthma     Atrial fibrillation ([address]     BPH (benign prostatic hypertrophy) 4/19/2017    Has seen Urology in past and tried Flowmax without success    Diabetes ([address]     HTN (hypertension) with goal to be determined     Morbid obesity with BMI of 40.0-44.9, adult ([facility] 4/5/2017    Myasthenia gravis ([facility]     Pulmonary emboli (HCC)     with bilateral DVT's         Past Surgical History:   Procedure Laterality Date    HX ORTHOPAEDIC      HX OTHER SURGICAL      L sided neck suregry \" infection\"       Social History   Substance Use Topics    Smoking status: Never Smoker    Smokeless tobacco: Not on file    Alcohol use No        Family History   Problem Relation Age of Onset    Diabetes Mother     Hypertension Mother     Deep Vein Thrombosis Mother     Deep Vein Thrombosis Sister         Allergies   Allergen Reactions    Lactose Itching    Fructose Other (comments)     States added sugar to foods causes sore throat/ear ache.  Inderal [Propranolol] Unknown (comments)    Lisinopril Angioedema    Gluten Nausea Only            Home Medications:  Prior to Admission Medications   Prescriptions Last Dose Informant Patient Reported? Taking? ALPRAZolam (XANAX) 0.25 mg tablet 4/9/2017 at HS Significant Other Yes Yes   Sig: Take 0.25 mg by mouth three (3) times daily as needed for Anxiety. acetaminophen (TYLENOL) 500 mg tablet Unknown at Unknown time Significant Other Yes No   Sig: Take 1,000 mg by mouth every eight (8) hours as needed for Pain. apixaban (ELIQUIS) 5 mg tablet 4/10/2017 at AM Significant Other No Yes   Sig: Please take 10mg twice daily for 5 days then decrease to 5mg twice daily thereafter  Indications: pulmonary thromboembolism   ascorbic acid, vitamin C, (VITAMIN C) 500 mg tablet 4/9/2017 at AM Significant Other Yes Yes   Sig: Take 1,000-1,500 mg by mouth daily. b complex vitamins (B COMPLEX 1) tablet 4/9/2017 at AM Significant Other Yes Yes   Sig: Take 3 Tabs by mouth daily. carvedilol (COREG) 12.5 mg tablet 4/9/2017 at HS Significant Other No Yes   Sig: Take 1 Tab by mouth two (2) times daily (with meals).    cholecalciferol, VITAMIN D3, (VITAMIN D3) 5,000 unit tab tablet 2017 at AM Significant Other Yes Yes   Sig: Take 5,000 Units by mouth daily. cloNIDine (CATAPRES) 0.2 mg/24 hr patch 2017 at AM Significant Other No No   Si Patch by TransDERmal route every seven (7) days. dilTIAZem CD (CARDIZEM CD) 300 mg ER capsule 4/10/2017 at AM Significant Other No Yes   Sig: Take 1 Cap by mouth daily. metFORMIN (GLUCOPHAGE) 500 mg tablet 4/10/2017 at AM Significant Other No Yes   Sig: Take 1 Tab by mouth daily (with breakfast). potassium chloride SA (MICRO-K) 10 mEq capsule 4/10/2017 at AM Significant Other Yes Yes   Sig: Take 10 mEq by mouth two (2) times a day. predniSONE (DELTASONE) 10 mg tablet 4/10/2017 at AM Significant Other No Yes   Sig: Take 5 Tabs by mouth daily (with breakfast). pyridostigmine (MESTINON) 60 mg tablet 2017 at HS Significant Other Yes Yes   Sig: Take 60 mg by mouth two (2) times a day. traMADol (ULTRAM) 50 mg tablet 4/10/2017 at Unknown time Significant Other Yes Yes   Sig: Take 50 mg by mouth every six (6) hours as needed for Pain.       Facility-Administered Medications: None       Hospital Medications:  Current Facility-Administered Medications   Medication Dose Route Frequency    0.9% sodium chloride infusion 250 mL  250 mL IntraVENous PRN    potassium chloride 10 mEq in 100 ml IVPB  10 mEq IntraVENous Q1H    dilTIAZem CD (CARDIZEM CD) capsule 360 mg  360 mg Oral DAILY    digoxin (LANOXIN) tablet 0.25 mg  0.25 mg Oral DAILY    piperacillin-tazobactam (ZOSYN) 3.375 g in 0.9% sodium chloride (MBP/ADV) 100 mL  3.375 g IntraVENous Q8H    0.9% sodium chloride infusion 250 mL  250 mL IntraVENous PRN    pravastatin (PRAVACHOL) tablet 40 mg  40 mg Oral QHS    labetalol (NORMODYNE;TRANDATE) injection 10 mg  10 mg IntraVENous Q6H PRN    oxyCODONE-acetaminophen (PERCOCET) 5-325 mg per tablet 1-2 Tab  1-2 Tab Oral Q4H PRN    HYDROmorphone (PF) (DILAUDID) injection 0.5 mg  0.5 mg IntraVENous Q3H PRN    predniSONE (DELTASONE) tablet 50 mg 50 mg Oral DAILY WITH BREAKFAST    pyridostigmine (MESTINON) tablet 90 mg  90 mg Oral TID    carvedilol (COREG) tablet 25 mg  25 mg Oral BID WITH MEALS    diphenhydrAMINE (BENADRYL) injection 25 mg  25 mg IntraVENous Q6H PRN    0.9% sodium chloride infusion 250 mL  250 mL IntraVENous PRN    sodium chloride (NS) flush 5-10 mL  5-10 mL IntraVENous Q8H    sodium chloride (NS) flush 5-10 mL  5-10 mL IntraVENous PRN    acetaminophen (TYLENOL) tablet 650 mg  650 mg Oral Q4H PRN    ondansetron (ZOFRAN) injection 4 mg  4 mg IntraVENous Q4H PRN    insulin lispro (HUMALOG) injection   SubCUTAneous AC&HS    glucose chewable tablet 16 g  4 Tab Oral PRN    dextrose (D50W) injection syrg 12.5-25 g  12.5-25 g IntraVENous PRN    glucagon (GLUCAGEN) injection 1 mg  1 mg IntraMUSCular PRN    ALPRAZolam (XANAX) tablet 0.25 mg  0.25 mg Oral TID PRN    ascorbic acid (vitamin C) (VITAMIN C) tablet 1,000 mg  1,000 mg Oral DAILY    cholecalciferol (VITAMIN D3) tablet 5,000 Units  5,000 Units Oral DAILY    traMADol (ULTRAM) tablet 50 mg  50 mg Oral Q6H PRN    pantoprazole (PROTONIX) 40 mg in sodium chloride 0.9 % 10 mL injection  40 mg IntraVENous Q12H    0.9% sodium chloride infusion 250 mL  250 mL IntraVENous PRN       Review of Systems: Difficult to obtain from patient at this time. Pertinent positives and negatives mentioned in HPI.        Objective:     Physical Exam:  Visit Vitals    BP (!) 143/93    Pulse (!) 113    Temp 99.3 °F (37.4 °C)    Resp 23    Ht 5' 11\" (1.803 m)    Wt 149.9 kg (330 lb 7.5 oz)    SpO2 95%    BMI 46.09 kg/m2     SpO2 Readings from Last 6 Encounters:   04/20/17 95%   04/09/17 94%   04/04/17 96%   02/24/17 96%   11/21/16 97%   10/21/16 98%    O2 Flow Rate (L/min): 4 l/min     Intake/Output Summary (Last 24 hours) at 04/20/17 0913  Last data filed at 04/20/17 0900   Gross per 24 hour   Intake           1941.3 ml   Output             1170 ml   Net            771.3 ml      General: no distress, drowsy  Skin:  Multiple skin contusions  HEENT: Pupils equal, sclera anicteric, oropharynx with no gross lesions  Cardiovascular: tachycardia  GI:  Bowel sounds present, soft, appears nontender. Midline incision with clean dressing. Ostomy with small amount blood tinged drainage  Rectal: no external lesions, digital rectal exam without no overt signs of blood  Musculoskeletal:  No skeletal deformity nor acute arthritis noted.   Neurological: CN II-XII grossly intact, no focal deficits  Lymphatic:  No cervical or periumbilic lymphadenopathy    Laboratory:    Recent Results (from the past 24 hour(s))   GLUCOSE, POC    Collection Time: 04/19/17 11:33 AM   Result Value Ref Range    Glucose (POC) 124 (H) 65 - 100 mg/dL    Performed by Kevin Bear (PCT)    HEMOGLOBIN    Collection Time: 04/19/17 11:57 AM   Result Value Ref Range    HGB 9.5 (L) 12.1 - 17.0 g/dL   GLUCOSE, POC    Collection Time: 04/19/17  4:08 PM   Result Value Ref Range    Glucose (POC) 184 (H) 65 - 100 mg/dL    Performed by Kevin Bear (PCT)    GLUCOSE, POC    Collection Time: 04/19/17  8:54 PM   Result Value Ref Range    Glucose (POC) 159 (H) 65 - 100 mg/dL    Performed by Mark Lucia (PCT)    HEMOGLOBIN    Collection Time: 04/19/17  8:55 PM   Result Value Ref Range    HGB 9.7 (L) 12.1 - 17.0 g/dL   TYPE & SCREEN    Collection Time: 04/19/17 10:45 PM   Result Value Ref Range    Crossmatch Expiration 04/22/2017     ABO/Rh(D) O POSITIVE     Antibody screen NEG     Unit number [id number]     Blood component type RC LR AS3,2     Unit division 00     Status of unit ISSUED     Crossmatch result Compatible    CBC W/O DIFF    Collection Time: 04/19/17 10:45 PM   Result Value Ref Range    WBC 10.5 4.1 - 11.1 K/uL    RBC 3.10 (L) 4.10 - 5.70 M/uL    HGB 9.8 (L) 12.1 - 17.0 g/dL    HCT 29.6 (L) 36.6 - 50.3 %    MCV 95.5 80.0 - 99.0 FL    MCH 31.6 26.0 - 34.0 PG    MCHC 33.1 30.0 - 36.5 g/dL    RDW 17.2 (H) 11.5 - 14.5 %    PLATELET 241 (H) 150 - 400 K/uL   PRO-BNP    Collection Time: 04/19/17 10:45 PM   Result Value Ref Range    NT pro-BNP 1201 (H) 0 - 315 PG/ML   METABOLIC PANEL, BASIC    Collection Time: 04/19/17 10:45 PM   Result Value Ref Range    Sodium 141 136 - 145 mmol/L    Potassium 3.5 3.5 - 5.1 mmol/L    Chloride 101 97 - 108 mmol/L    CO2 35 (H) 21 - 32 mmol/L    Anion gap 5 5 - 15 mmol/L    Glucose 137 (H) 65 - 100 mg/dL    BUN 9 6 - 20 MG/DL    Creatinine 0.89 0.70 - 1.30 MG/DL    BUN/Creatinine ratio 10 (L) 12 - 20      GFR est AA >60 >60 ml/min/1.73m2    GFR est non-AA >60 >60 ml/min/1.73m2    Calcium 8.6 8.5 - 10.1 MG/DL   LACTIC ACID, PLASMA    Collection Time: 04/19/17 10:45 PM   Result Value Ref Range    Lactic acid 2.4 (HH) 0.4 - 2.0 MMOL/L   FFP, ALLOCATE    Collection Time: 04/19/17 11:00 PM   Result Value Ref Range    Unit number [id number]     Blood component type FP24H,THAW     Unit division 00     Status of unit ISSUED     Unit number [id number]     Blood component type FP24H,THAW     Unit division 00     Status of unit ISSUED    CBC WITH AUTOMATED DIFF    Collection Time: 04/20/17  1:44 AM   Result Value Ref Range    WBC 11.1 4.1 - 11.1 K/uL    RBC 2.34 (L) 4.10 - 5.70 M/uL    HGB 7.2 (L) 12.1 - 17.0 g/dL    HCT 22.4 (L) 36.6 - 50.3 %    MCV 95.7 80.0 - 99.0 FL    MCH 30.8 26.0 - 34.0 PG    MCHC 32.1 30.0 - 36.5 g/dL    RDW 17.2 (H) 11.5 - 14.5 %    PLATELET 226 159 - 437 K/uL    NEUTROPHILS 86 (H) 32 - 75 %    LYMPHOCYTES 5 (L) 12 - 49 %    MONOCYTES 9 5 - 13 %    EOSINOPHILS 0 0 - 7 %    BASOPHILS 0 0 - 1 %    ABS. NEUTROPHILS 9.5 (H) 1.8 - 8.0 K/UL    ABS. LYMPHOCYTES 0.6 (L) 0.8 - 3.5 K/UL    ABS. MONOCYTES 1.0 0.0 - 1.0 K/UL    ABS. EOSINOPHILS 0.0 0.0 - 0.4 K/UL    ABS.  BASOPHILS 0.0 0.0 - 0.1 K/UL   PHOSPHORUS    Collection Time: 04/20/17  1:44 AM   Result Value Ref Range    Phosphorus 3.1 2.6 - 4.7 MG/DL   MAGNESIUM    Collection Time: 04/20/17  1:44 AM   Result Value Ref Range    Magnesium 1.7 1.6 - 2.4 mg/dL   METABOLIC PANEL, COMPREHENSIVE    Collection Time: 04/20/17  1:44 AM   Result Value Ref Range    Sodium 141 136 - 145 mmol/L    Potassium 2.8 (L) 3.5 - 5.1 mmol/L    Chloride 102 97 - 108 mmol/L    CO2 32 21 - 32 mmol/L    Anion gap 7 5 - 15 mmol/L    Glucose 123 (H) 65 - 100 mg/dL    BUN 10 6 - 20 MG/DL    Creatinine 0.79 0.70 - 1.30 MG/DL    BUN/Creatinine ratio 13 12 - 20      GFR est AA >60 >60 ml/min/1.73m2    GFR est non-AA >60 >60 ml/min/1.73m2    Calcium 8.2 (L) 8.5 - 10.1 MG/DL    Bilirubin, total 0.5 0.2 - 1.0 MG/DL    ALT (SGPT) 31 12 - 78 U/L    AST (SGOT) 19 15 - 37 U/L    Alk.  phosphatase 47 45 - 117 U/L    Protein, total 4.9 (L) 6.4 - 8.2 g/dL    Albumin 2.2 (L) 3.5 - 5.0 g/dL    Globulin 2.7 2.0 - 4.0 g/dL    A-G Ratio 0.8 (L) 1.1 - 2.2     LACTIC ACID, PLASMA    Collection Time: 04/20/17  1:44 AM   Result Value Ref Range    Lactic acid 2.4 (HH) 0.4 - 2.0 MMOL/L   HGB & HCT    Collection Time: 04/20/17  6:24 AM   Result Value Ref Range    HGB 8.0 (L) 12.1 - 17.0 g/dL    HCT 24.6 (L) 36.6 - 50.3 %   GLUCOSE, POC    Collection Time: 04/20/17  7:54 AM   Result Value Ref Range    Glucose (POC) 123 (H) 65 - 100 mg/dL    Performed by Georgia De La Torre          Assessment/Plan:     Principal Problem:    GI bleed (4/10/2017)    Active Problems:    PAF (paroxysmal atrial fibrillation) ([facility] (9/3/2016)      Myasthenia gravis with exacerbation, adult form ([facility] (4/4/2017)      History of stroke (4/5/2017)      Morbid obesity with BMI of 40.0-44.9, adult ([address] (4/5/2017)      Type II diabetes mellitus ([address] (4/10/2017)      Acute blood loss anemia (4/11/2017)      Acute pulmonary embolus ([address] (4/11/2017)      Colonic mass (4/12/2017)      Lung nodules (4/12/2017)      DVT (deep venous thrombosis) ([address] (4/14/2017)      Malignant neoplasm of sigmoid colon ([address] (4/14/2017)      Abscess of abdominal cavity (HCC) (4/14/2017)      BPH (benign prostatic hypertrophy) (4/19/2017)      Overview: Has seen Urology in past and tried Flowmax without success         See above narrative for full detail.     Luda KITTY Edwards  04/20/17  9:13 AM

## 2017-04-20 NOTE — PROGRESS NOTES
Pt transported to ICU via bariatric bed, O2 at 3L NC and cardiac monitor. Bedside report given to Shaila Soto RN.

## 2017-04-20 NOTE — PROGRESS NOTES
[id number] [facility] at [address] [place] [address]  [phone]    Hematology / Oncology Follow-up    Reason for Visit:   George Kaur is a 73 y.o. male who is seen for follow-up of colon cancer, PE. Interval History:   Transferred to ICU 4/19/2017    Mr Taunya Romberg opens eyes to name but verbally does not respond. Sister  at bedside. Medications reviewed in the EMR. Allergies   Allergen Reactions    Lactose Itching    Fructose Other (comments)     States added sugar to foods causes sore throat/ear ache.  Inderal [Propranolol] Unknown (comments)    Lisinopril Angioedema    Gluten Nausea Only        Review of Systems: A 6-point review of systems was obtained, negative except as reviewed in the HPI. Physical Exam:     Visit Vitals    /70    Pulse 93    Temp 98.2 °F (36.8 °C)    Resp (!) 37    Ht 5' 11\" (1.803 m)    Wt 149.9 kg (330 lb 7.5 oz)    SpO2 95%    BMI 46.09 kg/m2     General: No acute distress  Eyes: Anicteric sclerae  HENT: Atraumatic  Neck: Supple  Respiratory: Normal respiratory effort  CV: Tachy rate;2+ edema noted to  Upper and lower extremities. GI: Colostomy noted; Soft, nontender, nondistended, no masses, no hepatomegaly, no splenomegaly  Skin: No rashes, or petechiae  Psych: Lethargic,        Results:     Lab Results   Component Value Date/Time    WBC 11.1 04/20/2017 01:44 AM    HGB 8.0 04/20/2017 06:24 AM    HCT 24.6 04/20/2017 06:24 AM    PLATELET 450 88/14/4642 01:44 AM    MCV 95.7 04/20/2017 01:44 AM    ABS.  NEUTROPHILS 9.5 04/20/2017 01:44 AM    Hemoglobin (POC) 10.5 04/11/2017 12:50 PM     Lab Results   Component Value Date/Time    Sodium 141 04/20/2017 01:44 AM    Potassium 2.8 04/20/2017 01:44 AM    Chloride 102 04/20/2017 01:44 AM    CO2 32 04/20/2017 01:44 AM    Glucose 123 04/20/2017 01:44 AM    BUN 10 04/20/2017 01:44 AM    Creatinine 0.79 04/20/2017 01:44 AM    GFR est AA >60 04/20/2017 01:44 AM    GFR est non-AA >60 04/20/2017 01:44 AM    Calcium 8.2 04/20/2017 01:44 AM    Glucose (POC) 134 04/20/2017 11:23 AM     Lab Results   Component Value Date/Time    Bilirubin, total 0.5 04/20/2017 01:44 AM    ALT (SGPT) 31 04/20/2017 01:44 AM    AST (SGOT) 19 04/20/2017 01:44 AM    Alk. phosphatase 47 04/20/2017 01:44 AM    Protein, total 4.9 04/20/2017 01:44 AM    Albumin 2.2 04/20/2017 01:44 AM    Globulin 2.7 04/20/2017 01:44 AM     Lab Results   Component Value Date/Time    Iron % saturation 17 04/16/2017 01:56 AM    Iron 34 04/16/2017 01:56 AM    TIBC 202 04/16/2017 01:56 AM    Ferritin 235 04/16/2017 01:56 AM    Vitamin B12 1318 09/14/2016 06:02 AM    Homocysteine, plasma 10.8 09/02/2016 08:50 PM    Sed rate, automated 7 10/04/2016 04:30 AM    TSH 0.32 04/05/2017 04:39 PM    JULIA Direct NEGATIVE  09/08/2016 10:03 AM    Lipase 121 04/05/2017 03:49 PM    Hep C  virus Ab Interp. 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 Bilateral pulmonary emboli. 2. RUL airspace disease may represent a pulmonary infarct     4/06/2017 DUPLEX LE  CONCLUSION: Bilateral lower extremity venous duplex positive for deep  venous thrombosis. Negative for thrombophlebitis. 4/10/2017 CTA ABD PELV  IMPRESSION:  1. Sigmoid colonic hemorrhage. 2. Small adjacent mass in the sigmoid. Diverticulitis slightly favored over a  small colon carcinoma.   3. 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 4. Resolved pulmonary emboli in the visualized portions of the left lower lobe. Stable or slightly decreased right lung emboli with small developing right upper  lobe pulmonary infarction. 4/12/2017 XR CHEST   IMPRESSION: Unchanged peripheral right upper lobe airspace disease. Mild  bibasilar atelectasis. 4/17/2017 CT HEAD WO CONT  IMPRESSION: No evidence of acute intracranial process. No change from the prior  Study. 4/20/2017 Bilateral LE doppler  Pending    4/20/2017 CTA ABD PELV W CONT  pending    Assessment and Recommendations:   1. Colon cancer  Stage I ([id number])  4/11/2017 S/p Exp lap; sigmoidectomy and colostomy  Very small focus of tumor (0.2mm), with negative lymph nodes. No role for adjuvant therapy. Plan would be  proceed with surveillance as outpatient. Follow up with Dr Leslie Fong established and placed in discharge summary  Follow up with Dr. Damaso Alcala for surveillance colonoscopy. 2. Bilateral PE/ LLE DVT (4/05/2017)  Initially anticoagulated, but stopped due to GI bleed. IVC filter placed 4/11/2017. Anticoagulation had been resumed with a trial of lovenox (4/15); Now with recurrent rectal bleeding; failed Lovenox trial;  Anticoagulation stopped; at this point anticoagulation is contraindicated    At some point will need repeat dopplers  as base line now that he will be off anticoagulaltion due to recurrent GI bleed. 3. GI Bleed  Original source was sigmoid colon; s/p sigmoidectomy. 4/19/2017 recurrent GI bleed   Gen surgery following. GI following; plan for colonoscopy tomorrow    4. Anemia, normocytic   Secondary to recurrent GI bleed and recent surgery. Continue to monitor and transfuse Hgb < 7    5. Abscesses  Noted on pathology and on imaging. Currently on abx. 6. Pulmonary nodules  Uncertain etiology, possibly inflammatory. Unlikely to represent metastases, given the 0.2cm primary tumor. Repeat CT chest scan in 3 months      Plan reviewed with Dr Belkys Coon By: Rod Berry NP     April 20, 2017

## 2017-04-20 NOTE — PROGRESS NOTES
Chart reviewed for PICC placement evaluation. Areas reviewed include, but are not limited to, patient's current and past H&P, Lab and Procedure Results, all notes, media records and medications.

## 2017-04-20 NOTE — DISCHARGE SUMMARY
Physician Interim Discharge Summary     Patient ID:  Gerald Pacheco  [id number]  73 y.o.  1945    Admit date: 4/10/2017    Discharge date and time: TBD    Hospital Course: This is an interim summary and covers the hospitalization from 4/15 to 4/19/2017. For any preceding portion of the patient's hospitalization, please see my partner's notes. Mr. Dian Bender is a 74 yo WM w/ hx of pAF on Elquis, DVT/PE, MG, DM p/w GIB, found to have colon CA. Hospitalization c/b issues as below:     AMS: waxes and wanes, likely from delirium. Became obtunded on opioids 2 days ago. Hold opioids for sedation or respiratory depression. CPAP QHS and during naps     GI bleed (4/10/2017) / Acute blood loss anemia: due to apparent diverticular mass / colon cancer. s/p sigmoidectomy and colostomy. Occurred again this morning. Holding Lovenox. General surgery following. Serial Hg (stable). Resume IV Zosyn (diverticulitis). Follow up with surveillance c-scope and with oncology outpatient     Chest pain: resolved. Troponin essentially negative. EKG with TWI. Evaluated by cardiology. Stress test when more stable      Acute bilateral pulmonary embolus / LLE DVT: diagnosed last admission, s/p IVC filter. Lovenox being held. Hem/onc repeating LE dopplers     PAF (paroxysmal atrial fibrillation) ([facility] (9/3/2016) / accelerated HTN: Cardiology following. Added digoxin again. Cont diltiazem, Coreg. Now off anticoagulation      Myasthenia gravis with exacerbation, adult form ([facility] (4/4/2017). Cont pyridostigmine and steroids  See daily note for details. Final discharge summary will be done by the discharging physician.        Signed:  Sandi Araujo MD  4/19/2017  8:45 PM

## 2017-04-20 NOTE — DISCHARGE INSTRUCTIONS
Atrial Fibrillation: Care Instructions  Your Care Instructions    Atrial fibrillation is an irregular and often fast heartbeat. Treating this condition is important for several reasons. It can cause blood clots, which can travel from your heart to your brain and cause a stroke. If you have a fast heartbeat, you may feel lightheaded, dizzy, and weak. An irregular heartbeat can also increase your risk for heart failure. Atrial fibrillation is often the result of another heart condition, such as high blood pressure or coronary artery disease. Making changes to improve your heart condition will help you stay healthy and active. Follow-up care is a key part of your treatment and safety. Be sure to make and go to all appointments, and call your doctor if you are having problems. It's also a good idea to know your test results and keep a list of the medicines you take. How can you care for yourself at home? Medicines  · Take your medicines exactly as prescribed. Call your doctor if you think you are having a problem with your medicine. You will get more details on the specific medicines your doctor prescribes. · If your doctor has given you a blood thinner to prevent a stroke, be sure you get instructions about how to take your medicine safely. Blood thinners can cause serious bleeding problems. · Do not take any vitamins, over-the-counter drugs, or herbal products without talking to your doctor first.  Lifestyle changes  · Do not smoke. Smoking can increase your chance of a stroke and heart attack. If you need help quitting, talk to your doctor about stop-smoking programs and medicines. These can increase your chances of quitting for good. · Eat a heart-healthy diet. · Stay at a healthy weight. Lose weight if you need to. · Limit alcohol to 2 drinks a day for men and 1 drink a day for women. Too much alcohol can cause health problems. · Avoid colds and flu. Get a pneumococcal vaccine shot.  If you have had one before, ask your doctor whether you need another dose. Get a flu shot every year. If you must be around people with colds or flu, wash your hands often. Nutrition Recommendations for Discharge:             Continue Oral Nutrition Supplements at discharge:   Ensure Active High Protein for Muscle Health twice daily between meals for 30 days unless otherwise directed by your Primary Care Physician. This product can be purchased at your   local grocery store or drug store and online. Jose Villafuerte RD         Activity  · If your doctor recommends it, get more exercise. Walking is a good choice. Bit by bit, increase the amount you walk every day. Try for at least 30 minutes on most days of the week. You also may want to swim, bike, or do other activities. Your doctor may suggest that you join a cardiac rehabilitation program so that you can have help increasing your physical activity safely. · Start light exercise if your doctor says it is okay. Even a small amount will help you get stronger, have more energy, and manage stress. Walking is an easy way to get exercise. Start out by walking a little more than you did in the hospital. Gradually increase the amount you walk. · When you exercise, watch for signs that your heart is working too hard. You are pushing too hard if you cannot talk while you are exercising. If you become short of breath or dizzy or have chest pain, sit down and rest immediately. · Check your pulse regularly. Place two fingers on the artery at the palm side of your wrist, in line with your thumb. If your heartbeat seems uneven or fast, talk to your doctor. When should you call for help? Call 911 anytime you think you may need emergency care. For example, call if:  · You have symptoms of a heart attack. These may include:  ¨ Chest pain or pressure, or a strange feeling in the chest.  ¨ Sweating.   ¨ Shortness of breath. ¨ Nausea or vomiting. ¨ Pain, pressure, or a strange feeling in the back, neck, jaw, or upper belly or in one or both shoulders or arms. ¨ Lightheadedness or sudden weakness. ¨ A fast or irregular heartbeat. After you call 911, the  may tell you to chew 1 adult-strength or 2 to 4 low-dose aspirin. Wait for an ambulance. Do not try to drive yourself. · You have symptoms of a stroke. These may include:  ¨ Sudden numbness, tingling, weakness, or loss of movement in your face, arm, or leg, especially on only one side of your body. ¨ Sudden vision changes. ¨ Sudden trouble speaking. ¨ Sudden confusion or trouble understanding simple statements. ¨ Sudden problems with walking or balance. ¨ A sudden, severe headache that is different from past headaches. · You passed out (lost consciousness). Call your doctor now or seek immediate medical care if:  · You have new or increased shortness of breath. · You feel dizzy or lightheaded, or you feel like you may faint. · Your heart rate becomes irregular. · You can feel your heart flutter in your chest or skip heartbeats. Tell your doctor if these symptoms are new or worse. Watch closely for changes in your health, and be sure to contact your doctor if you have any problems. Where can you learn more? Go to http://palmer-kendall.info/. Enter U020 in the search box to learn more about \"Atrial Fibrillation: Care Instructions. \"  Current as of: January 27, 2016  Content Version: 11.2  © 9267-5811 [employer]. Care instructions adapted under license by [employer] (which disclaims liability or warranty for this information). If you have questions about a medical condition or this instruction, always ask your healthcare professional. [address] any warranty or liability for your use of this information.

## 2017-04-20 NOTE — PROGRESS NOTES
[facility]  [place] [address]  [phone]     Inpatient Neurology Progress Note  Jai Akhtar, ACNP-BC    Name:   Rhianna Godfrey.    Medical record #: [id number]  Admission Date: 4/10/2017  Date:   04/20/17    _____________________________________________________________________________    Subjective:  CC:   Pt cannot provide with lethargy  · GI bleed starting yesterday AM. Moved to ICU and Eliquis stopped  · RN trying to prep for colonoscopy tomorrow to eval for GIB  Denies:  Pt cannot provide with acuity of illness and lethargy  _____________________________________________________________________________  Objective  Patient Vitals for the past 12 hrs:   Temp Pulse Resp BP SpO2   04/20/17 1057 98.2 °F (36.8 °C) (!) 110 25 121/83 98 %   04/20/17 1000 - (!) 104 24 133/68 99 %   04/20/17 0900 - (!) 113 23 (!) 143/93 95 %   04/20/17 0825 99.3 °F (37.4 °C) 97 (!) 33 126/66 96 %   04/20/17 0800 - (!) 104 (!) 31 132/83 99 %   04/20/17 0701 - (!) 101 - - -   04/20/17 0630 - 85 20 127/77 96 %   04/20/17 0615 - (!) 101 24 138/85 94 %   04/20/17 0545 - 97 22 123/61 99 %   04/20/17 0530 - 89 13 130/66 99 %   04/20/17 0525 - 96 30 142/64 94 %   04/20/17 0510 97.5 °F (36.4 °C) 97 20 138/57 99 %   04/20/17 0500 - 94 17 127/62 100 %   04/20/17 0450 - 100 26 136/88 97 %   04/20/17 0440 - 95 27 (!) 116/98 97 %   04/20/17 0430 - 91 25 135/59 94 %   04/20/17 0420 - 100 (!) 31 132/56 (!) 89 %   04/20/17 0410 - 96 23 123/54 -   04/20/17 0400 - 89 26 111/70 -   04/20/17 0350 - 100 22 92/70 -   04/20/17 0349 - 93 29 - 91 %   04/20/17 0340 - 98 (!) 31 103/55 -   04/20/17 0330 - 93 24 96/63 97 %   04/20/17 0324 - 99 (!) 33 (!) 79/61 90 %   04/20/17 0320 - 95 28 90/62 90 %   04/20/17 0310 - 89 (!) 33 104/54 -   04/20/17 0300 99.2 °F (37.3 °C) 96 27 106/48 94 %   04/20/17 0240 - (!) 103 21 113/72 -   04/20/17 0230 - (!) 107 22 119/71 94 %   04/20/17 0220 - 98 (!) 41 123/60 -   04/20/17 0215 - 96 13 126/54 91 %   04/20/17 0210 - (!) 102 16 127/64 97 %   04/20/17 0200 - 100 29 128/70 100 %   04/20/17 0150 - - - (!) 140/123 (!) 80 %   04/20/17 0140 - (!) 120 21 122/59 98 %   04/20/17 0132 - (!) 104 23 (!) 132/103 95 %   04/20/17 0130 - (!) 110 24 150/67 94 %   04/20/17 0120 - (!) 105 21 171/59 (!) 86 %   04/20/17 0115 - (!) 103 13 (!) 151/123 97 %   04/20/17 0101 - (!) 102 14 - (!) 24 %   04/20/17 0100 - (!) 107 19 167/66 -   04/20/17 0050 - (!) 102 23 159/74 99 %   04/20/17 0032 - (!) 112 19 (!) 162/98 93 %   04/20/17 0030 - (!) 106 26 148/88 96 %   04/20/17 0000 - (!) 106 16 168/89 97 %   04/19/17 2345 - (!) 114 21 163/82 95 %     Allergies: Allergies   Allergen Reactions    Lactose Itching    Fructose Other (comments)     States added sugar to foods causes sore throat/ear ache.     Inderal [Propranolol] Unknown (comments)    Lisinopril Angioedema    Gluten Nausea Only     Inpatient Meds    Current Facility-Administered Medications:     0.9% sodium chloride infusion 250 mL, 250 mL, IntraVENous, PRN, Aneesh Li MD    potassium chloride 10 mEq in 100 ml IVPB, 10 mEq, IntraVENous, Q1H, Domenico Hancock V, DO, Last Rate: 100 mL/hr at 04/20/17 0955, 10 mEq at 04/20/17 0955    peg 3350-electrolytes (COLYTE) 4000 mL, 2,000 mL, Oral, ONCE, Nicolás Tobar PA-C    peg 3350-electrolytes (COLYTE) 4000 mL, 2,000 mL, Oral, ONCE, Nicolás Tobar PA-C    dilTIAZem CD (CARDIZEM CD) capsule 360 mg, 360 mg, Oral, DAILY, Romayne Plane, NP, 360 mg at 04/20/17 0911    digoxin (LANOXIN) tablet 0.25 mg, 0.25 mg, Oral, DAILY, Betsy Tamayo MD, 0.25 mg at 04/20/17 0911    piperacillin-tazobactam (ZOSYN) 3.375 g in 0.9% sodium chloride (MBP/ADV) 100 mL, 3.375 g, IntraVENous, Q8H, Csear Arambula MD, Last Rate: 25 mL/hr at 04/20/17 0442, 3.375 g at 04/20/17 0442    0.9% sodium chloride infusion 250 mL, 250 mL, IntraVENous, PRN, Alexandria Rosado MD    pravastatin (PRAVACHOL) tablet 40 mg, 40 mg, Oral, QHS, Cesar Arambula, MD, 40 mg at 04/19/17 2208    labetalol (NORMODYNE;TRANDATE) injection 10 mg, 10 mg, IntraVENous, Q6H PRN, Roxie Mauricio MD, 10 mg at 04/19/17 2028    oxyCODONE-acetaminophen (PERCOCET) 5-325 mg per tablet 1-2 Tab, 1-2 Tab, Oral, Q4H PRN, Roxie Mauricio MD, 2 Tab at 04/17/17 1718    HYDROmorphone (PF) (DILAUDID) injection 0.5 mg, 0.5 mg, IntraVENous, Q3H PRN, Roxie Mauricio MD, 0.5 mg at 04/19/17 2028    predniSONE (DELTASONE) tablet 50 mg, 50 mg, Oral, DAILY WITH BREAKFAST, Michael Monzon MD, 50 mg at 04/20/17 0915    pyridostigmine (MESTINON) tablet 90 mg, 90 mg, Oral, TID, Michael Monzon MD, 90 mg at 04/20/17 0912    carvedilol (COREG) tablet 25 mg, 25 mg, Oral, BID WITH MEALS, Wood Duarte MD, 25 mg at 04/20/17 0911    diphenhydrAMINE (BENADRYL) injection 25 mg, 25 mg, IntraVENous, Q6H PRN, Dione Oppenheim, MD, 25 mg at 04/15/17 2245    0.9% sodium chloride infusion 250 mL, 250 mL, IntraVENous, PRN, Melva Farias MD    sodium chloride (NS) flush 5-10 mL, 5-10 mL, IntraVENous, Q8H, Jyoti Brown MD, 10 mL at 04/20/17 0917    sodium chloride (NS) flush 5-10 mL, 5-10 mL, IntraVENous, PRN, Jyoti Brown MD    acetaminophen (TYLENOL) tablet 650 mg, 650 mg, Oral, Q4H PRN, Jyoti Brown MD, 650 mg at 04/19/17 1956    ondansetron (ZOFRAN) injection 4 mg, 4 mg, IntraVENous, Q4H PRN, Jyoti Brown MD, 4 mg at 04/11/17 0838    insulin lispro (HUMALOG) injection, , SubCUTAneous, AC&HS, Jyoti Brown MD, Stopped at 04/18/17 2200    glucose chewable tablet 16 g, 4 Tab, Oral, PRN, Jyoti Brown MD    dextrose (D50W) injection syrg 12.5-25 g, 12.5-25 g, IntraVENous, PRN, Jyoti Brown MD    glucagon [facility]) injection 1 mg, 1 mg, IntraMUSCular, PRN, Jyoti Brown MD    ALPRAZolam Job Masters) tablet 0.25 mg, 0.25 mg, Oral, TID PRN, Jyoti Brown MD, 0.25 mg at 04/18/17 2124    ascorbic acid (vitamin C) (VITAMIN C) tablet 1,000 mg, 1,000 mg, Oral, Jacqueline Louis MD, 1,000 mg at 04/20/17 2961    cholecalciferol (VITAMIN D3) tablet 5,000 Units, 5,000 Units, Oral, DAILY, Mariana Love MD, 5,000 Units at 04/20/17 0916    traMADol (ULTRAM) tablet 50 mg, 50 mg, Oral, Q6H PRN, Mariana Love MD, 50 mg at 04/19/17 1150    pantoprazole (PROTONIX) 40 mg in sodium chloride 0.9 % 10 mL injection, 40 mg, IntraVENous, Q12H, Mariana Love MD, 40 mg at 04/20/17 0900    0.9% sodium chloride infusion 250 mL, 250 mL, IntraVENous, PRN, Stan Chandler MD  Labs Reviewed  Recent Results (from the past 12 hour(s))   CBC WITH AUTOMATED DIFF    Collection Time: 04/20/17  1:44 AM   Result Value Ref Range    WBC 11.1 4.1 - 11.1 K/uL    RBC 2.34 (L) 4.10 - 5.70 M/uL    HGB 7.2 (L) 12.1 - 17.0 g/dL    HCT 22.4 (L) 36.6 - 50.3 %    MCV 95.7 80.0 - 99.0 FL    MCH 30.8 26.0 - 34.0 PG    MCHC 32.1 30.0 - 36.5 g/dL    RDW 17.2 (H) 11.5 - 14.5 %    PLATELET 473 880 - 157 K/uL    NEUTROPHILS 86 (H) 32 - 75 %    LYMPHOCYTES 5 (L) 12 - 49 %    MONOCYTES 9 5 - 13 %    EOSINOPHILS 0 0 - 7 %    BASOPHILS 0 0 - 1 %    ABS. NEUTROPHILS 9.5 (H) 1.8 - 8.0 K/UL    ABS. LYMPHOCYTES 0.6 (L) 0.8 - 3.5 K/UL    ABS. MONOCYTES 1.0 0.0 - 1.0 K/UL    ABS. EOSINOPHILS 0.0 0.0 - 0.4 K/UL    ABS.  BASOPHILS 0.0 0.0 - 0.1 K/UL   PHOSPHORUS    Collection Time: 04/20/17  1:44 AM   Result Value Ref Range    Phosphorus 3.1 2.6 - 4.7 MG/DL   MAGNESIUM    Collection Time: 04/20/17  1:44 AM   Result Value Ref Range    Magnesium 1.7 1.6 - 2.4 mg/dL   METABOLIC PANEL, COMPREHENSIVE    Collection Time: 04/20/17  1:44 AM   Result Value Ref Range    Sodium 141 136 - 145 mmol/L    Potassium 2.8 (L) 3.5 - 5.1 mmol/L    Chloride 102 97 - 108 mmol/L    CO2 32 21 - 32 mmol/L    Anion gap 7 5 - 15 mmol/L    Glucose 123 (H) 65 - 100 mg/dL    BUN 10 6 - 20 MG/DL    Creatinine 0.79 0.70 - 1.30 MG/DL    BUN/Creatinine ratio 13 12 - 20      GFR est AA >60 >60 ml/min/1.73m2    GFR est non-AA >60 >60 ml/min/1.73m2    Calcium 8.2 (L) 8.5 - 10.1 MG/DL    Bilirubin, total 0.5 0.2 - 1.0 MG/DL    ALT (SGPT) 31 12 - 78 U/L    AST (SGOT) 19 15 - 37 U/L    Alk. phosphatase 47 45 - 117 U/L    Protein, total 4.9 (L) 6.4 - 8.2 g/dL    Albumin 2.2 (L) 3.5 - 5.0 g/dL    Globulin 2.7 2.0 - 4.0 g/dL    A-G Ratio 0.8 (L) 1.1 - 2.2     LACTIC ACID, PLASMA    Collection Time: 04/20/17  1:44 AM   Result Value Ref Range    Lactic acid 2.4 (HH) 0.4 - 2.0 MMOL/L   HGB & HCT    Collection Time: 04/20/17  6:24 AM   Result Value Ref Range    HGB 8.0 (L) 12.1 - 17.0 g/dL    HCT 24.6 (L) 36.6 - 50.3 %   GLUCOSE, POC    Collection Time: 04/20/17  7:54 AM   Result Value Ref Range    Glucose (POC) 123 (H) 65 - 100 mg/dL    Performed by  [facility] GAS, ARTERIAL    Collection Time: 04/20/17  9:42 AM   Result Value Ref Range    pH 7.45 7.35 - 7.45      PCO2 44 35.0 - 45.0 mmHg    PO2 82 80 - 100 mmHg    O2 SAT 96 92 - 97 %    BICARBONATE 29 (H) 22 - 26 mmol/L    BASE EXCESS 4.7 mmol/L    O2 METHOD NASAL O2      O2 FLOW RATE 4.00 L/min    Sample source ARTERIAL      SITE RIGHT RADIAL      JAE'S TEST YES      Critical value read back DR ALLISON ATWOOD    GLUCOSE, POC    Collection Time: 04/20/17 11:23 AM   Result Value Ref Range    Glucose (POC) 134 (H) 65 - 100 mg/dL    Performed by Maranda Mills        Imaging  Reviewed:   CT Results (recent):    Results from Hospital Encounter encounter on 04/10/17   CT HEAD WO CONT   Narrative EXAM:  CT HEAD WO CONT    INDICATION:   Altered mental status, slurring of speech, on Lovenox    COMPARISON: September 13, 2006. TECHNIQUE: Unenhanced CT of the head was performed using 5 mm images. Brain and  bone windows were generated. CT dose reduction was achieved through use of a  standardized protocol tailored for this examination and automatic exposure  control for dose modulation. FINDINGS:  The ventricles and sulci are stable in size, shape and configuration and  midline.  There is unchanged mild periventricular white matter disease. There is  no intracranial hemorrhage, extra-axial collection, mass, mass effect or midline  shift. The basilar cisterns are open. No acute infarct is identified. The bone  windows demonstrate no abnormalities. The visualized portions of the paranasal  sinuses and mastoid air cells are clear. Impression IMPRESSION: No evidence of acute intracranial process. No change from the prior  study. MRI Results (recent):  No results found for this or any previous visit. Physical Exam  Obese WM in NAD, lethargic. Will arouse to some verbal and painful nailbed stimuli, moaning and speaking minimal words to self but not answering questions. FERNANDEZ passively, edema +2 pitting x4 extremities. KATHARINE. Reflexes +2 and babinski downgoing.  _____________________________________________________________________________  Assessment:   1.  MG   2. GIB  3. HA- resolved  4. PE:  Bilateral   5. A-fib  6. DVT, LLE  7. Colectomy    Plan  · Continue Prednisone 50mg daily , Cellcept 1gm BID and TID Mestinon 90mg   · Will wait on IVIG 60gm IV x 2 days until patient is more stable medically---currently being evaluated for GIB   · noted improvement in HR from cardiology med adjustments  · Continue PT/OT ---likely needs rehab    Will have team follow-up in next day or over weekend  My collaborating care team physician may have further recommendations.     On DVT Prophylaxis yes no   Continue  scd's while inpatient x      Care Plan discussed with:  Patient    Family- sister x   RN x   Care Manager    Consultant/Specialist:     Patient will be discussed with Dr. Brent Pardo Problems  Date Reviewed: 4/19/2017          Codes Class Noted POA    BPH (benign prostatic hypertrophy) (Chronic) ICD-10-CM: N40.0  ICD-9-CM: 600.00  4/19/2017 Yes    Overview Signed 4/19/2017 10:52 AM by Jean Pierre Peraza NP     Has seen Urology in past and tried Flow[facility] without success             DVT (deep venous thrombosis) ([place] [address].) ICD-10-CM: I82.409  ICD-9-CM: 453.40  4/14/2017 Unknown        Malignant neoplasm of sigmoid colon ([place] [address].) ICD-10-CM: C18.7  ICD-9-CM: 153.3  4/14/2017 Unknown        Abscess of abdominal cavity ([place] [address].) ICD-10-CM: K65.1  ICD-9-CM: 567.22  4/14/2017 Unknown        Colonic mass ICD-10-CM: K63.9  ICD-9-CM: 569.89  4/12/2017 Yes        Lung nodules ICD-10-CM: R91.8  ICD-9-CM: 793.19  4/12/2017 Yes        Acute blood loss anemia ICD-10-CM: D62  ICD-9-CM: 285.1  4/11/2017 Yes        Acute pulmonary embolus ([place] [address].) ICD-10-CM: I26.99  ICD-9-CM: 415.19  4/11/2017 Yes        * (Principal)GI bleed ICD-10-CM: K92.2  ICD-9-CM: 578.9  4/10/2017 Yes        Type II diabetes mellitus (HCC) (Chronic) ICD-10-CM: E11.9  ICD-9-CM: 250.00  4/10/2017 Yes        History of stroke (Chronic) ICD-10-CM: Z86.73  ICD-9-CM: V12.54  4/5/2017 Yes        Morbid obesity with BMI of 40.0-44.9, adult ([place] [address].) (Chronic) ICD-10-CM: E66.01, Z68.41  ICD-9-CM: 278.01, V85.41  4/5/2017 Yes        Myasthenia gravis with exacerbation, adult form ([place] [address].) (Chronic) ICD-10-CM: G70.01  ICD-9-CM: 358.01  4/4/2017 Yes        PAF (paroxysmal atrial fibrillation) (HCC) (Chronic) ICD-10-CM: I48.0  ICD-9-CM: 427.31  9/3/2016 Yes            ________________________________________________  - [facility]  04/20/17  ================================================    ADDENDUM--> Collaborating Care Team Physician:

## 2017-04-20 NOTE — PROGRESS NOTES
Occupational Therapy Note:  Chart reviewed and spoke with team at [address] [place] [address]. Patient transferred back to ICU yesterday due to increased bleeding at surgical incision. Patient undergoing further testing and too lethargic to engage in activity at this time. Will hold OT/PT per MD recommendation and follow-up next tx day. Thank you.   Espinoza Guerrero OTR/L

## 2017-04-20 NOTE — PROGRESS NOTES
General Surgery Daily Progress Note    Patient: Gennaro Tristan. MRN: [id number]  SSN: [id number]    YOB: 1945  Age: 73 y.o. Sex: male      Admit Date: 4/10/2017    Subjective:   Pt developed bleeding reportedly for ostomy, rectum and midline wound yesterday requiring transfer to ICU and blood transfusion.       Current Facility-Administered Medications   Medication Dose Route Frequency    0.9% sodium chloride infusion 250 mL  250 mL IntraVENous PRN    potassium chloride 10 mEq in 100 ml IVPB  10 mEq IntraVENous Q1H    dilTIAZem CD (CARDIZEM CD) capsule 360 mg  360 mg Oral DAILY    digoxin (LANOXIN) tablet 0.25 mg  0.25 mg Oral DAILY    piperacillin-tazobactam (ZOSYN) 3.375 g in 0.9% sodium chloride (MBP/ADV) 100 mL  3.375 g IntraVENous Q8H    0.9% sodium chloride infusion 250 mL  250 mL IntraVENous PRN    pravastatin (PRAVACHOL) tablet 40 mg  40 mg Oral QHS    labetalol (NORMODYNE;TRANDATE) injection 10 mg  10 mg IntraVENous Q6H PRN    oxyCODONE-acetaminophen (PERCOCET) 5-325 mg per tablet 1-2 Tab  1-2 Tab Oral Q4H PRN    HYDROmorphone (PF) (DILAUDID) injection 0.5 mg  0.5 mg IntraVENous Q3H PRN    predniSONE (DELTASONE) tablet 50 mg  50 mg Oral DAILY WITH BREAKFAST    pyridostigmine (MESTINON) tablet 90 mg  90 mg Oral TID    carvedilol (COREG) tablet 25 mg  25 mg Oral BID WITH MEALS    diphenhydrAMINE (BENADRYL) injection 25 mg  25 mg IntraVENous Q6H PRN    0.9% sodium chloride infusion 250 mL  250 mL IntraVENous PRN    sodium chloride (NS) flush 5-10 mL  5-10 mL IntraVENous Q8H    sodium chloride (NS) flush 5-10 mL  5-10 mL IntraVENous PRN    acetaminophen (TYLENOL) tablet 650 mg  650 mg Oral Q4H PRN    ondansetron (ZOFRAN) injection 4 mg  4 mg IntraVENous Q4H PRN    insulin lispro (HUMALOG) injection   SubCUTAneous AC&HS    glucose chewable tablet 16 g  4 Tab Oral PRN    dextrose (D50W) injection syrg 12.5-25 g  12.5-25 g IntraVENous PRN    glucagon (GLUCAGEN) injection 1 mg  1 mg IntraMUSCular PRN    ALPRAZolam (XANAX) tablet 0.25 mg  0.25 mg Oral TID PRN    ascorbic acid (vitamin C) (VITAMIN C) tablet 1,000 mg  1,000 mg Oral DAILY    cholecalciferol (VITAMIN D3) tablet 5,000 Units  5,000 Units Oral DAILY    traMADol (ULTRAM) tablet 50 mg  50 mg Oral Q6H PRN    pantoprazole (PROTONIX) 40 mg in sodium chloride 0.9 % 10 mL injection  40 mg IntraVENous Q12H    0.9% sodium chloride infusion 250 mL  250 mL IntraVENous PRN        Objective:      04/18 1901 - 04/20 0700  In: 2061.3 [P.O.:840;  I.V.:100]  Out: 1400 [Urine:600]  Patient Vitals for the past 8 hrs:   BP Temp Pulse Resp SpO2   04/20/17 0825 126/66 99.3 °F (37.4 °C) 97 (!) 33 96 %   04/20/17 0701 - - (!) 101 - -   04/20/17 0630 127/77 - 85 20 96 %   04/20/17 0615 138/85 - (!) 101 24 94 %   04/20/17 0545 123/61 - 97 22 99 %   04/20/17 0530 130/66 - 89 13 99 %   04/20/17 0525 142/64 - 96 30 94 %   04/20/17 0510 138/57 97.5 °F (36.4 °C) 97 20 99 %   04/20/17 0500 127/62 - 94 17 100 %   04/20/17 0450 136/88 - 100 26 97 %   04/20/17 0440 (!) 116/98 - 95 27 97 %   04/20/17 0430 135/59 - 91 25 94 %   04/20/17 0420 132/56 - 100 (!) 31 (!) 89 %   04/20/17 0410 123/54 - 96 23 -   04/20/17 0400 111/70 - 89 26 -   04/20/17 0350 92/70 - 100 22 -   04/20/17 0349 - - 93 29 91 %   04/20/17 0340 103/55 - 98 (!) 31 -   04/20/17 0330 96/63 - 93 24 97 %   04/20/17 0324 (!) 79/61 - 99 (!) 33 90 %   04/20/17 0320 90/62 - 95 28 90 %   04/20/17 0310 104/54 - 89 (!) 33 -   04/20/17 0300 106/48 99.2 °F (37.3 °C) 96 27 94 %   04/20/17 0240 113/72 - (!) 103 21 -   04/20/17 0230 119/71 - (!) 107 22 94 %   04/20/17 0220 123/60 - 98 (!) 41 -   04/20/17 0215 126/54 - 96 13 91 %   04/20/17 0210 127/64 - (!) 102 16 97 %   04/20/17 0200 128/70 - 100 29 100 %   04/20/17 0150 (!) 140/123 - - - (!) 80 %   04/20/17 0140 122/59 - (!) 120 21 98 %   04/20/17 0132 (!) 132/103 - (!) 104 23 95 %   04/20/17 0130 150/67 - (!) 110 24 94 %   04/20/17 0120 171/59 - (!) 105 21 (!) 86 %   04/20/17 0115 (!) 151/123 - (!) 103 13 97 %   04/20/17 0101 - - (!) 102 14 (!) 24 %   04/20/17 0100 167/66 - (!) 107 19 -       Physical Exam:  General: Somnolent, pail, confused when awakened  Lungs: Unlabored  Heart:  Tachycardic  Abdomen: Soft, non-tender, non-distended, incision staples intact and the skin gaps between staples are unchanged, there is small amount of SS drainage but no active bleeding. Firm swelling above superior apex may be a hematoma, there is abdominal and left flank ecchymosis. Ostomy slightly retracted, stool output, no active bleeding from ostomy. Extremities: Warm, moves all, edematous   Skin:  Warm and dry, no rash    Labs:   Recent Labs      04/20/17   0624  04/20/17   0144   WBC   --   11.1   HGB  8.0*  7.2*   HCT  24.6*  22.4*   PLT   --   323     Recent Labs      04/20/17   0144   NA  141   K  2.8*   CL  102   CO2  32   GLU  123*   BUN  10   CREA  0.79   CA  8.2*   MG  1.7   PHOS  3.1   ALB  2.2*   TBILI  0.5   SGOT  19   ALT  31       Assessment / Plan:   · POD#8 ex lap, sigmoidectomy, end colostomy for lower GI bleed  · Lower GI and incision bleeding yesterday. No active bleeding from either at time of my exam. He has received PRBC x 1 and FFP. Appropriate hgb response.    · Continue serial HH  · Consult GI for possible ostomy and rectal scope  · If there is further bleeding would consider getting CTA abd/pelvis  · Continue in ICU  · Discussed with Dr. Marilin Briones

## 2017-04-20 NOTE — PROGRESS NOTES
Nutrition Assessment:    RECOMMENDATIONS/INTERVENTION(S):   Diet advancement per surgery--when able to fully advance, recommend Diabetic (CCD)/Gluten Free diet (Gluten restriction per pt preference) & resume Ensure HP  Monitor BG, electrolytes  Continue to monitor plan of care and provide further recs/interventions prn    ASSESSMENT:   4/19:  Discussed case in ICU rounds. Pt moved back to ICU after large amount of bleeding from abd incision. Currently NPO, previously tolerating PO diet with fair-good appetite. Labs/Meds reviewed. K+ decreased and repleted. -[phone]. Mg, Phos okay. Skin-2+ pitting edema BUE, 3+ pitting edema BLE. Noted for colonoscopy planned for tomorrow. 4/17: Oncology following for colon CA. Pt being evaluated for CP this morning - moving to [facility]. Talked with sister outside room. Improved appetite. Tolerating diet. Will start ONS to aid in meeting needs while remains on Full liquids. Pt will need CCD education prior to D/C - new DM dx. Continued prednisone. -[phone]. K & Phos low. Improving edema status. 4/13:  Discussed case in ICU rounds. Sleep apnea causing O2 desats, pt now on Bi-pap. POD#2 ex lap, sigmoidectomy, end colostomy for lower GI bleed. Surgery notes CLD until bowel function returns. Last BM 4/13, hypoactive BS, poor appetite. WOCN in room currently. Will start Ensure Clear while awaiting diet advancement. Off IVF. Labs/meds reviewed. -[phone]. On prednisone. 2+ pitting edema BLE, BUE, generalized 1+. Will attempt f/u visit w/ pt again to encourage PO of diet/ONS. 4/11:  Chart reviewed. This is a 72 yo male s/p exp lap with sigmoid colectomy, colostomy. Hx Afib, DM, PE, myasthenia gravis. Visited pt this afternoon. Lethargic from recent surgery. Dietary hx obtained from sister. This writer asked about patient's fructose allergy.   She reported pt does not consume \"sugar\" and follows a gluten-free diet at home.  ? sister understanding as to what foods contain fructose as she stated pt does consume fresh fruit daily. Diet at home typically consists of meat, fresh fruit, and vegetables--no breads, cereals, or pastas including those that are gluten-free. \"He doesn't eat any of that even if it is gluten free. \"  Labs/Meds reviewed. -[phone], A1c elevated 10.5%--? newly dx DM. Mg repleted. LBM noted 4/22. Skin--no pressure injury; abd incision; 2+ pitting edema BUE, 3+ pitting edema BLE. Central line in place. Weight record reviewed. Pt with weight loss since January, however amount not significant for timeframe. SUBJECTIVE/OBJECTIVE:     Diet Order: NPO  % Eaten:    Patient Vitals for the past 72 hrs:   % Diet Eaten   04/19/17 1820 50 %   04/19/17 1339 0 %   04/19/17 0931 10 %   04/17/17 1858 10 %     Pertinent Medications: [x] Reviewed    Chemistries:  Lab Results   Component Value Date/Time    Sodium 141 04/20/2017 01:44 AM    Potassium 2.8 04/20/2017 01:44 AM    Chloride 102 04/20/2017 01:44 AM    CO2 32 04/20/2017 01:44 AM    Anion gap 7 04/20/2017 01:44 AM    Glucose 123 04/20/2017 01:44 AM    BUN 10 04/20/2017 01:44 AM    Creatinine 0.79 04/20/2017 01:44 AM    BUN/Creatinine ratio 13 04/20/2017 01:44 AM    GFR est AA >60 04/20/2017 01:44 AM    GFR est non-AA >60 04/20/2017 01:44 AM    Calcium 8.2 04/20/2017 01:44 AM    AST (SGOT) 19 04/20/2017 01:44 AM    Alk. phosphatase 47 04/20/2017 01:44 AM    Protein, total 4.9 04/20/2017 01:44 AM    Albumin 2.2 04/20/2017 01:44 AM    Globulin 2.7 04/20/2017 01:44 AM    A-G Ratio 0.8 04/20/2017 01:44 AM    ALT (SGPT) 31 04/20/2017 01:44 AM      Anthropometrics: Height: 5' 11\" (180.3 cm) Weight: 149.9 kg (330 lb 7.5 oz)    IBW (%IBW): 75.5 kg (166 lb 7.2 oz) ( ) UBW (%UBW):   (  %)    BMI: Body mass index is 46.09 kg/(m^2).     This BMI is indicative of:   [] Underweight    [] Normal    [] Overweight    []  Obesity    [x]  Extreme Obesity (BMI>40)  Estimated Nutrition Needs (Based on): 2287 Kcals/day (BMR (2144) x 1.3 AF - 500) , 113 g (-136 g/d (1.5-1.8 g/Kg IBW)) Protein  Carbohydrate: At Least 130 g/day  Fluids: 2300 mL/day    Last BM: 4/13   [x]Active     []Hyperactive  []Hypoactive       [] Absent   BS  Skin:    [] Intact   [x] Incision--abd  [] Breakdown   [] DTI   [] Tears/Excoriation/Abrasion  [x]Edema--2+pitting BUE and 3+ pitting BLE [] Other:    Wt Readings from Last 30 Encounters:   04/13/17 149.9 kg (330 lb 7.5 oz)   04/09/17 140 kg (308 lb 9.6 oz)   04/04/17 137.7 kg (303 lb 9.6 oz)   02/24/17 143.5 kg (316 lb 6.4 oz)   01/23/17 143.3 kg (316 lb)   11/21/16 141.6 kg (312 lb 3.2 oz)   10/21/16 142.9 kg (315 lb 1.6 oz)   10/01/16 146.5 kg (323 lb)   09/15/16 (!) 161.7 kg (356 lb 7.7 oz)      NUTRITION DIAGNOSES:   Problem:  Altered GI function Increased protein needs Inadequate oral food/beverage intake  Etiology: related to GIB, surgical wound healing     Signs/Symptoms: as evidenced by s/p exp lap with sigmoid colectomy and colostomy  currently NPO    NUTRITION INTERVENTIONS:  General, healthful diet; commercial supplement   Supplements: Commercial supplement              GOAL:   Advanced diet w/ no s/s of intolerance in the next 2-4 days    Cultural, Episcopal, or Ethnic Dietary Needs: None     LEARNING NEEDS (Diet, Food/Nutrient-Drug Interaction):    [] None Identified   [] Identified and Education Provided/Documented   [x] Identified and Pt declined/was not appropriate - need CCD before D/C      [x] Interdisciplinary Care Plan Reviewed/Documented    [x] Discharge Needs:    See dc order   [] No Nutrition Related Discharge Needs    NUTRITION RISK:   Pt Is At Nutrition Risk  [x]     No Nutrition Risk Identified  []       PT SEEN FOR:    []  MD Consult: []Calorie Count      []Diabetic Diet Education        []Diet Education     []Electrolyte Management     []General Nutrition Management and Supplements     []Management of Tube Feeding     []TPN Recommendations    []  RN Referral:  []MST score >=2     []Enteral/Parenteral Nutrition PTA     []Pregnant: Gestational DM or Multigestation                 [] Pressure Ulcer       []  Low BMI      []  Length of Stay       [] Dysphagia Diet         [] Ventilator     [x]  Follow-Up                 Ron Ferrara, [address] [place] [address]   Pager [phone]

## 2017-04-21 NOTE — PROGRESS NOTES
Problem: Non-Violent Restraints  Goal: Patient/Family Education  Outcome: Progressing Towards Goal  Patient shows no evidence of learning in regards to restraints

## 2017-04-21 NOTE — ANESTHESIA POSTPROCEDURE EVALUATION
Post-Anesthesia Evaluation and Assessment    Patient: Mitra Lucas MRN: [id number]  SSN: [id number]    YOB: 1945  Age: 73 y.o. Sex: male       Cardiovascular Function/Vital Signs  Visit Vitals    /85    Pulse (!) 110    Temp 36.8 °C (98.2 °F)    Resp 16    Ht 5' 11\" (1.803 m)    Wt 149.9 kg (330 lb 7.5 oz)    SpO2 96%    BMI 46.09 kg/m2       Patient is status post MAC anesthesia for Procedure(s):  COLONOSCOPY (via stoma). Nausea/Vomiting: None    Postoperative hydration reviewed and adequate. Pain:  Pain Scale 1: Adult Nonverbal Pain Scale (04/21/17 0800)  Pain Intensity 1: 0 (04/21/17 0800)   Managed    Neurological Status:   Neuro (WDL): Exceptions to WDL (04/11/17 1335)  Neuro  Neurologic State: Eyes open spontaneously;Confused (04/20/17 1700)  Orientation Level: Disoriented to place; Disoriented to situation;Disoriented to time (04/20/17 1700)  Cognition: Appropriate decision making; Appropriate for age attention/concentration; Appropriate safety awareness (04/20/17 1700)  Speech: Aphasic (comment) (04/20/17 1700)  Assessment L Pupil: Brisk (04/20/17 1700)  Size L Pupil (mm): 2 (04/20/17 1700)  Assessment R Pupil: Brisk (04/20/17 1700)  Size R Pupil (mm): 2 (04/20/17 1700)  LUE Motor Response: Purposeful (04/20/17 1700)  LLE Motor Response: Spontaneous ; Purposeful (04/20/17 1700)  RUE Motor Response: Purposeful (04/20/17 1700)  RLE Motor Response: Spontaneous ; Purposeful (04/20/17 1700)   At baseline    Mental Status and Level of Consciousness: Arousable    Pulmonary Status:   O2 Device: Nasal cannula (04/21/17 0800)   Adequate oxygenation and airway patent    Complications related to anesthesia: None    Post-anesthesia assessment completed.  No concerns    Signed By: Concha Perez MD     April 21, 2017

## 2017-04-21 NOTE — PROGRESS NOTES
Anette Bryant.  1945  [id number]    Situation:  Verbal report received from: Nba Herndon RN  Procedure: Procedure(s):  COLONOSCOPY (via stoma)    Background:    Preoperative diagnosis: anemia  Postoperative diagnosis: bleeding from stoma    :  Dr. Semaj Wade  Assistant(s): Endoscopy RN-1: Jonathan Martin RN    Specimens: * No specimens in log *  H. Pylori  no    Assessment:  Intra-procedure medications      Anesthesia gave intra-procedure sedation and medications, see anesthesia flow sheet yes    Intravenous fluids: NS@ KVO     Vital signs stable   yes    Abdominal assessment: round and soft   yes    Recommendation:  Discharge patient per MD order  inpatient.   Return to floor  ICU 3011  Family or Friend   None present  Permission to share finding with family or friend n/a

## 2017-04-21 NOTE — PROGRESS NOTES
GI note  Stable after colonoscopy  It is clear to me bleeding was from ostomy not from colon, probably related to anticoagulation. No additional neoplasia in colon. Call if needed. GI is signing off.   Mauri Lopez MD

## 2017-04-21 NOTE — PROCEDURES
[address] [facility] [address] [place], [place]  [phone]      2017    Colonoscopy Procedure Note  Anette Larkin. :  1945  [facility] Medical Record Number: [id number]    Indications:     Blood from ostomy  PCP:  Patience Gilmore MD  Anesthesia/Sedation: Conscious Sedation/Moderate Sedation  Endoscopist:  Dr. Leonidas Bernheim  Complications:  None  Estimated Blood Loss:  None    Permit:  The indications, risks, benefits and alternatives were reviewed with the patient or their decision maker who was provided an opportunity to ask questions and all questions were answered. The specific risks of colonoscopy with conscious sedation were reviewed, including but not limited to anesthetic complication, bleeding, adverse drug reaction, missed lesion, infection, IV site reactions, and intestinal perforation which would lead to the need for surgical repair. Alternatives to colonoscopy including radiographic imaging, observation without testing, or laboratory testing were reviewed including the limitations of those alternatives. After considering the options and having all their questions answered, the patient or their decision maker provided both verbal and written consent to proceed. Procedure in Detail:  After obtaining informed consent, positioning of the patient in the left lateral decubitus position, and conduction of a pre-procedure pause or \"time out\" the endoscope was introduced into the colostomy and advanced to the cecum, which was identified by the ileocecal valve and appendiceal orifice. The quality of the colonic preparation was good. A careful inspection was made as the colonoscope was withdrawn, findings and interventions are described below. Findings:    There is circumferential ulceration of the stoma within 2cm-4cm from the mucosal-cutaneous border but there are no other findings in the colon.  No blood is in the colon at all. The stoma is not actively bleeding but evidence of previous bleeding is noted based on eschar upon the ulcer. Specimens:    none    Complications:   None; patient tolerated the procedure well. Impression:  Bleeding from stoma. Recommendations:   Anticoagulation might lead to recurrent blood loss from stoma. As he has a recently diagnosed DVT there is risk to avoidance of anticoagulation, that of PE or CVA if PFO present. Since his hemoglobin has been stable, I think we could consider anticoagulation but we should choose an agent that could be easily reversed were significant bleeding to occur. Thank you for entrusting me with this patient's care. Please do not hesitate to contact me with any questions or if I can be of assistance with any of your other patients' GI needs.     Signed By: Ginger Frias MD                        April 21, 2017

## 2017-04-21 NOTE — PROGRESS NOTES
[facility]     Name: Juanita Escudero. MRN: [id number]   : 1945 Hospital: [address]   Date: 2017        Impression Plan   1. Acute bleed  2. Encephalopathy  3. Hx of rectal bleed  4. Hx of KWADWO  5. Abnormal CXR- pt has known pulmonary infarct               · Hold any anticoagulation  · Serial hemoglobins  · Rate control  · Tight BG control  · CPAP at night  · NPO  · PPI       Radiology  ( personally reviewed) - RUL infiltrate- consistent with known pulmonary infarct. ABG No results for input(s): PHI, PO2I, PCO2I in the last 72 hours. Subjective     Overnight events:  Colonoscopy showed non-bleeding ulceration at the stoma. Hbg stable  Pt sleeping, will wake up and mumble. Review of Systems:  Review of systems not obtained due to patient factors. Past Medical History:   Diagnosis Date    Anxiety     Arthritis     Asthma     Atrial fibrillation (HCC)     BPH (benign prostatic hypertrophy) 2017    Has seen Urology in past and tried Flowmax without success    Diabetes ([address]     HTN (hypertension) with goal to be determined     Morbid obesity with BMI of 40.0-44.9, adult ([address] 2017    Myasthenia gravis ([address]     Pulmonary emboli ([address]     with bilateral DVT's       Past Surgical History:   Procedure Laterality Date    COLONOSCOPY N/A 2017    COLONOSCOPY (via stoma) performed by Breann De La Garza MD at [facility] HX ORTHOPAEDIC      HX OTHER SURGICAL      L sided neck suregry \" infection\"      Prior to Admission medications    Medication Sig Start Date End Date Taking? Authorizing Provider   apixaban (ELIQUIS) 5 mg tablet Please take 10mg twice daily for 5 days then decrease to 5mg twice daily thereafter  Indications: pulmonary thromboembolism 17  Yes Nataliia Larry MD   carvedilol (COREG) 12.5 mg tablet Take 1 Tab by mouth two (2) times daily (with meals).  17  Yes Nataliia Larry MD   dilTIAZem CD (CARDIZEM CD) 300 mg ER capsule Take 1 Cap by mouth daily. 4/9/17  Yes Teo June MD   metFORMIN (GLUCOPHAGE) 500 mg tablet Take 1 Tab by mouth daily (with breakfast). 4/9/17  Yes Teo June MD   traMADol (ULTRAM) 50 mg tablet Take 50 mg by mouth every six (6) hours as needed for Pain. Yes Amalia Velazquez MD   pyridostigmine (MESTINON) 60 mg tablet Take 60 mg by mouth two (2) times a day. Yes Historical Provider   b complex vitamins (B COMPLEX 1) tablet Take 3 Tabs by mouth daily. Yes Historical Provider   cholecalciferol, VITAMIN D3, (VITAMIN D3) 5,000 unit tab tablet Take 5,000 Units by mouth daily. Yes Historical Provider   ascorbic acid, vitamin C, (VITAMIN C) 500 mg tablet Take 1,000-1,500 mg by mouth daily. Yes Historical Provider   predniSONE (DELTASONE) 10 mg tablet Take 5 Tabs by mouth daily (with breakfast). 11/21/16  Yes Lauren Bryant MD   potassium chloride SA (MICRO-K) 10 mEq capsule Take 10 mEq by mouth two (2) times a day. Yes Historical Provider   ALPRAZolam (XANAX) 0.25 mg tablet Take 0.25 mg by mouth three (3) times daily as needed for Anxiety. Yes Historical Provider   acetaminophen (TYLENOL) 500 mg tablet Take 1,000 mg by mouth every eight (8) hours as needed for Pain. Historical Provider   cloNIDine (CATAPRES) 0.2 mg/24 hr patch 1 Patch by TransDERmal route every seven (7) days.  9/16/16   Sorin Peña MD     Current Facility-Administered Medications   Medication Dose Route Frequency    0.9% sodium chloride infusion  50 mL/hr IntraVENous CONTINUOUS    insulin lispro (HUMALOG) injection   SubCUTAneous Q6H    sodium chloride (NS) flush 10 mL  10 mL InterCATHeter Q8H    dilTIAZem CD (CARDIZEM CD) capsule 360 mg  360 mg Oral DAILY    piperacillin-tazobactam (ZOSYN) 3.375 g in 0.9% sodium chloride (MBP/ADV) 100 mL  3.375 g IntraVENous Q8H    pravastatin (PRAVACHOL) tablet 40 mg  40 mg Oral QHS    predniSONE (DELTASONE) tablet 50 mg  50 mg Oral DAILY WITH BREAKFAST    pyridostigmine (MESTINON) tablet 90 mg  90 mg Oral TID    carvedilol (COREG) tablet 25 mg  25 mg Oral BID WITH MEALS    sodium chloride (NS) flush 5-10 mL  5-10 mL IntraVENous Q8H    ascorbic acid (vitamin C) (VITAMIN C) tablet 1,000 mg  1,000 mg Oral DAILY    cholecalciferol (VITAMIN D3) tablet 5,000 Units  5,000 Units Oral DAILY    pantoprazole (PROTONIX) 40 mg in sodium chloride 0.9 % 10 mL injection  40 mg IntraVENous Q12H     Allergies   Allergen Reactions    Lactose Itching    Fructose Other (comments)     States added sugar to foods causes sore throat/ear ache.  Inderal [Propranolol] Unknown (comments)    Lisinopril Angioedema    Gluten Nausea Only      Social History   Substance Use Topics    Smoking status: Never Smoker    Smokeless tobacco: Not on file    Alcohol use No      Family History   Problem Relation Age of Onset    Diabetes Mother     Hypertension Mother     Deep Vein Thrombosis Mother     Deep Vein Thrombosis Sister           Laboratory: I have personally reviewed the critical care flowsheet and labs. Recent Labs      04/21/17 0520 04/20/17   1448  04/20/17   0624  04/20/17 0144 04/19/17 2245 04/19/17   0350   WBC   --    --    --   11.1  10.5   --   9.1   HGB  7.7*  8.0*  8.0*  7.2*  9.8*   < >  8.9*   HCT   --    --   24.6*  22.4*  29.6*   --   28.7*   PLT   --    --    --   323  421*   --   327    < > = values in this interval not displayed.      Recent Labs      04/21/17 0520 04/20/17 0144 04/19/17 2245 04/19/17   0350   NA   --   141  141  142   K   --   2.8*  3.5  3.5   CL   --   102  101  103   CO2   --   32  35*  33*   GLU   --   123*  137*  101*   BUN   --   10  9  8   CREA  0.61*  0.79  0.89  0.74   CA   --   8.2*  8.6  8.4*   MG   --   1.7   --   1.9   PHOS   --   3.1   --    --    ALB   --   2.2*   --    --    SGOT   --   19   --    --    ALT   --   31   --    --        Objective:     Mode Rate Tidal Volume Pressure FiO2 PEEP Spontaneous      12 cm H2O 40 % 5 cm H20     Vital Signs:    Ventilator Pressures  Pressure Support (cm H2O): 12 cm H2O  PIP Observed (cm H2O): 17 cm H2O  MAP (cm H2O): 8  PEEP/VENT (cm H2O): 5 cm [id number](24)Ventilator Pressures  Pressure Support (cm H2O): 12 cm H2O  PIP Observed (cm H2O): 17 cm H2O  MAP (cm H2O): 8  PEEP/VENT (cm H2O): 5 cm H20  Safety & Alarms  Circuit Temperature: 98.6 °F (37 °C)  Backup Mode Checked/Apnea: Yes  Pressure Max: 40 cm H2O  Ve Min: 3.5  Ve Max: 25  Vt Min: 355 ml  Vt Max: 1000 ml  RR Min: 10  RR Max: 40  Intake/Output:   Last shift:      Ventilator Volumes  Vt Spont (ml): 905 ml  Ve Observed (l/min): 15 l/min  Last 3 shifts:  RRIOLAST3    Intake/Output Summary (Last 24 hours) at 04/21/17 1100  Last data filed at 04/21/17 0600   Gross per 24 hour   Intake             1750 ml   Output             6215 ml   Net            -4465 ml     EXAM:   GENERAL: sleepy, good color, opens eyes to stimuli and tries to answer questions HEENT:  PERRL, EOMI, no alar flaring or epistaxis, oral mucosa moist without cyanosis, NECK:  no jugular vein distention, no retractions, no thyromegaly or masses, LUNGS: CTA, no w/r/r, HEART:  Regular rate and rhythm with no MGR; no edema is present, ABDOMEN:  soft with no tenderness, bowel sounds present, EXTREMITIES:  warm with no cyanosis, SKIN:  no jaundice or ecchymosis and NEUROLOGIC:  lethargic    Gavi Balbuena MD  [facility]

## 2017-04-21 NOTE — PERIOP NOTES
TRANSFER - OUT REPORT:    Verbal report given to Dionne Frazier RN(name) on [employer].  being transferred to [facility](unit) for routine post - op       Report consisted of patients Situation, Background, Assessment and   Recommendations(SBAR). Information from the following report(s) SBAR, Procedure Summary, Intake/Output and MAR was reviewed with the receiving nurse. Lines:   Triple Lumen 04/20/17 Left Neck (Active)   Central Line Being Utilized Yes 4/21/2017  7:04 AM   Criteria for Appropriate Use Hemodynamically unstable, requiring monitoring lines, vasopressors, or volume resuscitation 4/21/2017  2:28 AM   Site Assessment Clean, dry, & intact 4/21/2017  7:04 AM   Infiltration Assessment 0 4/21/2017  7:04 AM   Affected Extremity/Extremities Color distal to insertion site pink (or appropriate for race); Pulses palpable;Range of motion performed 4/21/2017  2:28 AM   Date of Last Dressing Change 04/20/17 4/21/2017  2:28 AM   Dressing Status Clean, dry, & intact 4/21/2017  7:04 AM   Dressing Type Tape;Transparent 4/21/2017  7:04 AM   Action Taken Tubing changed 4/21/2017  2:28 AM   Proximal Hub Color/Line Status White; Infusing 4/21/2017  7:04 AM   Positive Blood Return (Medial Site) Yes 4/21/2017  2:28 AM   Medial Hub Color/Line Status Blue; Infusing 4/21/2017  7:04 AM   Positive Blood Return (Lateral Site) Yes 4/21/2017  7:04 AM   Distal Hub Color/Line Status Brown 4/21/2017  7:04 AM   Positive Blood Return (Site #3) Yes 4/21/2017  2:28 AM   Alcohol Cap Used Yes 4/21/2017  2:28 AM       Peripheral IV 04/19/17 Left Antecubital (Active)   Site Assessment Clean, dry, & intact 4/21/2017  2:28 AM   Phlebitis Assessment 0 4/21/2017  2:28 AM   Infiltration Assessment 0 4/21/2017  2:28 AM   Dressing Status Clean, dry, & intact 4/21/2017  2:28 AM   Dressing Type Tape;Transparent 4/21/2017  2:28 AM   Hub Color/Line Status Pink; Infusing;Patent 4/21/2017  2:28 AM   Action Taken Open ports on tubing capped 4/19/2017 11:04 PM Alcohol Cap Used Yes 4/21/2017  2:28 AM        Opportunity for questions and clarification was provided.       Patient transported with:   Monitor  O2 @ 3 liters  Registered Nurse

## 2017-04-21 NOTE — ANESTHESIA PREPROCEDURE EVALUATION
Anesthetic History   No history of anesthetic complications            Review of Systems / Medical History  Patient summary reviewed, nursing notes reviewed and pertinent labs reviewed    Pulmonary            Asthma        Neuro/Psych       CVA       Cardiovascular    Hypertension        Dysrhythmias : atrial fibrillation      Exercise tolerance: >4 METS  Comments: Not on beta blocker   GI/Hepatic/Renal  Within defined limits              Endo/Other    Diabetes    Morbid obesity, arthritis and anemia     Other Findings   Comments: Myasthenia gravis            Physical Exam    Airway  Mallampati: IV  TM Distance: < 4 cm  Neck ROM: short neck   Mouth opening: Normal     Cardiovascular    Rhythm: irregular  Rate: normal         Dental  No notable dental hx       Pulmonary  Breath sounds clear to auscultation               Abdominal  GI exam deferred       Other Findings            Anesthetic Plan    ASA: 4  Anesthesia type: MAC            Anesthetic plan and risks discussed with: Patient

## 2017-04-21 NOTE — PROGRESS NOTES
[facility]  [address] [facility], [place], [id number] [place] [address]  [phone]      Medical Progress Note      NAME: Tino Dumont. :  1945  MRM:  [id number]    Date/Time: 2017          Subjective:     Chief Complaint:  F/u PE / DVT, bowel resection    Chart/notes/labs/studies reviewed, patient examined at bedside. BRBPR earlier today. A little more confused. HR elevated. No CP. Objective:       Vitals:          Last 24hrs VS reviewed since prior progress note. Most recent are:    Visit Vitals    /87    Pulse (!) 101    Temp 99.1 °F (37.3 °C)    Resp 30    Ht 5' 11\" (1.803 m)    Wt 149.9 kg (330 lb 7.5 oz)    SpO2 96%    BMI 46.09 kg/m2     SpO2 Readings from Last 6 Encounters:   17 96%   17 94%   17 96%   17 96%   16 97%   10/21/16 98%    O2 Flow Rate (L/min): 2 l/min       Intake/Output Summary (Last 24 hours) at 17 1609  Last data filed at 17 1445   Gross per 24 hour   Intake             1450 ml   Output             6560 ml   Net            -5110 ml          Exam:     Physical Exam:    Gen: obese, frail, elderly, chronically ill-appearing, in no acute distress. HEENT: Pink conjunctivae, hearing intact to voice, moist mucous membranes  Neck: Supple, without masses, thyroid non-tender  Resp: No accessory muscle use, clear breath sounds without wheezes rales or rhonchi  Card: No murmurs, tachycardic, irregularly irregular. Trace peripheral edema  Abd: Soft, NTTP. Obese. Stoma pink. Dark blood output in ostomy. BRBPR and hematochezia  Musc: No cyanosis or clubbing  Skin: No rashes or ulcers, skin turgor is good  Neuro: Cranial nerves are grossly intact, no focal motor weakness moving all extremities, follows commands appropriately.    Psych: limited insight, oriented to person and place        Medications Reviewed: (see below)    Lab Data Reviewed: (see below)    ______________________________________________________________________    Medications:     Current Facility-Administered Medications   Medication Dose Route Frequency    0.9% sodium chloride infusion 250 mL  250 mL IntraVENous PRN    insulin lispro (HUMALOG) injection   SubCUTAneous Q6H    sodium chloride (NS) flush 10 mL  10 mL InterCATHeter Q8H    sodium chloride (NS) flush 10 mL  10 mL InterCATHeter PRN    dilTIAZem CD (CARDIZEM CD) capsule 360 mg  360 mg Oral DAILY    piperacillin-tazobactam (ZOSYN) 3.375 g in 0.9% sodium chloride (MBP/ADV) 100 mL  3.375 g IntraVENous Q8H    0.9% sodium chloride infusion 250 mL  250 mL IntraVENous PRN    pravastatin (PRAVACHOL) tablet 40 mg  40 mg Oral QHS    labetalol (NORMODYNE;TRANDATE) injection 10 mg  10 mg IntraVENous Q6H PRN    oxyCODONE-acetaminophen (PERCOCET) 5-325 mg per tablet 1-2 Tab  1-2 Tab Oral Q4H PRN    HYDROmorphone (PF) (DILAUDID) injection 0.5 mg  0.5 mg IntraVENous Q3H PRN    predniSONE (DELTASONE) tablet 50 mg  50 mg Oral DAILY WITH BREAKFAST    pyridostigmine (MESTINON) tablet 90 mg  90 mg Oral TID    carvedilol (COREG) tablet 25 mg  25 mg Oral BID WITH MEALS    diphenhydrAMINE (BENADRYL) injection 25 mg  25 mg IntraVENous Q6H PRN    0.9% sodium chloride infusion 250 mL  250 mL IntraVENous PRN    sodium chloride (NS) flush 5-10 mL  5-10 mL IntraVENous Q8H    sodium chloride (NS) flush 5-10 mL  5-10 mL IntraVENous PRN    acetaminophen (TYLENOL) tablet 650 mg  650 mg Oral Q4H PRN    ondansetron (ZOFRAN) injection 4 mg  4 mg IntraVENous Q4H PRN    glucose chewable tablet 16 g  4 Tab Oral PRN    dextrose (D50W) injection syrg 12.5-25 g  12.5-25 g IntraVENous PRN    glucagon (GLUCAGEN) injection 1 mg  1 mg IntraMUSCular PRN    ALPRAZolam (XANAX) tablet 0.25 mg  0.25 mg Oral TID PRN    ascorbic acid (vitamin C) (VITAMIN C) tablet 1,000 mg  1,000 mg Oral DAILY    cholecalciferol (VITAMIN D3) tablet 5,000 Units  5,000 Units Oral DAILY    traMADol (ULTRAM) tablet 50 mg  50 mg Oral Q6H PRN    pantoprazole (PROTONIX) 40 mg in sodium chloride 0.9 % 10 mL injection  40 mg IntraVENous Q12H    0.9% sodium chloride infusion 250 mL  250 mL IntraVENous PRN            Lab Review:     Recent Labs      04/21/17   1446  04/21/17   0520  04/20/17   1448  04/20/17   0624  04/20/17 0144 04/19/17 2245 04/19/17   0350   WBC   --    --    --    --   11.1  10.5   --   9.1   HGB  7.7*  7.7*  8.0*  8.0*  7.2*  9.8*   < >  8.9*   HCT   --    --    --   24.6*  22.4*  29.6*   --   28.7*   PLT   --    --    --    --   323  421*   --   327    < > = values in this interval not displayed. Recent Labs      04/21/17   0520 04/20/17 0144 04/19/17 2245 04/19/17   0350   NA   --   141  141  142   K   --   2.8*  3.5  3.5   CL   --   102  101  103   CO2   --   32  35*  33*   GLU   --   123*  137*  101*   BUN   --   10  9  8   CREA  0.61*  0.79  0.89  0.74   CA   --   8.2*  8.6  8.4*   MG   --   1.7   --   1.9   PHOS   --   3.1   --    --    ALB   --   2.2*   --    --    SGOT   --   19   --    --    ALT   --   31   --    --      No components found for: [place]  Recent Labs      04/20/17   0942   PH  7.45   PCO2  44   PO2  82   HCO3  29*     No results for input(s): INR in the last 72 hours. No lab exists for component: INREXT, INREXT  No results found for: SDES  Lab Results   Component Value Date/Time    Culture result: MRSA NOT PRESENT 04/12/2017 05:49 PM    Culture result:  04/12/2017 05:49 PM         Screening of patient nares for MRSA is for surveillance purposes and, if positive, to facilitate isolation considerations in high risk settings. It is not intended for automatic decolonization interventions per se as regimens are not sufficiently effective to warrant routine use.     Culture result: ENTEROBACTER CLOACAE 09/16/2016 10:10 PM    Culture result: KLEBSIELLA OXYTOCA 09/16/2016 10:10 PM            Assessment / Plan:     74 yo WM w/ hx of pAF on [place], DVT/PE, MG, DM p/w GIB, found to have colon CA. Hospitalization c/b issues as below:    AMS: waxes and wanes, likely from delirium. Became obtunded on opioids since Monday and remains somewhat somnolent, likely propagated by acute/critical illness  -- hold opioids for sedation or respiratory depression  -- CPAP QHS and during naps    GI bleed (4/10/2017) / Acute blood loss anemia: due to apparent diverticular mass / colon cancer. s/p sigmoidectomy and colostomy. Worse overnight and transferred to ICU  -- Holding Lovenox indefinitely  -- general surgery evaluation appreciated  -- GI consulted, stomal scope shows ulceration/bleeding stoma   -- serial Hg (stable)  -- Cont IV Zosyn (diverticulitis)    Chest pain: resolved. Troponin essentially negative. EKG with TWI. Evaluated by cardiology  -- stress test when more stable    Anasarca: ?low protein state (albumin 2.2 and likely to worsen without feeding). Monitor volume status, resp status and kidney function closely. May need to diurese      Acute bilateral pulmonary embolus / LLE DVT: diagnosed last admission, s/p IVC filter  -- Per hem/onc, hold lovenox indefinitely    PAF (paroxysmal atrial fibrillation) ([facility] (9/3/2016) / accelerated HTN: Cardiology following.   -- added digoxin   -- cont diltiazem  -- cont Coreg  -- now off anticoagulation indefinitely     Myasthenia gravis with exacerbation, adult form ([facility] (4/4/2017)  -- cont pyridostigmine  -- appreciate neurology input.  Had considered weaning steroids but will hold off for now  -- If resp compromise thought to be from MG, then would get IVIG but he is volume overloaded as it stands and this would likely worsen the situation       History of stroke (4/5/2017):   -- off Lovenox  -- on statin     Morbid obesity with BMI of 40.0-44.9, adult ([facility] (4/5/2017)  -- counseled on weight loss     Type II diabetes mellitus ([facility] (4/10/2017):   -- follow BS on SSI      Possible KWADWO: desats at night pretty consistently  -- CPAP QHS    Lung nodule:   -- will need repeat CT imaging per guidelines      Total time spent with patient:  25 minutes                 Care Plan discussed with: Patient, Nursing Staff and >50% of time spent in counseling and coordination of care    Discussed:  Care Plan    Prophylaxis:  SCD's    Disposition:  SNF/LTC           ___________________________________________________    Attending Physician: David Elizabeth DO

## 2017-04-21 NOTE — PROGRESS NOTES
Physicnal Therapy Note:  Chart reviewed and spoke with team at [address] [place] [address]. Patient is lethargic, not following commands, and unable to maintain arousal for participation in PT re-evaluation. Will continue to follow as appropriate. Thank you.

## 2017-04-21 NOTE — PROGRESS NOTES
Occupational Therapy Note:  Chart reviewed and spoke with team at [address] [place] [address]. Patient is lethargic, not following commands, and unable to maintain arousal for participation in OT re-evaluation. Will continue to follow as appropriate. Thank you.   Onelia Suresh OTR/L

## 2017-04-21 NOTE — PROGRESS NOTES
Bedside and Verbal shift change report given to Georgie Vaughan RN (oncoming nurse) by Richard Rollins RN (offgoing nurse). Report included the following information SBAR, Med Rec Status, Cardiac Rhythm Afib and Alarm Parameters . Overnight patient put out at total of    Intake/Output Summary (Last 24 hours) at 04/21/17 0658  Last data filed at 04/21/17 0600   Gross per 24 hour   Intake             2050 ml   Output             7060 ml   Net            -5010 ml     Patient received all of his Golytely. His temperature max was 99.2. Colostomy was placed to drainage canister. Patient bruised over abd, but no new bruising noted. Right IJ intact. Blood sugars stable. +2 to +3 edema noted all over patients body, including his scrotum. Bedside and Verbal shift change report given to [id number] [facility]. (oncoming nurse) by Georgie Vaughan RN  (offgoing nurse). Report included the following information SBAR, Kardex, Recent Results, Med Rec Status, Cardiac Rhythm Afibs and Alarm Parameters .

## 2017-04-21 NOTE — PROGRESS NOTES
Pt.'s sister is planning to tour [facility] . Once pt is working again with PT & OT,I will send clinicals to [facility].   Amadou Anderson

## 2017-04-21 NOTE — PROGRESS NOTES
[address] [facility] at [address] [place] [address]  [phone]    Hematology / Oncology Follow-up    Reason for Visit:   Shalini Kerr is a 73 y.o. male who is seen for follow-up of colon cancer, PE. Interval History:   Transferred to ICU 4/19/2017    Mr Alyce Nixon opens eyes to name but verbally does not respond; shook his head no that he was not in pain. No family at bedside. Medications reviewed in the EMR. Allergies   Allergen Reactions    Lactose Itching    Fructose Other (comments)     States added sugar to foods causes sore throat/ear ache.  Inderal [Propranolol] Unknown (comments)    Lisinopril Angioedema    Gluten Nausea Only        Review of Systems: A 6-point review of systems was obtained, negative except as reviewed in the HPI. Physical Exam:     Visit Vitals    /84    Pulse (!) 102    Temp 99.2 °F (37.3 °C)    Resp 18    Ht 5' 11\" (1.803 m)    Wt 149.9 kg (330 lb 7.5 oz)    SpO2 96%    BMI 46.09 kg/m2     General: No acute distress  Eyes: Anicteric sclerae  HENT: Atraumatic  Neck: Supple  Respiratory: Normal respiratory effort  CV: Tachy rate;2+ edema noted to  Upper and lower extremities. GI: Colostomy noted; Soft, nontender, nondistended, no masses, no hepatomegaly, no splenomegaly  Skin: No rashes, or petechiae  Psych: Lethargic,        Results:     Lab Results   Component Value Date/Time    WBC 11.1 04/20/2017 01:44 AM    HGB 7.7 04/21/2017 05:20 AM    HCT 24.6 04/20/2017 06:24 AM    PLATELET 443 77/28/2631 01:44 AM    MCV 95.7 04/20/2017 01:44 AM    ABS.  NEUTROPHILS 9.5 04/20/2017 01:44 AM    Hemoglobin (POC) 10.5 04/11/2017 12:50 PM     Lab Results   Component Value Date/Time    Sodium 141 04/20/2017 01:44 AM    Potassium 2.8 04/20/2017 01:44 AM    Chloride 102 04/20/2017 01:44 AM    CO2 32 04/20/2017 01:44 AM    Glucose 123 04/20/2017 01:44 AM    BUN 10 04/20/2017 01:44 AM    Creatinine 0.61 04/21/2017 05:20 AM    GFR est AA >60 04/21/2017 05:20 AM    GFR est non-AA >60 04/21/2017 05:20 AM    Calcium 8.2 04/20/2017 01:44 AM    Glucose (POC) 129 04/21/2017 12:08 PM     Lab Results   Component Value Date/Time    Bilirubin, total 0.5 04/20/2017 01:44 AM    ALT (SGPT) 31 04/20/2017 01:44 AM    AST (SGOT) 19 04/20/2017 01:44 AM    Alk. phosphatase 47 04/20/2017 01:44 AM    Protein, total 4.9 04/20/2017 01:44 AM    Albumin 2.2 04/20/2017 01:44 AM    Globulin 2.7 04/20/2017 01:44 AM     Lab Results   Component Value Date/Time    Iron % saturation 17 04/16/2017 01:56 AM    Iron 34 04/16/2017 01:56 AM    TIBC 202 04/16/2017 01:56 AM    Ferritin 235 04/16/2017 01:56 AM    Vitamin B12 1318 09/14/2016 06:02 AM    Homocysteine, plasma 10.8 09/02/2016 08:50 PM    Sed rate, automated 7 10/04/2016 04:30 AM    TSH 0.32 04/05/2017 04:39 PM    JULIA Direct NEGATIVE  09/08/2016 10:03 AM    Lipase 121 04/05/2017 03:49 PM    Hep C  virus Ab Interp. 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 Bilateral pulmonary emboli. 2. RUL airspace disease may represent a pulmonary infarct     4/06/2017 DUPLEX LE  CONCLUSION: Bilateral lower extremity venous duplex positive for deep  venous thrombosis. Negative for thrombophlebitis. 4/10/2017 CTA ABD PELV  IMPRESSION:  1. Sigmoid colonic hemorrhage. 2. Small adjacent mass in the sigmoid.  Diverticulitis slightly favored over a  small colon carcinoma. 3. 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 4. Resolved pulmonary emboli in the visualized portions of the left lower lobe. Stable or slightly decreased right lung emboli with small developing right upper  lobe pulmonary infarction. 4/12/2017 XR CHEST   IMPRESSION: Unchanged peripheral right upper lobe airspace disease. Mild  bibasilar atelectasis. 4/17/2017 CT HEAD WO CONT  IMPRESSION: No evidence of acute intracranial process. No change from the prior  Study. 4/20/2017 Bilateral LE doppler  CONCLUSION: Left lower extremity venous duplex positive for deep  venous thrombosis of indeterminate age involving the popliteal vein. Right lower extremity is thrombus free. NOTE: Bilateral peroneal veins  not visualized. Avascular structure consistent with a baker's cyst  noted in the right popliteal fossa measuring . 3.33 x 3.76 cm.    4/20/2017 CTA ABD PELV W CONT  IMPRESSION:  No evidence of retroperitoneal hemorrhage. Small hematoma along the left lower  quadrant anterior abdominal wall incision. No evidence of active contrast  extravasation. Unchanged pelvic fluid collection, suggestive of abscess. Improving right lower lobe pulmonary emboli. Unchanged right middle lobe  airspace disease, presumably pulmonary infarct. pending    Assessment and Recommendations: 1. GI Bleed  Original source was sigmoid colon; s/p sigmoidectomy. 4/19/2017 recurrent GI bleed   Gen surgery following. GI following; colonoscopy (4/21/2017) ulceration of stoma; possible site of bleeding; no active bleeding noted [place])    2. Anemia, normocytic   Secondary to recurrent GI bleed and recent surgery. Continue to monitor and transfuse Hgb < 72.    3 . Bilateral PE/ LLE DVT (4/05/2017)  Initially anticoagulated, but stopped due to GI bleed. IVC filter placed 4/11/2017. Anticoagulation had been resumed with a trial of lovenox (4/15);  Now with recurrent rectal bleeding; failed Lovenox trial;  Anticoagulation stopped; at this point anticoagulation is contraindicated      4. Colon cancer  Stage I ([id number])  4/11/2017 S/p Exp lap; sigmoidectomy and colostomy  Very small focus of tumor (0.2mm), with negative lymph nodes. No role for adjuvant therapy. Plan would be  proceed with surveillance as outpatient. Follow up with Dr Carson Graft established and placed in discharge summary    5. Abscesses  Noted on pathology and on imaging. Currently on abx. 6. Pulmonary nodules  Uncertain etiology, possibly inflammatory. Unlikely to represent metastases, given the 0.2cm primary tumor.     Repeat CT chest scan in 3 months      Plan reviewed with Dr Sandra Dodson By: Vince Wu NP     April 21, 2017

## 2017-04-21 NOTE — PROGRESS NOTES
Reviewed EMR notes from past 2 days. Pt to critically ill to give IVIG. If pt stabilizes and any respiratory difficulty is believed to be from his Myasthenia, we can do IVIG at that time (if he doesn't have signs of volume overload).

## 2017-04-21 NOTE — PERIOP NOTES
ABD remains rounded post procedure, no change from pre-procedure. Pt has no complaints at this time and tolerated the procedure well. Received report from CRNA, see anesthesia report.

## 2017-04-21 NOTE — PERIOP NOTES
Kacy Foster.  1945  [id number]    Situation:    Scheduled Procedure: Procedure(s):  COLONOSCOPY (via stoma)  Verbal report received from: Kathie Tariq RN  Preoperative diagnosis: anemia    Background:    Procedure: Procedure(s):  COLONOSCOPY (via stoma)  Physician performing procedure; Dr. Delphine Leonardo RN    NPO Status/Last PO Intake: midnight    Pregnancy Test:Not applicable If yes, result: none    Is the patient taking Blood Thinners: NO   Is the patient diabetic:yes       If yes, what was the last BS:  106  Time taken? 4181  Anything given? no           Does the patient have a Pacemaker/Defibrillator in place?: no   Does the patient need antibiotics before/during/after procedure: no   If the patient is having a colon, How much prep was drank? completed   What were the Colon prep results? 2800 cc from ostomy   Does the patient have SCD in place:no   Is patient on CONTACT precautions:no        If yes, what kind of CONTACT precautions:     Assessment:  Are the vital signs stable prior to patient coming to ENDO?  yes  Is the patient alert/oriented and able to sign consent for the procedures:no     Does the patient have a patient IV in place?  yes     Recommendation:  Family or Friend present no     Permission to share finding with Family or Friend yes (Consents to be obtained from patient's sister)

## 2017-04-21 NOTE — PROGRESS NOTES
General Surgery Daily Progress Note    Patient: Arin Laguna MRN: [id number]  SSN: [id number]    YOB: 1945  Age: 73 y.o. Sex: male      Admit Date: 4/10/2017    Subjective:   Pt developed bleeding reportedly for ostomy, rectum and midline wound yesterday requiring transfer to ICU and blood transfusion.       Current Facility-Administered Medications   Medication Dose Route Frequency    0.9% sodium chloride infusion  50 mL/hr IntraVENous CONTINUOUS    0.9% sodium chloride infusion 250 mL  250 mL IntraVENous PRN    insulin lispro (HUMALOG) injection   SubCUTAneous Q6H    sodium chloride (NS) flush 10 mL  10 mL InterCATHeter Q8H    sodium chloride (NS) flush 10 mL  10 mL InterCATHeter PRN    dilTIAZem CD (CARDIZEM CD) capsule 360 mg  360 mg Oral DAILY    piperacillin-tazobactam (ZOSYN) 3.375 g in 0.9% sodium chloride (MBP/ADV) 100 mL  3.375 g IntraVENous Q8H    0.9% sodium chloride infusion 250 mL  250 mL IntraVENous PRN    pravastatin (PRAVACHOL) tablet 40 mg  40 mg Oral QHS    labetalol (NORMODYNE;TRANDATE) injection 10 mg  10 mg IntraVENous Q6H PRN    oxyCODONE-acetaminophen (PERCOCET) 5-325 mg per tablet 1-2 Tab  1-2 Tab Oral Q4H PRN    HYDROmorphone (PF) (DILAUDID) injection 0.5 mg  0.5 mg IntraVENous Q3H PRN    predniSONE (DELTASONE) tablet 50 mg  50 mg Oral DAILY WITH BREAKFAST    pyridostigmine (MESTINON) tablet 90 mg  90 mg Oral TID    carvedilol (COREG) tablet 25 mg  25 mg Oral BID WITH MEALS    diphenhydrAMINE (BENADRYL) injection 25 mg  25 mg IntraVENous Q6H PRN    0.9% sodium chloride infusion 250 mL  250 mL IntraVENous PRN    sodium chloride (NS) flush 5-10 mL  5-10 mL IntraVENous Q8H    sodium chloride (NS) flush 5-10 mL  5-10 mL IntraVENous PRN    acetaminophen (TYLENOL) tablet 650 mg  650 mg Oral Q4H PRN    ondansetron (ZOFRAN) injection 4 mg  4 mg IntraVENous Q4H PRN    glucose chewable tablet 16 g  4 Tab Oral PRN    dextrose (D50W) injection syrg 12.5-25 g  12.5-25 g IntraVENous PRN    glucagon (GLUCAGEN) injection 1 mg  1 mg IntraMUSCular PRN    ALPRAZolam (XANAX) tablet 0.25 mg  0.25 mg Oral TID PRN    ascorbic acid (vitamin C) (VITAMIN C) tablet 1,000 mg  1,000 mg Oral DAILY    cholecalciferol (VITAMIN D3) tablet 5,000 Units  5,000 Units Oral DAILY    traMADol (ULTRAM) tablet 50 mg  50 mg Oral Q6H PRN    pantoprazole (PROTONIX) 40 mg in sodium chloride 0.9 % 10 mL injection  40 mg IntraVENous Q12H    0.9% sodium chloride infusion 250 mL  250 mL IntraVENous PRN        Objective:      04/19 1901 - 04/21 0700  In: 3271.3 [I.V.:800]  Out: 7660 [Urine:4360]  Patient Vitals for the past 8 hrs:   BP Temp Pulse Resp SpO2   04/21/17 0911 174/84 - (!) 102 - -   04/21/17 0815 146/90 - (!) 105 18 96 %   04/21/17 0810 113/80 - (!) 105 18 96 %   04/21/17 0805 152/74 - (!) 110 21 95 %   04/21/17 0800 151/85 - (!) 110 16 96 %   04/21/17 0755 146/79 - (!) 109 17 97 %   04/21/17 0750 163/86 - (!) 109 18 98 %   04/21/17 0745 163/81 - (!) 101 12 99 %   04/21/17 0740 152/81 98.2 °F (36.8 °C) (!) 117 19 98 %   04/21/17 0701 - - 98 - -   04/21/17 0700 154/88 99.9 °F (37.7 °C) (!) 105 20 97 %   04/21/17 0650 - - (!) 115 19 99 %   04/21/17 0600 162/88 - (!) 109 14 99 %   04/21/17 0500 156/77 99.4 °F (37.4 °C) 95 14 98 %   04/21/17 0400 145/79 - 98 18 98 %   04/21/17 0300 139/75 - (!) 101 19 96 %   04/21/17 0200 132/90 - 93 14 100 %       Physical Exam:  General: Somnolent, pail, confused when awakened  Lungs: Unlabored  Heart:  Tachycardic  Abdomen: Soft, non-tender, non-distended, incision staples intact,   Extremities: Warm, moves all, edematous   Skin:  Warm and dry, no rash    Labs:   Recent Labs      04/21/17   0520   04/20/17   0624  04/20/17   0144   WBC   --    --    --   11.1   HGB  7.7*   < >  8.0*  7.2*   HCT   --    --   24.6*  22.4*   PLT   --    --    --   323    < > = values in this interval not displayed.      Recent Labs      04/21/17   0520  04/20/17   0144   NA --   141   K   --   2.8*   CL   --   102   CO2   --   32   GLU   --   123*   BUN   --   10   CREA  0.61*  0.79   CA   --   8.2*   MG   --   1.7   PHOS   --   3.1   ALB   --   2.2*   TBILI   --   0.5   SGOT   --   19   ALT   --   31       Assessment / Plan:   · POD#9 ex lap, sigmoidectomy, end colostomy for lower GI bleed  · 4/19-20: LGIB, received transfusion and FFP  · CTA shows small swati-incisional hematoma, no intra-abdominal or retroperitoneal bleeding  · Colonoscopy today shows ulceration of the stoma that appears to be site of recent bleeding. No active bleeding now. · Continue serial H/H  · Should not resume anticoagulation.  He has a IVC filter  · May resume diet

## 2017-04-22 NOTE — PROGRESS NOTES
Bedside swallow satisfactory per nursing. Ok with d/c NGT, transfer to stepdown.       Ita Medina MD

## 2017-04-22 NOTE — PROGRESS NOTES
[facility]  [address] [facility], [place], [address] [place] [address]  [phone]      Medical Progress Note      NAME: Mary Correa. :  1945  MRM:  [id number]    Date/Time: 2017          Subjective:     Chief Complaint:  F/u PE / DVT, bowel resection    Chart/notes/labs/studies reviewed, patient examined at bedside. Remains somewhat confused, will awake with shaking or voice. No CP. Objective:       Vitals:          Last 24hrs VS reviewed since prior progress note. Most recent are:    Visit Vitals    /60    Pulse (!) 101    Temp 98.5 °F (36.9 °C)    Resp 26    Ht 5' 11\" (1.803 m)    Wt 131.6 kg (290 lb 2 oz)    SpO2 96%    BMI 40.46 kg/m2     SpO2 Readings from Last 6 Encounters:   17 96%   17 94%   17 96%   17 96%   16 97%   10/21/16 98%    O2 Flow Rate (L/min): 2 l/min       Intake/Output Summary (Last 24 hours) at 17 1239  Last data filed at 17 0553   Gross per 24 hour   Intake              460 ml   Output             3475 ml   Net            -3015 ml          Exam:     Physical Exam:    Gen: obese, frail, elderly, chronically ill-appearing, in no acute distress. HEENT: Pink conjunctivae, hearing intact to voice, moist mucous membranes  Neck: Supple, without masses, thyroid non-tender  Resp: No accessory muscle use, clear breath sounds without wheezes rales or rhonchi  Card: No murmurs, tachycardic, irregularly irregular. +2 peripheral edema bilaterally  Abd: Soft, NTTP. Obese. Stoma pink. Brown ostomy output. Musc: No cyanosis or clubbing  Skin: No rashes or ulcers, skin turgor is good  Neuro: Cranial nerves are grossly intact, no focal motor weakness moving all extremities, does not follows commands consistently.    Psych: limited insight, oriented to person, moving extremities, occasionally answers quesions        Medications Reviewed: (see below)    Lab Data Reviewed: (see below)    ______________________________________________________________________    Medications:     Current Facility-Administered Medications   Medication Dose Route Frequency    0.9% sodium chloride infusion 250 mL  250 mL IntraVENous PRN    insulin lispro (HUMALOG) injection   SubCUTAneous Q6H    sodium chloride (NS) flush 10 mL  10 mL InterCATHeter Q8H    sodium chloride (NS) flush 10 mL  10 mL InterCATHeter PRN    dilTIAZem CD (CARDIZEM CD) capsule 360 mg  360 mg Oral DAILY    piperacillin-tazobactam (ZOSYN) 3.375 g in 0.9% sodium chloride (MBP/ADV) 100 mL  3.375 g IntraVENous Q8H    0.9% sodium chloride infusion 250 mL  250 mL IntraVENous PRN    pravastatin (PRAVACHOL) tablet 40 mg  40 mg Oral QHS    labetalol (NORMODYNE;TRANDATE) injection 10 mg  10 mg IntraVENous Q6H PRN    oxyCODONE-acetaminophen (PERCOCET) 5-325 mg per tablet 1-2 Tab  1-2 Tab Oral Q4H PRN    HYDROmorphone (PF) (DILAUDID) injection 0.5 mg  0.5 mg IntraVENous Q3H PRN    predniSONE (DELTASONE) tablet 50 mg  50 mg Oral DAILY WITH BREAKFAST    pyridostigmine (MESTINON) tablet 90 mg  90 mg Oral TID    carvedilol (COREG) tablet 25 mg  25 mg Oral BID WITH MEALS    diphenhydrAMINE (BENADRYL) injection 25 mg  25 mg IntraVENous Q6H PRN    0.9% sodium chloride infusion 250 mL  250 mL IntraVENous PRN    sodium chloride (NS) flush 5-10 mL  5-10 mL IntraVENous Q8H    sodium chloride (NS) flush 5-10 mL  5-10 mL IntraVENous PRN    acetaminophen (TYLENOL) tablet 650 mg  650 mg Oral Q4H PRN    ondansetron (ZOFRAN) injection 4 mg  4 mg IntraVENous Q4H PRN    glucose chewable tablet 16 g  4 Tab Oral PRN    dextrose (D50W) injection syrg 12.5-25 g  12.5-25 g IntraVENous PRN    glucagon (GLUCAGEN) injection 1 mg  1 mg IntraMUSCular PRN    ALPRAZolam (XANAX) tablet 0.25 mg  0.25 mg Oral TID PRN    ascorbic acid (vitamin C) (VITAMIN C) tablet 1,000 mg  1,000 mg Oral DAILY    cholecalciferol (VITAMIN D3) tablet 5,000 Units  5,000 Units Oral DAILY    traMADol (ULTRAM) tablet 50 mg  50 mg Oral Q6H PRN    pantoprazole (PROTONIX) 40 mg in sodium chloride 0.9 % 10 mL injection  40 mg IntraVENous Q12H    0.9% sodium chloride infusion 250 mL  250 mL IntraVENous PRN            Lab Review:     Recent Labs      04/22/17   0456  04/22/17   0034  04/21/17   1446   04/20/17   0624  04/20/17   0144  04/19/17   2245   WBC   --    --    --    --    --   11.1  10.5   HGB  7.8*  7.7*  7.7*   < >  8.0*  7.2*  9.8*   HCT   --   25.4*   --    --   24.6*  22.4*  29.6*   PLT   --    --    --    --    --   323  421*    < > = values in this interval not displayed. Recent Labs      04/22/17 0456  04/21/17   0520  04/20/17   0144  04/19/17   2245   NA  138   --   141  141   K  3.0*   --   2.8*  3.5   CL  101   --   102  101   CO2  32   --   32  35*   GLU  93   --   123*  137*   BUN  6   --   10  9   CREA  0.71  0.61*  0.79  0.89   CA  8.3*   --   8.2*  8.6   MG   --    --   1.7   --    PHOS   --    --   3.1   --    ALB   --    --   2.2*   --    SGOT   --    --   19   --    ALT   --    --   31   --      No components found for: [place]  Recent Labs      04/20/17   0942   PH  7.45   PCO2  44   PO2  82   HCO3  29*     No results for input(s): INR in the last 72 hours. No lab exists for component: INREXT, INREXT  No results found for: SDES  Lab Results   Component Value Date/Time    Culture result: MRSA NOT PRESENT 04/12/2017 05:49 PM    Culture result:  04/12/2017 05:49 PM         Screening of patient nares for MRSA is for surveillance purposes and, if positive, to facilitate isolation considerations in high risk settings. It is not intended for automatic decolonization interventions per se as regimens are not sufficiently effective to warrant routine use.     Culture result: ENTEROBACTER CLOACAE 09/16/2016 10:10 PM    Culture result: KLEBSIELLA OXYTOCA 09/16/2016 10:10 PM            Assessment / Plan:     72 yo WM w/ hx of pAF on [place], DVT/PE, MG, DM p/w GIB, found to have colon CA. Hospitalization c/b issues as below:    AMS: waxes and wanes, likely from delirium. Became obtunded on opioids since Monday and remains somewhat somnolent, likely propagated by acute/critical illness  -- hold opioids for sedation or respiratory depression  -- CPAP QHS and during naps  -- Restraint/sitter as needed. Re-direction. -- Neuro evaluated, ?element of MG with this    GI bleed (4/10/2017) / Acute blood loss anemia: due to apparent diverticular mass / colon cancer. s/p sigmoidectomy and colostomy. -- Holding Lovenox indefinitely  -- general surgery evaluation appreciated  -- GI consulted, stomal scope shows ulceration/bleeding stoma   -- serial Hg (stable)  -- Cont IV Zosyn (diverticulitis)    Chest pain: resolved. Troponin essentially negative. EKG with TWI. Evaluated by cardiology  -- stress test when more stable    Anasarca: ?low protein state (albumin 2.2 and likely to worsen without feeding). Monitor volume status, resp status and kidney function closely. May need to diurese      Acute bilateral pulmonary embolus / LLE DVT: diagnosed last admission, s/p IVC filter  -- Per hem/onc, hold lovenox indefinitely    PAF (paroxysmal atrial fibrillation) ([facility] (9/3/2016) / accelerated HTN: Cardiology following.   -- added digoxin   -- cont diltiazem  -- cont Coreg  -- now off anticoagulation indefinitely     Myasthenia gravis with exacerbation, adult form ([facility] (4/4/2017)  -- cont pyridostigmine  -- appreciate neurology input.  Had considered weaning steroids but will hold off for now  -- If resp compromise thought to be from MG, then would get IVIG but he is volume overloaded as it stands and this would likely worsen the situation    History of stroke (4/5/2017):   -- off Lovenox  -- on statin     Morbid obesity with BMI of 40.0-44.9, adult ([facility] (4/5/2017)  -- counseled on weight loss     Type II diabetes mellitus ([facility] (4/10/2017):   -- follow BS on SSI      Possible KWADWO: desats at night pretty consistently  -- CPAP QHS    Lung nodule:   -- will need repeat CT imaging per guidelines      Total time spent with patient:  25 minutes                 Care Plan discussed with: Patient, Family, Nursing Staff and >50% of time spent in counseling and coordination of care    Discussed:  Care Plan    Prophylaxis:  SCD's    Disposition:  SNF/LTC           ___________________________________________________    Attending Physician: Felicity Ricci DO

## 2017-04-22 NOTE — PROGRESS NOTES
Problem: Falls - Risk of  Goal: *Absence of falls  Outcome: Progressing Towards Goal     Late note: BEDSIDE REPORT - ASSUME CARE     0700: Bedside shift change report received by Alma Chen RN. Report given with SBAR, Kardex, Intake/Output, Recent Results and Cardiac Rhythm AFib. Opportunity for questions and clarification was provided. Assumed care of patient. Safety instructions given and received by pt. Problem: Pressure Ulcer - Risk of  Goal: *Prevention of pressure ulcer  Outcome: Progressing Towards Goal  On turn team. No pressure ulcers upon assessments. Problem: Upper and Lower GI Bleed: Day 3  Goal: Diagnostic Test/Procedures  Outcome:Progressing  See lab results. Received pt from endo after SBAR report. Goal: Nutrition/Diet  Outcome: Progressing Towards Goal  STAND test passed. Did not eat lunch. Goal: Medications  Outcome: Progressing   Not requesting pain mgt. And still sleeping. Goal: Treatments/Interventions/Procedures  Outcome: Progressing Towards Goal  No evidence of active bleeding. Goal: Psychosocial  Outcome: Progressing Towards Goal  Obtunded but arousable. Problem: Ostomy Care  Goal: *Patient pouching appliance will fit properly and maintain integrity at least three to five days  Infection control procedures (eg, clean dressings, clean gloves, hand washing, precautions to isolate wound from contamination, sterile instruments used for wound debridement) should be implemented. Outcome: Progressing Towards Goal  Connected to suction. Problem: Non-Violent Restraints  Goal: *Removal from restraints as soon as assessed to be safe  Outcome: Progressing Towards Goal  Pt. Reaches for dsgs and briggs. Goal: Non-violent Restaints:Standard Interventions  Outcome: Progressing Towards Goal  In progress. BEDSIDE REPORT TO ONCOMING RN    1900: Bedside shift change report given to RN (oncoming nurse) by Alma Chen RN (offgoing nurse).   Report given with SBAR, Kardex, Intake/Output and Recent Results. Opportunity for questions and clarification was provided.

## 2017-04-22 NOTE — PROGRESS NOTES
SHIFT REPORT:  1900- Bedside shift change report given to Bayron Larkin RN (oncoming nurse) by Zane Dumont RN (offgoing nurse). Report included the following information SBAR, Kardex, Procedure Summary, Intake/Output, Recent Results and Cardiac Rhythm. SHIFT SUMMARY:  2030-wife in to visit; request that urine be tested for infection as when pt becomes less responsive (like tonight) it usually means he had a UTI; Dr. Carey Felder notified of VS and wifes opinion, order received for UA and culture as needed; spec collected and sent. 2200-saline added to briggs balloon as urine leaking from around briggs; pads changed, pt repositioned  0200-ABD inc dressing satuated w serous drainage, changed abd incision dressing using Primapore dressing; several gaps in incision w raw tissue visible. No bleeding visible. 0300-R arm weeping from gen edema  0600-burgandy drainage from rectal area but just enough to discolor 1/4 blue chux pad; cont to leak urine from around briggs insertion site. Responsive to having arms and legs moved around, min arousable to verbal stimuli. END OF SHIFT REPORT:  0700-Bedside shift change report given to Elpidio Andrade RN (oncoming nurse) by Bayron Larkin RN (offgoing nurse). Report included the following information SBAR, Kardex, Procedure Summary, Intake/Output, Recent Results and Cardiac Rhythm .

## 2017-04-22 NOTE — PROGRESS NOTES
Called dr Damaso Francisco about patient mews score of 5 and 3. Patient obtunded only responding to pain. Dr Mauricio Low aware no change. No new orders received.

## 2017-04-22 NOTE — PROGRESS NOTES
PULMONARY/Critical Care/SLEEP MEDICINE  [facility]  Name: Myrtie Kayser. : 1945   MRN: [id number]   Date: 2017 9:20 AM      IMPRESSION:   1. Acute bleed, s/p colonoscopy, felt to be from ostomy  2. Encephalopathy  3. Hx of rectal bleed  4. Hx of KWADWO  5. Abnormal CXR- pt has known pulmonary infarct, ivf in place  · MG, on prednisone and mestinon  · KWADWO, on cpap   PLAN:   · Replace elytes, kcl elixir 40 meq once  · More aggressive use of cpap if he lets us  · Likely can go out of the icu       I have reviewed the flowsheet and previous days notes. Some what lethargic in restraints due to delirium, not able to give history, sleeping with obvious apneas, not on cpap, at bedside though  Overnight events noted  ROS: not able to give    Vital Signs:    Visit Vitals    /69    Pulse 92    Temp 98.9 °F (37.2 °C)    Resp 14    Ht 5' 11\" (1.803 m)    Wt 131.6 kg (290 lb 2 oz)    SpO2 97%    BMI 40.46 kg/m2       O2 Device: Nasal cannula   O2 Flow Rate (L/min): 2 l/min   Temp (24hrs), Av.5 °F (37.5 °C), Min:98.9 °F (37.2 °C), Max:99.9 °F (37.7 °C)       Intake/Output:   Last shift:         Last 3 shifts:  1901 -  0700  In: 1410 [I.V.:500]  Out: 7570 [Urine:4595]    Intake/Output Summary (Last 24 hours) at 17 0920  Last data filed at 17 0553   Gross per 24 hour   Intake              460 ml   Output             3475 ml   Net            -3015 ml       Physical Exam:   General:  lethargic, no distress. Head:  Normocephalic, without obvious abnormality, atraumatic. Eyes:  Conjunctivae/corneas clear. PERRL, EOMs intact. Neck: Short, thick, mp 4   Lungs:   Clear to auscultation bilaterally. Chest wall:  No tenderness or deformity. Heart:  Regular rate and rhythm, S1, S2 normal, no murmur   Abdomen:   Soft, non-tender.  Bowel sounds normal. obese   Extremities: Extremities normal, atraumatic, anasarca   Pulses: 2+ and symmetric all extremities. Skin: Skin color, texture, turgor normal. No rashes or lesions   Lymph nodes: Cervical, supraclavicular not palpable. Neurologic: Moves all 4 extremities, no focal deficits noted.        DATA:   Current Facility-Administered Medications   Medication Dose Route Frequency    0.9% sodium chloride infusion 250 mL  250 mL IntraVENous PRN    insulin lispro (HUMALOG) injection   SubCUTAneous Q6H    sodium chloride (NS) flush 10 mL  10 mL InterCATHeter Q8H    sodium chloride (NS) flush 10 mL  10 mL InterCATHeter PRN    dilTIAZem CD (CARDIZEM CD) capsule 360 mg  360 mg Oral DAILY    piperacillin-tazobactam (ZOSYN) 3.375 g in 0.9% sodium chloride (MBP/ADV) 100 mL  3.375 g IntraVENous Q8H    0.9% sodium chloride infusion 250 mL  250 mL IntraVENous PRN    pravastatin (PRAVACHOL) tablet 40 mg  40 mg Oral QHS    labetalol (NORMODYNE;TRANDATE) injection 10 mg  10 mg IntraVENous Q6H PRN    oxyCODONE-acetaminophen (PERCOCET) 5-325 mg per tablet 1-2 Tab  1-2 Tab Oral Q4H PRN    HYDROmorphone (PF) (DILAUDID) injection 0.5 mg  0.5 mg IntraVENous Q3H PRN    predniSONE (DELTASONE) tablet 50 mg  50 mg Oral DAILY WITH BREAKFAST    pyridostigmine (MESTINON) tablet 90 mg  90 mg Oral TID    carvedilol (COREG) tablet 25 mg  25 mg Oral BID WITH MEALS    diphenhydrAMINE (BENADRYL) injection 25 mg  25 mg IntraVENous Q6H PRN    0.9% sodium chloride infusion 250 mL  250 mL IntraVENous PRN    sodium chloride (NS) flush 5-10 mL  5-10 mL IntraVENous Q8H    sodium chloride (NS) flush 5-10 mL  5-10 mL IntraVENous PRN    acetaminophen (TYLENOL) tablet 650 mg  650 mg Oral Q4H PRN    ondansetron (ZOFRAN) injection 4 mg  4 mg IntraVENous Q4H PRN    glucose chewable tablet 16 g  4 Tab Oral PRN    dextrose (D50W) injection syrg 12.5-25 g  12.5-25 g IntraVENous PRN    glucagon (GLUCAGEN) injection 1 mg  1 mg IntraMUSCular PRN    ALPRAZolam (XANAX) tablet 0.25 mg  0.25 mg Oral TID PRN    ascorbic acid (vitamin C) (VITAMIN C) tablet 1,000 mg  1,000 mg Oral DAILY    cholecalciferol (VITAMIN D3) tablet 5,000 Units  5,000 Units Oral DAILY    traMADol (ULTRAM) tablet 50 mg  50 mg Oral Q6H PRN    pantoprazole (PROTONIX) 40 mg in sodium chloride 0.9 % 10 mL injection  40 mg IntraVENous Q12H    0.9% sodium chloride infusion 250 mL  250 mL IntraVENous PRN       Telemetry:          Labs:  Recent Labs      04/22/17   0456  04/22/17   0034  04/21/17   1446   04/20/17   0624  04/20/17   0144  04/19/17   2245   WBC   --    --    --    --    --   11.1  10.5   HGB  7.8*  7.7*  7.7*   < >  8.0*  7.2*  9.8*   HCT   --   25.4*   --    --   24.6*  22.4*  29.6*   PLT   --    --    --    --    --   323  421*    < > = values in this interval not displayed. Recent Labs      04/22/17   0456  04/21/17   0520  04/20/17   0144  04/19/17   2245   NA  138   --   141  141   K  3.0*   --   2.8*  3.5   CL  101   --   102  101   CO2  32   --   32  35*   GLU  93   --   123*  137*   BUN  6   --   10  9   CREA  0.71  0.61*  0.79  0.89   CA  8.3*   --   8.2*  8.6   MG   --    --   1.7   --    PHOS   --    --   3.1   --    ALB   --    --   2.2*   --    SGOT   --    --   19   --    ALT   --    --   31   --      Recent Labs      04/20/17   0942   PH  7.45   PCO2  44   PO2  82   HCO3  29*       Imaging:  I have personally reviewed the patients radiographs and reports.     See my orders for details    My assessment/plan was discussed with: Diego Dumas MD

## 2017-04-22 NOTE — PROGRESS NOTES
Problem: Falls - Risk of  Goal: *Absence of falls  Outcome: Progressing Towards Goal     BEDSIDE REPORT - ASSUME CARE     0700: Bedside shift change report received by Cristofer Gagnon RN. Report given with SBAR, Kardex, Intake/Output, Recent Results and Cardiac Rhythm AFib. Opportunity for questions and clarification was provided. Assumed care of patient. Safety instructions given and received by pt. Problem: Pressure Ulcer - Risk of  Goal: *Prevention of pressure ulcer  Outcome: Progressing Towards Goal  On turn team. No pressure ulcers upon assessments. Problem: Upper and Lower GI Bleed: Day 3  Goal: Diagnostic Test/Procedures  Outcome: Resolved/Met Date Met:  04/22/17  See lab results. Goal: Nutrition/Diet  Outcome: Progressing Towards Goal  STAND test passed. Did not eat lunch. Goal: Medications  Outcome: Resolved/Met Date Met:  04/22/17  Not requesting pain mgt. And still sleeping. Goal: Treatments/Interventions/Procedures  Outcome: Progressing Towards Goal  No evidence of active bleeding. Goal: Psychosocial  Outcome: Progressing Towards Goal  Impulsive. Confused. Problem: Ostomy Care  Goal: *Patient pouching appliance will fit properly and maintain integrity at least three to five days  Infection control procedures (eg, clean dressings, clean gloves, hand washing, precautions to isolate wound from contamination, sterile instruments used for wound debridement) should be implemented. Outcome: Progressing Towards Goal  Wafer and bag changed. Problem: Non-Violent Restraints  Goal: *Removal from restraints as soon as assessed to be safe  Outcome: Progressing Towards Goal  Pt. Reaches for dsgs and briggs. Goal: Non-violent Restaints:Standard Interventions  Outcome: Progressing Towards Goal  In progress.     1800 TRANSFER - OUT REPORT:    Verbal report given to [employer] RN(name) on [employer].  being transferred to [facility](unit) for routine progression of care       Report consisted of patients Situation, Background, Assessment and   Recommendations(SBAR). Information from the following report(s) SBAR, Kardex, Intake/Output and Recent Results was reviewed with the receiving nurse. Lines:   Triple Lumen 04/20/17 Left Neck (Active)   Central Line Being Utilized Yes 4/22/2017  3:00 AM   Criteria for Appropriate Use Limited/no vessel suitable for conventional peripheral access 4/22/2017  3:00 AM   Site Assessment Clean, dry, & intact 4/22/2017  3:00 AM   Infiltration Assessment 0 4/22/2017  3:00 AM   Affected Extremity/Extremities Color distal to insertion site pink (or appropriate for race); Pulses palpable;Range of motion performed 4/22/2017  3:00 AM   Date of Last Dressing Change 04/22/17 4/22/2017  3:00 AM   Dressing Status Clean, dry, & intact 4/22/2017  3:00 AM   Dressing Type Transparent 4/22/2017  3:00 AM   Action Taken Open ports on tubing capped 4/22/2017  3:00 AM   Proximal Hub Color/Line Status White 4/22/2017  3:00 AM   Positive Blood Return (Medial Site) Yes 4/22/2017  3:00 AM   Medial Hub Color/Line Status Blue 4/22/2017  3:00 AM   Positive Blood Return (Lateral Site) Yes 4/22/2017  3:00 AM   Distal Hub Color/Line Status Blue 4/22/2017  3:00 AM   Positive Blood Return (Site #3) Yes 4/22/2017  3:00 AM   Alcohol Cap Used Yes 4/22/2017  3:00 AM        Opportunity for questions and clarification was provided.       Patient transported with:   Monitor  Registered Nurse

## 2017-04-23 NOTE — PROGRESS NOTES
Neurology Progress Note    Interval Hx:      Follow up: confusion/ AMS, lethargy, hx Myasthenia Gravis    Last seen on 4-18-17 at which time he was lethargic but awoke to voice, mildly dyspneic at that time, but denied feeling very short of breath. Moved all extremities 4+/ 5 and didn't have any ptosis or diplopia on sustained up-gaze. His Mestinon ws continued at 90 mg TID and Prednisone continued at 50 mg Qday (Cellcept 1000 mg BID has been withheld since being diagnosed with focal spot of colon CA). Had recurrent GI Bleed on 4-19-17 and has become progressively obtunded. Neurology asked to revisit due to worsening confusion/ encephalopathy, ? of whether it cold be from his myasthenia gravis and whether generalized weakness is due to myasthenia. Pt doesn't open eyes to voice but does on sternal rub.  + dolls eyes bilaterally. Rapid breathing pattern (tachycardia on monitor). Has wrist restraints in place as pt said to be trying to pull IVs out earlier. Wrist restraints temporarily released. Pt moves right arm 2/ 5 and left arm 1/ 5. No spontaneous movement in legs. Doesn't withdraw legs to nailbed pressure. Reflexes: left biceps 1+, right biceps absent, left patellar absent, right patellar 1+, absent achilles bilaterally. Plantar response neutral bilaterally. Patient Vitals for the past 4 hrs:   Temp Pulse Resp BP SpO2   04/23/17 1439 - (!) 130 - - -   04/23/17 1400 - (!) 102 29 143/80 99 %   04/23/17 1300 - (!) 110 28 (!) 143/99 98 %   04/23/17 1200 99 °F (37.2 °C) (!) 101 26 165/82 99 %   04/23/17 1142 - (!) 157 30 - -   04/23/17 1100 - (!) 117 27 157/85 99 %         Impression:  Myasthenia gravis, difficult to control at baseline (currently on Mestinon 60 mg TID, Prednisone 50 mg/ day; Cellcept 1000 mg has been withheld since dx of focal colon CA). Hx A Fib, hx of recent bilateral PEs, and recent GI bleeds necessitating stopping his anticoagulation.   Obtunded on exam with 0/5 strength in lower extremities and 1-2/ 5 strength in arms, globally hyporeflexive. The myasthenia could account for his severe weakness but wouldn't make him confused. ? Stroke as a cause of confusion. Discussed checking MRI Brain with RN and Dr. Julianna Batista, but pt not medically stable enough (on diltiazem) to make it through MRI. Will check CT head when RN feels pt can tolerate the scan (tachycardic at this point). Discussed options of IVIG for MG but pt volume overloaded. Discussed Plasmapheresis but pt not medically stable enough to tolerate that (from a blood pressure/ physiologic standpoint).   Will review CT head once complete and continue to follow with you

## 2017-04-23 NOTE — PROGRESS NOTES
[address], [place], [address] [place] [address]  [phone]      Medical Progress Note      NAME: Seb Stapleton :  1945  MRM:  [id number]    Date/Time: 2017          Subjective:     Chief Complaint:  F/u PE / DVT, bowel resection    Chart/notes/labs/studies reviewed, patient examined at bedside. Remains lethargic and altered, will awake with shaking very minimally. No CP. Objective:       Vitals:        Last 24hrs VS reviewed since prior progress note. Most recent are:    Visit Vitals    /85    Pulse (!) 157    Temp 99.2 °F (37.3 °C)    Resp 30    Ht 5' 11\" (1.803 m)    Wt 131.8 kg (290 lb 9.1 oz)    SpO2 99%    BMI 40.53 kg/m2     SpO2 Readings from Last 6 Encounters:   17 99%   17 94%   17 96%   17 96%   16 97%   10/21/16 98%    O2 Flow Rate (L/min): 2 l/min       Intake/Output Summary (Last 24 hours) at 17 1356  Last data filed at 17 1055   Gross per 24 hour   Intake              480 ml   Output             1280 ml   Net             -800 ml          Exam:     Physical Exam:    Gen: obese, frail, elderly, chronically ill-appearing, in no acute distress. HEENT: Pink conjunctivae, hearing intact to voice, moist mucous membranes  Neck: Supple, without masses, thyroid non-tender  Resp: No accessory muscle use, clear breath sounds without wheezes rales or rhonchi  Card: No murmurs, tachycardic, irregularly irregular. +2 peripheral edema bilaterally  Abd: Soft, NTTP. Obese. Stoma pink. Brown ostomy output. Musc: No cyanosis or clubbing  Skin: No rashes or ulcers, skin turgor is good  Neuro: Cranial nerves are grossly intact, no focal motor weakness moving all extremities, does not follows commands consistently.    Psych: limited insight, not answering quesions        Medications Reviewed: (see below)    Lab Data Reviewed: (see below)    ______________________________________________________________________    Medications:     Current Facility-Administered Medications   Medication Dose Route Frequency    0.9% sodium chloride 1,000 mL with mvi, adult no. 4 with vit K 10 mL, thiamine 696 mg, folic acid 1 mg infusion   IntraVENous Q24H    dilTIAZem (CARDIZEM) 125 mg in dextrose 5% 125 mL infusion  0-15 mg/hr IntraVENous TITRATE    0.9% sodium chloride infusion 250 mL  250 mL IntraVENous PRN    insulin lispro (HUMALOG) injection   SubCUTAneous Q6H    sodium chloride (NS) flush 10 mL  10 mL InterCATHeter Q8H    sodium chloride (NS) flush 10 mL  10 mL InterCATHeter PRN    dilTIAZem CD (CARDIZEM CD) capsule 360 mg  360 mg Oral DAILY    piperacillin-tazobactam (ZOSYN) 3.375 g in 0.9% sodium chloride (MBP/ADV) 100 mL  3.375 g IntraVENous Q8H    0.9% sodium chloride infusion 250 mL  250 mL IntraVENous PRN    pravastatin (PRAVACHOL) tablet 40 mg  40 mg Oral QHS    labetalol (NORMODYNE;TRANDATE) injection 10 mg  10 mg IntraVENous Q6H PRN    predniSONE (DELTASONE) tablet 50 mg  50 mg Oral DAILY WITH BREAKFAST    pyridostigmine (MESTINON) tablet 90 mg  90 mg Oral TID    carvedilol (COREG) tablet 25 mg  25 mg Oral BID WITH MEALS    diphenhydrAMINE (BENADRYL) injection 25 mg  25 mg IntraVENous Q6H PRN    0.9% sodium chloride infusion 250 mL  250 mL IntraVENous PRN    sodium chloride (NS) flush 5-10 mL  5-10 mL IntraVENous Q8H    sodium chloride (NS) flush 5-10 mL  5-10 mL IntraVENous PRN    acetaminophen (TYLENOL) tablet 650 mg  650 mg Oral Q4H PRN    ondansetron (ZOFRAN) injection 4 mg  4 mg IntraVENous Q4H PRN    glucose chewable tablet 16 g  4 Tab Oral PRN    dextrose (D50W) injection syrg 12.5-25 g  12.5-25 g IntraVENous PRN    glucagon (GLUCAGEN) injection 1 mg  1 mg IntraMUSCular PRN    ascorbic acid (vitamin C) (VITAMIN C) tablet 1,000 mg  1,000 mg Oral DAILY    cholecalciferol (VITAMIN D3) tablet 5,000 Units  5,000 Units Oral DAILY    pantoprazole (PROTONIX) 40 mg in sodium chloride 0.9 % 10 mL injection  40 mg IntraVENous Q12H    0.9% sodium chloride infusion 250 mL  250 mL IntraVENous PRN            Lab Review:     Recent Labs      04/23/17   0300  04/22/17   0456  04/22/17   0034   WBC  10.9   --    --    HGB  7.8*  7.8*  7.7*   HCT  24.9*   --   25.4*   PLT  306   --    --      Recent Labs      04/23/17   0300  04/22/17   0456  04/21/17   0520   NA  138  138   --    K  3.3*  3.0*   --    CL  100  101   --    CO2  30  32   --    GLU  120*  93   --    BUN  8  6   --    CREA  0.69*  0.71  0.61*   CA  8.3*  8.3*   --    MG  1.8   --    --    PHOS  2.9   --    --    ALB  2.5*   --    --    SGOT  18   --    --    ALT  32   --    --      No components found for: GLPOC  No results for input(s): PH, PCO2, PO2, HCO3, FIO2 in the last 72 hours. No results for input(s): INR in the last 72 hours. No lab exists for component: INREXT, INREXT  No results found for: [facility]  Lab Results   Component Value Date/Time    Culture result: MRSA NOT PRESENT 04/12/2017 05:49 PM    Culture result:  04/12/2017 05:49 PM         Screening of patient nares for MRSA is for surveillance purposes and, if positive, to facilitate isolation considerations in high risk settings. It is not intended for automatic decolonization interventions per se as regimens are not sufficiently effective to warrant routine use. Culture result: ENTEROBACTER CLOACAE 09/16/2016 10:10 PM    Culture result: KLEBSIELLA OXYTOCA 09/16/2016 10:10 PM            Assessment / Plan:     74 yo WM w/ hx of pAF on Elquis, DVT/PE, MG, DM p/w GIB, found to have colon CA. Hospitalization c/b issues as below:    Acute Metabolic Encephalopathy: waxes and wanes, likely from delirium. Became obtunded on opioids since Monday and remains somnolent, likely propagated by acute/critical illness  -- hold opioids for sedation or respiratory depression  -- Infectious screen with CXR, BCx and UCx.  Remains on zosyn (though this is treating diverticulitis and will be discontinued after tomorrow)  -- CPAP QHS and during naps  -- Restraint/sitter as needed. Re-direction. -- Neuro evaluated, ?element of MG with this    GI bleed (4/10/2017) / Acute blood loss anemia: due to apparent diverticular mass / colon cancer. s/p sigmoidectomy and colostomy. -- Holding Lovenox indefinitely  -- general surgery evaluation appreciated  -- GI consulted, stomal scope shows ulceration/bleeding stoma   -- serial Hg (stable)  -- Cont IV Zosyn (diverticulitis day 13 of 14, prolonged duration due to critical illness)    Chest pain: resolved. Troponin essentially negative. EKG with TWI. Evaluated by cardiology  -- stress test when more stable    Anasarca: ?low protein state (albumin 2.2 and likely to worsen without feeding). Monitor volume status, resp status and kidney function closely. Will give lasix 20 mg IVP once (lasix naive)      Acute bilateral pulmonary embolus / LLE DVT: diagnosed last admission, s/p IVC filter  -- Per hem/onc, hold lovenox indefinitely    PAF (paroxysmal atrial fibrillation) ([facility] (9/3/2016) / accelerated HTN: Cardiology following.   -- AFib with RVR today, started back on dilt gtt while unable to take PO  -- cont digoxin, diltiazem, Coreg when taking PO reliably  -- now off anticoagulation indefinitely     Myasthenia grav[facility] with exacerbation, adult [facility] (4/4/2017)  -- cont pyridostigmine  -- appreciate neurology input.  Had considered weaning steroids but will hold off for now given continued weakness and lethargy  -- If resp compromise thought to be from MG, then would get IVIG but he is volume overloaded as it stands and this would likely worsen the situation    History of stroke (4/5/2017):   -- off Lovenox  -- on statin     Morbid obesity with BMI of 40.0-44.9, adult ([facility] (4/5/2017)  -- counseled on weight loss     Type II diabetes mellitus ([facility] (4/10/2017):   -- follow BS on SSI      Possible KWADWO: desats at night pretty consistently  -- CPAP QHS    Lung nodule:   -- will need repeat CT imaging per guidelines      Total time spent with patient:  40 minutes                 Care Plan discussed with: Patient, Nursing Staff and >50% of time spent in counseling and coordination of care    Discussed:  Care Plan    Prophylaxis:  SCD's    Disposition:  SNF/LTC           ___________________________________________________    Attending Physician: Cici Chavez, DO

## 2017-04-23 NOTE — PROGRESS NOTES
Bedside and Verbal shift change report given to Marylee London RN (oncoming nurse) by ISAAC RN (offgoing nurse). Report included the following information SBAR, Kardex and Recent Results. 0700- . Kylie Pham Blood to lab. .    1115- Family @ bedside. . Trying to wake him up. . With little stimulate HR is jumped up to 150's. .  Aware by MD Santosh Savage. .Order received. . IV diltazem started. ..    1200- pt is lethergy VS obtuded. . Only moving upper ext. Kylie Ankit WITH LITTLE MOVEMENT HR IS GOING UPTO 160-180s. . ON IV DILTAZEM. Kylie Ankit 1420- Pts RR is about 30's. . o2 nc 3l. .    9547- family is talking with MD Santosh Savage  regarding \"CODE STATUS\". ... NOW HE  IS A DNR PT. Jeff Clark is bringing all the paper work from home. 1700- 's and Bp is 140's. . Order received form ALBERTA, increased diltazem 20cc/ now. .  IF pts HR and BP gets stable pt can go to CT dept . ... Bedside and Verbal shift change report given to ISAAC RN (oncoming nurse) by Marylee London RN (offgoing nurse). Report included the following information SBAR, Kardex and Recent Results.

## 2017-04-23 NOTE — PROGRESS NOTES
SHIFT REPORT:  1900-Bedside shift change report given to Sveta Alicea RN (oncoming nurse) by Yana Phoenix (offgoing nurse). Report included the following information SBAR, Kardex, Procedure Summary, Intake/Output, Recent Results and Cardiac Rhythm. SHIFT SUMMARY:  1945 Briggs cath replaced as is possible not in proper position; bright red blood from urethra after prev briggs removed; After new Briggs inserted, initially bloody then cleared to pale yellow w little sediment. 1955- heart rate down after dumping 1000 ml urine; Cardizem rate down to 10mg/hr  2130-urine output good AT 450ml, clear  2358-Cardizem up to 15mg/hr for   0300-venous blood drawn for AM labs. 0350-Cardizem back to 20mg/hr for sustained /hr  0500-perineal and briggs care given, small amt sero-sang drainage from rectum onto blue chux; colostomy emptied for 30cc semi-liquid tan/orange stool;  0530-Dr Castellanos returned call r/t low K+ 2.6. Kylie Ankit Kylie Ankit orders received. 0620-First of 4 runs of KCL up to run over 1 hour.; [place] wrist restraints unlatched at present as pt not moving around but very little. Will watch carefully. END OF SHIFT REPORT:  0700-Bedside shift change report given to Marylee London, RN (oncoming nurse) by Sveta Alicea RN (offgoing nurse). Report included the following information SBAR, Kardex, Procedure Summary, Intake/Output, Recent Results and Cardiac Rhythm .

## 2017-04-24 PROBLEM — J96.00 ACUTE RESPIRATORY FAILURE (HCC): Status: ACTIVE | Noted: 2017-01-01

## 2017-04-24 NOTE — PROGRESS NOTES
Alvino Keita nae [place] 81  [address] [facility], [address] [place] [address]  [phone]      Medical Progress Note      NAME: Mitra Ventura. :  1945  MRM:  [id number]    Date/Time: 2017          Subjective:     Chief Complaint:  F/u PE / DVT, bowel resection    Chart/notes/labs/studies reviewed, patient examined at bedside. Breathing status is much worse. Essentially unresponsive. Spoke at length with sister last PM and this AM. Pursuing comfort measures         Objective:       Vitals:        Last 24hrs VS reviewed since prior progress note. Most recent are:    Visit Vitals    /84 (BP 1 Location: Right arm, BP Patient Position: At rest)    Pulse (!) 124    Temp (!) 100.9 °F (38.3 °C)    Resp (!) 35    Ht 5' 11\" (1.803 m)    Wt 131.8 kg (290 lb 9.1 oz)    SpO2 90%    BMI 40.53 kg/m2     SpO2 Readings from Last 6 Encounters:   17 90%   17 94%   17 96%   17 96%   16 97%   10/21/16 98%    O2 Flow Rate (L/min): 4 l/min       Intake/Output Summary (Last 24 hours) at 17 0835  Last data filed at 17 0507   Gross per 24 hour   Intake             1070 ml   Output             3760 ml   Net            -2690 ml          Exam:     Physical Exam:    Gen: obese, frail, elderly, chronically ill-appearing, in severe distress  HEENT: CPAP in place, eyes open, not tracking  Neck: Supple, without masses  Resp: ++++ accessory muscle use, coarse breath sounds throughout  Card: No murmurs, tachycardic, irregularly irregular. +4 peripheral edema bilaterally  Abd: Soft, NTTP. Obese. Stoma pink. Brown ostomy output. Musc: No cyanosis or clubbing  Skin: No rashes or ulcers, skin turgor is good  Neuro: Cranial nerves are grossly intact, moving all extremities, does not follows commands.    Psych: No insight, not answering quesions        Medications Reviewed: (see below)    Lab Data Reviewed: (see below)    ______________________________________________________________________    Medications:     Current Facility-Administered Medications   Medication Dose Route Frequency    bumetanide (BUMEX) injection 1 mg  1 mg IntraVENous ONCE    ondansetron (ZOFRAN ODT) tablet 4 mg  4 mg Oral Q6H PRN    LORazepam (ATIVAN) injection 1 mg  1 mg IntraVENous Q15MIN PRN    ketorolac (TORADOL) injection 30 mg  30 mg IntraVENous Q8H PRN    glycopyrrolate (ROBINUL) injection 0.2 mg  0.2 mg IntraVENous Q4H PRN    morphine injection 2 mg  2 mg IntraVENous Q15MIN PRN    albuterol (PROVENTIL VENTOLIN) nebulizer solution 2.5 mg  2.5 mg Nebulization Q2H PRN    pantoprazole (PROTONIX) 40 mg in sodium chloride 0.9 % 10 mL injection  40 mg IntraVENous Q12H            Lab Review:     Recent Labs      04/24/17   0245 04/23/17   0300  04/22/17 0456  04/22/17   0034   WBC  12.4*  10.9   --    --    HGB  8.5*  7.8*  7.8*  7.7*   HCT  27.1*  24.9*   --   25.4*   PLT  323  306   --    --      Recent Labs      04/24/17   0245 04/23/17   0300  04/22/17   0456   NA  142  138  138   K  2.6*  3.3*  3.0*   CL  103  100  101   CO2  28  30  32   GLU  128*  120*  93   BUN  8  8  6   CREA  0.66*  0.69*  0.71   CA  8.4*  8.3*  8.3*   MG  1.9  1.8   --    PHOS  3.0  2.9   --    ALB   --   2.5*   --    SGOT   --   18   --    ALT   --   32   --      No components found for: GLPOC  No results for input(s): PH, PCO2, PO2, HCO3, FIO2 in the last 72 hours. No results for input(s): INR in the last 72 hours. No lab exists for component: INREXT, INREXT  No results found for: SDES  Lab Results   Component Value Date/Time    Culture result: NO GROWTH AFTER 22 HOURS 04/23/2017 08:55 AM    Culture result: MRSA NOT PRESENT 04/12/2017 05:49 PM    Culture result:  04/12/2017 05:49 PM         Screening of patient nares for MRSA is for surveillance purposes and, if positive, to facilitate isolation considerations in high risk settings.  It is not intended for automatic decolonization interventions per se as regimens are not sufficiently effective to warrant routine use. Assessment / Plan:     74 yo WM w/ hx of pAF on [place], DVT/PE, MG, DM p/w GIB, found to have colon CA. Hospitalization c/b issues as below:    Acute Metabolic Encephalopathy / GI bleed / Acute blood loss anemia / diverticular mass / colon cancer s/p sigmoidectomy and colostomy / Anasarca / Acute bilateral pulmonary embolus / LLE DVT / PAF (paroxysmal atrial fibrillation) with rapid ventricular rate / accelerated HTN / Myasthenia gravis with exacerbation, adult form / History of stroke / Morbid obesity with BMI of 40.0-44.9, adult / Type II diabetes mellitus  -- Patient critically ill  -- After speaking with sister (POA), she has made the decision to transfer to comfort care measures, which is certainly reasonable. -- Palliaitve care/Hospice consult.  Patient would certainly need inpatient hospice      Total time spent with patient:  40 minutes                 Care Plan discussed with: Patient, Family, Consultants, Nursing Staff and >50% of time spent in counseling and coordination of care    Discussed:  Code Status, Care Plan and D/C Planning    Prophylaxis:  SCD's    Disposition:  Hospice           ___________________________________________________    Attending Physician: Deven Mota DO

## 2017-04-24 NOTE — PROGRESS NOTES
Responded to notification of pt's death. Several of his family members present. Some were with pt at time of death. They take solace in being with him at the time of death and that he  peacefully, without pain. Also, their ba gives them hope that the pt is with God. Information about [facility] made available.    Chaplain Ayala MDiv, MS, [facility]  [phone]

## 2017-04-24 NOTE — HOSPICE
[address] [facility] Admission Note: Mr Theodore Snow is a 73year old  man who has been declining steadily since about 2016. Patient is admitted today to [facility] level of care for Respiratory distress. He is unresponsive and breathing is quite labored. His caregiver and MPOA is his sister, Loyda Musa (#[phone]). Patient has lived with her since 2016 after a hospital stay as he was unable to live in his own home alone and Fernando Aleman indicated that she would be his caregiver. Fernando Aleman is realistic and practical. She has been with others at EOL. Her   4 years ago at home with hospice services. She also made the decision that patient would have no quality of life in this state and she chose comfort measures for her brother. There is another sister, aTra Langford who is coming today to see patient. There is another brother who lives near the [facility]. Patient originally had 6 siblings. Two have , one in childbirth. Fernando Aleman has good support. There was a nephew who came by to see patient briefly today and other family members are coming by today. She was encouraged to take care of herself (\"I got up at 2:30 am and couldn't fall back asleep\"), she has been eating well. Patient was a \"book keeper\" per Fernando Aleman. His nephew who came in today noted that patient \"taught me all the math I know\". Apparently patient also had a photographic memory. He and Fernando Aleman had talked about his wishes if he got sick. Patient has two adult children but does not have a relationship with them. His son lives with his ex wife Tatiana Villanueva had a nasty divorce\" per Fernando Aleman) and she last tried to contact this son about 9 years ago. The son noted that he did not want to speak to his father and they have not had a relationship. A daughter (another marriage) lives with her mother and had a stroke at the age of 2, she doesn't drive, but is mentally capable of understanding patient's condition.  Fernando Aleman has spoken to her about patient. Digna Dey noted she did not get much of a response from the daughter regarding coming to see patient. Patient is [facility] and belongs to [facility] in [place], [place].  arrangements have been made via [facility] on [employer]. [employer] [facility]  [phone]    Social Work Admission Note  Patient Name: Leland Bello YOB: 1945  Age: 73 y.o. Date of Visit: 17  Facility of Care: [address] [place] [address]  Patient Room: Count includes the [facility]/     Hospice Attending: Analisa Jones MD  Hospice Diagnosis: resp failure  Acute respiratory failure ([facility] [address]    Level of Care:    [x]  GIP    []  Respite   []  Routine    NARRATIVE   See above    ADVANCE CARE PLANNING    Code Status: DNR  Durable DNR: _ Yes  X_ No  Advance Care Planning 2017   Patient's Healthcare Decision Maker is: Legal Next of Kin   Confirm Advance Directive None   Patient Would Like to Complete Advance Directive -     MPOA is sister Jaswant Ma. Patient has lived with her since 10/2016. Relationship Status:  []  Single     []        [x]      []  Domestic Partner     []  /  []  Common Law  []    []  Unknown    If in a relationship, name of partner/spouse:  Duration of relationship:    Restorationist: UNKNOWN : Caodaism : [facility] in [place], [place]. Patient has a home in [facility] [address]: [facility] Provided:  None at this time. Social Work Initial Assessment     Gender:  male    Race/Ethnicity: (amado all that apply)  []  American [place] [facility]) or [place] Native  []    []  Black or [place] American  []   or   []   or [place]  [x]  Nasrin Garg  []  Unknown      Service:    []  Yes   []  No       [x]  Unknown  Appropriate for Pinning Ceremony:   []  Yes      []  No  Is patient using VA benefits?    []  Yes      []  No     Primary Language: English  []  Needed  []   utilized during visit    Ability to express thoughts/needs/feelings  []  Expressed thoughts/feelings/needs without difficulty  []  Requires extra time and cuing  []  Speech limited single words  []  Uses only gestures (eye, blinking eye or head movement/pointing)  []  Unable to express thoughts/feelings/needs (speech unintelligible or inappropriate)  [x]  Unresponsive  Notes:      Mental Status:  []  Alert-oriented to:     []  Person     []  Place     []  Time  [x]  Comatose-responds to:    []   Verbal stimuli    []  Tactile stimuli    []  Painful stimuli  []  Forgetful  []  Disoriented/Confused  []  Lethargic  []  Agitated  []  Other (specify):    Notes:  Shallow breathing     Patients description of Illness/Current Health Status:    []  Patient unable to discuss  []  Patient unwilling to discuss  []  (Specify)        Knowledge/Understanding of Disease Process  Patient:    []  Demonstrates knowledge/understanding of disease process   []  Demonstrates knowledge/understanding of treatment plan   []  Demonstrates knowledge/understanding of prognosis   []  Demonstrates acceptance of prognosis   []  Demonstrates knowledge/understanding of resuscitation status   []  Other (specify)  Caregiver:   [x]  Demonstrates knowledge/understanding of disease process   [x]  Demonstrates knowledge/understanding of treatment plan   [x]  Demonstrates knowledge/understanding of prognosis   [x]  Demonstrates acceptance of prognosis   [x]  Demonstrates knowledge/understanding of resuscitation status   []  Other (specify)  Notes:      Patients living arrangement/care setting:  Use the PRIOR COLUMN when the PATIENTS current health status necessitated a change in his/her primary residence.     Prior Current Response              []             []    Patients own home/residence              [x]             []    Home of family member/friend              []             []    Boarding home              [] []    Assisted living facility/senior care center              []             []    Hospital/Acute care facility              []             []    Skilled nursing facility              []             []    Long term care facility/Nursing home              []             [x]    Hospice in Patient      Primary Caregiver:  []  No Primary Caregiver  Name of Primary Caregiver: [employer]  Relationship or Primary Caregiver:    []  Spouse/Significant other       []  Natural Child        []  Step child       [x]  Sibling   []  Parent   []  Friend/Neighbor   []  Community/Latter-day Volunteer   []  Paid help   []  Other (specify):___________  Notes:  Caring, supportive sister. Patient has lived with her since 10/2016   Family members/Significant others:  Name: Belem Govea  Relationship:Sister  Phone Number: [phone] C / [phone] H  Actively involved in care? [x]  Yes  []  No    Name: Wendy Adam  Relationship: Sister  Phone Number: [phone] T  Actively involved in care? [x]  Yes  []  No   Is planning to visit today. There is another brother in [place]. A nephew came in to visit today.     Social support systems: (select ONE best description)  [x]  Excellent social support system which includes three or more family members or friends  []  Good social support system which includes two or less members or friends  []  [address] support which includes one family member or friend  []  Poor social support; no family members or friends; basically ALONE  Notes: Christiane Duarte planning to have other family coming in to see her today.      Emotional Status: (amado all that apply)    Patient Caregiver Response                 []                [x]    Mood/Affect stable and appropriate                   []                []    Angry                 []                []    Anxious                 []                []    Apprehensive                 []                []    Avoidant                 []                [] Clinging                 []                []    Depressed                 []                []    Distraught                 []                []    Elated                 []                []    Euphoric                 []                []    Fearful                 []                []    Flat Affect                 []                []    Helpless                 []                []    Hostile                 []                []    Impulsive                 []                []    Irritable                 []                []    Labile                 []                []    Manic                 []                []    Restlessness                 []                []    Sad                 []                []    Suspicious                 []                []    Tearful                 []                []    Withdrawn     Notes: Nothing to note. Georgie Roman is stoic and realistic. Coping Skills (strengths/weakness):    Patient: Coping Skills (strength/weakness):  Unresponsive   Family/caregiver (strength/weakness): Realistic and accepting     [place] of care (amado all that apply):     [x]  No burden evident   []  Family must administer medications   []  Illness causing financial strain   []  Family/Support feels overwhelmed   []  Family/Support sleep disturbed with patients care   []  Patients care causes extra physical stress  of death  []  Illness causes changes in family lifestyle  []  Illness impacting family/support employment  []  Family experiencing increased time demands  []  Patients behavior endangers family  []  Denial of patients illness  []  Concern over outcome of illness/fear  []  Patients behavior embarrassing to family   Notes: Georgie Roman glad that patient can be here for care.     Risk Factors: (amado all that apply):    [x]  No burden evident   []  Alcohol abuse  []  Financial resources inadequate to meet basic needs (food/house/etc)  []  Financial resources inadequate to meet health care needs (supplies/equipment/medications)  []  Food/nutrition resources inadequate  []  Home environment unsafe/inadequate for home care  []  Homicidal risk  []  Lives alone or without concerned relatives  []  Multiple medications/complex schedule  []  Physical limitations increase likelihood of falls  []  Plan of care/treatments complicated  []  Substance use/abuse  []  Suicidal risk  []  Visual impairment threatens safety/ability to perform self-care  []  Other (specify):     Abuse/Neglect (actual/potential risks):  [x]  No signs of abuse/neglect  []  History of abuse/neglect                 []  [id number]          []  Sexual  []  History of domestic violence  []  Lacks adequate physical care  []  Lacks emotional nurturing/support  []  Lacks appropriate stimulation/cognitive experiences  []  Left alone inappropriately  []  Lacks necessary supervision  []  Inadequate or delayed medical care  []  Unsafe environment (i.e guns/drug use/history of violence in the home/etc.)  []  Bruising or other physical signs of injury present  []  Other (specify):  Notes:   []  Refer to child/adult protective services      Current Sources of Stress (in Addition to Current Illness):   [x]  None reported  []  Bills/Debt    []  Career/Job change    []   (short term)    []   (long term)    []  Death of a child (recent)    []  Death of a parent (recent)   []  Death of a spouse (recent)   []  Employment status changed   []  Family discord    []  Financial loss/Inadequate inther (specify):come  []  Job loss  []  Legal issues unresolved  []  Lifestyle change  []  Marital discord  []  Marriage within the last year  []  Paperwork (insurance/legal/etc) overwhelming  []  Separation/Divorce  []  Other (specify):  Notes:      Current [employer] Being Utilized     1. None at this time. Interventions/Plan of Care     1. Assess social and emotional factors related to coping with end of life issues  2.  Community resource planning/referral   3. Relocation to different care setting if/when symptoms stabilize  4. Discharge Planning     1. Likely patient will not be discharged as he is close to EOL. Hospice Bereavement Assessment     4/24/2017    Bereaved Name: Too Desai  Address: [address] [place] [address]. Viola Regalado  Phone: [phone] H.   Bereaved Date of Birth  Bereaved Gender:  [x]  Female  []  Male  Date Assessment Completed: 04/24/17    Relationship to the Patient:  []  Spouse/Significant other    []  Parent  []  Natural child      []  Step child   []  friend/Neighbor  [x]  Sibling  []  Other (specify):     Primary Caregiver  [x]  Yes    []  No     Social Support Systems  [x]  Excellent social support system which includes three or more willing family members or friends  []  Good social support system which includes two or less willing family members or friends  []  [address] support which includes one willing family member or friend  []  Poor social support; no willing family members or friends; basically ALONE     Frequency of Contact/Communication with Family and Friends  [x]  Daily  []  Three or more times a week  []  One to two times a week  []  Two to three times per month  []  At least monthly  []  Less often than monthly     Community Support Groups/Counseling Services Currently Used:   [x]  None  []  Bereavement support group   Specify support group:   []  Behavioral Support group   Specify support group:   []  Cancer support group    Specify support group:   []  Mental health support/counseling  Specify support group:   []  Other (specify)     Sources of Stress/Grief in Addition to Patients Current illness or Death:    []  None reported  []  Bills/Debt    []  Career/Job change     []   (short term)  []   (long term)     []  Death of child      []  Death of parent    [x]  Death of spouse   []  Employment status changed     []  Family discord     []  Financial      [] Financial loss/Inadequate income  []  Job loss  []  Lifestyle change  []  Marital discord  []  Marriage within the last year  []  Separation/Divorce. []  Legal issues unresolved  []  Paperwork (insurance/legal/etc.) overwhelming  []  Personal illness/injury  [x]  Other (specify): Her   at home with hospice about 4 years ago. She has been practical, realistic and made decision for hospice for patient. \"There is no quality of life like this\". Stress Level Reported   [] 1      [x] 2      [] 3      [] 4      [] 5     [] 6      [] 7      [] 8      [] 9      [] 10  []  No Stress         []  Maximum Stress     Support Notes: Several relatives visiting today.  Has good support and has 4 children.     Emotional Behavior  Emotional Status:    [x]  NO SIGNIFICANT FINDINGS  []  Agitation/Restless      []  Angry       []  Anxious     []  Avoidant       []  Bereavement /loss issues     []  Clinging     []  Depressed       []  Distraught  []  Euphoric   []  Fearful   []  Flat affect  []  Helpless  []  Hostile   []  Irritable  []  Labile  []  Potential suicide risk  []  Sad  []  Strained family/social relationships  []  Suspicious  []  Tearful  []  Withdrawn         Coping of Caregiver: Check coping mechanisms observed during assessment  []  Confrontive coping (describes aggressive efforts to alter the situation and suggests some degree of hostility and risk taking)  []  Distancing (describes cognitive efforts to detach oneself and to minimize the significance of the situation)  []  Self-Controlling 9describes efforts to regulate ones own feelings)  []  Social Support (describes efforts to seek informational support, tangible support and emotional support)  [x]  Accepting (acknowledges ones own role an doing inner work that promotes personal peace)  []  Resignation  surrender (to accept no longer fight, to make peace w/circumstances)  []  Escape - Avoidance- Denial (describes wishful thinking and behavioral efforts to escape or avoid)  []  Planful Problem Solving (describes deliberates [id number] efforts to alter the situation)  []  Positive Reappraisal (describes efforts to create positive meaning by focusing on personal growth. It also has a Tenriism dimension)  []  Other:     Significant behavior changed noted:   [x]  None  []  Alcohol use/abuse  []  Arrest or problems with law enforcement  []  Emotional lability  []  Increased incidence of physical illness/symptoms  []  Loss of interest in activities  []  School performance affected   []  Sleeping patters/habits altered  []  Substance/Drug use and/or abuse  []  Smoking (underage or excessive)  []  Truancy  []  Other:      Emotional/Behavior Notes: None to note. []  Suicidal Ideation Expressed/Discussed  : None to note  []  Homicidal Ideation Expressed/Discussed : None to note         Coping skills (strengths/weaknesses): Realistic , ba, family.      Support Services Needed/Referrals Required: Reinier Jones all that apply)     Suggested Resources  []         []  Financial management/counseling  []  Final arrangements/ planning  []  Food/Nutrition resources  []  Home maintenance/repairs/ services  []  Homemaker services  []  Income/Medical coverage assistance  []  Legal Assistance   []  Mental health referral   []  Protective services  []  Relocation to different care setting  []  Transportation   []  Other:             Bereavement Follow-Up Plan: Regular mailings. Knows hospice.  Her  had hospice at home 4 years ago.          Financial  [x]  Independent: Manages financial affairs without assistance  []  Minimal Assistance: Needs prompting/reminders to pay bills/make deposits/cash checks or manage accounts  []  Moderate Assistance: Needs supervision of all financial tasks  []  Total Assistance: Unable to manage her/his own financial affairs  []  Unknown  Notes     Survivor Risk Assessment Summary (Actual or Potential Risks to the Bereavement Process):     []  Abuse or history of abuse observed or reported within family system  []  Concurrent stressful events or situations observed or reported  []  Dependent children reside within household  []  Family discord exhibited/described  []  Feelings of guilt expressed by family members  []  Financial status significantly affected by illness/death  []  Marital discord exhibited/described  []  Neglect observed or reported within the family system  []  Patient is a single-parent with dependent children  []  Psychiatric illness (or history) reported  []  Social Support systems limited  []  Spiritual distress observed or reported  []  Survivor frail/elderly/dependent  []  Survivor showing emotional and/or physical signs of stress/distress  []  Other (specify):  Notes: None     Cultural Perspective Regarding Death:    []  Yes    [x]  No  Cultural Notes : Patient is Synagogue. His Lutheran is [address]     Bereavement Assessment:     [x]  LOW RISK Indications:    [x]  normal grief   [x]  good support    [x]  open expression      []  MODERATE RISK Indications:    []  grief normal but severe    []  depression (taking meds. professional help)   []  somatic distress   []  low support    []  Hx of difficulty w/ loss    []  unresolved conflict w/ the .  Elise  []  HIGH RISK Indications:    []  Hx mental illness    []  Suicidal ideation    []  Depression (no meds.  or prof. help)   []  Somatic distress   []  Excessive guilt or anger    []  Multiple losses    []  unresolved losses , other life crises.        MSW Assessment Completed by: Romain Bass LCSW  17    Time In:10:45 am       Time Out: 11:40 am

## 2017-04-24 NOTE — PROGRESS NOTES
I met with Mr. Fae Canavan' sister and another family member. Mr. Fae Canavan was minimally responsive. Mr. Fae Canavan spent most of his life in [place]. He was a member of a [employer]. In Oct. Of 2016, he moved to live with his sister. He received spiritual and emotional support from the Confucianism of his sister's Hindu. During my visit, I validated Mr. Fae Canavan' sisters frustration and grief. I also explored ways in which she could still find meaning and purpose. She mentioned the importance of prayer especially in difficult situations. I affirmed her belief in God's presence and mercy in the midst of uncertainty. I also provided support to the family friend whose grief was compounded by her daughter's personal problems. I concluded the visit with a prayer for the family and patient. I encouraged the family to contact the staff for additional spiritual and emotional support.

## 2017-04-24 NOTE — PROGRESS NOTES
Brief Progress Note    I was asked to pronounce pt. Several family members at bedside. No response to tactile stimuli. Pupils are fixed. No observed chest movement. No palpable pulse. No heartbeat or breath sound audible for 1 minute. Time of passing is 1755.       Angus Penny MD  Palliative Care Team

## 2017-04-24 NOTE — PROGRESS NOTES
General Surgery    Weekend events noted. Spoke with sister and she has decided to transition to comfort care and hospice. Will sign off.

## 2017-04-24 NOTE — PROGRESS NOTES
4/24/2017  4:12 PM  EMR reviewed, CM met w/ Pt's sister, Juan Pablo Hernandez) [phone], she related Pt has been staying in her home since October 2016,  And has been hospitalized at [facility] most of April 2017 w/ declining health. She has  made the decision to begin Hospice care. I related CM is her to support in any way.   Reuel Sever  Case Management

## 2017-04-24 NOTE — H&P
[facility]  [phone]    Patient Name: Bruno Sarmiento. YOB: 1945    Date of Provider Hospice Visit: 04/24/17    Level of Care:   [x] General Inpatient (GIP)    [] Routine   [] Respite    Location of Care:  [] [facility] [place] [x] [facility] [] [facility] [] [facility] [] [facility]ospice [facility]  [] Home [] Other:      Date of Original Hospice Admission: 4/24/17  Hospice Attending: Gissell Medrano Hospice Diagnosis: Acute respiratory failure  Diagnoses RELATED to the terminal prognosis: Myasthenia gravis, PE/DVT, LGI bleed with sigmoidectomy, colon cancer, PAF, AMS, morbid obesity  Other Diagnoses: HTN     HOSPICE NARRATIVE COMPOSED BY PHYSICIAN   Rationale for a prognosis of life expectancy of 6 months or less if the disease follows its normal course:    Anette Mcnair. is a 73y.o. year old who was admitted to [facility]. The patient's principle diagnosis of GI bleed led to sigmoidectomy  has resulted in respiratory failure/myasthenia gravis and rapid decline. Functionally, the patient's Palliative Performance Scale has declined over a period of weeks and is estimated at 10 Objective information that support this patients limited prognosis includes: Patient was admitted to hospital on 4/10/17, one day after being discharged for PE/DVT. Patient readmitted with LGI bleed and despite multiple attempts to manage conservatively, he had to undergo sigmoid colectomy on 4/11/17. Patient with multiple issues postop to include respiratory failure, multiple transfers to ICU, likely the result of his MG. For about 1 week, he has been minimally responsive and Maggie(sister) elected to proceed with Hospice care. Patient still with respiratory distress as weaned off CPAP The patient/family chose comfort measures with the support of Hospice. HOSPICE DIAGNOSES   Active Symptoms:  1. SOB  2. Agitation  3. Respiratory failure  4. Secretions     PLAN   1.  Patient admitted GIP status secondary to frequent nursing interventions and respiratory distress  2. Dyspnea-at first we attempted morphine PCA and had escalated the dose to 5 mg/hr basal and 2 mg every 6 minute bolus but still with rapid breathing/respiratory distress so changed to dilaudid PCA with 0.5 mg bolus every 6 minutes and basal of 1.5 mg basal. Will need to reassess frequently. 3. Agitation-ativan has been adjusted throughout the day and is now 2 mg IV every 2 hours  4. Comfort meds for secretions, fever. 5. Discussion had with patient's sister at bedside, patient's bedside nurse(Kristina), and Moses Ortiz. 6.  and SW to support family needs  7. Disposition: home if stabilizes    Prognosis estimated based on 04/24/17 clinical assessment is:   [x] Few to Many Hours  [] Few to Many Days    [] Few to Many Weeks    [] Few to Many Months    Communicated plan of care with: Hospice Case Manager;  Hospice IDT; Care Team     GOALS OF CARE     Resuscitation Status: DNR  Durable DNR: [x] Yes [] No    Advance Care Planning 4/12/2017   Patient's Healthcare Decision Maker is: Legal Next of Kin   Confirm Advance Directive None   Patient Would Like to Complete Advance Directive -        HISTORY     History obtained from: chart, family, Dr. Aleena Perez: bleeding  The patient is:   [] Verbal  [] Nonverbal  [x] Unresponsive    HPI/SUBJECTIVE:  Patient is unresponsive but labored breathing noted       REVIEW OF SYSTEMS     The following systems were: [] reviewed  [x] unable to be reviewed    Positive ROS include:  Constitutional:  Ears/nose/mouth/throat:  Respiratory:  Gastrointestinal:  Musculoskeletal:  Neurologic:  Psychiatric:  Endocrine:     Adult Non-Verbal Pain Assessment Score: 6/10    Face  [] 0   No particular expression or smile  [x] 1   Occasional grimace, tearing, frowning, wrinkled forehead  [] 2   Frequent grimace, tearing, frowning, wrinkled forehead    Activity (movement)  [] 0   Lying quietly, normal position  [] 1   Seeking attention through movement or slow, cautious movement  [x] 2   Restless, excessive activity and/or withdrawal reflexes    Guarding  [] 0   Lying quietly, no positioning of hands over areas of body  [x] 1   Splinting areas of the body, tense  [] 2   Rigid, stiff    Physiology (vital signs)  [x] 0   Stable vital signs  [] 1   Change in any of the following: SBP > 20mm Hg; HR > 20/minute  [] 2   Change in any of the following: SBP > 30mm Hg; HR > 25/minute    Respiratory  [] 0   Baseline RR/SpO2, compliant with ventilator  [] 1   RR > 10 above baseline, or 5% drop SpO2, mild asynchrony with ventilator  [x] 2   RR > 20 above baseline, or 10% drop SpO2, asynchrony with ventilator     FUNCTIONAL ASSESSMENT     Palliative Performance Scale (PPS):10     PSYCHOSOCIAL/SPIRITUAL ASSESSMENT     Active Problems:    Acute respiratory failure (HCC) (4/24/2017)      Past Medical History:   Diagnosis Date    Anxiety     Arthritis     Asthma     Atrial fibrillation (HCC)     BPH (benign prostatic hypertrophy) 4/19/2017    Has seen Urology in past and tried Flowmax without success    Diabetes ([address]     HTN (hypertension) with goal to be determined     Morbid obesity with BMI of 40.0-44.9, adult ([address] 4/5/2017    Myasthenia gravis ([address]     Pulmonary emboli ([address]     with bilateral DVT's       Past Surgical History:   Procedure Laterality Date    COLONOSCOPY N/A 4/21/2017    COLONOSCOPY (via stoma) performed by Richard Oreilly MD at [facility] HX ORTHOPAEDIC      HX OTHER SURGICAL      L sided neck suregry \" infection\"      Social History   Substance Use Topics    Smoking status: Never Smoker    Smokeless tobacco: Not on file    Alcohol use No     Family History   Problem Relation Age of Onset    Diabetes Mother     Hypertension Mother     Deep Vein Thrombosis Mother     Deep Vein Thrombosis Sister       Allergies   Allergen Reactions    Lactose Itching    Fructose Other (comments)     States added sugar to foods causes sore throat/ear ache.     Inderal [Propranolol] Unknown (comments)    Lisinopril Angioedema    Gluten Nausea Only      Current Facility-Administered Medications   Medication Dose Route Frequency    LORazepam (ATIVAN) injection 1 mg  1 mg IntraVENous Q15MIN PRN    acetaminophen (TYLENOL) suppository 650 mg  650 mg Rectal Q4H PRN    scopolamine (TRANSDERM-SCOP) 1.5 mg  1.5 mg TransDERmal Q72H PRN    glycopyrrolate (ROBINUL) injection 0.2 mg  0.2 mg IntraVENous Q4H PRN    bisacodyl (DULCOLAX) suppository 10 mg  10 mg Rectal DAILY PRN    morphine injection 2 mg  2 mg IntraVENous Q15MIN PRN    LORazepam (ATIVAN) injection 2 mg  2 mg IntraVENous Q2H    HYDROmorphone (PF) 15 mg/30 ml (DILAUDID) PCA   IntraVENous CONTINUOUS        PHYSICAL EXAM     Wt Readings from Last 3 Encounters:   04/23/17 131.8 kg (290 lb 9.1 oz)   04/09/17 140 kg (308 lb 9.6 oz)   04/04/17 137.7 kg (303 lb 9.6 oz)       Visit Vitals    /82 (BP 1 Location: Right arm, BP Patient Position: At rest)    Pulse 100    Temp 98.9 °F (37.2 °C)    Resp 22    SpO2 97%       Supplemental O2  [x] Yes  [] NO  Last bowel movement:     Currently this patient has:  [] Peripheral IV [] PICC  [] PORT [] ICD    [x] Patten Catheter [] NG Tube   [] PEG Tube    [] Rectal Tube [] Drain  [x] Other:  Central line and ostomy    Constitutional: obese, ill appearing, unresponsive  Eyes: sunken  ENMT: nasal cannula in place  Cardiovascular: tachycardia  Respiratory: moderate-severe respiratory distress, decreased at bases  Gastrointestinal: soft, ostomy C/D/I  Musculoskeletal:muslce wasting  Skin: warm, dry  Neurologic:unresponsive       Pertinent Lab and or Imaging Tests:  Lab Results   Component Value Date/Time    Sodium 142 04/24/2017 02:45 AM    Potassium 2.6 04/24/2017 02:45 AM    Chloride 103 04/24/2017 02:45 AM    CO2 28 04/24/2017 02:45 AM    Anion gap 11 04/24/2017 02:45 AM    Glucose 128 04/24/2017 02:45 AM    BUN 8 04/24/2017 02:45 AM    Creatinine 0.66 04/24/2017 02:45 AM    BUN/Creatinine ratio 12 04/24/2017 02:45 AM    GFR est AA >60 04/24/2017 02:45 AM    GFR est non-AA >60 04/24/2017 02:45 AM    Calcium 8.4 04/24/2017 02:45 AM     Lab Results   Component Value Date/Time    Protein, total 5.3 04/23/2017 03:00 AM    Albumin 2.5 04/23/2017 03:00 AM           Total time: 70  Counseling / coordination time:   > 50% counseling / coordination?:     This patient meets Hospice General Inpatient (GIP) Level of Care.     The precipitating event that resulted in the need for GIP was: gi bleed and respiratory failure    The interventions tried that have been unsuccessful at controlling symptoms include: patient has been on TPN, CPAP, IVF, IV abx since 4/10/17 without improvement    Supporting documentation for GIP need for pain control:  [x] Frequent evaluation by a doctor, nurse practitioner, nurse   [x] Frequent medication adjustment    [x] IVs that cannot be administered at home   [] Aggressive pain management   [] Complicated technical delivery of medications              Supporting documentation for GIP need for symptom control:  []  Sudden decline necessitating intensive nursing intervention  []  Uncontrolled / intractable nausea or vomiting   []  Pathological fractures  []  Advanced open wounds requiring frequent skilled care  [x] Unmanageable respiratory distress  [] New or worsening delirium   [] Delirium with behavior issues  [] Imminent death  with skilled nursing needs documented above

## 2017-04-24 NOTE — HOSPICE
[facility]  [phone]      Patient Name Isaac Champagne.    Date of Admission 4/10/2017    1945   Discharge Order Received From Julia Santiago Diagnosis Discussed with Patient and Approved by Hospice Attending Respiratory Failure   Attending Provider Elected by Patient Blair Pepper   Attending Providers Agrees to Service as Attending and Admission Orders Received Yes   Level of Care GIP   GIP Symptoms if applicable Respiratory distress   DNR/DDNR DNR               Primary Caregiver or MPOA Kamran Spencer/Sister/MPOA

## 2017-04-24 NOTE — PROGRESS NOTES
Cardiology Progress Note  [facility]  NAME:  George Langston. :   1945   MRN:   [id number]     Assessment/Plan: Critically ill    Persistent afib       - IV dilt due to unable to take po       - no anticoagulation risk >>> benefit give bleeding  HTN-      - coreg, cardizem   Acute resp failure: on BiPap, bumex 1 mg IV now   Anasarca  Encephalopathy  Anemia/ recurrent bleed   Lower GI bleed/inferior mesenteric artery bleed s/p coils/s/p exp lap w/ sigmoid colon resection, colostomy, ? Mass adjacent to sigmoid colon  Hx PE/DVT - s/p IVC filter    Prognosis poor. Per sister transitioning to comfort care this am. Will sign off. pls call if needed. Subjective:   George Kaur is a 73y.o. year old male w/ hx:  AFib  cx by T2DM, Myasthenia gravis, morbid obesity, recent PE  discharged 17  from [facility] for PE/a fib. He presented today with chief complain of rectal bleeding. Per sister, the pt this morning began experiencing several episodes of bright red rectal bleeding, prompting the pt to be brought to the ED. Became weak and dizzy at home.        LABS:  hgb 13.1 , trop .04     : s/p expl lap, sigmoid colectomy, colostomy, IVC filter     4/10 LED (+) DVT     Overnight activities reviewed:    - obtunded              - unable to take po              - K 2.6          Cardiac ROS: Obtunded. Review of Systems: obtunded    CARDIAC EVALUATION   17: ECHO- LVEF 60% No WMA.  Mild MR, Mild PaHTN  2016: ECHO- EF 65%, no WMA, mild TR, RVSP 41 mmHg        Objective:     Visit Vitals    /84 (BP 1 Location: Right arm, BP Patient Position: At rest)    Pulse (!) 124    Temp (!) 100.9 °F (38.3 °C)    Resp (!) 35    Ht 5' 11\" (1.803 m)    Wt 290 lb 9.1 oz (131.8 kg)    SpO2 90%    BMI 40.53 kg/m2      O2 Flow Rate (L/min): 4 l/min O2 Device: CPAP mask    Temp (24hrs), Av.6 °F (37.6 °C), Min:98 °F (36.7 °C), Max:101.4 °F (38.6 °C)        Intake/Output Summary (Last 24 hours) at 04/24/17 8501  Last data filed at 04/24/17 0507   Gross per 24 hour   Intake             1100 ml   Output             3790 ml   Net            -2690 ml       TELE: afib rate 100-120     General: obtunded  HEENT: Atraumatic. Pale  and moist.  Sclera jaundiced  Neck: Supple  Lungs: diminished, tachypneic, using accessory muscles to breathe. Heart: PMI:diminished, Irregular   rhythm, no murmur, no S3, no rubs, no gallops. Difficult to assess  JVD. Abdomen: Soft,  Distended, large abd dressing, colostomy stoma pink, scant bloody drainage,  Few  bowel sounds. : briggs   Extremities: extensive general edema,   no clubbing, no cyanosis. No calf tenderness  Neurologic: unresponsive  Psych:   Unresponsive.        Care Plan discussed with:    Comments   Patient x    Family  y    RN x    Care Manager                    Consultant:          Data Review:   CMP:   Lab Results   Component Value Date/Time     04/24/2017 02:45 AM    K 2.6 (LL) 04/24/2017 02:45 AM     04/24/2017 02:45 AM    CO2 28 04/24/2017 02:45 AM    AGAP 11 04/24/2017 02:45 AM     (H) 04/24/2017 02:45 AM    BUN 8 04/24/2017 02:45 AM    CREA 0.66 (L) 04/24/2017 02:45 AM    GFRAA >60 04/24/2017 02:45 AM    GFRNA >60 04/24/2017 02:45 AM    CA 8.4 (L) 04/24/2017 02:45 AM    MG 1.9 04/24/2017 02:45 AM    PHOS 3.0 04/24/2017 02:45 AM     CBC:   Lab Results   Component Value Date/Time    WBC 12.4 (H) 04/24/2017 02:45 AM    HGB 8.5 (L) 04/24/2017 02:45 AM    HCT 27.1 (L) 04/24/2017 02:45 AM     04/24/2017 02:45 AM     All Cardiac Markers in the last 24 hours: No results found for: CPK, CKMMB, CKMB, RCK3, CKMBT, CKNDX, CKND1, SUJEY, TROPT, TROIQ, GALLITO, TROPT, TNIPOC, BNP, BNPP  Recent Glucose Results:   Lab Results   Component Value Date/Time    GLUCPOC 141 (H) 04/24/2017 12:18 AM    GLUCPOC 177 (H) 04/23/2017 04:15 PM    GLUCPOC 135 (H) 04/23/2017 12:12 PM     (H) 04/24/2017 02:45 AM ABG: No results found for: PH, PHI, PCO2, PCO2I, PO2, PO2I, HCO3, HCO3I, FIO2, FIO2I  LIPIDS:  Cholesterol, total   Date Value Ref Range Status   04/18/2017 151 <200 MG/DL Final     Triglyceride   Date Value Ref Range Status   04/18/2017 105 <150 MG/DL Final     Comment:     Based on NCEP-ATP III:  Triglycerides <150 mg/dL  is considered normal, 150-199 mg/dL  borderline high,  200-499 mg/dL high and  greater than or equal to 500 mg/dL very high. HDL Cholesterol   Date Value Ref Range Status   04/18/2017 45 MG/DL Final     Comment:     Based on NCEP ATP III, HDL Cholesterol <40 mg/dL is considered low and >60 mg/dL is elevated. LDL, calculated   Date Value Ref Range Status   04/18/2017 85 0 - 100 MG/DL Final     Comment:     Based on the NCEP-ATP: LDL-C concentrations are considered  optimal <100 mg/dL,  near optimal/above Normal 100-129 mg/dL  Borderline High: 130-159, High: 160-189 mg/dL  Very High: Greater than or equal to 190 mg/dL       VLDL, calculated   Date Value Ref Range Status   04/18/2017 21 MG/DL Final     CHOL/HDL Ratio   Date Value Ref Range Status   04/18/2017 3.4 0 - 5.0   Final       Medications reviewed  Current Facility-Administered Medications   Medication Dose Route Frequency    potassium chloride 10 mEq in 100 ml IVPB  10 mEq IntraVENous Q1H    bumetanide (BUMEX) injection 1 mg  1 mg IntraVENous ONCE    0.9% sodium chloride 1,000 mL with mvi, adult no. 4 with vit K 10 mL, thiamine 137 mg, folic acid 1 mg infusion   IntraVENous Q24H    dilTIAZem (CARDIZEM) 125 mg in dextrose 5% 125 mL infusion  0-15 mg/hr IntraVENous TITRATE    0.9% sodium chloride infusion 250 mL  250 mL IntraVENous PRN    insulin lispro (HUMALOG) injection   SubCUTAneous Q6H    sodium chloride (NS) flush 10 mL  10 mL InterCATHeter Q8H    sodium chloride (NS) flush 10 mL  10 mL InterCATHeter PRN    piperacillin-tazobactam (ZOSYN) 3.375 g in 0.9% sodium chloride (MBP/ADV) 100 mL  3.375 g IntraVENous Q8H    0.9% sodium chloride infusion 250 mL  250 mL IntraVENous PRN    pravastatin (PRAVACHOL) tablet 40 mg  40 mg Oral QHS    labetalol (NORMODYNE;TRANDATE) injection 10 mg  10 mg IntraVENous Q6H PRN    predniSONE (DELTASONE) tablet 50 mg  50 mg Oral DAILY WITH BREAKFAST    pyridostigmine (MESTINON) tablet 90 mg  90 mg Oral TID    carvedilol (COREG) tablet 25 mg  25 mg Oral BID WITH MEALS    diphenhydrAMINE (BENADRYL) injection 25 mg  25 mg IntraVENous Q6H PRN    0.9% sodium chloride infusion 250 mL  250 mL IntraVENous PRN    sodium chloride (NS) flush 5-10 mL  5-10 mL IntraVENous Q8H    sodium chloride (NS) flush 5-10 mL  5-10 mL IntraVENous PRN    acetaminophen (TYLENOL) tablet 650 mg  650 mg Oral Q4H PRN    ondansetron (ZOFRAN) injection 4 mg  4 mg IntraVENous Q4H PRN    glucose chewable tablet 16 g  4 Tab Oral PRN    dextrose (D50W) injection syrg 12.5-25 g  12.5-25 g IntraVENous PRN    glucagon (GLUCAGEN) injection 1 mg  1 mg IntraMUSCular PRN    ascorbic acid (vitamin C) (VITAMIN C) tablet 1,000 mg  1,000 mg Oral DAILY    cholecalciferol (VITAMIN D3) tablet 5,000 Units  5,000 Units Oral DAILY    pantoprazole (PROTONIX) 40 mg in sodium chloride 0.9 % 10 mL injection  40 mg IntraVENous Q12H    0.9% sodium chloride infusion 250 mL  250 mL IntraVENous PRN       Clarisa Larry NP

## 2017-04-24 NOTE — HOSPICE
[facility]  [phone]    Nursing Note   Patient Name: Hamzah Roman. YOB: 1945  Age: 73 y.o. [facility] RN Note:  Hospice consult noted. Chart reviewed. Plan of care discussed with patients nurse & care manager. In to meet with patient sister/MPOA Tatiana Casillas. Discussed Hospice philosophy, general plan of care, levels of care, services and on call procedures. Family information packet provided & reviewed with Tatiana Casillas. Ms. Haseeb Loredo elects [facility] for comfort measures, consents signed. Patient admitted to [facility] LOC for management of uncontrolled respiratory distress. Thank you for the opportunity to be of service to this patient.

## 2017-04-24 NOTE — PROGRESS NOTES
[facility]: [address] [facility]  [address] [place], [place] [address] [facility]  CORBIN Mehta    * Inpatient Progress Note *    NAME:  Bruno Sarmiento. :  1945  MRN:  [id number]  PCP:    Gypsy Fontaine MD  ______________________________________________________________________  *Labs, studies, notes and hospital records were reviewed. -ESTUARDO Carr  ______________________________________________________________________  Chart review  · Note patient is now under comfort care, may enroll in hospice  ·  Head CT cancelled in chart and now DNR    Will sign off  If things change, happy to see pt again  ____________________________________________________________________    Collaborating care team physician, Dr. Riggs Running    ____________________________________________________________________    CORBIN Mehta-BC

## 2017-04-24 NOTE — PROGRESS NOTES
[id number] [facility] at [facility]  [phone]    Hematology / Oncology Follow-up    Reason for Visit:   Radha Liu is a 73 y.o. male who is seen for follow-up of colon cancer, PE. Interval History:   Transferred to ICU 4/19/2017;   4/24/2017 Family has decided to make patent comfort care    Mr Robert Preciado resting comfortably. No family at bedside. Medications reviewed in the EMR. Allergies   Allergen Reactions    Lactose Itching    Fructose Other (comments)     States added sugar to foods causes sore throat/ear ache.  Inderal [Propranolol] Unknown (comments)    Lisinopril Angioedema    Gluten Nausea Only        Review of Systems: A 6-point review of systems was obtained, negative except as reviewed in the HPI. Physical Exam:     Visit Vitals    /74    Pulse (!) 104    Temp 99 °F (37.2 °C)    Resp 22    Ht 5' 11\" (1.803 m)    Wt 131.8 kg (290 lb 9.1 oz)    SpO2 92%    BMI 40.53 kg/m2     General: obese, ill appearing  Eyes: closed  HENT: Atraumatic  Neck: Supple  Respiratory: shallow, rapid ; accessory muscle use effort  CV: Tachy rate;2+ edema noted to  Upper and lower extremities. GI: Colostomy noted; Soft, nontender, nondistended, no masses, no hepatomegaly, no splenomegaly  Skin: No rashes, or petechiae  Psych:         Results:     Lab Results   Component Value Date/Time    WBC 12.4 04/24/2017 02:45 AM    HGB 8.5 04/24/2017 02:45 AM    HCT 27.1 04/24/2017 02:45 AM    PLATELET 894 46/72/4275 02:45 AM    MCV 97.1 04/24/2017 02:45 AM    ABS.  NEUTROPHILS 11.3 04/24/2017 02:45 AM    Hemoglobin (POC) 10.5 04/11/2017 12:50 PM     Lab Results   Component Value Date/Time    Sodium 142 04/24/2017 02:45 AM    Potassium 2.6 04/24/2017 02:45 AM    Chloride 103 04/24/2017 02:45 AM    CO2 28 04/24/2017 02:45 AM    Glucose 128 04/24/2017 02:45 AM    BUN 8 04/24/2017 02:45 AM    Creatinine 0.66 04/24/2017 02:45 AM    GFR est AA >60 04/24/2017 02:45 AM    GFR est non-AA >60 04/24/2017 02:45 AM    Calcium 8.4 04/24/2017 02:45 AM    Glucose (POC) 140 04/24/2017 08:22 AM     Lab Results   Component Value Date/Time    Bilirubin, total 0.6 04/23/2017 03:00 AM    ALT (SGPT) 32 04/23/2017 03:00 AM    AST (SGOT) 18 04/23/2017 03:00 AM    Alk. phosphatase 50 04/23/2017 03:00 AM    Protein, total 5.3 04/23/2017 03:00 AM    Albumin 2.5 04/23/2017 03:00 AM    Globulin 2.8 04/23/2017 03:00 AM     Lab Results   Component Value Date/Time    Iron % saturation 17 04/16/2017 01:56 AM    Iron 34 04/16/2017 01:56 AM    TIBC 202 04/16/2017 01:56 AM    Ferritin 235 04/16/2017 01:56 AM    Vitamin B12 1318 09/14/2016 06:02 AM    Homocysteine, plasma 10.8 09/02/2016 08:50 PM    Sed rate, automated 7 10/04/2016 04:30 AM    TSH 0.32 04/05/2017 04:39 PM    JULIA Direct NEGATIVE  09/08/2016 10:03 AM    Lipase 121 04/05/2017 03:49 PM    Hep C  virus Ab Interp. 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 Bilateral pulmonary emboli. 2. RUL airspace disease may represent a pulmonary infarct     4/06/2017 DUPLEX LE  CONCLUSION: Bilateral lower extremity venous duplex positive for deep  venous thrombosis. Negative for thrombophlebitis. 4/10/2017 CTA ABD PELV  IMPRESSION:  1. Sigmoid colonic hemorrhage. 2. Small adjacent mass in the sigmoid.  Diverticulitis slightly favored over a  small colon carcinoma. 3. 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 4. Resolved pulmonary emboli in the visualized portions of the left lower lobe. Stable or slightly decreased right lung emboli with small developing right upper  lobe pulmonary infarction. 4/12/2017 XR CHEST   IMPRESSION: Unchanged peripheral right upper lobe airspace disease. Mild  bibasilar atelectasis. 4/17/2017 CT HEAD WO CONT  IMPRESSION: No evidence of acute intracranial process. No change from the prior  Study. 4/20/2017 Bilateral LE doppler  CONCLUSION: Left lower extremity venous duplex positive for deep  venous thrombosis of indeterminate age involving the popliteal vein. Right lower extremity is thrombus free. NOTE: Bilateral peroneal veins  not visualized. Avascular structure consistent with a baker's cyst  noted in the right popliteal fossa measuring . 3.33 x 3.76 cm.    4/20/2017 CTA ABD PELV W CONT  IMPRESSION:  No evidence of retroperitoneal hemorrhage. Small hematoma along the left lower  quadrant anterior abdominal wall incision. No evidence of active contrast  extravasation. Unchanged pelvic fluid collection, suggestive of abscess. Improving right lower lobe pulmonary emboli. Unchanged right middle lobe  airspace disease, presumably pulmonary infarct. pending    Assessment and Recommendations:   1. Acute encephalopathy  Neuro evaluated  Family has decided to make patient comfort care; Has enrolled in hospice care     We will sign off.       Plan reviewed with Dr Flor Lara By: Carlo Shah NP     April 24, 2017

## 2017-04-24 NOTE — PROGRESS NOTES
NUTRITION       Patient for follow-up. Chart reviewed, noted  Patient now with Hospice, comfort measures. RD to sign off but available as needed.       Saul Puckett RD

## 2017-04-24 NOTE — DISCHARGE SUMMARY
Physician Discharge Summary     Patient ID:  Lizeth Stallings  [id number]  73 y.o.  1945    Admit date: 4/24/2017    Discharge date and time: 4/24/2017    Admission Diagnoses: resp failure  Acute respiratory failure [facility])    Discharge Diagnoses:    Principal Diagnosis   <principal problem not specified>                                             Other Diagnoses  Active Problems:    Acute respiratory failure ([address] (4/24/2017)         Hospital Course:     74 yo WM w/ hx of pAF on [place], DVT/PE, MG, DM p/w GIB, found to have colon CA. Hospitalization c/b issues as below:     Acute Metabolic Encephalopathy / GI bleed / Acute blood loss anemia / diverticular mass / colon cancer s/p sigmoidectomy and colostomy / Anasarca / Acute bilateral pulmonary embolus / LLE DVT / PAF (paroxysmal atrial fibrillation) with rapid ventricular rate / accelerated HTN / Myasthenia gravis with exacerbation, adult form / History of stroke / Morbid obesity with BMI of 40.0-44.9, adult / Type II diabetes mellitus  -- Patient critically ill  -- After speaking with sister (POA), she has made the decision to transfer to comfort care measures, which is certainly reasonable. -- Palliaitve care/Hospice consult. Patient would certainly need inpatient hospice       PCP: Pawel Castro MD    Consults: Cardiology, Pulmonary/Intensive care, Nephrology, Palliative Care and Hospice    Significant Diagnostic Studies: See Hospital Course    Discharged to hospice in terminal condition. Discharge Exam:    Last 24hrs VS reviewed since prior progress note.  Most recent are:          Visit Vitals    /84 (BP 1 Location: Right arm, BP Patient Position: At rest)    Pulse (!) 124    Temp (!) 100.9 °F (38.3 °C)    Resp (!) 35    Ht 5' 11\" (1.803 m)    Wt 131.8 kg (290 lb 9.1 oz)    SpO2 90%    BMI 40.53 kg/m2          SpO2 Readings from Last 6 Encounters:   04/24/17 90%   04/09/17 94%   04/04/17 96%   02/24/17 96%   11/21/16 97%   10/21/16 98%     O2 Flow Rate (L/min): 4 l/min         Intake/Output Summary (Last 24 hours) at 04/24/17 0835  Last data filed at 04/24/17 0507    Gross per 24 hour   Intake 1070 ml   Output 3760 ml   Net -2690 ml            Exam:      Physical Exam:     Gen: obese, frail, elderly, chronically ill-appearing, in severe distress  HEENT: CPAP in place, eyes open, not tracking  Neck: Supple, without masses  Resp: ++++ accessory muscle use, coarse breath sounds throughout  Card: No murmurs, tachycardic, irregularly irregular. +4 peripheral edema bilaterally  Abd: Soft, NTTP. Obese. Stoma pink. Brown ostomy output. Musc: No cyanosis or clubbing  Skin: No rashes or ulcers, skin turgor is good  Neuro: Cranial nerves are grossly intact, moving all extremities, does not follows commands. Psych: No insight, not answering quesions      Disposition: hospice    Patient Instructions:   Current Discharge Medication List      CONTINUE these medications which have NOT CHANGED    Details   apixaban (ELIQUIS) 5 mg tablet Please take 10mg twice daily for 5 days then decrease to 5mg twice daily thereafter  Indications: pulmonary thromboembolism  Qty: 60 Tab, Refills: 0      carvedilol (COREG) 12.5 mg tablet Take 1 Tab by mouth two (2) times daily (with meals). Qty: 60 Tab, Refills: 0      dilTIAZem CD (CARDIZEM CD) 300 mg ER capsule Take 1 Cap by mouth daily. Qty: 30 Cap, Refills: 0      metFORMIN (GLUCOPHAGE) 500 mg tablet Take 1 Tab by mouth daily (with breakfast). Qty: 30 Tab, Refills: 0      traMADol (ULTRAM) 50 mg tablet Take 50 mg by mouth every six (6) hours as needed for Pain. acetaminophen (TYLENOL) 500 mg tablet Take 1,000 mg by mouth every eight (8) hours as needed for Pain.      pyridostigmine (MESTINON) 60 mg tablet Take 60 mg by mouth two (2) times a day. b complex vitamins (B COMPLEX 1) tablet Take 3 Tabs by mouth daily.       cholecalciferol, VITAMIN D3, (VITAMIN D3) 5,000 unit tab tablet Take 5,000 Units by mouth daily.      ascorbic acid, vitamin C, (VITAMIN C) 500 mg tablet Take 1,000-1,500 mg by mouth daily. predniSONE (DELTASONE) 10 mg tablet Take 5 Tabs by mouth daily (with breakfast). Qty: 450 Tab, Refills: 3      potassium chloride SA (MICRO-K) 10 mEq capsule Take 10 mEq by mouth two (2) times a day. Associated Diagnoses: Myasthenia gravis in crisis ([address]      ALPRAZolam (XANAX) 0.25 mg tablet Take 0.25 mg by mouth three (3) times daily as needed for Anxiety. Associated Diagnoses: Myasthenia gravis in crisis ([address]      cloNIDine (CATAPRES) 0.2 mg/24 hr patch 1 Patch by TransDERmal route every seven (7) days.   Qty: 4 Patch, Refills: 0           Activity: Activity as tolerated  Diet: Comfort feeds    Signed:  Dolores Connolly DO  4/24/2017  10:50 AM    Greater than 30 mins was spent in coordination, counseling, and execution of this patient's discharge

## 2017-04-24 NOTE — PROGRESS NOTES
174: Nurse called to room by family. No heartbeat or breath sound audible. Dr. Bushra Valentin on the floor. Notified. 183: Record of death completed. [facility] called. Patient released. 184: Post mortem care complete.  home notified.  Will be up to floor to  patient per family request.

## 2017-04-24 NOTE — PROGRESS NOTES
Bedside and Verbal shift change report given to Princess Bueno RN (oncoming nurse) by ISAAC RN (offgoing nurse). Report included the following information SBAR, Kardex and Recent Results. D/C IV Diltazem. . D/C Patten. .. D/C SCDS. TRANSFER - OUT REPORT:    Verbal report given to michael SMITH(name) on [employer].  being transferred to Critical access hospital(unit) for routine progression of care       Report consisted of patients Situation, Background, Assessment and   Recommendations(SBAR). Information from the following report(s) SBAR, Kardex and Recent Results was reviewed with the receiving nurse. Lines:   Triple Lumen 04/20/17 Left Neck (Active)   Central Line Being Utilized Yes 4/24/2017  8:00 AM   Criteria for Appropriate Use Limited/no vessel suitable for conventional peripheral access 4/24/2017  8:00 AM   Site Assessment Clean, dry, & intact 4/24/2017  8:00 AM   Infiltration Assessment 0 4/24/2017  8:00 AM   Affected Extremity/Extremities Color distal to insertion site pink (or appropriate for race) 4/24/2017  8:00 AM   Date of Last Dressing Change 04/20/17 4/24/2017  8:00 AM   Dressing Status Clean, dry, & intact 4/24/2017  8:00 AM   Dressing Type Disk with Chlorhexadine gluconate (CHG) 4/24/2017  8:00 AM   Action Taken Blood drawn 4/24/2017  4:00 AM   Proximal Hub Color/Line Status White;Capped;Flushed 4/24/2017  8:00 AM   Positive Blood Return (Medial Site) Yes 4/24/2017  8:00 AM   Medial Hub Color/Line Status Blue;Capped;Flushed 4/24/2017  8:00 AM   Positive Blood Return (Lateral Site) Yes 4/24/2017  8:00 AM   Distal Hub Color/Line Status Brown;Capped 4/24/2017  8:00 AM   Positive Blood Return (Site #3) Yes 4/24/2017  8:00 AM   Alcohol Cap Used Yes 4/24/2017  4:00 AM        Opportunity for questions and clarification was provided. Patient transported with:   Registered Nurse with o2 2l rashawn Smith

## 2017-04-24 NOTE — WOUND CARE
Ostomy nurse follow up  Patient transferred to Hospice this am,  Family at bedside   Ostomy and abdominal dressing intact, no immediate needs at this time.     Lizzie Painting

## 2017-04-24 NOTE — PROGRESS NOTES
4- CASE MANAGEMENT NOTE:  Per the MD consult and the sister's choice, I sent a referral thru [facility] to [facility]. They will meet with pt's sister today.  PILY KnoxW, CM

## 2017-04-25 ENCOUNTER — HOME CARE VISIT (OUTPATIENT)
Dept: HOSPICE | Facility: HOSPICE | Age: 72
End: 2017-04-25
Payer: MEDICARE

## 2017-04-25 NOTE — HOSPICE
[facility]  [phone]    Discharge/Death Nursing Note   Patient Name: Suzanne Jaffe YOB: 1945  Age: 73 y.o. Date of Death: 17  Admitted Date: 2017  Time of Death: 5:55 PM    Facility of Care: [facility]  Level of Care: [facility]  Patient Room: [facility]     Hospice Attending: Mendel Zacarias  Hospice Diagnosis: resp failure  Acute respiratory failure ([address]    Death Pronouncement   Pronouncement of death completed by: Anna Castillo MD    Agency staff was not present at the time of death    At the time of death the patient was documented as:  No response to tactile stimuli. Pupils are fixed. No observed chest movement. No palpable pulse. No heartbeat or breath sound audible for 1 minute.       The pt  within [facility]    The following were notified of the patient's death: family at bedside, [id number] [facility] MD Regan and unit staff    Medications were disposed of per facility protocol     Discharge Summary   Discharge Reason: Death    Summary of Care Provided: EOL symptom management, education, emotional support    [x] Post mortem care provided by facility  [x] Notification of  home by facility  [] Referrals/Community resources provided:   [x] Goals completed  [] Durable Medical Equipment vendor notified     Disciplines involved: [x] RN [x] SW [x]  [] SHER [] Vol [] PT [] OT [] ST [] BC    [x] IDT communication/notification    Attending Physician, Dr. Reece Flower, notified of death    Bereaved     Irvin Hernandez  Sister  SEBASTIAN  [address]  [address] [place] [address]

## 2017-04-25 NOTE — ROUTINE PROCESS
Patient in restraints; wrist.  These were removed prior to death. Quality review complete and log updated.

## 2017-04-29 LAB
BACTERIA SPEC CULT: NORMAL
SERVICE CMNT-IMP: NORMAL

## 2017-10-05 NOTE — PROGRESS NOTES
[facility] order acknowledge posible transitioning to hospice.  Thank you Normal rate, regular rhythm.  Heart sounds S1, S2.

## 2022-07-28 NOTE — HOSPICE
"Reached out to provider that placed cpap order with \"home settings.\" Patient has no hx of haider, is on RA, unlabored, and has not worn a bipap/cpap this admission per charting. No call back at this time. Will hold off on placing patient on any sort of bipap/cpap until order is clarified.   " Tessa  [facility]  [phone]    Inpatient Nursing Admission   Patient Name: Dada Gallo. YOB: 1945  Age: 73 y.o. Date of Hospice Admission: 4/24/2017  Hospice Attending Elected by Patient: Wilfredo Ross MD  Primary Care Physician: Emma Sow MD  Admitting RN: Tiago Jay. Nano Perdomo RN  : Mitzy Maddox LCSW     Level of Care (GIP/Routine/Respite): GIP  Facility of Care: [facility]  Patient Room: 523/01     HOSPICE SUMMARY   ER Visits/ Hospitalizations in past year: 4/10/17 GI Bleed, 4/5/17 PE, PAF, Myasthenia gravis w/exacerbation 9/2/16 Myasthenia gravis in crisis  Hospice Diagnosis: resp failure  Acute respiratory failure ([facility]  Onset Date of Hospice Diagnosis: 4/24/2017  Summary of Disease Progression Leading to Hospice Diagnosis: Patient with hospitalization 4/5/17 secondary to PE, PAF and exacerbation of Myasthenia gravis. Patient discharged home on anticoagulation therapy but developed GI bleed, attempted anticoagulation with Lovenox with subsequent GI bleeding. Patient diagnosed with Stage I Colon CA and underwent sigmoidectomy and colostomy 4/11/17. Patient with significant decline over the last week with AMS, somnolence. Increase in respiratory distress over the last several hours to days, rapidly declining0. . Multiple comorbidities including PAF, DM, Mysasthenia gravis, morbid obesity contributing. Patient requiring CPAP overnight and PRN.  Currently on O2 4 l/min via NC with RR in the 30's and agonal despite PRN dose of 2 mg IV morphine and 1 mg IV lorazepam at 8 am.     Co-Morbidities:   Patient Active Problem List   Diagnosis Code    PAF (paroxysmal atrial fibrillation) (HCC) I48.0    Cervical radiculopathy due to degenerative joint disease of spine M47.22    Myasthenia gravis with exacerbation, adult form ([facility] G70.01    History of stroke Z80.78    Leukocytosis D72.829    Morbid obesity with BMI of 40.0-44.9, adult ([facility] E66.01, Z68.41    GI bleed K92.2    Type II diabetes mellitus (HCC) E11.9    Acute blood loss anemia D62    Acute pulmonary embolus (HCC) I26.99    Colonic mass K63.9    Lung nodules R91.8    DVT (deep venous thrombosis) (HCC) I82.409    Malignant neoplasm of sigmoid colon (HCC) C18.7    Abscess of abdominal cavity (HCC) K65.1    BPH (benign prostatic hypertrophy) N40.0    Acute respiratory failure (HCC) J96.00     Diagnoses RELATED to the terminal prognosis: PAF, PE, Myasthenia Gravis, Morbid Obesity, Anemia, PE, Lung Nodules, DVT, Abdominal Abcess, Colon CA, Acute on Chronic Respiratory Failure  Other Diagnoses: DM    Rationale for a prognosis of life expectancy of 6 months or less if the disease follows its normal course (Disease Specific History):     Anette Richardson is a 73 y.o. who was admitted to [address] [place] [address]. The patient's principle diagnosis of acute respiratory failure has resulted in unresponsive, respiratory distress. Functionally, the patient's Palliative Performance Scale has declined over a period of 1 month and is estimated at 10. Objective information that support this patients limited prognosis includes imrse: unresponsive, in acute respiratory distress with rapid decline in status despite aggressive attempts to manage, agonal respirations, RR in the 30's, abdominal breathing, coarse lung sounds throughout with audible wheeze. The patient/family chose comfort measures with the support of Hospice. Patient meets for GIP LOC as evidenced by acute respiratory distress/failure despite administration of Morphine 2 mg IV x 1 and Lorazpeam 1 mg IV x 1 at 8 AM today.     Prognosis estimated based on 04/24/17 clinical assessment is:   [] Few to Many Hours  [x] Hours to Days   [] Few to Many Days   [] Days to Weeks   [] Few to Many Weeks   [] Weeks to Months   [] Few to Many Months    ASSESSMENT    Patient self-reports:  []  Yes    [x] No    SYMPTOMS: Respiratory distress    SIGNS/PHYSICAL FINDINGS: Eyes open, watery, does not track or respond. Tachycardic, irregular HR, pulses weak, trace edema throughout. Tachypnea, coarse breath sounds throughout with audible wheeze, use of accessory muscles, abdominal breath, agonal. Cool to touch, pale. Patten draining elisa urine. Colostomy intact. KARNOFSKY: 10    FAST for all dementia: n/a    CLINICAL INFORMATION     Wt Readings from Last 3 Encounters:   04/23/17 131.8 kg (290 lb 9.1 oz)   04/09/17 140 kg (308 lb 9.6 oz)   04/04/17 137.7 kg (303 lb 9.6 oz)     Ht Readings from Last 3 Encounters:   04/10/17 5' 11\" (1.803 m)   04/05/17 5' 11\" (1.803 m)   01/23/17 5' 11\" (1.803 m)     There is no height or weight on file to calculate BMI. Visit Vitals    /82 (BP 1 Location: Right arm, BP Patient Position: At rest)    Pulse 100    Temp 98.9 °F (37.2 °C)    Resp 22    SpO2 97%       LAB VALUES  No results found for this visit on 04/24/17 (from the past 12 hour(s)). No results found for this visit on 04/24/17 (from the past 6 hour(s)). Lab Results   Component Value Date/Time    Protein, total 5.3 04/23/2017 03:00 AM    Albumin 2.5 04/23/2017 03:00 AM       Currently this patient has:  [x] Supplemental O2 [x] Peripheral IV  [] PICC    [] PORT   [x] Patten Catheter [] NG Tube   [] PEG Tube [] Ostomy    [] AICD: Has ICD been deactivated? [] Yes [] No:______    PLAN     1. Admit to [facility] for respiratory distress  2. Schedule morphine 2 mg every 2 hours; update noon start Morphine PCA 2 mg/hr basal with 1 mg every 6 min bolus; 1:30 PM update Increase Morphine PCA rate to 3 mg/hr basal ; 2:30 PM update Increase Morphine PCA rate to 5 mg/hr basal; 3:45 PM Update Change from Morphine PCA to Dilaudid PCA 1.5 mg/hr basal and 0.5 mg bolus every 6 minutes as needed for breakthrough. 3. Schedule Lorazepam 1 mg every 4 hours; update 1:30 PM change to every 2 hours; update 2:30 Increase to Lorazepam 2 mg every 2 hours scheduled. Continue 1 mg Q15M PRN  4. Symptom relief medications as below PRN  5. IDT for symptom management, emotional and spiritual support    Hospice Team Frequency Orders:  Skilled Nurse - Daily x 7 days with 5 PRN visits for symptom control. MSW  1 visit for initial assessment/evaluation for family support and need for volunteer services. Yoandy Grier  1 visit for initial assessment/evaluation for spiritual support. ADVANCE CARE PLANNING (Complete in ACP Flow Sheet)   Code Status: DNR  Durable DNR: []  Yes  [x]  No  Advance Care Planning 2017   Patient's Healthcare Decision Maker is: Legal Next of Kin   Confirm Advance Directive None   Patient Would Like to Complete Advance Directive -   Maribel Serna - Sister / Nilda Fernandez [phone] Service: [] Yes  []  No      [x] Unknown  Appropriate for Pinning Ceremony:  [] Yes     [] No  Sikhism: Methodist   Home: [address] [place] [address]     1. Discharge Plan: Home with hospice upon stabilization  2. Patient/Family teaching: Hospice orientation, end of life education, symptom management, plan of care, medications for purpose, side effects and frequency/dosage  3. Response to patient/family teaching: Sister verbalized understanding. SOCIAL/EMOTIONAL/SPIRITUAL NEEDS     Spiritual Issues Identified: Patient member of [facility] in [place], [place]. Sister to call today and provide update on patient condition.  referral initiated. Psych/ Social/ Emotional Issues Identified: See SW note for detail    Caregiver Support:  [x] Provided information on End of Life Care   [] Material Provided: Gone From My Sight or Marygrace Sear Dr. Levester Bloch contacted, discharge to hospice order received  Dr. Mendoza All contacted, agrees to serve as attending provider for hospice and provided verbal certification of terminal illness. Orders for hospice admission, medications and plan of treatment received.  Medication reconciliation completed. MEDS: See medication list below  DME: Per hospital  Supplies: Per hospital  IDT communication to include MD, SN, SW, CH and support team    ALLERGIES AND MEDICATIONS     Allergies: Allergies   Allergen Reactions    Lactose Itching    Fructose Other (comments)     States added sugar to foods causes sore throat/ear ache.     Inderal [Propranolol] Unknown (comments)    Lisinopril Angioedema    Gluten Nausea Only     Current Facility-Administered Medications   Medication Dose Route Frequency    LORazepam (ATIVAN) injection 1 mg  1 mg IntraVENous Q15MIN PRN    acetaminophen (TYLENOL) suppository 650 mg  650 mg Rectal Q4H PRN    scopolamine (TRANSDERM-SCOP) 1.5 mg  1.5 mg TransDERmal Q72H PRN    glycopyrrolate (ROBINUL) injection 0.2 mg  0.2 mg IntraVENous Q4H PRN    bisacodyl (DULCOLAX) suppository 10 mg  10 mg Rectal DAILY PRN    morphine injection 2 mg  2 mg IntraVENous Q15MIN PRN    morphine (PF)  mg/30 ml   IntraVENous CONTINUOUS    LORazepam (ATIVAN) injection 1 mg  1 mg IntraVENous Q2H

## (undated) DEVICE — POOLE SUCTION INSTRUMENT WITH REMOVABLE SHEATH: Brand: POOLE

## (undated) DEVICE — COLOSTOMY/ILEOSTOMY KIT, FLEXWEAR: Brand: NEW IMAGE

## (undated) DEVICE — Device

## (undated) DEVICE — SUT PROL 2-0 30IN CT2 BLU --

## (undated) DEVICE — SUTURE VCRL SZ 0 L18IN ABSRB UD POLYGLACTIN 910 COAT BRAID J646H

## (undated) DEVICE — NEEDLE HYPO 22GA L1.5IN BLK S STL HUB POLYPR SHLD REG BVL

## (undated) DEVICE — BLADE ELECTRODE: Brand: EDGE

## (undated) DEVICE — DRAPE FLD WRM W44XL66IN C6L FOR INTRATEMP SYS THERMABASIN

## (undated) DEVICE — DBD-PACK,LAPAROTOMY,2 REINFORCED GOWNS: Brand: MEDLINE

## (undated) DEVICE — SUTURE VCRL SZ 2-0 L36IN ABSRB UD L40MM CT 1/2 CIR J957H

## (undated) DEVICE — 1200 GUARD II KIT W/5MM TUBE W/O VAC TUBE: Brand: GUARDIAN

## (undated) DEVICE — 3M™ MEDIPORE™ H SOFT CLOTH TAPE SHORT ROLL TAPE 6INCHES X 2 YARDS 16 ROLLS/CASE 2866S: Brand: 3M™ MEDIPORE™

## (undated) DEVICE — 3000CC GUARDIAN II: Brand: GUARDIAN

## (undated) DEVICE — EXTRA LARGE, DISPOSABLE C-SECTION PROTECTOR - RETRACTOR: Brand: ALEXIS ® O C-SECTION PROTECTOR - RETRACTOR

## (undated) DEVICE — SUTURE PERMAHAND SZ 2-0 L18IN NONABSORBABLE BLK L26MM SH C012D

## (undated) DEVICE — ABDOMINAL PAD: Brand: DERMACEA

## (undated) DEVICE — KIT COLON W/ 1.1OZ LUB AND 2 END

## (undated) DEVICE — COVER LT HNDL BLU PLAS

## (undated) DEVICE — KENDALL RADIOLUCENT FOAM MONITORING ELECTRODE -RECTANGULAR SHAPE: Brand: KENDALL

## (undated) DEVICE — ROCKER SWITCH PENCIL BLADE ELECTRODE, HOLSTER: Brand: EDGE

## (undated) DEVICE — REM POLYHESIVE ADULT PATIENT RETURN ELECTRODE: Brand: VALLEYLAB

## (undated) DEVICE — STERILE POLYISOPRENE POWDER-FREE SURGICAL GLOVES: Brand: PROTEXIS

## (undated) DEVICE — BAG BELONG PT PERS CLEAR HANDL

## (undated) DEVICE — DRAPE,REIN 53X77,STERILE: Brand: MEDLINE

## (undated) DEVICE — STERILE-Z MAYO STAND COVERS CLEAR POLYETHYLENE STERILE UNIVERSAL FIT 20 PER CASE: Brand: STERILE-Z

## (undated) DEVICE — DEVON™ KNEE AND BODY STRAP 60" X 3" (1.5 M X 7.6 CM): Brand: DEVON

## (undated) DEVICE — GAUZE SPONGES,12 PLY: Brand: CURITY

## (undated) DEVICE — NON-ADHERENT STRIPS,OIL EMULSION: Brand: CURITY

## (undated) DEVICE — SOLUTION IV 1000ML 0.9% SOD CHL

## (undated) DEVICE — ARTICULATING RELOAD WITH TRI-STAPLE TECHNOLOGY: Brand: ENDO GIA

## (undated) DEVICE — (D)PREP SKN CHLRAPRP APPL 26ML -- CONVERT TO ITEM 371833

## (undated) DEVICE — AMD ANTIMICROBIAL DRAIN SPONGES, 6 PLY, 0.2% POLYHEXAMETHYLENE BIGUANIDE HCI (PHMB): Brand: EXCILON

## (undated) DEVICE — CANN NASAL O2 CAPNOGRAPHY AD -- FILTERLINE

## (undated) DEVICE — INFECTION CONTROL KIT SYS

## (undated) DEVICE — 3M™ CUROS™ DISINFECTING CAP FOR NEEDLELESS CONNECTORS 270/CARTON 20 CARTONS/CASE CFF1-270: Brand: CUROS™

## (undated) DEVICE — DEVICE TRNSF SPIK STL 2008S] MICROTEK MEDICAL INC]

## (undated) DEVICE — TRAY CATH 16F DRN BG LTX -- CONVERT TO ITEM 363158

## (undated) DEVICE — 3M™ WARMING BLANKET, FULL BODY, 10 PER CASE, 40068: Brand: BAIR HUGGER™

## (undated) DEVICE — SUTURE PDS II SZ 1 L96IN ABSRB VLT TP-1 L65MM 1/2 CIR Z880G

## (undated) DEVICE — SPONGE GZ W4XL4IN COT 12 PLY TYP VII WVN C FLD DSGN

## (undated) DEVICE — SUTURE VCRL SZ 0 L18IN ABSRB UD POLYGLACTIN 910 BRAID TIE J912G

## (undated) DEVICE — DRAIN WND PENRS RADPQ 0.25X18 -- CONVERT TO ITEM 360112

## (undated) DEVICE — SUTURE MCRYL SZ 3-0 L27IN ABSRB UD L26MM SH 1/2 CIR Y416H

## (undated) DEVICE — SPONGE LAP 18X18IN STRL -- 5/PK

## (undated) DEVICE — SUTURE PROL SZ 2-0 L36IN NONABSORBABLE BLU SH L26MM 1/2 CIR 8523H

## (undated) DEVICE — SOLIDIFIER MEDC 1200ML -- CONVERT TO 356117

## (undated) DEVICE — STAPLER SKIN H3.9MM WIRE DIA0.58MM CRWN 6.9MM 35 STPL ROT

## (undated) DEVICE — SURGICAL PROCEDURE PACK BASIN MAJ SET CUST NO CAUT

## (undated) DEVICE — (D)SYR 10ML 1/5ML GRAD NSAF -- PKGING CHANGE USE ITEM 338027

## (undated) DEVICE — TOWEL SURG W17XL27IN STD BLU COT NONFENESTRATED PREWASHED